# Patient Record
Sex: FEMALE | Race: BLACK OR AFRICAN AMERICAN | NOT HISPANIC OR LATINO | Employment: UNEMPLOYED | URBAN - METROPOLITAN AREA
[De-identification: names, ages, dates, MRNs, and addresses within clinical notes are randomized per-mention and may not be internally consistent; named-entity substitution may affect disease eponyms.]

---

## 2017-03-20 ENCOUNTER — ALLSCRIPTS OFFICE VISIT (OUTPATIENT)
Dept: OTHER | Facility: OTHER | Age: 42
End: 2017-03-20

## 2017-03-20 DIAGNOSIS — N64.4 MASTODYNIA: ICD-10-CM

## 2017-03-20 DIAGNOSIS — D56.3 THALASSEMIA MINOR: ICD-10-CM

## 2017-03-20 DIAGNOSIS — D64.9 ANEMIA: ICD-10-CM

## 2017-03-20 DIAGNOSIS — N64.52 NIPPLE DISCHARGE: ICD-10-CM

## 2017-03-20 DIAGNOSIS — M54.9 DORSALGIA: ICD-10-CM

## 2017-03-24 ENCOUNTER — TRANSCRIBE ORDERS (OUTPATIENT)
Dept: LAB | Facility: HOSPITAL | Age: 42
End: 2017-03-24

## 2017-03-24 ENCOUNTER — HOSPITAL ENCOUNTER (OUTPATIENT)
Dept: ULTRASOUND IMAGING | Facility: CLINIC | Age: 42
Discharge: HOME | End: 2017-03-24
Payer: COMMERCIAL

## 2017-03-24 ENCOUNTER — HOSPITAL ENCOUNTER (OUTPATIENT)
Dept: MAMMOGRAPHY | Facility: CLINIC | Age: 42
Discharge: HOME | End: 2017-03-24
Payer: COMMERCIAL

## 2017-03-24 ENCOUNTER — APPOINTMENT (OUTPATIENT)
Dept: LAB | Facility: HOSPITAL | Age: 42
End: 2017-03-24
Payer: COMMERCIAL

## 2017-03-24 DIAGNOSIS — N64.4 MASTODYNIA: ICD-10-CM

## 2017-03-24 DIAGNOSIS — N64.52 NIPPLE DISCHARGE: ICD-10-CM

## 2017-03-24 DIAGNOSIS — R56.9 SEIZURES (HCC): Primary | ICD-10-CM

## 2017-03-24 DIAGNOSIS — D56.3 THALASSEMIA MINOR: ICD-10-CM

## 2017-03-24 LAB
ALBUMIN SERPL BCP-MCNC: 3.5 G/DL (ref 3.5–5)
ALP SERPL-CCNC: 54 U/L (ref 46–116)
ALT SERPL W P-5'-P-CCNC: 23 U/L (ref 12–78)
ANION GAP SERPL CALCULATED.3IONS-SCNC: 7 MMOL/L (ref 4–13)
AST SERPL W P-5'-P-CCNC: 13 U/L (ref 5–45)
BASOPHILS # BLD AUTO: 0.02 THOUSANDS/ΜL (ref 0–0.1)
BASOPHILS NFR BLD AUTO: 0 % (ref 0–1)
BILIRUB SERPL-MCNC: 0.26 MG/DL (ref 0.2–1)
BUN SERPL-MCNC: 9 MG/DL (ref 5–25)
CALCIUM SERPL-MCNC: 8.8 MG/DL (ref 8.3–10.1)
CHLORIDE SERPL-SCNC: 104 MMOL/L (ref 100–108)
CO2 SERPL-SCNC: 28 MMOL/L (ref 21–32)
CREAT SERPL-MCNC: 0.8 MG/DL (ref 0.6–1.3)
EOSINOPHIL # BLD AUTO: 0.23 THOUSAND/ΜL (ref 0–0.61)
EOSINOPHIL NFR BLD AUTO: 3 % (ref 0–6)
ERYTHROCYTE [DISTWIDTH] IN BLOOD BY AUTOMATED COUNT: 15.6 % (ref 11.6–15.1)
GFR SERPL CREATININE-BSD FRML MDRD: >60 ML/MIN/1.73SQ M
GLUCOSE SERPL-MCNC: 83 MG/DL (ref 65–140)
HCT VFR BLD AUTO: 34.1 % (ref 34.8–46.1)
HGB BLD-MCNC: 11 G/DL (ref 11.5–15.4)
LYMPHOCYTES # BLD AUTO: 2.66 THOUSANDS/ΜL (ref 0.6–4.47)
LYMPHOCYTES NFR BLD AUTO: 39 % (ref 14–44)
MCH RBC QN AUTO: 24.5 PG (ref 26.8–34.3)
MCHC RBC AUTO-ENTMCNC: 32.3 G/DL (ref 31.4–37.4)
MCV RBC AUTO: 76 FL (ref 82–98)
MONOCYTES # BLD AUTO: 0.44 THOUSAND/ΜL (ref 0.17–1.22)
MONOCYTES NFR BLD AUTO: 6 % (ref 4–12)
NEUTROPHILS # BLD AUTO: 3.5 THOUSANDS/ΜL (ref 1.85–7.62)
NEUTS SEG NFR BLD AUTO: 52 % (ref 43–75)
NRBC BLD AUTO-RTO: 0 /100 WBCS
PLATELET # BLD AUTO: 238 THOUSANDS/UL (ref 149–390)
PMV BLD AUTO: 10.5 FL (ref 8.9–12.7)
POTASSIUM SERPL-SCNC: 3.4 MMOL/L (ref 3.5–5.3)
PROLACTIN SERPL-MCNC: 12 NG/ML
PROT SERPL-MCNC: 7.2 G/DL (ref 6.4–8.2)
RBC # BLD AUTO: 4.49 MILLION/UL (ref 3.81–5.12)
SODIUM SERPL-SCNC: 139 MMOL/L (ref 136–145)
T3 SERPL-MCNC: 1.1 NG/ML (ref 0.6–1.8)
T4 FREE SERPL-MCNC: 0.98 NG/DL (ref 0.76–1.46)
TSH SERPL DL<=0.05 MIU/L-ACNC: 0.91 UIU/ML (ref 0.36–3.74)
WBC # BLD AUTO: 6.87 THOUSAND/UL (ref 4.31–10.16)

## 2017-03-24 PROCEDURE — G0204 DX MAMMO INCL CAD BI: HCPCS

## 2017-03-24 PROCEDURE — 76642 ULTRASOUND BREAST LIMITED: CPT

## 2017-03-24 PROCEDURE — 80053 COMPREHEN METABOLIC PANEL: CPT

## 2017-03-24 PROCEDURE — 85025 COMPLETE CBC W/AUTO DIFF WBC: CPT

## 2017-03-24 PROCEDURE — 84146 ASSAY OF PROLACTIN: CPT

## 2017-03-24 PROCEDURE — 84439 ASSAY OF FREE THYROXINE: CPT

## 2017-03-24 PROCEDURE — 84443 ASSAY THYROID STIM HORMONE: CPT

## 2017-03-24 PROCEDURE — 84480 ASSAY TRIIODOTHYRONINE (T3): CPT

## 2017-03-24 PROCEDURE — 36415 COLL VENOUS BLD VENIPUNCTURE: CPT

## 2017-04-08 ENCOUNTER — APPOINTMENT (EMERGENCY)
Dept: RADIOLOGY | Facility: HOSPITAL | Age: 42
End: 2017-04-08
Payer: COMMERCIAL

## 2017-04-08 ENCOUNTER — HOSPITAL ENCOUNTER (EMERGENCY)
Facility: HOSPITAL | Age: 42
Discharge: HOME | End: 2017-04-08
Attending: EMERGENCY MEDICINE | Admitting: EMERGENCY MEDICINE
Payer: COMMERCIAL

## 2017-04-08 VITALS
OXYGEN SATURATION: 98 % | WEIGHT: 193 LBS | RESPIRATION RATE: 24 BRPM | TEMPERATURE: 98.7 F | HEIGHT: 67 IN | DIASTOLIC BLOOD PRESSURE: 101 MMHG | BODY MASS INDEX: 30.29 KG/M2 | SYSTOLIC BLOOD PRESSURE: 140 MMHG | HEART RATE: 97 BPM

## 2017-04-08 DIAGNOSIS — M54.9 BILATERAL BACK PAIN, UNSPECIFIED BACK LOCATION, UNSPECIFIED CHRONICITY: Primary | ICD-10-CM

## 2017-04-08 LAB
ALBUMIN SERPL BCP-MCNC: 3.7 G/DL (ref 3.5–5)
ALP SERPL-CCNC: 73 U/L (ref 46–116)
ALT SERPL W P-5'-P-CCNC: 36 U/L (ref 12–78)
ANION GAP SERPL CALCULATED.3IONS-SCNC: 8 MMOL/L (ref 4–13)
AST SERPL W P-5'-P-CCNC: 28 U/L (ref 5–45)
BASOPHILS # BLD AUTO: 0.03 THOUSANDS/ΜL (ref 0–0.1)
BASOPHILS NFR BLD AUTO: 0 % (ref 0–1)
BILIRUB SERPL-MCNC: 0.22 MG/DL (ref 0.2–1)
BUN SERPL-MCNC: 9 MG/DL (ref 5–25)
CALCIUM SERPL-MCNC: 8.7 MG/DL (ref 8.3–10.1)
CHLORIDE SERPL-SCNC: 105 MMOL/L (ref 100–108)
CO2 SERPL-SCNC: 27 MMOL/L (ref 21–32)
CREAT SERPL-MCNC: 0.81 MG/DL (ref 0.6–1.3)
EOSINOPHIL # BLD AUTO: 0.23 THOUSAND/ΜL (ref 0–0.61)
EOSINOPHIL NFR BLD AUTO: 3 % (ref 0–6)
ERYTHROCYTE [DISTWIDTH] IN BLOOD BY AUTOMATED COUNT: 15.4 % (ref 11.6–15.1)
ETHANOL SERPL-MCNC: 307 MG/DL (ref 0–3)
GFR SERPL CREATININE-BSD FRML MDRD: >60 ML/MIN/1.73SQ M
GLUCOSE SERPL-MCNC: 96 MG/DL (ref 65–140)
HCT VFR BLD AUTO: 39.5 % (ref 34.8–46.1)
HGB BLD-MCNC: 13 G/DL (ref 11.5–15.4)
LYMPHOCYTES # BLD AUTO: 3.46 THOUSANDS/ΜL (ref 0.6–4.47)
LYMPHOCYTES NFR BLD AUTO: 42 % (ref 14–44)
MCH RBC QN AUTO: 24.6 PG (ref 26.8–34.3)
MCHC RBC AUTO-ENTMCNC: 32.9 G/DL (ref 31.4–37.4)
MCV RBC AUTO: 75 FL (ref 82–98)
MONOCYTES # BLD AUTO: 0.59 THOUSAND/ΜL (ref 0.17–1.22)
MONOCYTES NFR BLD AUTO: 7 % (ref 4–12)
NEUTROPHILS # BLD AUTO: 4 THOUSANDS/ΜL (ref 1.85–7.62)
NEUTS SEG NFR BLD AUTO: 48 % (ref 43–75)
NRBC BLD AUTO-RTO: 0 /100 WBCS
PLATELET # BLD AUTO: 328 THOUSANDS/UL (ref 149–390)
PMV BLD AUTO: 9.8 FL (ref 8.9–12.7)
POTASSIUM SERPL-SCNC: 4.1 MMOL/L (ref 3.5–5.3)
PROT SERPL-MCNC: 8.1 G/DL (ref 6.4–8.2)
RBC # BLD AUTO: 5.28 MILLION/UL (ref 3.81–5.12)
SODIUM SERPL-SCNC: 140 MMOL/L (ref 136–145)
WBC # BLD AUTO: 8.34 THOUSAND/UL (ref 4.31–10.16)

## 2017-04-08 PROCEDURE — 99285 EMERGENCY DEPT VISIT HI MDM: CPT

## 2017-04-08 PROCEDURE — 70450 CT HEAD/BRAIN W/O DYE: CPT

## 2017-04-08 PROCEDURE — 72100 X-RAY EXAM L-S SPINE 2/3 VWS: CPT

## 2017-04-08 PROCEDURE — 80320 DRUG SCREEN QUANTALCOHOLS: CPT | Performed by: EMERGENCY MEDICINE

## 2017-04-08 PROCEDURE — 96374 THER/PROPH/DIAG INJ IV PUSH: CPT

## 2017-04-08 PROCEDURE — 85025 COMPLETE CBC W/AUTO DIFF WBC: CPT | Performed by: EMERGENCY MEDICINE

## 2017-04-08 PROCEDURE — 36415 COLL VENOUS BLD VENIPUNCTURE: CPT | Performed by: EMERGENCY MEDICINE

## 2017-04-08 PROCEDURE — 93005 ELECTROCARDIOGRAM TRACING: CPT | Performed by: EMERGENCY MEDICINE

## 2017-04-08 PROCEDURE — 80053 COMPREHEN METABOLIC PANEL: CPT | Performed by: EMERGENCY MEDICINE

## 2017-04-08 RX ORDER — ZIPRASIDONE MESYLATE 20 MG/ML
INJECTION, POWDER, LYOPHILIZED, FOR SOLUTION INTRAMUSCULAR
Status: DISCONTINUED
Start: 2017-04-08 | End: 2017-04-09 | Stop reason: HOSPADM

## 2017-04-08 RX ORDER — ASENAPINE 5 MG/1
5 TABLET SUBLINGUAL 2 TIMES DAILY
Status: DISCONTINUED | OUTPATIENT
Start: 2017-04-09 | End: 2017-04-09 | Stop reason: HOSPADM

## 2017-04-08 RX ORDER — LIDOCAINE 50 MG/G
2 PATCH TOPICAL ONCE
Status: COMPLETED | OUTPATIENT
Start: 2017-04-08 | End: 2017-04-08

## 2017-04-08 RX ORDER — LORAZEPAM 2 MG/ML
INJECTION INTRAMUSCULAR
Status: DISCONTINUED
Start: 2017-04-08 | End: 2017-04-09 | Stop reason: HOSPADM

## 2017-04-08 RX ORDER — KETOROLAC TROMETHAMINE 30 MG/ML
15 INJECTION, SOLUTION INTRAMUSCULAR; INTRAVENOUS ONCE
Status: COMPLETED | OUTPATIENT
Start: 2017-04-08 | End: 2017-04-08

## 2017-04-08 RX ADMIN — KETOROLAC TROMETHAMINE 15 MG: 30 INJECTION, SOLUTION INTRAMUSCULAR at 22:14

## 2017-04-08 RX ADMIN — LIDOCAINE 2 PATCH: 50 PATCH CUTANEOUS at 22:15

## 2017-04-10 LAB
ATRIAL RATE: 96 BPM
P AXIS: 79 DEGREES
PR INTERVAL: 160 MS
QRS AXIS: 33 DEGREES
QRSD INTERVAL: 70 MS
QT INTERVAL: 334 MS
QTC INTERVAL: 421 MS
T WAVE AXIS: -5 DEGREES
VENTRICULAR RATE: 96 BPM

## 2017-04-11 ENCOUNTER — ALLSCRIPTS OFFICE VISIT (OUTPATIENT)
Dept: OTHER | Facility: OTHER | Age: 42
End: 2017-04-11

## 2017-04-11 ENCOUNTER — APPOINTMENT (OUTPATIENT)
Dept: LAB | Facility: CLINIC | Age: 42
End: 2017-04-11
Payer: COMMERCIAL

## 2017-04-11 DIAGNOSIS — D64.9 ANEMIA: ICD-10-CM

## 2017-04-11 LAB
FERRITIN SERPL-MCNC: 44 NG/ML (ref 8–388)
IRON SERPL-MCNC: 52 UG/DL (ref 50–170)

## 2017-04-11 PROCEDURE — 82728 ASSAY OF FERRITIN: CPT

## 2017-04-11 PROCEDURE — 83540 ASSAY OF IRON: CPT

## 2017-04-11 PROCEDURE — 36415 COLL VENOUS BLD VENIPUNCTURE: CPT

## 2017-04-13 ENCOUNTER — ALLSCRIPTS OFFICE VISIT (OUTPATIENT)
Dept: OTHER | Facility: OTHER | Age: 42
End: 2017-04-13

## 2017-04-13 ENCOUNTER — GENERIC CONVERSION - ENCOUNTER (OUTPATIENT)
Dept: OTHER | Facility: OTHER | Age: 42
End: 2017-04-13

## 2017-04-25 ENCOUNTER — APPOINTMENT (OUTPATIENT)
Dept: LAB | Facility: HOSPITAL | Age: 42
End: 2017-04-25
Payer: COMMERCIAL

## 2017-04-25 ENCOUNTER — TRANSCRIBE ORDERS (OUTPATIENT)
Dept: LAB | Facility: HOSPITAL | Age: 42
End: 2017-04-25

## 2017-04-25 ENCOUNTER — TRANSCRIBE ORDERS (OUTPATIENT)
Dept: NEUROLOGY | Facility: AMBULATORY SURGERY CENTER | Age: 42
End: 2017-04-25

## 2017-04-25 ENCOUNTER — ALLSCRIPTS OFFICE VISIT (OUTPATIENT)
Dept: OTHER | Facility: OTHER | Age: 42
End: 2017-04-25

## 2017-04-25 ENCOUNTER — LAB REQUISITION (OUTPATIENT)
Dept: LAB | Facility: HOSPITAL | Age: 42
End: 2017-04-25
Payer: COMMERCIAL

## 2017-04-25 DIAGNOSIS — Z11.3 ENCOUNTER FOR SCREENING FOR INFECTIONS WITH PREDOMINANTLY SEXUAL MODE OF TRANSMISSION: ICD-10-CM

## 2017-04-25 DIAGNOSIS — Z01.419 ENCOUNTER FOR GYNECOLOGICAL EXAMINATION WITHOUT ABNORMAL FINDING: ICD-10-CM

## 2017-04-25 DIAGNOSIS — N92.0 EXCESSIVE AND FREQUENT MENSTRUATION WITH REGULAR CYCLE: ICD-10-CM

## 2017-04-25 LAB
HBV SURFACE AG SER QL: NORMAL
RPR SER QL: NORMAL

## 2017-04-25 PROCEDURE — 87389 HIV-1 AG W/HIV-1&-2 AB AG IA: CPT

## 2017-04-25 PROCEDURE — 87591 N.GONORRHOEAE DNA AMP PROB: CPT | Performed by: NURSE PRACTITIONER

## 2017-04-25 PROCEDURE — 87340 HEPATITIS B SURFACE AG IA: CPT

## 2017-04-25 PROCEDURE — 36415 COLL VENOUS BLD VENIPUNCTURE: CPT

## 2017-04-25 PROCEDURE — 87491 CHLMYD TRACH DNA AMP PROBE: CPT | Performed by: NURSE PRACTITIONER

## 2017-04-25 PROCEDURE — 86592 SYPHILIS TEST NON-TREP QUAL: CPT

## 2017-04-25 PROCEDURE — 87624 HPV HI-RISK TYP POOLED RSLT: CPT | Performed by: NURSE PRACTITIONER

## 2017-04-25 PROCEDURE — 88175 CYTOPATH C/V AUTO FLUID REDO: CPT | Performed by: NURSE PRACTITIONER

## 2017-04-26 LAB
CHLAMYDIA DNA CVX QL NAA+PROBE: NORMAL
HIV 1+2 AB+HIV1 P24 AG SERPL QL IA: NORMAL
N GONORRHOEA DNA GENITAL QL NAA+PROBE: NORMAL

## 2017-04-27 LAB — HPV RRNA GENITAL QL NAA+PROBE: NORMAL

## 2017-05-02 LAB
LAB AP GYN PRIMARY INTERPRETATION: NORMAL
Lab: NORMAL
PATH INTERP SPEC-IMP: NORMAL

## 2017-05-03 ENCOUNTER — HOSPITAL ENCOUNTER (OUTPATIENT)
Dept: RADIOLOGY | Facility: HOSPITAL | Age: 42
Discharge: HOME | End: 2017-05-03
Payer: COMMERCIAL

## 2017-05-03 DIAGNOSIS — N92.0 EXCESSIVE AND FREQUENT MENSTRUATION WITH REGULAR CYCLE: ICD-10-CM

## 2017-05-03 PROCEDURE — 76856 US EXAM PELVIC COMPLETE: CPT

## 2017-05-03 PROCEDURE — 76830 TRANSVAGINAL US NON-OB: CPT

## 2017-05-11 ENCOUNTER — ALLSCRIPTS OFFICE VISIT (OUTPATIENT)
Dept: OTHER | Facility: OTHER | Age: 42
End: 2017-05-11

## 2017-05-11 ENCOUNTER — LAB REQUISITION (OUTPATIENT)
Dept: LAB | Facility: HOSPITAL | Age: 42
End: 2017-05-11
Payer: COMMERCIAL

## 2017-05-11 DIAGNOSIS — R87.619 ABNORMAL CYTOLOGICAL FINDING IN SPECIMEN FROM CERVIX UTERI: ICD-10-CM

## 2017-05-11 PROCEDURE — 88305 TISSUE EXAM BY PATHOLOGIST: CPT | Performed by: OBSTETRICS & GYNECOLOGY

## 2017-05-25 ENCOUNTER — ALLSCRIPTS OFFICE VISIT (OUTPATIENT)
Dept: OTHER | Facility: OTHER | Age: 42
End: 2017-05-25

## 2017-06-12 ENCOUNTER — ALLSCRIPTS OFFICE VISIT (OUTPATIENT)
Dept: OTHER | Facility: OTHER | Age: 42
End: 2017-06-12

## 2017-06-12 ENCOUNTER — LAB REQUISITION (OUTPATIENT)
Dept: LAB | Facility: HOSPITAL | Age: 42
End: 2017-06-12
Payer: COMMERCIAL

## 2017-06-12 DIAGNOSIS — N93.9 ABNORMAL UTERINE AND VAGINAL BLEEDING, UNSPECIFIED: ICD-10-CM

## 2017-06-12 PROCEDURE — 88305 TISSUE EXAM BY PATHOLOGIST: CPT | Performed by: OBSTETRICS & GYNECOLOGY

## 2017-09-01 DIAGNOSIS — R79.0 ABNORMAL LEVEL OF BLOOD MINERAL: ICD-10-CM

## 2017-09-01 DIAGNOSIS — K62.5 HEMORRHAGE OF ANUS AND RECTUM: ICD-10-CM

## 2017-09-01 DIAGNOSIS — N60.01 SOLITARY CYST OF RIGHT BREAST: ICD-10-CM

## 2017-09-01 DIAGNOSIS — N60.02 SOLITARY CYST OF LEFT BREAST: ICD-10-CM

## 2017-09-04 ENCOUNTER — APPOINTMENT (EMERGENCY)
Dept: RADIOLOGY | Facility: HOSPITAL | Age: 42
End: 2017-09-04
Payer: COMMERCIAL

## 2017-09-04 ENCOUNTER — HOSPITAL ENCOUNTER (EMERGENCY)
Facility: HOSPITAL | Age: 42
Discharge: HOME | End: 2017-09-04
Attending: EMERGENCY MEDICINE | Admitting: EMERGENCY MEDICINE
Payer: COMMERCIAL

## 2017-09-04 VITALS
RESPIRATION RATE: 18 BRPM | BODY MASS INDEX: 27.62 KG/M2 | HEIGHT: 67 IN | WEIGHT: 176 LBS | OXYGEN SATURATION: 100 % | SYSTOLIC BLOOD PRESSURE: 148 MMHG | HEART RATE: 63 BPM | DIASTOLIC BLOOD PRESSURE: 75 MMHG | TEMPERATURE: 96.6 F

## 2017-09-04 DIAGNOSIS — R63.0 DECREASED APPETITE: Primary | ICD-10-CM

## 2017-09-04 DIAGNOSIS — F32.A DEPRESSION: ICD-10-CM

## 2017-09-04 DIAGNOSIS — N83.201 CYST OF RIGHT OVARY: ICD-10-CM

## 2017-09-04 DIAGNOSIS — F10.10 ALCOHOL ABUSE: ICD-10-CM

## 2017-09-04 LAB
ANION GAP SERPL CALCULATED.3IONS-SCNC: 7 MMOL/L (ref 4–13)
BASOPHILS # BLD AUTO: 0.03 THOUSANDS/ΜL (ref 0–0.1)
BASOPHILS NFR BLD AUTO: 0 % (ref 0–1)
BUN SERPL-MCNC: 8 MG/DL (ref 5–25)
CALCIUM SERPL-MCNC: 9.1 MG/DL (ref 8.3–10.1)
CHLORIDE SERPL-SCNC: 103 MMOL/L (ref 100–108)
CO2 SERPL-SCNC: 25 MMOL/L (ref 21–32)
CREAT SERPL-MCNC: 0.77 MG/DL (ref 0.6–1.3)
EOSINOPHIL # BLD AUTO: 0.1 THOUSAND/ΜL (ref 0–0.61)
EOSINOPHIL NFR BLD AUTO: 1 % (ref 0–6)
ERYTHROCYTE [DISTWIDTH] IN BLOOD BY AUTOMATED COUNT: 17.9 % (ref 11.6–15.1)
ETHANOL EXG-MCNC: 0 MG/DL
GFR SERPL CREATININE-BSD FRML MDRD: 112 ML/MIN/1.73SQ M
GLUCOSE SERPL-MCNC: 91 MG/DL (ref 65–140)
HCG UR QL: NEGATIVE
HCT VFR BLD AUTO: 35.1 % (ref 34.8–46.1)
HGB BLD-MCNC: 11.5 G/DL (ref 11.5–15.4)
LYMPHOCYTES # BLD AUTO: 2.6 THOUSANDS/ΜL (ref 0.6–4.47)
LYMPHOCYTES NFR BLD AUTO: 29 % (ref 14–44)
MCH RBC QN AUTO: 24.4 PG (ref 26.8–34.3)
MCHC RBC AUTO-ENTMCNC: 32.8 G/DL (ref 31.4–37.4)
MCV RBC AUTO: 74 FL (ref 82–98)
MONOCYTES # BLD AUTO: 0.66 THOUSAND/ΜL (ref 0.17–1.22)
MONOCYTES NFR BLD AUTO: 7 % (ref 4–12)
NEUTROPHILS # BLD AUTO: 5.67 THOUSANDS/ΜL (ref 1.85–7.62)
NEUTS SEG NFR BLD AUTO: 63 % (ref 43–75)
NRBC BLD AUTO-RTO: 0 /100 WBCS
PLATELET # BLD AUTO: 261 THOUSANDS/UL (ref 149–390)
PMV BLD AUTO: 10.5 FL (ref 8.9–12.7)
POTASSIUM SERPL-SCNC: 4.1 MMOL/L (ref 3.5–5.3)
RBC # BLD AUTO: 4.72 MILLION/UL (ref 3.81–5.12)
SODIUM SERPL-SCNC: 135 MMOL/L (ref 136–145)
WBC # BLD AUTO: 9.08 THOUSAND/UL (ref 4.31–10.16)

## 2017-09-04 PROCEDURE — 76830 TRANSVAGINAL US NON-OB: CPT

## 2017-09-04 PROCEDURE — 99285 EMERGENCY DEPT VISIT HI MDM: CPT

## 2017-09-04 PROCEDURE — 81025 URINE PREGNANCY TEST: CPT | Performed by: EMERGENCY MEDICINE

## 2017-09-04 PROCEDURE — 85025 COMPLETE CBC W/AUTO DIFF WBC: CPT | Performed by: EMERGENCY MEDICINE

## 2017-09-04 PROCEDURE — 82075 ASSAY OF BREATH ETHANOL: CPT | Performed by: EMERGENCY MEDICINE

## 2017-09-04 PROCEDURE — 93976 VASCULAR STUDY: CPT

## 2017-09-04 PROCEDURE — 36415 COLL VENOUS BLD VENIPUNCTURE: CPT | Performed by: EMERGENCY MEDICINE

## 2017-09-04 PROCEDURE — 74177 CT ABD & PELVIS W/CONTRAST: CPT

## 2017-09-04 PROCEDURE — 76856 US EXAM PELVIC COMPLETE: CPT

## 2017-09-04 PROCEDURE — 80048 BASIC METABOLIC PNL TOTAL CA: CPT | Performed by: EMERGENCY MEDICINE

## 2017-09-04 RX ORDER — ALBUTEROL SULFATE 90 UG/1
2 AEROSOL, METERED RESPIRATORY (INHALATION) EVERY 6 HOURS PRN
COMMUNITY
End: 2019-03-05

## 2017-09-04 RX ADMIN — IOHEXOL 100 ML: 350 INJECTION, SOLUTION INTRAVENOUS at 11:33

## 2017-09-08 ENCOUNTER — ALLSCRIPTS OFFICE VISIT (OUTPATIENT)
Dept: OTHER | Facility: OTHER | Age: 42
End: 2017-09-08

## 2017-09-08 ENCOUNTER — APPOINTMENT (OUTPATIENT)
Dept: LAB | Facility: CLINIC | Age: 42
End: 2017-09-08
Payer: COMMERCIAL

## 2017-09-08 DIAGNOSIS — K62.5 HEMORRHAGE OF ANUS AND RECTUM: ICD-10-CM

## 2017-09-08 LAB
ANION GAP SERPL CALCULATED.3IONS-SCNC: 8 MMOL/L (ref 4–13)
BUN SERPL-MCNC: 7 MG/DL (ref 5–25)
CALCIUM SERPL-MCNC: 8.9 MG/DL (ref 8.3–10.1)
CHLORIDE SERPL-SCNC: 105 MMOL/L (ref 100–108)
CO2 SERPL-SCNC: 23 MMOL/L (ref 21–32)
CREAT SERPL-MCNC: 0.72 MG/DL (ref 0.6–1.3)
ERYTHROCYTE [DISTWIDTH] IN BLOOD BY AUTOMATED COUNT: 18.2 % (ref 11.6–15.1)
GFR SERPL CREATININE-BSD FRML MDRD: 121 ML/MIN/1.73SQ M
GLUCOSE P FAST SERPL-MCNC: 87 MG/DL (ref 65–99)
HCT VFR BLD AUTO: 36.5 % (ref 34.8–46.1)
HGB BLD-MCNC: 11.8 G/DL (ref 11.5–15.4)
MCH RBC QN AUTO: 24.5 PG (ref 26.8–34.3)
MCHC RBC AUTO-ENTMCNC: 32.3 G/DL (ref 31.4–37.4)
MCV RBC AUTO: 76 FL (ref 82–98)
PLATELET # BLD AUTO: 295 THOUSANDS/UL (ref 149–390)
PMV BLD AUTO: 10.9 FL (ref 8.9–12.7)
POTASSIUM SERPL-SCNC: 3.8 MMOL/L (ref 3.5–5.3)
RBC # BLD AUTO: 4.82 MILLION/UL (ref 3.81–5.12)
SODIUM SERPL-SCNC: 136 MMOL/L (ref 136–145)
WBC # BLD AUTO: 8.09 THOUSAND/UL (ref 4.31–10.16)

## 2017-09-08 PROCEDURE — 82785 ASSAY OF IGE: CPT

## 2017-09-08 PROCEDURE — 80048 BASIC METABOLIC PNL TOTAL CA: CPT

## 2017-09-08 PROCEDURE — 83550 IRON BINDING TEST: CPT

## 2017-09-08 PROCEDURE — 36415 COLL VENOUS BLD VENIPUNCTURE: CPT

## 2017-09-08 PROCEDURE — 83540 ASSAY OF IRON: CPT

## 2017-09-08 PROCEDURE — 85027 COMPLETE CBC AUTOMATED: CPT

## 2017-09-08 PROCEDURE — 86003 ALLG SPEC IGE CRUDE XTRC EA: CPT

## 2017-09-11 LAB
A ALTERNATA IGE QN: <0.1 KUA/I
A FUMIGATUS IGE QN: <0.1 KUA/I
ALLERGEN COMMENT: ABNORMAL
ALLERGEN COMMENT: ABNORMAL
ALMOND IGE QN: <0.1 KUA/I
BERMUDA GRASS IGE QN: <0.1 KUA/I
BOXELDER IGE QN: <0.1 KUA/I
C HERBARUM IGE QN: <0.1 KUA/I
CASHEW NUT IGE QN: <0.1 KUA/I
CAT DANDER IGE QN: <0.1 KUA/I
CMN PIGWEED IGE QN: <0.1 KUA/I
CODFISH IGE QN: <0.1 KUA/I
COMMON RAGWEED IGE QN: <0.1 KUA/I
COTTONWOOD IGE QN: <0.1 KUA/I
D FARINAE IGE QN: 6.3 KUA/I
D PTERONYSS IGE QN: 5.48 KUA/I
DOG DANDER IGE QN: <0.1 KUA/I
EGG WHITE IGE QN: <0.1 KUA/I
GLUTEN IGE QN: <0.1 KUA/I
HAZELNUT IGE QN: <0.1 KUA/L
IRON SATN MFR SERPL: 6 %
IRON SERPL-MCNC: 26 UG/DL (ref 50–170)
LONDON PLANE IGE QN: <0.1 KUA/I
MILK IGE QN: <0.1 KUA/I
MOUSE URINE PROT IGE QN: <0.1 KUA/I
MT JUNIPER IGE QN: <0.1 KUA/I
MUGWORT IGE QN: <0.1 KUA/I
P NOTATUM IGE QN: <0.1 KUA/I
PEANUT IGE QN: <0.1 KUA/I
ROACH IGE QN: 4.66 KUA/I
SALMON IGE QN: <0.1 KUA/I
SCALLOP IGE QN: <0.1 KUA/L
SESAME SEED IGE QN: <0.1 KUA/I
SHEEP SORREL IGE QN: <0.1 KUA/I
SHRIMP IGE QN: 1.91 KUA/L
SILVER BIRCH IGE QN: <0.1 KUA/I
SOYBEAN IGE QN: <0.1 KUA/I
TIBC SERPL-MCNC: 443 UG/DL (ref 250–450)
TIMOTHY IGE QN: <0.1 KUA/I
TOTAL IGE SMQN RAST: 297 KU/L (ref 0–113)
TOTAL IGE SMQN RAST: 297 KU/L (ref 0–113)
TUNA IGE QN: <0.1 KUA/I
WALNUT IGE QN: <0.1 KUA/I
WALNUT IGE QN: <0.1 KUA/I
WHEAT IGE QN: <0.1 KUA/I
WHITE ASH IGE QN: <0.1 KUA/I
WHITE ELM IGE QN: <0.1 KUA/I
WHITE MULBERRY IGE QN: <0.1 KUA/I
WHITE OAK IGE QN: <0.1 KUA/I

## 2017-09-15 ENCOUNTER — ANESTHESIA (OUTPATIENT)
Dept: GASTROENTEROLOGY | Facility: HOSPITAL | Age: 42
End: 2017-09-15
Payer: COMMERCIAL

## 2017-09-15 ENCOUNTER — ANESTHESIA EVENT (OUTPATIENT)
Dept: GASTROENTEROLOGY | Facility: HOSPITAL | Age: 42
End: 2017-09-15
Payer: COMMERCIAL

## 2017-09-15 ENCOUNTER — GENERIC CONVERSION - ENCOUNTER (OUTPATIENT)
Dept: OTHER | Facility: OTHER | Age: 42
End: 2017-09-15

## 2017-09-15 ENCOUNTER — HOSPITAL ENCOUNTER (OUTPATIENT)
Facility: HOSPITAL | Age: 42
Setting detail: OUTPATIENT SURGERY
Discharge: HOME | End: 2017-09-15
Attending: INTERNAL MEDICINE | Admitting: INTERNAL MEDICINE
Payer: COMMERCIAL

## 2017-09-15 VITALS
DIASTOLIC BLOOD PRESSURE: 86 MMHG | TEMPERATURE: 97.3 F | WEIGHT: 183 LBS | HEART RATE: 62 BPM | SYSTOLIC BLOOD PRESSURE: 141 MMHG | HEIGHT: 67 IN | OXYGEN SATURATION: 100 % | BODY MASS INDEX: 28.72 KG/M2 | RESPIRATION RATE: 18 BRPM

## 2017-09-15 DIAGNOSIS — K62.5 RECTAL BLEEDING: ICD-10-CM

## 2017-09-15 PROCEDURE — 88305 TISSUE EXAM BY PATHOLOGIST: CPT | Performed by: INTERNAL MEDICINE

## 2017-09-15 RX ORDER — PROPOFOL 10 MG/ML
INJECTION, EMULSION INTRAVENOUS AS NEEDED
Status: DISCONTINUED | OUTPATIENT
Start: 2017-09-15 | End: 2017-09-15 | Stop reason: SURG

## 2017-09-15 RX ORDER — PROPOFOL 10 MG/ML
INJECTION, EMULSION INTRAVENOUS CONTINUOUS PRN
Status: DISCONTINUED | OUTPATIENT
Start: 2017-09-15 | End: 2017-09-15 | Stop reason: SURG

## 2017-09-15 RX ORDER — SODIUM CHLORIDE 9 MG/ML
INJECTION, SOLUTION INTRAVENOUS CONTINUOUS PRN
Status: DISCONTINUED | OUTPATIENT
Start: 2017-09-15 | End: 2017-09-15 | Stop reason: SURG

## 2017-09-15 RX ORDER — SODIUM CHLORIDE 9 MG/ML
50 INJECTION, SOLUTION INTRAVENOUS CONTINUOUS
Status: CANCELLED | OUTPATIENT
Start: 2017-09-15

## 2017-09-15 RX ADMIN — PROPOFOL 50 MG: 10 INJECTION, EMULSION INTRAVENOUS at 13:31

## 2017-09-15 RX ADMIN — SODIUM CHLORIDE: 0.9 INJECTION, SOLUTION INTRAVENOUS at 13:17

## 2017-09-15 RX ADMIN — PROPOFOL 100 MCG/KG/MIN: 10 INJECTION, EMULSION INTRAVENOUS at 13:31

## 2017-09-21 ENCOUNTER — GENERIC CONVERSION - ENCOUNTER (OUTPATIENT)
Dept: OTHER | Facility: OTHER | Age: 42
End: 2017-09-21

## 2017-09-25 ENCOUNTER — HOSPITAL ENCOUNTER (OUTPATIENT)
Dept: ULTRASOUND IMAGING | Facility: CLINIC | Age: 42
Discharge: HOME | End: 2017-09-25
Payer: COMMERCIAL

## 2017-10-09 ENCOUNTER — ALLSCRIPTS OFFICE VISIT (OUTPATIENT)
Dept: OTHER | Facility: OTHER | Age: 42
End: 2017-10-09

## 2017-11-02 ENCOUNTER — HOSPITAL ENCOUNTER (OUTPATIENT)
Dept: ULTRASOUND IMAGING | Facility: CLINIC | Age: 42
Discharge: HOME | End: 2017-11-02
Payer: COMMERCIAL

## 2017-11-02 ENCOUNTER — TRANSCRIBE ORDERS (OUTPATIENT)
Dept: MAMMOGRAPHY | Facility: CLINIC | Age: 42
End: 2017-11-02

## 2017-11-02 DIAGNOSIS — N60.02 SOLITARY CYST OF LEFT BREAST: ICD-10-CM

## 2017-11-02 DIAGNOSIS — N60.01 SOLITARY CYST OF RIGHT BREAST: ICD-10-CM

## 2017-11-02 DIAGNOSIS — Z12.39 SCREENING BREAST EXAMINATION: Primary | ICD-10-CM

## 2017-11-02 PROCEDURE — 76642 ULTRASOUND BREAST LIMITED: CPT

## 2018-01-10 NOTE — RESULT NOTES
Discussion/Summary   Removed polyp was a benign polyp  Repeat colonoscopy should be at the age of 50  Verified Results  (1) TISSUE EXAM [id number] 01:47PM Leslie Roblero     Test Name Result Flag Reference   LAB AP CASE REPORT (Report)     Surgical Pathology Report             Case: [id number]                   Authorizing Provider: Gallito Lundberg MD      Collected:      09/15/2017 1347        Ordering Location:   [address] [place] [address]   [facility]      [facility]                               Pathologist:      Eloy Dhillon MD                                Specimen:  Polyp, Colorectal, Transverse colon polyp   LAB AP FINAL DIAGNOSIS      A  Polyp, Colorectal, Transverse colon polyp:  -Polypoid fragment of colonic mucosa with focal hyperplastic surface   change suggestive of early hyperplastic polyp  Electronically signed by Eloy Dhillon MD on 9/20/2017 at 4:07 PM   LAB AP NOTE      Interpretation performed at [facility]   LAB AP SURGICAL ADDITIONAL INFORMATION (Report)     All controls performed with the immunohistochemical stains reported above   reacted appropriately  These tests were developed and their performance   characteristics determined by [facility] or   [facility]  They may not be cleared or approved by the [place]  Food and Drug Administration  The FDA has determined that such clearance   or approval is not necessary  These tests are used for clinical purposes  They should not be regarded as investigational or for research  This   laboratory has been approved by [place] 88, designated as a high-complexity   laboratory and is qualified to perform these tests  LAB AP GROSS DESCRIPTION (Report)     A  The specimen is received in formalin, labeled with the patient's name   and hospital number, and is designated transverse colon polyp   The   specimen consists of a tan soft tissue fragment measuring 0 5 cm  Entirely   submitted  One cassette  Note: The estimated total formalin fixation time based upon information   provided by the submitting clinician and the standard processing schedule   is over 72 hours      MAC

## 2018-01-10 NOTE — MISCELLANEOUS
Provider Comments  Provider Comments: This is the patients 1st no show to Neurology  No show letter mailed to patient and scanned into patients chart   Kelli Lucas MA      Signatures   Electronically signed by : JANNIE Kaplan ; Oct 30 2017  8:18AM EST                       (Author)

## 2018-01-12 VITALS
BODY MASS INDEX: 29.07 KG/M2 | WEIGHT: 185.2 LBS | SYSTOLIC BLOOD PRESSURE: 142 MMHG | HEIGHT: 67 IN | DIASTOLIC BLOOD PRESSURE: 94 MMHG

## 2018-01-13 VITALS
HEIGHT: 66 IN | BODY MASS INDEX: 29.09 KG/M2 | WEIGHT: 181 LBS | DIASTOLIC BLOOD PRESSURE: 88 MMHG | SYSTOLIC BLOOD PRESSURE: 120 MMHG

## 2018-01-13 NOTE — MISCELLANEOUS
Message  Return to work or school:   Honorio Elijah is under my professional care   She was seen in my office on 4/13/2017   She is able to return to work on  4/13/2017            [address]    Electronically signed by : Andrew Laguna, ; Apr 13 2017  9:40AM EST                       (Author)

## 2018-01-14 VITALS
DIASTOLIC BLOOD PRESSURE: 88 MMHG | HEART RATE: 70 BPM | BODY MASS INDEX: 29.27 KG/M2 | HEIGHT: 67 IN | SYSTOLIC BLOOD PRESSURE: 126 MMHG | TEMPERATURE: 98.9 F | WEIGHT: 186.51 LBS

## 2018-01-14 VITALS
HEIGHT: 67 IN | BODY MASS INDEX: 30.04 KG/M2 | WEIGHT: 191.38 LBS | SYSTOLIC BLOOD PRESSURE: 144 MMHG | DIASTOLIC BLOOD PRESSURE: 94 MMHG

## 2018-01-14 VITALS
HEART RATE: 88 BPM | TEMPERATURE: 97.9 F | WEIGHT: 184.3 LBS | DIASTOLIC BLOOD PRESSURE: 84 MMHG | HEIGHT: 67 IN | SYSTOLIC BLOOD PRESSURE: 118 MMHG | BODY MASS INDEX: 28.93 KG/M2

## 2018-01-15 VITALS
SYSTOLIC BLOOD PRESSURE: 134 MMHG | DIASTOLIC BLOOD PRESSURE: 74 MMHG | HEIGHT: 67 IN | HEART RATE: 94 BPM | BODY MASS INDEX: 29.82 KG/M2 | WEIGHT: 190 LBS

## 2018-01-15 VITALS
HEART RATE: 80 BPM | DIASTOLIC BLOOD PRESSURE: 80 MMHG | TEMPERATURE: 97.7 F | BODY MASS INDEX: 30.51 KG/M2 | HEIGHT: 66 IN | SYSTOLIC BLOOD PRESSURE: 136 MMHG | WEIGHT: 189.82 LBS

## 2018-01-16 NOTE — MISCELLANEOUS
Reason For Visit  Reason For Visit Free Text Note Form: Assistance with Mental Health Referral     Case Management Documentation [facility]:   Information obtained from the patient and medical record  Other needs and concerns include psych and Depression  Patient's financial status employed  She is also dealing with additional issues such as chronic/terminal disease  Action Plan: supportive counseling/advocacy, information provided and [facility]  plan reviewed  Progress Note  this 45 y/o female pt seen tis date for assistance with MH referral  Pt is a single the mother of 5 children who works full-time, has had recent death in the family, work and health stress  Pt reports she had a hx of depression in the past and has required OP MH services  Pt realizes she needs help again an is receptive to services  No SIIHI present  SW has reviewed with pt a few resources who accept her insurance and pt has chosen [facility]  Pt has an appointment on 4/17/17 at 8:00 am  Supportive counseling provided as well as info re the [employer] Act and info re: OP PT services  Pt to f/u with SW as needed  Active Problems    1  Anemia (285 9) (D64 9)   2  Back pain (724 5) (M54 9)   3  Bilateral breast cysts (610 0) (N60 01,N60 02)   4  Breast discharge (611 79) (N64 52)   5  Depression (311) (F32 9)   6  History of esophageal reflux (V12 79) (Z87 19)   7  Need for influenza vaccination (V04 81) (Z23)   8  Seizure-like activity (780 39) (R56 9)   9  Thalassemia trait, alpha (282 46) (D56 3)    Current Meds   1  Ibuprofen 600 MG Oral Tablet; Therapy: [id number] to Recorded    Allergies    1  Doxycycline Hyclate CAPS   2   Percocet TABS    Future Appointments    Date/Time Provider Specialty Site   04/25/2017 08:15 AM [address] [facility] Schedule  [facility]  Gabino Villalba [address]     Signatures   Electronically signed by : Ciarra Alejandra LCSW; Apr 13 2017  9:53AM EST                       (Author)

## 2018-05-01 DIAGNOSIS — N60.01 SOLITARY CYST OF RIGHT BREAST: ICD-10-CM

## 2018-05-02 ENCOUNTER — HOSPITAL ENCOUNTER (OUTPATIENT)
Dept: MAMMOGRAPHY | Facility: CLINIC | Age: 43
Discharge: HOME | End: 2018-05-02
Payer: COMMERCIAL

## 2018-05-02 ENCOUNTER — HOSPITAL ENCOUNTER (OUTPATIENT)
Dept: ULTRASOUND IMAGING | Facility: CLINIC | Age: 43
Discharge: HOME | End: 2018-05-02

## 2018-05-02 ENCOUNTER — HOSPITAL ENCOUNTER (OUTPATIENT)
Dept: ULTRASOUND IMAGING | Facility: CLINIC | Age: 43
Discharge: HOME | End: 2018-05-02
Payer: COMMERCIAL

## 2018-05-02 DIAGNOSIS — R92.8 ABNORMAL ULTRASOUND OF BREAST: Primary | ICD-10-CM

## 2018-05-02 DIAGNOSIS — R92.8 ABNORMAL MAMMOGRAM: ICD-10-CM

## 2018-05-02 DIAGNOSIS — Z12.39 SCREENING BREAST EXAMINATION: ICD-10-CM

## 2018-05-02 DIAGNOSIS — N60.01 SOLITARY CYST OF RIGHT BREAST: ICD-10-CM

## 2018-05-02 PROCEDURE — 77067 SCR MAMMO BI INCL CAD: CPT

## 2018-05-02 PROCEDURE — G0279 TOMOSYNTHESIS, MAMMO: HCPCS

## 2018-05-02 PROCEDURE — 77065 DX MAMMO INCL CAD UNI: CPT

## 2018-05-02 PROCEDURE — 76642 ULTRASOUND BREAST LIMITED: CPT

## 2018-08-18 ENCOUNTER — HOSPITAL ENCOUNTER (EMERGENCY)
Facility: HOSPITAL | Age: 43
Discharge: HOME | End: 2018-08-19
Attending: EMERGENCY MEDICINE | Admitting: EMERGENCY MEDICINE
Payer: COMMERCIAL

## 2018-08-18 VITALS
OXYGEN SATURATION: 100 % | HEART RATE: 94 BPM | DIASTOLIC BLOOD PRESSURE: 107 MMHG | RESPIRATION RATE: 18 BRPM | SYSTOLIC BLOOD PRESSURE: 168 MMHG | TEMPERATURE: 97.9 F

## 2018-08-18 DIAGNOSIS — N93.9 VAGINAL BLEEDING: Primary | ICD-10-CM

## 2018-08-18 DIAGNOSIS — N93.8 DYSFUNCTIONAL UTERINE BLEEDING: ICD-10-CM

## 2018-08-18 LAB
ALBUMIN SERPL BCP-MCNC: 3.8 G/DL (ref 3.5–5)
ALP SERPL-CCNC: 50 U/L (ref 46–116)
ALT SERPL W P-5'-P-CCNC: 18 U/L (ref 12–78)
ANION GAP SERPL CALCULATED.3IONS-SCNC: 5 MMOL/L (ref 4–13)
AST SERPL W P-5'-P-CCNC: 10 U/L (ref 5–45)
BACTERIA UR QL AUTO: ABNORMAL /HPF
BASOPHILS # BLD AUTO: 0.05 THOUSANDS/ΜL (ref 0–0.1)
BASOPHILS NFR BLD AUTO: 1 % (ref 0–1)
BILIRUB SERPL-MCNC: 0.23 MG/DL (ref 0.2–1)
BILIRUB UR QL STRIP: NEGATIVE
BUN SERPL-MCNC: 9 MG/DL (ref 5–25)
CALCIUM SERPL-MCNC: 8.8 MG/DL (ref 8.3–10.1)
CHLORIDE SERPL-SCNC: 106 MMOL/L (ref 100–108)
CLARITY UR: CLEAR
CO2 SERPL-SCNC: 26 MMOL/L (ref 21–32)
COLOR UR: YELLOW
COLOR, POC: YELLOW
CREAT SERPL-MCNC: 0.7 MG/DL (ref 0.6–1.3)
EOSINOPHIL # BLD AUTO: 0.25 THOUSAND/ΜL (ref 0–0.61)
EOSINOPHIL NFR BLD AUTO: 3 % (ref 0–6)
ERYTHROCYTE [DISTWIDTH] IN BLOOD BY AUTOMATED COUNT: 14.9 % (ref 11.6–15.1)
EXT PREG TEST URINE: NEGATIVE
GFR SERPL CREATININE-BSD FRML MDRD: 124 ML/MIN/1.73SQ M
GLUCOSE SERPL-MCNC: 97 MG/DL (ref 65–140)
GLUCOSE UR STRIP-MCNC: NEGATIVE MG/DL
HCT VFR BLD AUTO: 35.8 % (ref 34.8–46.1)
HGB BLD-MCNC: 11.2 G/DL (ref 11.5–15.4)
HGB UR QL STRIP.AUTO: ABNORMAL
IMM GRANULOCYTES # BLD AUTO: 0.02 THOUSAND/UL (ref 0–0.2)
IMM GRANULOCYTES NFR BLD AUTO: 0 % (ref 0–2)
KETONES UR STRIP-MCNC: NEGATIVE MG/DL
LEUKOCYTE ESTERASE UR QL STRIP: ABNORMAL
LYMPHOCYTES # BLD AUTO: 3.14 THOUSANDS/ΜL (ref 0.6–4.47)
LYMPHOCYTES NFR BLD AUTO: 37 % (ref 14–44)
MCH RBC QN AUTO: 25.7 PG (ref 26.8–34.3)
MCHC RBC AUTO-ENTMCNC: 31.3 G/DL (ref 31.4–37.4)
MCV RBC AUTO: 82 FL (ref 82–98)
MONOCYTES # BLD AUTO: 0.45 THOUSAND/ΜL (ref 0.17–1.22)
MONOCYTES NFR BLD AUTO: 5 % (ref 4–12)
NEUTROPHILS # BLD AUTO: 4.64 THOUSANDS/ΜL (ref 1.85–7.62)
NEUTS SEG NFR BLD AUTO: 54 % (ref 43–75)
NITRITE UR QL STRIP: NEGATIVE
NON-SQ EPI CELLS URNS QL MICRO: ABNORMAL /HPF
NRBC BLD AUTO-RTO: 0 /100 WBCS
PH UR STRIP.AUTO: 7 [PH] (ref 4.5–8)
PLATELET # BLD AUTO: 293 THOUSANDS/UL (ref 149–390)
PMV BLD AUTO: 10.5 FL (ref 8.9–12.7)
POTASSIUM SERPL-SCNC: 3.5 MMOL/L (ref 3.5–5.3)
PROT SERPL-MCNC: 7.7 G/DL (ref 6.4–8.2)
PROT UR STRIP-MCNC: NEGATIVE MG/DL
RBC # BLD AUTO: 4.36 MILLION/UL (ref 3.81–5.12)
RBC #/AREA URNS AUTO: ABNORMAL /HPF
SODIUM SERPL-SCNC: 137 MMOL/L (ref 136–145)
SP GR UR STRIP.AUTO: <=1.005 (ref 1–1.03)
UROBILINOGEN UR QL STRIP.AUTO: 0.2 E.U./DL
WBC # BLD AUTO: 8.55 THOUSAND/UL (ref 4.31–10.16)
WBC #/AREA URNS AUTO: ABNORMAL /HPF

## 2018-08-18 PROCEDURE — 85025 COMPLETE CBC W/AUTO DIFF WBC: CPT | Performed by: EMERGENCY MEDICINE

## 2018-08-18 PROCEDURE — 81001 URINALYSIS AUTO W/SCOPE: CPT

## 2018-08-18 PROCEDURE — 80053 COMPREHEN METABOLIC PANEL: CPT | Performed by: EMERGENCY MEDICINE

## 2018-08-18 PROCEDURE — 36415 COLL VENOUS BLD VENIPUNCTURE: CPT | Performed by: EMERGENCY MEDICINE

## 2018-08-18 PROCEDURE — 81025 URINE PREGNANCY TEST: CPT | Performed by: EMERGENCY MEDICINE

## 2018-08-18 RX ORDER — ALBUTEROL SULFATE 90 UG/1
2 AEROSOL, METERED RESPIRATORY (INHALATION) EVERY 6 HOURS PRN
COMMUNITY
End: 2019-03-05

## 2018-08-18 RX ORDER — FLUTICASONE PROPIONATE 50 MCG
1 SPRAY, SUSPENSION (ML) NASAL DAILY
COMMUNITY
End: 2020-10-30 | Stop reason: ALTCHOICE

## 2018-08-19 PROCEDURE — 87491 CHLMYD TRACH DNA AMP PROBE: CPT | Performed by: EMERGENCY MEDICINE

## 2018-08-19 PROCEDURE — 99284 EMERGENCY DEPT VISIT MOD MDM: CPT

## 2018-08-19 PROCEDURE — 87591 N.GONORRHOEAE DNA AMP PROB: CPT | Performed by: EMERGENCY MEDICINE

## 2018-08-19 NOTE — ED ATTENDING ATTESTATION
Edil Delgado DO, saw and evaluated the patient  I have discussed the patient with the resident/non-physician practitioner and agree with the resident's/non-physician practitioner's findings, Plan of Care, and MDM as documented in the resident's/non-physician practitioner's note, except where noted  All available labs and Radiology studies were reviewed  At this point I agree with the current assessment done in the Emergency Department  I have conducted an independent evaluation of this patient a history and physical is as follows:    42 yo female presents for evaluation of vaginal bleeding for over a week  Bleeding has slowed down, but pt worried that she is pregnant because her  has been "doing some freaky stuff"  Then pt thought maybe she left a tampon in, so she googled how to remove it  She reached up into her vagina and felt a "lump" but no string  Pt denies CP/SOB, lightheadedness, abd pain  Pt has hx of anemia, is supposed to be on iron but is not taking it  Imp: AUB   Plan: UA, upreg, CBC, pelvic exam       Critical Care Time  CritCare Time    Procedures

## 2018-08-19 NOTE — DISCHARGE INSTRUCTIONS
Dysfunctional Uterine Bleeding   WHAT YOU NEED TO KNOW:   Dysfunctional uterine bleeding (DUB) is abnormal uterine bleeding that is caused by a problem with your hormones  You may have bleeding from your uterus at times other than your normal monthly period  Your monthly periods may last longer or shorter, and bleeding may be heavier or lighter than usual    DISCHARGE INSTRUCTIONS:   Medicines:   · Hormones  help decrease bleeding by making your monthly periods more regular  Sometimes this medicine may be given as birth control pills  · NSAIDs  help decrease swelling, pain, and fever  This medicine is available with or without a doctor's order  NSAIDs can cause stomach bleeding or kidney problems in certain people  If you take blood thinner medicine, always ask your healthcare provider if NSAIDs are safe for you  Always read the medicine label and follow directions  · Iron supplements  may be given if your blood iron level decreases because of heavy bleeding  Iron may make you constipated  Ask your healthcare provider for ways to prevent or treat constipation  Iron may also make your bowel movements turn dark or black  · Take your medicine as directed  Contact your healthcare provider if you think your medicine is not helping or if you have side effects  Tell him or her if you are allergic to any medicine  Keep a list of the medicines, vitamins, and herbs you take  Include the amounts, and when and why you take them  Bring the list or the pill bottles to follow-up visits  Carry your medicine list with you in case of an emergency  Follow up with your healthcare provider as directed:  Write down your questions so you remember to ask them during your visits  Self-care:   · Apply heat  on your lower abdomen for 20 to 30 minutes every 2 hours for as many days as directed  Heat helps decrease pain and muscle spasms  · Include foods high in iron  if needed   Examples of foods high in iron are leafy green vegetables, beef, pork, liver, eggs, and whole-grain breads and cereals  · Keep a diary of your menstrual cycles  Keep track of the number of tampons or pads you use each day  · Talk to your healthcare provider before you start a weight loss program   You may need to wait until the abnormal bleeding has stopped before you try to lose weight  The amount of iron in your blood should be normal before you lose weight  Contact your healthcare provider if:   · You need to change your sanitary pad or tampon more than once an hour  · Your medicine causes nausea, vomiting, or diarrhea  · You have questions or concerns about your condition or care  Return to the emergency department if:   · You continue to bleed heavily, or you feel faint  © 2017 [employer]  Information is for End User's use only and may not be sold, redistributed or otherwise used for commercial purposes  All illustrations and images included in CareNotes® are the copyrighted property of A [employer] M , Inc  or [employer]  The above information is an  only  It is not intended as medical advice for individual conditions or treatments  Talk to your doctor, nurse or pharmacist before following any medical regimen to see if it is safe and effective for you

## 2018-08-20 LAB
CHLAMYDIA DNA CVX QL NAA+PROBE: NORMAL
N GONORRHOEA DNA GENITAL QL NAA+PROBE: NORMAL

## 2018-08-20 NOTE — ED PROVIDER NOTES
History  Chief Complaint   Patient presents with    Vaginal Bleeding     Pt states she was late with her menstral perdios in , pt then got her period on  and continues until   Pt complains of back pain and large clots  Patient is a 41year old female who presents with vaginal bleeding  Reports that initially had an irregularly long menstrual cycle that was heavier than baseline, then had a few days of no bleeding  Restarted bleeding with heavy amount, clots, changing tampons every 4-5 hours  Reports that she is concerned that she may be pregant because her  "has been doing freaky stuff", but also that they do not have sexual intercourse frequently and she has not had intercrouse since taking a pregancy test  Concerned that she left a tampon in the vagina, googled how to remove- when reaching into vagina believes she felt a lump, but could not feel tampon  Denies any vaginal discharge, abdominal pain, dysuria, fevers, chills, shortness of breath, palpitations, lightheadedness  Of note, patient has a history of anemia- non-compliant with iron supplementation  Assessment and Plan: vaginal bleeding  rule out pregnancy  Will perform pelvic exam to assess for tampon  Labs to evaluate hemoglobin for acute anemia  Pelvic exam unremarkable  Follow up OBGYN  Prior to Admission Medications   Prescriptions Last Dose Informant Patient Reported? Taking? Lansoprazole (PREVACID PO)   Yes Yes   Sig: Take by mouth   Montelukast Sodium (SINGULAIR PO)   Yes Yes   Sig: Take by mouth   albuterol (PROVENTIL HFA,VENTOLIN HFA) 90 mcg/act inhaler   Yes Yes   Sig: Inhale 2 puffs every 6 (six) hours as needed for wheezing   fluticasone (FLONASE) 50 mcg/act nasal spray   Yes Yes   Si spray into each nostril daily      Facility-Administered Medications: None       History reviewed  No pertinent past medical history  History reviewed  No pertinent surgical history  History reviewed  No pertinent family history  I have reviewed and agree with the history as documented  Social History   Substance Use Topics    Smoking status: Never Smoker    Smokeless tobacco: Never Used    Alcohol use No        Review of Systems   Constitutional: Negative for chills and fever  HENT: Negative for congestion and rhinorrhea  Eyes: Negative for photophobia and visual disturbance  Respiratory: Negative for chest tightness and shortness of breath  Cardiovascular: Negative for chest pain and palpitations  Gastrointestinal: Negative for abdominal pain, blood in stool, constipation, diarrhea, nausea and vomiting  Genitourinary: Positive for vaginal bleeding  Negative for dysuria, hematuria, pelvic pain, vaginal discharge and vaginal pain  Musculoskeletal: Negative for back pain and neck pain  Skin: Negative for pallor and rash  Neurological: Negative for dizziness, weakness, light-headedness, numbness and headaches  Physical Exam  ED Triage Vitals [08/18/18 2140]   Temperature Pulse Respirations Blood Pressure SpO2   97 9 °F (36 6 °C) 94 18 (!) 168/107 100 %      Temp Source Heart Rate Source Patient Position - Orthostatic VS BP Location FiO2 (%)   Tympanic -- -- -- --      Pain Score       No Pain           Orthostatic Vital Signs  Vitals:    08/18/18 2140   BP: (!) 168/107   Pulse: 94       Physical Exam   Constitutional: She is oriented to person, place, and time  She appears well-developed and well-nourished  No distress  HENT:   Head: Normocephalic and atraumatic  Right Ear: External ear normal    Left Ear: External ear normal    Nose: Nose normal    Mouth/Throat: Oropharynx is clear and moist    Eyes: Conjunctivae and EOM are normal  Pupils are equal, round, and reactive to light  Neck: Normal range of motion  Neck supple  Cardiovascular: Normal rate, regular rhythm, normal heart sounds and intact distal pulses  Exam reveals no gallop and no friction rub      No murmur heard   Pulmonary/Chest: Effort normal and breath sounds normal  No respiratory distress  She has no wheezes  She has no rales  She exhibits no tenderness  Abdominal: Soft  Bowel sounds are normal  She exhibits no distension  There is no tenderness  There is no rebound and no guarding  Genitourinary: Vagina normal  Cervix exhibits no motion tenderness, no discharge and no friability  Right adnexum displays no mass, no tenderness and no fullness  Left adnexum displays no mass, no tenderness and no fullness  No erythema, tenderness or bleeding in the vagina  No foreign body in the vagina  No signs of injury around the vagina  No vaginal discharge found  Musculoskeletal: Normal range of motion  She exhibits no edema  Neurological: She is alert and oriented to person, place, and time  She has normal reflexes  Skin: Skin is warm and dry  No rash noted  She is not diaphoretic  No erythema  No pallor  Psychiatric: She has a normal mood and affect  Her behavior is normal    Nursing note and vitals reviewed  ED Medications  Medications - No data to display    Diagnostic Studies  Results Reviewed     Procedure Component Value Units Date/Time    Chlamydia/GC amplified DNA by PCR [[id number]] Collected:  08/19/18 0100    Lab Status:   In process Specimen:  Genital from Cervix Updated:  08/19/18 0106    Urine Microscopic [[id number]]  (Abnormal) Collected:  08/18/18 2253    Lab Status:  Final result Specimen:  Urine from Urine, Other Updated:  08/18/18 2333     RBC, UA None Seen /hpf      WBC, UA None Seen /hpf      Epithelial Cells Moderate (A) /hpf      Bacteria, UA None Seen /hpf     Comprehensive metabolic panel [[id number]] Collected:  08/18/18 2243    Lab Status:  Final result Specimen:  Blood from Arm, Right Updated:  08/18/18 2310     Sodium 137 mmol/L      Potassium 3 5 mmol/L      Chloride 106 mmol/L      CO2 26 mmol/L      Anion Gap 5 mmol/L      BUN 9 mg/dL      Creatinine 0 70 mg/dL      Glucose 97 mg/dL Calcium 8 8 mg/dL      AST 10 U/L      ALT 18 U/L      Alkaline Phosphatase 50 U/L      Total Protein 7 7 g/dL      Albumin 3 8 g/dL      Total Bilirubin 0 23 mg/dL      eGFR 124 ml/min/1 73sq m     Narrative:         National Kidney Disease Education Program recommendations are as follows:  GFR calculation is accurate only with a steady state creatinine  Chronic Kidney disease less than 60 ml/min/1 73 sq  meters  Kidney failure less than 15 ml/min/1 73 sq  meters      CBC and differential [[id number]]  (Abnormal) Collected:  08/18/18 2243    Lab Status:  Final result Specimen:  Blood from Arm, Right Updated:  08/18/18 2251     WBC 8 55 Thousand/uL      RBC 4 36 Million/uL      Hemoglobin 11 2 (L) g/dL      Hematocrit 35 8 %      MCV 82 fL      MCH 25 7 (L) pg      MCHC 31 3 (L) g/dL      RDW 14 9 %      MPV 10 5 fL      Platelets 704 Thousands/uL      nRBC 0 /100 WBCs      Neutrophils Relative 54 %      Immat GRANS % 0 %      Lymphocytes Relative 37 %      Monocytes Relative 5 %      Eosinophils Relative 3 %      Basophils Relative 1 %      Neutrophils Absolute 4 64 Thousands/µL      Immature Grans Absolute 0 02 Thousand/uL      Lymphocytes Absolute 3 14 Thousands/µL      Monocytes Absolute 0 45 Thousand/µL      Eosinophils Absolute 0 25 Thousand/µL      Basophils Absolute 0 05 Thousands/µL     POCT pregnancy, urine [[id number]]  (Normal) Resulted:  08/18/18 2242    Lab Status:  Final result Updated:  08/18/18 2243     EXT PREG TEST UR (Ref: Negative) negative    POCT urinalysis dipstick [[id number]]  (Normal) Resulted:  08/18/18 2242    Lab Status:  Final result Specimen:  Urine Updated:  08/18/18 2242     Color, UA yellow    ED Urine Macroscopic [[id number]]  (Abnormal) Collected:  08/18/18 2253    Lab Status:  Final result Specimen:  Urine Updated:  08/18/18 2241     Color, UA Yellow     Clarity, UA Clear     pH, UA 7 0     Leukocytes, UA Trace (A)     Nitrite, UA Negative     Protein, UA Negative mg/dl      Glucose, UA Negative mg/dl      Ketones, UA Negative mg/dl      Urobilinogen, UA 0 2 E U /dl      Bilirubin, UA Negative     Blood, UA Small (A)     Specific [place], UA <=1 005    Narrative:       CLINITEK RESULT                 No orders to display         Procedures  Procedures      Phone Consults  ED Phone Contact    ED Course  ED Course as of Aug 20 0152   Sat Aug 18, 2018   3671 Hemoglobin: (!) 11 2                               [facility]  CritCare Time    Disposition  Final diagnoses:   Vaginal bleeding   Dysfunctional uterine bleeding     Time reflects when diagnosis was documented in both MDM as applicable and the Disposition within this note     Time User Action Codes Description Comment    8/19/2018 12:56 AM Glorious Peal Add [N93 9] Vaginal bleeding     8/19/2018 12:57 AM Glorious Peal Add [N93 8] Dysfunctional uterine bleeding       ED Disposition     ED Disposition Condition Comment    Discharge  Rayma Shannan discharge to home/self care      Condition at discharge: Good        Follow-up Information     Follow up With Specialties Details [facility] Schedule an appointment as soon as possible for a visit in 3 days for re-evaluation [address] [id number] [phone]     [address] [place] [address] Emergency Department Emergency Medicine Go to for re-evaluation, As needed, If symptoms worsen [facility] [address] [facility] ED, [address] [place] [address], [facility]          Discharge Medication List as of 8/19/2018 12:57 AM      CONTINUE these medications which have NOT CHANGED    Details   albuterol (PROVENTIL HFA,VENTOLIN HFA) 90 mcg/act inhaler Inhale 2 puffs every 6 (six) hours as needed for wheezing, Historical Med      fluticasone (FLONASE) 50 mcg/act nasal spray 1 spray into each nostril daily, Historical Med      Lansoprazole (PREVACID PO) Take by mouth, Historical Med      Montelukast Sodium (SINGULAIR PO) Take by mouth, Historical Med           No discharge procedures on file  ED Provider  Attending physically available and evaluated Kelly Resendiz I managed the patient along with the ED Attending      Electronically Signed by         Maricarmen Dalton DO  08/20/18 9486

## 2018-12-10 ENCOUNTER — TELEPHONE (OUTPATIENT)
Dept: OBGYN CLINIC | Facility: HOSPITAL | Age: 43
End: 2018-12-10

## 2019-01-07 ENCOUNTER — TELEPHONE (OUTPATIENT)
Dept: INTERNAL MEDICINE CLINIC | Facility: CLINIC | Age: 44
End: 2019-01-07

## 2019-01-07 PROBLEM — D64.9 ANEMIA: Status: ACTIVE | Noted: 2017-04-11

## 2019-01-07 PROBLEM — N93.9 ABNORMAL UTERINE BLEEDING (AUB): Status: ACTIVE | Noted: 2017-06-12

## 2019-01-07 PROBLEM — M54.9 BACK PAIN: Status: ACTIVE | Noted: 2017-04-11

## 2019-01-07 PROBLEM — R87.619 ATYPICAL GLANDULAR CELLS ON CERVICAL PAP SMEAR: Status: ACTIVE | Noted: 2017-05-11

## 2019-01-07 PROBLEM — N87.0 CIN I (CERVICAL INTRAEPITHELIAL NEOPLASIA I): Status: ACTIVE | Noted: 2017-05-25

## 2019-01-07 PROBLEM — N92.0 MENORRHAGIA: Status: ACTIVE | Noted: 2017-04-25

## 2019-01-07 PROBLEM — F41.9 ANXIETY: Status: ACTIVE | Noted: 2017-06-12

## 2019-01-07 PROBLEM — N60.01 CYST OF RIGHT BREAST: Status: ACTIVE | Noted: 2017-11-03

## 2019-01-07 PROBLEM — F32.A DEPRESSION: Status: ACTIVE | Noted: 2017-04-11

## 2019-02-13 ENCOUNTER — APPOINTMENT (EMERGENCY)
Dept: RADIOLOGY | Facility: HOSPITAL | Age: 44
End: 2019-02-13
Payer: COMMERCIAL

## 2019-02-13 ENCOUNTER — HOSPITAL ENCOUNTER (EMERGENCY)
Facility: HOSPITAL | Age: 44
Discharge: HOME | End: 2019-02-13
Attending: EMERGENCY MEDICINE | Admitting: EMERGENCY MEDICINE
Payer: COMMERCIAL

## 2019-02-13 VITALS
SYSTOLIC BLOOD PRESSURE: 161 MMHG | TEMPERATURE: 97.4 F | HEART RATE: 93 BPM | DIASTOLIC BLOOD PRESSURE: 89 MMHG | OXYGEN SATURATION: 100 % | RESPIRATION RATE: 18 BRPM

## 2019-02-13 DIAGNOSIS — J45.901 ASTHMA EXACERBATION: Primary | ICD-10-CM

## 2019-02-13 DIAGNOSIS — J06.9 VIRAL URI WITH COUGH: ICD-10-CM

## 2019-02-13 PROCEDURE — 99283 EMERGENCY DEPT VISIT LOW MDM: CPT

## 2019-02-13 PROCEDURE — 71046 X-RAY EXAM CHEST 2 VIEWS: CPT

## 2019-02-13 PROCEDURE — 94640 AIRWAY INHALATION TREATMENT: CPT

## 2019-02-13 RX ORDER — IPRATROPIUM BROMIDE AND ALBUTEROL SULFATE 2.5; .5 MG/3ML; MG/3ML
3 SOLUTION RESPIRATORY (INHALATION)
Status: DISCONTINUED | OUTPATIENT
Start: 2019-02-13 | End: 2019-02-13 | Stop reason: HOSPADM

## 2019-02-13 RX ORDER — PREDNISONE 50 MG/1
50 TABLET ORAL DAILY
Qty: 4 TABLET | Refills: 0 | Status: SHIPPED | OUTPATIENT
Start: 2019-02-14 | End: 2020-10-30 | Stop reason: ALTCHOICE

## 2019-02-13 RX ORDER — PSEUDOEPHEDRINE HCL 60 MG/1
60 TABLET ORAL EVERY 8 HOURS PRN
Qty: 30 TABLET | Refills: 0 | Status: SHIPPED | OUTPATIENT
Start: 2019-02-13 | End: 2020-10-30 | Stop reason: ALTCHOICE

## 2019-02-13 RX ORDER — BENZONATATE 100 MG/1
100 CAPSULE ORAL EVERY 8 HOURS
Qty: 21 CAPSULE | Refills: 0 | Status: SHIPPED | OUTPATIENT
Start: 2019-02-13 | End: 2020-10-30 | Stop reason: ALTCHOICE

## 2019-02-13 RX ADMIN — IPRATROPIUM BROMIDE AND ALBUTEROL SULFATE 3 ML: 2.5; .5 SOLUTION RESPIRATORY (INHALATION) at 14:19

## 2019-02-13 RX ADMIN — PREDNISONE 50 MG: 10 TABLET ORAL at 14:19

## 2019-02-15 ENCOUNTER — TELEPHONE (OUTPATIENT)
Dept: INTERNAL MEDICINE CLINIC | Facility: CLINIC | Age: 44
End: 2019-02-15

## 2019-02-15 DIAGNOSIS — J45.31 ACUTE EXACERBATION OF MILD PERSISTENT EXTRINSIC ASTHMA: Primary | ICD-10-CM

## 2019-02-15 RX ORDER — ALBUTEROL SULFATE 2.5 MG/3ML
2.5 SOLUTION RESPIRATORY (INHALATION) EVERY 6 HOURS PRN
Qty: 25 VIAL | Refills: 0 | Status: SHIPPED | OUTPATIENT
Start: 2019-02-15 | End: 2019-02-20

## 2019-02-15 NOTE — TELEPHONE ENCOUNTER
Patient called in due to her being at the ER on 02/13/2019 and patient was given nebulizer solution that has to be mixed with saline  Patient called the pharmacy because she is not familiar with how to use this so the pharmacist suggested patient contact her PCP and request the albuterol that is prepackaged be ordered  Patient has not been seen since 04/2017 but I am going to have her schedule an appt  Can you please maybe order this 1 time solution so she does not stay without her solution?

## 2019-03-05 ENCOUNTER — OFFICE VISIT (OUTPATIENT)
Dept: INTERNAL MEDICINE CLINIC | Facility: CLINIC | Age: 44
End: 2019-03-05

## 2019-03-05 VITALS
HEART RATE: 80 BPM | WEIGHT: 174.6 LBS | SYSTOLIC BLOOD PRESSURE: 120 MMHG | BODY MASS INDEX: 27.4 KG/M2 | HEIGHT: 67 IN | DIASTOLIC BLOOD PRESSURE: 90 MMHG | TEMPERATURE: 97.9 F

## 2019-03-05 DIAGNOSIS — J45.20 MILD INTERMITTENT ASTHMA WITHOUT COMPLICATION: Primary | ICD-10-CM

## 2019-03-05 DIAGNOSIS — E66.3 OVERWEIGHT (BMI 25.0-29.9): ICD-10-CM

## 2019-03-05 DIAGNOSIS — R09.82 POST-NASAL DRIP: ICD-10-CM

## 2019-03-05 DIAGNOSIS — F17.200 CURRENT EVERY DAY SMOKER: ICD-10-CM

## 2019-03-05 DIAGNOSIS — B37.3 VAGINAL CANDIDIASIS: ICD-10-CM

## 2019-03-05 PROBLEM — B37.31 VAGINAL CANDIDIASIS: Status: ACTIVE | Noted: 2019-03-05

## 2019-03-05 PROBLEM — M54.9 BACK PAIN: Status: RESOLVED | Noted: 2017-04-11 | Resolved: 2019-03-05

## 2019-03-05 PROBLEM — N93.9 ABNORMAL UTERINE BLEEDING (AUB): Status: RESOLVED | Noted: 2017-06-12 | Resolved: 2019-03-05

## 2019-03-05 PROBLEM — J45.31 ACUTE EXACERBATION OF MILD PERSISTENT EXTRINSIC ASTHMA: Status: RESOLVED | Noted: 2019-02-15 | Resolved: 2019-03-05

## 2019-03-05 PROBLEM — D64.9 ANEMIA: Status: RESOLVED | Noted: 2017-04-11 | Resolved: 2019-03-05

## 2019-03-05 PROCEDURE — 99213 OFFICE O/P EST LOW 20 MIN: CPT | Performed by: PHYSICIAN ASSISTANT

## 2019-03-05 PROCEDURE — 3008F BODY MASS INDEX DOCD: CPT | Performed by: PHYSICIAN ASSISTANT

## 2019-03-05 RX ORDER — NICOTINE 21 MG/24HR
1 PATCH, TRANSDERMAL 24 HOURS TRANSDERMAL EVERY 24 HOURS
Qty: 28 PATCH | Refills: 1 | Status: SHIPPED | OUTPATIENT
Start: 2019-03-05 | End: 2020-10-30 | Stop reason: ALTCHOICE

## 2019-03-05 RX ORDER — FLUCONAZOLE 150 MG/1
150 TABLET ORAL ONCE
Qty: 1 TABLET | Refills: 1 | Status: SHIPPED | OUTPATIENT
Start: 2019-03-05 | End: 2019-03-05

## 2019-03-05 RX ORDER — ALBUTEROL SULFATE 90 UG/1
2 AEROSOL, METERED RESPIRATORY (INHALATION) EVERY 6 HOURS PRN
Qty: 18 G | Refills: 0 | Status: SHIPPED | OUTPATIENT
Start: 2019-03-05 | End: 2020-03-04

## 2019-03-05 RX ORDER — GUAIFENESIN, PSEUDOEPHEDRINE HYDROCHLORIDE 600; 60 MG/1; MG/1
1 TABLET, EXTENDED RELEASE ORAL EVERY 12 HOURS
Qty: 10 TABLET | Refills: 0 | Status: SHIPPED | OUTPATIENT
Start: 2019-03-05 | End: 2019-03-10

## 2019-03-05 NOTE — PATIENT INSTRUCTIONS
As review today I have sent a Ventolin inhaler to your pharmacy  As discussed you have not needed to use this until this upper respiratory infection  We also reviewed that prednisone for a couple of days would not cause any symptoms in her mouth or vaginal discharge or itching  I did send a medication for this  Take 1 tablet immediately and if symptoms are not completely resolved you may take a 2nd tablet  If still no improvement please follow-up with your gyn  Please get the labs completed  I have sent the patch  This is 421 mg  We reviewed that you plan to start today as you have not purchased any cigarettes at all today  Additional information has been provided for assistance and we will see you in 1 month to see how you were doing as well as review your labs  Cigarette Smoking and Your Health   AMBULATORY CARE:   Risks to your health if you smoke:  Nicotine and other chemicals found in tobacco damage every cell in your body  Even if you are a light smoker, you have an increased risk for cancer, heart disease, and lung disease  If you are pregnant or have diabetes, smoking increases your risk for complications  Benefits to your health if you stop smoking:   · You decrease respiratory symptoms such as coughing, wheezing, and shortness of breath  · You reduce your risk for cancers of the lung, mouth, throat, kidney, bladder, pancreas, stomach, and cervix  If you already have cancer, you increase the benefits of chemotherapy  You also reduce your risk for cancer returning or a second cancer from developing  · You reduce your risk for heart disease, blood clots, heart attack, and stroke  · You reduce your risk for lung infections, and diseases such as pneumonia, asthma, chronic bronchitis, and emphysema  · Your circulation improves  More oxygen can be delivered to your body  If you have diabetes, you lower your risk for complications, such as kidney, artery, and eye diseases  You also lower your risk for nerve damage  Nerve damage can lead to amputations, poor vision, and blindness  · You improve your body's ability to heal and to fight infections  Benefits to the health of others if you stop smoking:  Tobacco is harmful to nonsmokers who breathe in your secondhand smoke  The following are ways the health of others around you may improve when you stop smoking:  · You lower the risks for lung cancer and heart disease in nonsmoking adults  · If you are pregnant, you lower the risk for miscarriage, early delivery, low birth weight, and stillbirth  You also lower your baby's risk for SIDS, obesity, developmental delay, and neurobehavioral problems, such as ADHD  · If you have children, you lower their risk for ear infections, colds, pneumonia, bronchitis, and asthma  For more information and support to stop smoking:   · [employer]  Phone: 1- 721 - 561-8855  Web Address: [employer]  Follow up with your healthcare provider as directed:  Write down your questions so you remember to ask them during your visits  © 2017 [employer]  Information is for End User's use only and may not be sold, redistributed or otherwise used for commercial purposes  All illustrations and images included in CareNotes® are the copyrighted property of [employer] , [employer]  or [employer]  The above information is an  only  It is not intended as medical advice for individual conditions or treatments  Talk to your doctor, nurse or pharmacist before following any medical regimen to see if it is safe and effective for you

## 2019-03-05 NOTE — PROGRESS NOTES
Assessment/Plan:    No problem-specific Assessment & Plan notes found for this encounter  Diagnoses and all orders for this visit:    Mild intermittent asthma without complication  -     albuterol (VENTOLIN HFA) 90 mcg/act inhaler; Inhale 2 puffs every 6 (six) hours as needed for wheezing  -     pseudoephedrine-guaifenesin (MUCINEX D)  MG per tablet; Take 1 tablet by mouth every 12 (twelve) hours for 5 days    Post-nasal drip    Vaginal candidiasis  -     fluconazole (DIFLUCAN) 150 mg tablet; Take 1 tablet (150 mg total) by mouth once for 1 dose    Overweight (BMI 25 0-29 9)  -     Comprehensive metabolic panel  -     Lipid Panel with Direct LDL reflex    Current every day smoker  -     nicotine (NICODERM CQ) 21 mg/24 hr TD 24 hr patch; Place 1 patch on the skin every 24 hours for 60 days          Subjective:      Patient ID: Chivo Pink is a 41 y o  female  Patient is here for ER follow up  Patient went to ER  with URI/flu-like illness  She was given a nebulizer treatment there which improved her breath  She was prescribed prednisone and took for 5 days  She is no longer wheezing or feeling short of breath or chest tightness  She still has a slight cough with brown and blood streaked mucus  It is choking her when she sleeps  She feels like something is sitting in her throat  She noticed that her tongue is very white a couple of days ago  Patient reports she has a vaginal yeast infection as well as she was getting some itchy white discharge  Reviewed with patient that prednisone for a couple a days and no steroid inhaler was unlikely to cause her to have any thrush or yeast infection in her mouth  Patient however does confirm that while she was having some of the vaginal itching she and her  did have oral sex  She also still has post nasal drip and rhinorrhea  She uses a friend's albuterol inhaler because her prescription      She also has a nebulizer at home and was given refills for this  She used it everyday after the hospital for a week  Since then she has been fine  Still smoking 10 to 20 a day is considering quitting  Has tried and did but only when pregnant  Patient reports she is the only smoker in the home  She states her 6year-old child has now been making comments about getting her to quit  Patient states she smokes anywhere from 10-30 cigarettes a day depending upon her mood and if she is home a lot  She states she often smokes just because it is habit and she is bored  Patient states she has a plan to start exercising to help with her mood and help her quit smoking  She reports she has equipment in the home and plans to start this  Patient states she is interested in the patch and aware that she cannot smoke at all while on the patch  Patient has been advised to set a quit date and advised family an additional information has been provided  Patient also admits that she never went for the follow-up bilateral breast ultrasounds to monitor her cysts  This was due in November of 2018  These forms were reprinted and provided to patient today  The following portions of the patient's history were reviewed and updated as appropriate: allergies, current medications, past family history, past medical history, past social history, past surgical history and problem list     Review of Systems   Constitutional: Negative  Negative for chills and fever  HENT: Positive for congestion, postnasal drip and rhinorrhea  Negative for ear pain and sore throat  White tongue   Eyes: Negative  Respiratory: Positive for cough  Negative for chest tightness, shortness of breath and wheezing  Cardiovascular: Negative for chest pain and palpitations  Gastrointestinal: Negative  Endocrine: Negative for polydipsia, polyphagia and polyuria  Genitourinary:        Vaginal itching / discharge   Musculoskeletal: Negative  Skin: Negative    Negative for rash  Neurological: Negative  Psychiatric/Behavioral: Negative  Objective:      /90 (BP Location: Right arm, Patient Position: Sitting, Cuff Size: Standard)   Pulse 80   Temp 97 9 °F (36 6 °C) (Oral)   Ht 5' 6 75" (1 695 m)   Wt 79 2 kg (174 lb 9 7 oz)   BMI 27 55 kg/m²          Physical Exam   Constitutional: She is oriented to person, place, and time  She appears well-developed and well-nourished  No distress  HENT:   Head: Normocephalic and atraumatic  Right Ear: External ear normal    Left Ear: External ear normal    Mouth/Throat: Uvula is midline and mucous membranes are normal  No oropharyngeal exudate, posterior oropharyngeal edema or posterior oropharyngeal erythema  No tonsillar exudate  Right nare with edematous turbinate  Left normal    Minimal white substance on surface of tongue  Mild post nasal drip   Eyes: Conjunctivae are normal    Neck: Carotid bruit is not present  Cardiovascular: Normal rate, regular rhythm and normal heart sounds  Pulmonary/Chest: Effort normal and breath sounds normal  No respiratory distress  She has no wheezes  Abdominal: Soft  Bowel sounds are normal    Lymphadenopathy:     She has no cervical adenopathy  Neurological: She is alert and oriented to person, place, and time  Skin: Skin is warm and dry  No rash noted  She is not diaphoretic  No erythema  No pallor  Psychiatric: She has a normal mood and affect  Her behavior is normal    Nursing note and vitals reviewed

## 2019-04-05 ENCOUNTER — TELEPHONE (OUTPATIENT)
Dept: INTERNAL MEDICINE CLINIC | Facility: CLINIC | Age: 44
End: 2019-04-05

## 2019-12-18 ENCOUNTER — OFFICE VISIT (OUTPATIENT)
Dept: INTERNAL MEDICINE CLINIC | Facility: CLINIC | Age: 44
End: 2019-12-18

## 2019-12-18 VITALS
BODY MASS INDEX: 27.99 KG/M2 | WEIGHT: 174.16 LBS | TEMPERATURE: 97.6 F | OXYGEN SATURATION: 99 % | HEART RATE: 90 BPM | DIASTOLIC BLOOD PRESSURE: 80 MMHG | SYSTOLIC BLOOD PRESSURE: 110 MMHG | HEIGHT: 66 IN

## 2019-12-18 DIAGNOSIS — E66.3 OVERWEIGHT (BMI 25.0-29.9): ICD-10-CM

## 2019-12-18 DIAGNOSIS — B35.9 TINEA: Primary | ICD-10-CM

## 2019-12-18 DIAGNOSIS — M79.645 PAIN OF FINGER OF LEFT HAND: ICD-10-CM

## 2019-12-18 DIAGNOSIS — D56.3 THALASSEMIA TRAIT, ALPHA: ICD-10-CM

## 2019-12-18 PROCEDURE — 99213 OFFICE O/P EST LOW 20 MIN: CPT | Performed by: PHYSICIAN ASSISTANT

## 2019-12-18 PROCEDURE — 3008F BODY MASS INDEX DOCD: CPT | Performed by: PHYSICIAN ASSISTANT

## 2019-12-18 RX ORDER — CLOTRIMAZOLE 1 %
CREAM (GRAM) TOPICAL 2 TIMES DAILY
Qty: 30 G | Refills: 1 | Status: SHIPPED | OUTPATIENT
Start: 2019-12-18 | End: 2020-10-30 | Stop reason: ALTCHOICE
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
## 2019-12-18 NOTE — PATIENT INSTRUCTIONS
As per our discussion you will likely require oral antifungals because you have locations in so many places it will be difficult to use the cream alone  That being said as we discussed you must get your labs completed 1st to evaluate your liver functions before oral antifungal pills can be prescribed  In the meantime I have sent a prescription for the clotrimazole cream that you may use on the areas for now  As reviewed these labs are also going to screen you for diabetes and thyroid which has been a concern of yours for some time now  Order has been placed so you may schedule with the orthopedist who already evaluated your finger to determine if you did sustain a fracture  Schedule follow-up appointment with me after the holidays as per your request for routine checkup in healthcare

## 2020-01-02 ENCOUNTER — OFFICE VISIT (OUTPATIENT)
Dept: OBGYN CLINIC | Facility: MEDICAL CENTER | Age: 45
End: 2020-01-02
Payer: COMMERCIAL

## 2020-01-02 ENCOUNTER — APPOINTMENT (OUTPATIENT)
Dept: RADIOLOGY | Facility: MEDICAL CENTER | Age: 45
End: 2020-01-02
Payer: COMMERCIAL

## 2020-01-02 VITALS
SYSTOLIC BLOOD PRESSURE: 141 MMHG | DIASTOLIC BLOOD PRESSURE: 92 MMHG | HEIGHT: 68 IN | WEIGHT: 173 LBS | BODY MASS INDEX: 26.22 KG/M2 | HEART RATE: 78 BPM

## 2020-01-02 DIAGNOSIS — M79.645 PAIN OF FINGER OF LEFT HAND: ICD-10-CM

## 2020-01-02 DIAGNOSIS — M79.645 PAIN OF FINGER OF LEFT HAND: Primary | ICD-10-CM

## 2020-01-02 DIAGNOSIS — M19.042 ARTHRITIS OF FINGER OF LEFT HAND: ICD-10-CM

## 2020-01-02 PROCEDURE — 3008F BODY MASS INDEX DOCD: CPT | Performed by: ORTHOPAEDIC SURGERY

## 2020-01-02 PROCEDURE — 99204 OFFICE O/P NEW MOD 45 MIN: CPT | Performed by: ORTHOPAEDIC SURGERY

## 2020-01-02 PROCEDURE — 73140 X-RAY EXAM OF FINGER(S): CPT

## 2020-01-02 RX ORDER — MELOXICAM 15 MG/1
15 TABLET ORAL DAILY
Qty: 30 TABLET | Refills: 0 | Status: SHIPPED | OUTPATIENT
Start: 2020-01-02 | End: 2020-10-30 | Stop reason: ALTCHOICE
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
## 2020-01-15 ENCOUNTER — TELEPHONE (OUTPATIENT)
Dept: INTERNAL MEDICINE CLINIC | Facility: CLINIC | Age: 45
End: 2020-01-15

## 2020-01-15 NOTE — TELEPHONE ENCOUNTER
Called patient because she have an appt with Ruby Contreras on 1/17/2020 at 9:40 am and there is blood test ordered back in 3/2019 and 12/2019 and she never completed  Per patient she would go tomorrow 1/16/2020

## 2020-02-17 ENCOUNTER — APPOINTMENT (EMERGENCY)
Dept: RADIOLOGY | Facility: HOSPITAL | Age: 45
End: 2020-02-17
Payer: COMMERCIAL

## 2020-02-17 ENCOUNTER — HOSPITAL ENCOUNTER (EMERGENCY)
Facility: HOSPITAL | Age: 45
Discharge: HOME | End: 2020-02-17
Attending: EMERGENCY MEDICINE | Admitting: EMERGENCY MEDICINE
Payer: COMMERCIAL

## 2020-02-17 VITALS
WEIGHT: 159 LBS | RESPIRATION RATE: 18 BRPM | SYSTOLIC BLOOD PRESSURE: 150 MMHG | OXYGEN SATURATION: 99 % | HEART RATE: 77 BPM | DIASTOLIC BLOOD PRESSURE: 93 MMHG | TEMPERATURE: 98.5 F | BODY MASS INDEX: 24.54 KG/M2

## 2020-02-17 DIAGNOSIS — R05.9 COUGH: ICD-10-CM

## 2020-02-17 DIAGNOSIS — R07.89 MUSCULOSKELETAL CHEST PAIN: Primary | ICD-10-CM

## 2020-02-17 LAB
ALBUMIN SERPL BCP-MCNC: 4 G/DL (ref 3.5–5)
ALP SERPL-CCNC: 59 U/L (ref 46–116)
ALT SERPL W P-5'-P-CCNC: 19 U/L (ref 12–78)
ANION GAP SERPL CALCULATED.3IONS-SCNC: 4 MMOL/L (ref 4–13)
AST SERPL W P-5'-P-CCNC: 13 U/L (ref 5–45)
BASOPHILS # BLD AUTO: 0.06 THOUSANDS/ΜL (ref 0–0.1)
BASOPHILS NFR BLD AUTO: 1 % (ref 0–1)
BILIRUB SERPL-MCNC: 0.41 MG/DL (ref 0.2–1)
BUN SERPL-MCNC: 12 MG/DL (ref 5–25)
CALCIUM SERPL-MCNC: 9.2 MG/DL (ref 8.3–10.1)
CHLORIDE SERPL-SCNC: 105 MMOL/L (ref 100–108)
CO2 SERPL-SCNC: 26 MMOL/L (ref 21–32)
CREAT SERPL-MCNC: 0.83 MG/DL (ref 0.6–1.3)
EOSINOPHIL # BLD AUTO: 0.24 THOUSAND/ΜL (ref 0–0.61)
EOSINOPHIL NFR BLD AUTO: 3 % (ref 0–6)
ERYTHROCYTE [DISTWIDTH] IN BLOOD BY AUTOMATED COUNT: 15.7 % (ref 11.6–15.1)
GFR SERPL CREATININE-BSD FRML MDRD: 101 ML/MIN/1.73SQ M
GLUCOSE SERPL-MCNC: 90 MG/DL (ref 65–140)
HCT VFR BLD AUTO: 37.8 % (ref 34.8–46.1)
HGB BLD-MCNC: 11.9 G/DL (ref 11.5–15.4)
IMM GRANULOCYTES # BLD AUTO: 0.01 THOUSAND/UL (ref 0–0.2)
IMM GRANULOCYTES NFR BLD AUTO: 0 % (ref 0–2)
LYMPHOCYTES # BLD AUTO: 2.8 THOUSANDS/ΜL (ref 0.6–4.47)
LYMPHOCYTES NFR BLD AUTO: 40 % (ref 14–44)
MCH RBC QN AUTO: 25.1 PG (ref 26.8–34.3)
MCHC RBC AUTO-ENTMCNC: 31.5 G/DL (ref 31.4–37.4)
MCV RBC AUTO: 80 FL (ref 82–98)
MONOCYTES # BLD AUTO: 0.45 THOUSAND/ΜL (ref 0.17–1.22)
MONOCYTES NFR BLD AUTO: 6 % (ref 4–12)
NEUTROPHILS # BLD AUTO: 3.45 THOUSANDS/ΜL (ref 1.85–7.62)
NEUTS SEG NFR BLD AUTO: 50 % (ref 43–75)
NRBC BLD AUTO-RTO: 0 /100 WBCS
PLATELET # BLD AUTO: 231 THOUSANDS/UL (ref 149–390)
PMV BLD AUTO: 9.7 FL (ref 8.9–12.7)
POTASSIUM SERPL-SCNC: 3.7 MMOL/L (ref 3.5–5.3)
PROT SERPL-MCNC: 7.8 G/DL (ref 6.4–8.2)
RBC # BLD AUTO: 4.74 MILLION/UL (ref 3.81–5.12)
SODIUM SERPL-SCNC: 135 MMOL/L (ref 136–145)
TROPONIN I SERPL-MCNC: <0.02 NG/ML
WBC # BLD AUTO: 7.01 THOUSAND/UL (ref 4.31–10.16)

## 2020-02-17 PROCEDURE — 94640 AIRWAY INHALATION TREATMENT: CPT

## 2020-02-17 PROCEDURE — 96372 THER/PROPH/DIAG INJ SC/IM: CPT

## 2020-02-17 PROCEDURE — 93005 ELECTROCARDIOGRAM TRACING: CPT

## 2020-02-17 PROCEDURE — 99284 EMERGENCY DEPT VISIT MOD MDM: CPT | Performed by: EMERGENCY MEDICINE

## 2020-02-17 PROCEDURE — 71046 X-RAY EXAM CHEST 2 VIEWS: CPT

## 2020-02-17 PROCEDURE — 80053 COMPREHEN METABOLIC PANEL: CPT | Performed by: EMERGENCY MEDICINE

## 2020-02-17 PROCEDURE — 99285 EMERGENCY DEPT VISIT HI MDM: CPT

## 2020-02-17 PROCEDURE — 36415 COLL VENOUS BLD VENIPUNCTURE: CPT | Performed by: EMERGENCY MEDICINE

## 2020-02-17 PROCEDURE — 84484 ASSAY OF TROPONIN QUANT: CPT | Performed by: EMERGENCY MEDICINE

## 2020-02-17 PROCEDURE — 85025 COMPLETE CBC W/AUTO DIFF WBC: CPT | Performed by: EMERGENCY MEDICINE

## 2020-02-17 RX ORDER — KETOROLAC TROMETHAMINE 30 MG/ML
15 INJECTION, SOLUTION INTRAMUSCULAR; INTRAVENOUS ONCE
Status: DISCONTINUED | OUTPATIENT
Start: 2020-02-17 | End: 2020-02-17

## 2020-02-17 RX ORDER — KETOROLAC TROMETHAMINE 30 MG/ML
15 INJECTION, SOLUTION INTRAMUSCULAR; INTRAVENOUS ONCE
Status: COMPLETED | OUTPATIENT
Start: 2020-02-17 | End: 2020-02-17

## 2020-02-17 RX ADMIN — IPRATROPIUM BROMIDE 0.5 MG: 0.5 SOLUTION RESPIRATORY (INHALATION) at 21:59

## 2020-02-17 RX ADMIN — KETOROLAC TROMETHAMINE 15 MG: 30 INJECTION, SOLUTION INTRAMUSCULAR at 22:15

## 2020-02-17 RX ADMIN — ALBUTEROL SULFATE 5 MG: 2.5 SOLUTION RESPIRATORY (INHALATION) at 21:59

## 2020-02-18 LAB
ATRIAL RATE: 72 BPM
P AXIS: 69 DEGREES
PR INTERVAL: 166 MS
QRS AXIS: 46 DEGREES
QRSD INTERVAL: 74 MS
QT INTERVAL: 404 MS
QTC INTERVAL: 442 MS
T WAVE AXIS: 40 DEGREES
VENTRICULAR RATE: 72 BPM

## 2020-02-18 PROCEDURE — 93010 ELECTROCARDIOGRAM REPORT: CPT | Performed by: INTERNAL MEDICINE

## 2020-02-18 NOTE — ED PROVIDER NOTES
History  Chief Complaint   Patient presents with    Chest Pain     Pt reports left sided chest pain, and feels like the right side is restricted  Pt has hx of asthma but reports it feels different      Patient is a 24-year-old female with a history of asthma who presents for right-sided chest tightness and left-sided chest discomfort  Patient says the symptoms have been present since Friday  She says that they have been constant in nature and have not worsened  She says that the pain in her left-sided chest is worse when she coughs  She says that she has been using her albuterol inhaler without relief  She says this feels different than asthma that she has had in the past   She admits to a cough productive of clear mucus, runny nose  She denies fevers, chills, shortness of breath, abdominal pain, lightheadedness, dizziness  Patient says she has not taken anything for the discomfort  Prior to Admission Medications   Prescriptions Last Dose Informant Patient Reported? Taking? ARTIFICIAL TEARS 1 4 % ophthalmic solution   Yes Yes   Sig: instill 1 drop into both eyes four times a day if needed   HYDROcodone-acetaminophen (NORCO) 5-325 mg per tablet   Yes Yes   Sig: Take 1-2 tablets by mouth every 6 (six) hours   Lansoprazole (PREVACID PO)   Yes Yes   Sig: Take by mouth   Montelukast Sodium (SINGULAIR PO)   Yes Yes   Sig: Take by mouth   Respiratory Therapy Supplies (NEBULIZER COMPRESSOR) KIT   No No   Sig: by Does not apply route once for 1 dose Please dispense 1 nebulizer machine with mask and tubing for patient     albuterol (VENTOLIN HFA) 90 mcg/act inhaler   No No   Sig: Inhale 2 puffs every 6 (six) hours as needed for wheezing   Patient not taking: Reported on 1/2/2020   benzonatate (TESSALON PERLES) 100 mg capsule   No No   Sig: Take 1 capsule (100 mg total) by mouth every 8 (eight) hours   Patient not taking: Reported on 3/5/2019   bisacodyl (DULCOLAX) 5 mg EC tablet   Yes Yes   Sig: Take by mouth   clotrimazole (LOTRIMIN) 1 % cream   No Yes   Sig: Apply topically 2 (two) times a day   docusate sodium (DULCOLAX) 100 mg capsule   Yes Yes   Sig: Take by mouth daily   doxylamine (UNISOM) 25 MG tablet   Yes Yes   Sig: Take by mouth   ferrous sulfate 325 (65 Fe) mg tablet   Yes Yes   Sig: Take 1 tablet by mouth 2 (two) times a day   fluticasone (FLONASE) 50 mcg/act nasal spray   Yes Yes   Si spray into each nostril daily   ibuprofen (MOTRIN) 600 mg tablet   Yes Yes   Sig: Take by mouth   meloxicam (MOBIC) 15 mg tablet   No Yes   Sig: Take 1 tablet (15 mg total) by mouth daily   menthol-cetylpyridinium (CEPACOL) 3 MG lozenge Not Taking at Unknown time  No No   Sig: Take 1 lozenge (3 mg total) by mouth as needed for sore throat   Patient not taking: Reported on 3/5/2019   naproxen (NAPROSYN) 500 mg tablet   Yes Yes   Sig: Take 500 mg by mouth   nicotine (NICODERM CQ) 21 mg/24 hr TD 24 hr patch   No No   Sig: Place 1 patch on the skin every 24 hours for 60 days   polyethylene glycol (MIRALAX) 17 g packet   No Yes   Sig: Take 17 g by mouth daily as needed (constipation)   predniSONE 50 mg tablet   No Yes   Sig: Take 1 tablet (50 mg total) by mouth daily   pseudoephedrine (SUDAFED) 60 mg tablet   No Yes   Sig: Take 1 tablet (60 mg total) by mouth every 8 (eight) hours as needed for congestion      Facility-Administered Medications: None       Past Medical History:   Diagnosis Date    Anemia     Asthma     Bilateral breast cysts     last assessed    [facility]    resolved   11/3/17     Cervical cancer, FIGO stage I (HCC)     Constipation     Seizures (HCC)     URI (upper respiratory infection)     under additonal type - Chronic       Past Surgical History:   Procedure Laterality Date     SECTION      INGUINAL HERNIA REPAIR      last assessed   12/17/15      OH COLONOSCOPY FLX DX W/COLLJ SPEC WHEN PFRMD N/A 9/15/2017    Procedure: COLONOSCOPY;  Surgeon: Morales Panchal MD;  Location: [facility];  Service: Gastroenterology    SKIN BIOPSY      last assessed   40/67/36      UMBILICAL HERNIA REPAIR      last assessed   12/17/15     URETHRAL DIVERTICULECTOMY      excision of urethral diverticulum    last assessed   12/17/15       Family History   Problem Relation Age of Onset    Hyperlipidemia Mother     Hypertension Mother    [facility] Breast cancer Mother         neoplasm/dx first age 39, mastectomy age 38, did have lymph biopsy positive with recurrance age around age 61    Hyperlipidemia Maternal Grandmother     Hypertension Maternal Grandmother     Leukemia Maternal Grandmother      I have reviewed and agree with the history as documented  Social History     Tobacco Use    Smoking status: Current Every Day Smoker     Packs/day: 0 25     Types: Cigarettes    Smokeless tobacco: Never Used   Substance Use Topics    Alcohol use: Yes     Frequency: Monthly or less     Drinks per session: 1 or 2     Binge frequency: Never    Drug use: Never        Review of Systems   Constitutional: Negative for chills, diaphoresis and fever  HENT: Positive for congestion  Negative for sinus pressure, sore throat and trouble swallowing  Eyes: Negative for pain, discharge and itching  Respiratory: Positive for cough and chest tightness  Negative for shortness of breath and wheezing  Cardiovascular: Negative for chest pain, palpitations and leg swelling  Gastrointestinal: Negative for abdominal distention, abdominal pain, blood in stool, diarrhea, nausea and vomiting  Endocrine: Negative for polyphagia and polyuria  Genitourinary: Negative for difficulty urinating, dysuria, flank pain, hematuria, pelvic pain and vaginal bleeding  Musculoskeletal: Negative for arthralgias and back pain  Skin: Negative for rash  Neurological: Negative for dizziness, syncope, weakness, light-headedness and headaches         Physical Exam  ED Triage Vitals [02/17/20 2039]   Temperature Pulse Respirations Blood Pressure SpO2   98 5 °F (36 9 °C) 85 18 (!) 182/108 100 %      Temp Source Heart Rate Source Patient Position - Orthostatic VS BP Location FiO2 (%)   Oral Monitor Sitting Left arm --      Pain Score       8             Orthostatic Vital Signs  Vitals:    02/17/20 2039 02/17/20 2155   BP: (!) 182/108 150/93   Pulse: 85 77   Patient Position - Orthostatic VS: Sitting Lying       Physical Exam   Constitutional: She is oriented to person, place, and time  She appears well-developed and well-nourished  No distress  HENT:   Head: Normocephalic and atraumatic  Right Ear: External ear normal    Left Ear: External ear normal    Mouth/Throat: No oropharyngeal exudate  Eyes: Pupils are equal, round, and reactive to light  Conjunctivae are normal    Neck: Normal range of motion  Neck supple  Cardiovascular: Normal rate, regular rhythm, normal heart sounds and intact distal pulses  Exam reveals no gallop and no friction rub  No murmur heard  Pulmonary/Chest: Effort normal and breath sounds normal  No respiratory distress  She has no decreased breath sounds  She has no wheezes  She has no rales  Abdominal: Soft  She exhibits no distension  There is no tenderness  There is no guarding  Musculoskeletal: Normal range of motion  She exhibits no edema, tenderness or deformity  Lymphadenopathy:     She has no cervical adenopathy  Neurological: She is alert and oriented to person, place, and time  No cranial nerve deficit or sensory deficit  She exhibits normal muscle tone  Skin: Skin is warm and dry  Psychiatric: She has a normal mood and affect  Nursing note and vitals reviewed        ED Medications  Medications   albuterol inhalation solution 5 mg (5 mg Nebulization Given 2/17/20 2159)   ipratropium (ATROVENT) 0 02 % inhalation solution 0 5 mg (0 5 mg Nebulization Given 2/17/20 2159)   ketorolac (TORADOL) injection 15 mg (15 mg Intramuscular Given 2/17/20 2215)       Diagnostic Studies  Results Reviewed Procedure Component Value Units Date/Time    Troponin I [[id number]]  (Normal) Collected:  02/17/20 2216    Lab Status:  Final result Specimen:  Blood from Arm, Left Updated:  02/17/20 2240     Troponin I <0 02 ng/mL     Comprehensive metabolic panel [[id number]]  (Abnormal) Collected:  02/17/20 2216    Lab Status:  Final result Specimen:  Blood from Arm, Left Updated:  02/17/20 2239     Sodium 135 mmol/L      Potassium 3 7 mmol/L      Chloride 105 mmol/L      CO2 26 mmol/L      ANION GAP 4 mmol/L      BUN 12 mg/dL      Creatinine 0 83 mg/dL      Glucose 90 mg/dL      Calcium 9 2 mg/dL      AST 13 U/L      ALT 19 U/L      Alkaline Phosphatase 59 U/L      Total Protein 7 8 g/dL      Albumin 4 0 g/dL      Total Bilirubin 0 41 mg/dL      eGFR 101 ml/min/1 73sq m     Narrative:       National Kidney Disease Foundation guidelines for Chronic Kidney Disease (CKD):     Stage 1 with normal or high GFR (GFR > 90 mL/min/1 73 square meters)    Stage 2 Mild CKD (GFR = 60-89 mL/min/1 73 square meters)    Stage 3A Moderate CKD (GFR = 45-59 mL/min/1 73 square meters)    Stage 3B Moderate CKD (GFR = 30-44 mL/min/1 73 square meters)    Stage 4 Severe CKD (GFR = 15-29 mL/min/1 73 square meters)    Stage 5 End Stage CKD (GFR <15 mL/min/1 73 square meters)  Note: GFR calculation is accurate only with a steady state creatinine    CBC and differential [[id number]]  (Abnormal) Collected:  02/17/20 2216    Lab Status:  Final result Specimen:  Blood from Arm, Left Updated:  02/17/20 2223     WBC 7 01 Thousand/uL      RBC 4 74 Million/uL      Hemoglobin 11 9 g/dL      Hematocrit 37 8 %      MCV 80 fL      MCH 25 1 pg      MCHC 31 5 g/dL      RDW 15 7 %      MPV 9 7 fL      Platelets 471 Thousands/uL      nRBC 0 /100 WBCs      Neutrophils Relative 50 %      Immat GRANS % 0 %      Lymphocytes Relative 40 %      Monocytes Relative 6 %      Eosinophils Relative 3 %      Basophils Relative 1 %      Neutrophils Absolute 3 45 Thousands/µL Immature Grans Absolute 0 01 Thousand/uL      Lymphocytes Absolute 2 80 Thousands/µL      Monocytes Absolute 0 45 Thousand/µL      Eosinophils Absolute 0 24 Thousand/µL      Basophils Absolute 0 06 Thousands/µL                  XR chest 2 views    (Results Pending)         Procedures  Procedures      ED Course                               MDM  Number of Diagnoses or Management Options  Diagnosis management comments: 53-year-old female history of asthma presenting for 4 days of cough, congestion, right-sided chest tightness and left-sided chest discomfort  Chest discomfort is worse when she coughs and when she is lays flat  No associated lightheadedness, dizziness, nausea, vomiting or diaphoresis  No history DVT/PE  Vitals are within normal limits  Lungs are clear to auscultation  Will obtain cardiac workup  Will obtain chest x-ray to rule out pneumonia  Patient is low risk per Wells criteria and PERC negative  Believe patient's symptoms are secondary to musculoskeletal pain from cough  Will give Toradol and breathing treatment        Disposition  Final diagnoses:   Musculoskeletal chest pain   Cough     Time reflects when diagnosis was documented in both MDM as applicable and the Disposition within this note     Time User Action Codes Description Comment    2/17/2020 10:49 PM Anna Jackson Add [R07 89] Musculoskeletal chest pain     2/17/2020 10:49 PM Anna Jackson Add [R05] Cough       ED Disposition     ED Disposition Condition Date/Time Comment    Discharge Stable Mon Feb 17, 2020 10:49 PM Genevieve Teran discharge to home/self care  Follow-up Information     Follow up With Specialties Details Why Contact Info    Aura Canela PA-C Internal Medicine, Physician Assistant Schedule an appointment as soon as possible for a visit  For follow up of symptoms [address] [facility]  [phone]            Discharge Medication List as of 2/17/2020 10:50 PM CONTINUE these medications which have NOT CHANGED    Details   albuterol (VENTOLIN HFA) 90 mcg/act inhaler Inhale 2 puffs every 6 (six) hours as needed for wheezing, Starting Tue 3/5/2019, Until Wed 3/4/2020, Normal      ARTIFICIAL TEARS 1 4 % ophthalmic solution instill 1 drop into both eyes four times a day if needed, Historical Med      benzonatate (TESSALON PERLES) 100 mg capsule Take 1 capsule (100 mg total) by mouth every 8 (eight) hours, Starting Wed 2/13/2019, Print      bisacodyl (DULCOLAX) 5 mg EC tablet Take by mouth, Starting Fri 9/8/2017, Historical Med      clotrimazole (LOTRIMIN) 1 % cream Apply topically 2 (two) times a day, Starting Wed 12/18/2019, Normal      docusate sodium (DULCOLAX) 100 mg capsule Take by mouth daily, Starting Thu 9/14/2017, Historical Med      doxylamine (UNISOM) 25 MG tablet Take by mouth, Historical Med      ferrous sulfate 325 (65 Fe) mg tablet Take 1 tablet by mouth 2 (two) times a day, Starting Tue 9/12/2017, Historical Med      fluticasone (FLONASE) 50 mcg/act nasal spray 1 spray into each nostril daily, Historical Med      HYDROcodone-acetaminophen (NORCO) 5-325 mg per tablet Take 1-2 tablets by mouth every 6 (six) hours, Starting Tue 10/27/2015, Historical Med      ibuprofen (MOTRIN) 600 mg tablet Take by mouth, Starting Wed 3/8/2017, Historical Med      Lansoprazole (PREVACID PO) Take by mouth, Historical Med      meloxicam (MOBIC) 15 mg tablet Take 1 tablet (15 mg total) by mouth daily, Starting Thu 1/2/2020, Normal      menthol-cetylpyridinium (CEPACOL) 3 MG lozenge Take 1 lozenge (3 mg total) by mouth as needed for sore throat, Starting Wed 2/13/2019, Print      Montelukast Sodium (SINGULAIR PO) Take by mouth, Historical Med      naproxen (NAPROSYN) 500 mg tablet Take 500 mg by mouth, Starting Tue 10/27/2015, Historical Med      nicotine (NICODERM CQ) 21 mg/24 hr TD 24 hr patch Place 1 patch on the skin every 24 hours for 60 days, Starting Tue 3/5/2019, Until Sat 5/4/2019, Normal      polyethylene glycol (MIRALAX) 17 g packet Take 17 g by mouth daily as needed (constipation), Starting 12/21/2016, Until Discontinued, Print      predniSONE 50 mg tablet Take 1 tablet (50 mg total) by mouth daily, Starting Thu 2/14/2019, Print      pseudoephedrine (SUDAFED) 60 mg tablet Take 1 tablet (60 mg total) by mouth every 8 (eight) hours as needed for congestion, Starting Wed 2/13/2019, Print      Respiratory Therapy Supplies (NEBULIZER COMPRESSOR) KIT by Does not apply route once for 1 dose Please dispense 1 nebulizer machine with mask and tubing for patient , Starting Wed 2/13/2019, Print           No discharge procedures on file  ED Provider  Attending physically available and evaluated Holly Cabezas I managed the patient along with the ED Attending      Electronically Signed by         Nathan Wen DO  02/17/20 1942

## 2020-02-18 NOTE — ED ATTENDING ATTESTATION
2/17/2020  IAsaf, , saw and evaluated the patient  I have discussed the patient with the resident/non-physician practitioner and agree with the resident's/non-physician practitioner's findings, Plan of Care, and MDM as documented in the resident's/non-physician practitioner's note, except where noted  All available labs and Radiology studies were reviewed  I was present for key portions of any procedure(s) performed by the resident/non-physician practitioner and I was immediately available to provide assistance  At this point I agree with the current assessment done in the Emergency Department  I have conducted an independent evaluation of this patient a history and physical is as follows:    ED Course       Patient is a 60-year-old female history of asthma presents more as a chest tightness and pain that is worse when she coughs  Denies any fevers or chills  Denies any overt shortness of breath  Denies any chest pain otherwise  No trauma but has had recent URI symptoms concerns of cough, clear mucus production and runny nose  Denies any abdominal pain, lightheadedness, dizziness  Otherwise benign physical exam   Patient not tachycardic tachypneic nor hypoxic  The calf pain tenderness or asymmetry, lungs have very faint expiratory wheeze  No respiratory distress or accessory muscle usage  No history of DVT or PE  Repeat exam, wheezing is resolved  No accessory muscle usage  Lungs clear  Toradol for myalgias  Labs reviewed within normal range  Chest x-ray unremarkable  DC home and follow up with her primary care physician  States she only smokes a few cigarettes daily but advised to quit smoking  Smoking cessation discussion with patient:  3-5 minute conversation with the patient regarding smoking cessation  I explained at length risks of long term smoking, associated health conditions, benefits of quitting, as well as quitting methods   Patient receptive and understands      Critical Care Time  Procedures

## 2020-10-30 ENCOUNTER — OFFICE VISIT (OUTPATIENT)
Dept: INTERNAL MEDICINE CLINIC | Facility: CLINIC | Age: 45
End: 2020-10-30

## 2020-10-30 ENCOUNTER — PATIENT OUTREACH (OUTPATIENT)
Dept: INTERNAL MEDICINE CLINIC | Facility: CLINIC | Age: 45
End: 2020-10-30

## 2020-10-30 VITALS
HEIGHT: 66 IN | WEIGHT: 162 LBS | DIASTOLIC BLOOD PRESSURE: 80 MMHG | SYSTOLIC BLOOD PRESSURE: 130 MMHG | HEART RATE: 88 BPM | OXYGEN SATURATION: 98 % | TEMPERATURE: 96.8 F | BODY MASS INDEX: 26.03 KG/M2

## 2020-10-30 DIAGNOSIS — R92.8 ABNORMAL MAMMOGRAM OF BOTH BREASTS: Primary | ICD-10-CM

## 2020-10-30 DIAGNOSIS — R92.8 ABNORMAL ULTRASOUND OF BREAST: ICD-10-CM

## 2020-10-30 DIAGNOSIS — Z11.3 ROUTINE SCREENING FOR STI (SEXUALLY TRANSMITTED INFECTION): ICD-10-CM

## 2020-10-30 DIAGNOSIS — F17.200 CURRENT EVERY DAY SMOKER: ICD-10-CM

## 2020-10-30 DIAGNOSIS — D22.9 CHANGE IN SKIN MOLE: ICD-10-CM

## 2020-10-30 DIAGNOSIS — J45.20 MILD INTERMITTENT ASTHMA WITHOUT COMPLICATION: ICD-10-CM

## 2020-10-30 DIAGNOSIS — E66.3 OVERWEIGHT (BMI 25.0-29.9): ICD-10-CM

## 2020-10-30 DIAGNOSIS — F41.9 ANXIETY: ICD-10-CM

## 2020-10-30 DIAGNOSIS — Z28.20 IMMUNIZATION NOT CARRIED OUT BECAUSE OF PATIENT DECISION: ICD-10-CM

## 2020-10-30 DIAGNOSIS — Z13.220 SCREENING FOR LIPID DISORDERS: ICD-10-CM

## 2020-10-30 DIAGNOSIS — N92.6 MENSTRUAL CHANGES: ICD-10-CM

## 2020-10-30 PROBLEM — F32.A DEPRESSION: Status: RESOLVED | Noted: 2017-04-11 | Resolved: 2020-10-30

## 2020-10-30 PROCEDURE — 3725F SCREEN DEPRESSION PERFORMED: CPT | Performed by: PHYSICIAN ASSISTANT

## 2020-10-30 PROCEDURE — 99214 OFFICE O/P EST MOD 30 MIN: CPT | Performed by: PHYSICIAN ASSISTANT

## 2020-10-30 RX ORDER — HYDROXYZINE HYDROCHLORIDE 25 MG/1
25 TABLET, FILM COATED ORAL 2 TIMES DAILY PRN
Qty: 30 TABLET | Refills: 1 | Status: SHIPPED | OUTPATIENT
Start: 2020-10-30 | End: 2021-02-15 | Stop reason: SDUPTHER

## 2020-11-02 ENCOUNTER — LAB (OUTPATIENT)
Dept: LAB | Facility: HOSPITAL | Age: 45
End: 2020-11-02
Payer: COMMERCIAL

## 2020-11-02 DIAGNOSIS — Z11.3 ROUTINE SCREENING FOR STI (SEXUALLY TRANSMITTED INFECTION): ICD-10-CM

## 2020-11-02 LAB
ALBUMIN SERPL BCP-MCNC: 3.6 G/DL (ref 3.5–5)
ALP SERPL-CCNC: 59 U/L (ref 46–116)
ALT SERPL W P-5'-P-CCNC: 22 U/L (ref 12–78)
ANION GAP SERPL CALCULATED.3IONS-SCNC: 7 MMOL/L (ref 4–13)
AST SERPL W P-5'-P-CCNC: 14 U/L (ref 5–45)
BASOPHILS # BLD AUTO: 0.05 THOUSANDS/ΜL (ref 0–0.1)
BASOPHILS NFR BLD AUTO: 1 % (ref 0–1)
BILIRUB SERPL-MCNC: 0.41 MG/DL (ref 0.2–1)
BUN SERPL-MCNC: 9 MG/DL (ref 5–25)
CALCIUM SERPL-MCNC: 8.7 MG/DL (ref 8.3–10.1)
CHLORIDE SERPL-SCNC: 110 MMOL/L (ref 100–108)
CHOLEST SERPL-MCNC: 194 MG/DL (ref 50–200)
CO2 SERPL-SCNC: 25 MMOL/L (ref 21–32)
CREAT SERPL-MCNC: 0.7 MG/DL (ref 0.6–1.3)
EOSINOPHIL # BLD AUTO: 0.19 THOUSAND/ΜL (ref 0–0.61)
EOSINOPHIL NFR BLD AUTO: 3 % (ref 0–6)
ERYTHROCYTE [DISTWIDTH] IN BLOOD BY AUTOMATED COUNT: 15.3 % (ref 11.6–15.1)
GFR SERPL CREATININE-BSD FRML MDRD: 123 ML/MIN/1.73SQ M
GLUCOSE P FAST SERPL-MCNC: 78 MG/DL (ref 65–99)
HCT VFR BLD AUTO: 36.8 % (ref 34.8–46.1)
HDLC SERPL-MCNC: 59 MG/DL
HGB BLD-MCNC: 11.6 G/DL (ref 11.5–15.4)
IMM GRANULOCYTES # BLD AUTO: 0.02 THOUSAND/UL (ref 0–0.2)
IMM GRANULOCYTES NFR BLD AUTO: 0 % (ref 0–2)
LDLC SERPL CALC-MCNC: 95 MG/DL (ref 0–100)
LYMPHOCYTES # BLD AUTO: 2.35 THOUSANDS/ΜL (ref 0.6–4.47)
LYMPHOCYTES NFR BLD AUTO: 31 % (ref 14–44)
MCH RBC QN AUTO: 27.2 PG (ref 26.8–34.3)
MCHC RBC AUTO-ENTMCNC: 31.5 G/DL (ref 31.4–37.4)
MCV RBC AUTO: 86 FL (ref 82–98)
MONOCYTES # BLD AUTO: 0.49 THOUSAND/ΜL (ref 0.17–1.22)
MONOCYTES NFR BLD AUTO: 6 % (ref 4–12)
NEUTROPHILS # BLD AUTO: 4.6 THOUSANDS/ΜL (ref 1.85–7.62)
NEUTS SEG NFR BLD AUTO: 59 % (ref 43–75)
NRBC BLD AUTO-RTO: 0 /100 WBCS
PLATELET # BLD AUTO: 259 THOUSANDS/UL (ref 149–390)
PMV BLD AUTO: 10 FL (ref 8.9–12.7)
POTASSIUM SERPL-SCNC: 4.2 MMOL/L (ref 3.5–5.3)
PROT SERPL-MCNC: 7.2 G/DL (ref 6.4–8.2)
RBC # BLD AUTO: 4.27 MILLION/UL (ref 3.81–5.12)
SODIUM SERPL-SCNC: 142 MMOL/L (ref 136–145)
TRIGL SERPL-MCNC: 199 MG/DL
TSH SERPL DL<=0.05 MIU/L-ACNC: 0.55 UIU/ML (ref 0.36–3.74)
WBC # BLD AUTO: 7.7 THOUSAND/UL (ref 4.31–10.16)

## 2020-11-02 PROCEDURE — 85025 COMPLETE CBC W/AUTO DIFF WBC: CPT | Performed by: PHYSICIAN ASSISTANT

## 2020-11-02 PROCEDURE — 84443 ASSAY THYROID STIM HORMONE: CPT | Performed by: PHYSICIAN ASSISTANT

## 2020-11-02 PROCEDURE — 80053 COMPREHEN METABOLIC PANEL: CPT | Performed by: PHYSICIAN ASSISTANT

## 2020-11-02 PROCEDURE — 36415 COLL VENOUS BLD VENIPUNCTURE: CPT | Performed by: PHYSICIAN ASSISTANT

## 2020-11-02 PROCEDURE — 87389 HIV-1 AG W/HIV-1&-2 AB AG IA: CPT

## 2020-11-02 PROCEDURE — 80061 LIPID PANEL: CPT | Performed by: PHYSICIAN ASSISTANT

## 2020-11-03 LAB — HIV 1+2 AB+HIV1 P24 AG SERPL QL IA: NORMAL

## 2020-11-04 ENCOUNTER — CONSULT (OUTPATIENT)
Dept: MULTI SPECIALTY CLINIC | Facility: CLINIC | Age: 45
End: 2020-11-04

## 2020-11-04 VITALS — BODY MASS INDEX: 26.12 KG/M2 | HEIGHT: 66 IN | WEIGHT: 162.5 LBS | TEMPERATURE: 97 F

## 2020-11-04 DIAGNOSIS — L20.89 OTHER ATOPIC DERMATITIS: ICD-10-CM

## 2020-11-04 DIAGNOSIS — D48.5 NEOPLASM OF UNCERTAIN BEHAVIOR OF SKIN: ICD-10-CM

## 2020-11-04 DIAGNOSIS — D22.5 MULTIPLE BENIGN MELANOCYTIC NEVI OF UPPER AND LOWER EXTREMITIES AND TRUNK: Primary | ICD-10-CM

## 2020-11-04 DIAGNOSIS — D18.01 CHERRY ANGIOMA: ICD-10-CM

## 2020-11-04 DIAGNOSIS — D22.70 MULTIPLE BENIGN MELANOCYTIC NEVI OF UPPER AND LOWER EXTREMITIES AND TRUNK: Primary | ICD-10-CM

## 2020-11-04 DIAGNOSIS — D23.9 DERMATOFIBROMA: ICD-10-CM

## 2020-11-04 DIAGNOSIS — D22.60 MULTIPLE BENIGN MELANOCYTIC NEVI OF UPPER AND LOWER EXTREMITIES AND TRUNK: Primary | ICD-10-CM

## 2020-11-04 PROCEDURE — 99244 OFF/OP CNSLTJ NEW/EST MOD 40: CPT | Performed by: DERMATOLOGY

## 2020-11-06 ENCOUNTER — HOSPITAL ENCOUNTER (OUTPATIENT)
Dept: ULTRASOUND IMAGING | Facility: CLINIC | Age: 45
Discharge: HOME | End: 2020-11-06
Payer: COMMERCIAL

## 2020-11-06 ENCOUNTER — TELEPHONE (OUTPATIENT)
Dept: OBGYN CLINIC | Facility: CLINIC | Age: 45
End: 2020-11-06

## 2020-11-06 ENCOUNTER — HOSPITAL ENCOUNTER (OUTPATIENT)
Dept: MAMMOGRAPHY | Facility: CLINIC | Age: 45
Discharge: HOME | End: 2020-11-06
Payer: COMMERCIAL

## 2020-11-06 VITALS — HEIGHT: 66 IN | WEIGHT: 162 LBS | BODY MASS INDEX: 26.03 KG/M2

## 2020-11-06 DIAGNOSIS — R92.8 ABNORMAL MAMMOGRAM OF BOTH BREASTS: ICD-10-CM

## 2020-11-06 PROCEDURE — G0279 TOMOSYNTHESIS, MAMMO: HCPCS

## 2020-11-06 PROCEDURE — 76642 ULTRASOUND BREAST LIMITED: CPT

## 2020-11-06 PROCEDURE — 77066 DX MAMMO INCL CAD BI: CPT

## 2020-11-25 ENCOUNTER — OFFICE VISIT (OUTPATIENT)
Dept: INTERNAL MEDICINE CLINIC | Facility: CLINIC | Age: 45
End: 2020-11-25

## 2020-11-25 VITALS
HEIGHT: 66 IN | TEMPERATURE: 97.8 F | DIASTOLIC BLOOD PRESSURE: 80 MMHG | BODY MASS INDEX: 26.36 KG/M2 | HEART RATE: 79 BPM | OXYGEN SATURATION: 97 % | SYSTOLIC BLOOD PRESSURE: 120 MMHG | WEIGHT: 164 LBS

## 2020-11-25 DIAGNOSIS — H10.13 ALLERGIC CONJUNCTIVITIS OF BOTH EYES: ICD-10-CM

## 2020-11-25 DIAGNOSIS — F41.9 ANXIETY: ICD-10-CM

## 2020-11-25 DIAGNOSIS — J45.20 MILD INTERMITTENT ASTHMA WITHOUT COMPLICATION: Primary | ICD-10-CM

## 2020-11-25 DIAGNOSIS — E66.3 OVERWEIGHT (BMI 25.0-29.9): ICD-10-CM

## 2020-11-25 PROBLEM — B37.3 VAGINAL CANDIDIASIS: Status: RESOLVED | Noted: 2019-03-05 | Resolved: 2020-11-25

## 2020-11-25 PROBLEM — B37.31 VAGINAL CANDIDIASIS: Status: RESOLVED | Noted: 2019-03-05 | Resolved: 2020-11-25

## 2020-11-25 PROBLEM — B35.9 TINEA: Status: RESOLVED | Noted: 2019-12-18 | Resolved: 2020-11-25

## 2020-11-25 PROBLEM — M79.645 PAIN OF FINGER OF LEFT HAND: Status: RESOLVED | Noted: 2019-12-18 | Resolved: 2020-11-25

## 2020-11-25 PROCEDURE — 99213 OFFICE O/P EST LOW 20 MIN: CPT | Performed by: PHYSICIAN ASSISTANT

## 2020-11-25 PROCEDURE — 3008F BODY MASS INDEX DOCD: CPT | Performed by: PHYSICIAN ASSISTANT

## 2020-11-25 RX ORDER — KETOTIFEN FUMARATE 0.35 MG/ML
1 SOLUTION/ DROPS OPHTHALMIC 2 TIMES DAILY PRN
Qty: 5 ML | Refills: 0 | Status: SHIPPED | OUTPATIENT
Start: 2020-11-25

## 2020-11-25 RX ORDER — LORATADINE 10 MG/1
10 TABLET ORAL DAILY
Qty: 90 TABLET | Refills: 1 | Status: SHIPPED | OUTPATIENT
Start: 2020-11-25 | End: 2022-08-05

## 2021-02-15 DIAGNOSIS — F41.9 ANXIETY: ICD-10-CM

## 2021-02-15 NOTE — TELEPHONE ENCOUNTER
Patient left voicemail requesting refills on hydoxyzine, patient is also requesting refills on albuterol inhaler  I do not see this as an active medication  Please refill albuterol if appropriate

## 2021-02-16 DIAGNOSIS — J45.20 MILD INTERMITTENT ASTHMA WITHOUT COMPLICATION: Primary | ICD-10-CM

## 2021-02-16 RX ORDER — HYDROXYZINE HYDROCHLORIDE 25 MG/1
25 TABLET, FILM COATED ORAL 2 TIMES DAILY PRN
Qty: 30 TABLET | Refills: 1 | Status: SHIPPED | OUTPATIENT
Start: 2021-02-16

## 2021-02-16 RX ORDER — ALBUTEROL SULFATE 90 UG/1
2 AEROSOL, METERED RESPIRATORY (INHALATION) EVERY 6 HOURS PRN
Qty: 8 G | Refills: 0 | Status: SHIPPED | OUTPATIENT
Start: 2021-02-16 | End: 2021-09-24 | Stop reason: SDUPTHER

## 2021-03-06 ENCOUNTER — HOSPITAL ENCOUNTER (EMERGENCY)
Facility: HOSPITAL | Age: 46
Discharge: HOME | End: 2021-03-06
Attending: EMERGENCY MEDICINE
Payer: COMMERCIAL

## 2021-03-06 ENCOUNTER — NURSE TRIAGE (OUTPATIENT)
Dept: OTHER | Facility: OTHER | Age: 46
End: 2021-03-06

## 2021-03-06 VITALS
TEMPERATURE: 97.8 F | HEART RATE: 87 BPM | WEIGHT: 173 LBS | OXYGEN SATURATION: 96 % | DIASTOLIC BLOOD PRESSURE: 104 MMHG | BODY MASS INDEX: 27.92 KG/M2 | SYSTOLIC BLOOD PRESSURE: 147 MMHG | RESPIRATION RATE: 16 BRPM

## 2021-03-06 DIAGNOSIS — L01.00 IMPETIGO: ICD-10-CM

## 2021-03-06 DIAGNOSIS — L85.3 DRY SKIN: ICD-10-CM

## 2021-03-06 DIAGNOSIS — R21 RASH: Primary | ICD-10-CM

## 2021-03-06 PROCEDURE — 99284 EMERGENCY DEPT VISIT MOD MDM: CPT | Performed by: EMERGENCY MEDICINE

## 2021-03-06 PROCEDURE — 99282 EMERGENCY DEPT VISIT SF MDM: CPT

## 2021-03-06 NOTE — TELEPHONE ENCOUNTER
Regarding:  lesions on my face and ears  ----- Message from Yuridia Jurado sent at 3/6/2021 11:45 AM EST -----  " I have lesions on my face and ears"

## 2021-03-06 NOTE — ED ATTENDING ATTESTATION
3/6/2021  IJean-Paul MD, saw and evaluated the patient  I have discussed the patient with the resident/non-physician practitioner and agree with the resident's/non-physician practitioner's findings, Plan of Care, and MDM as documented in the resident's/non-physician practitioner's note, except where noted  All available labs and Radiology studies were reviewed  I was present for key portions of any procedure(s) performed by the resident/non-physician practitioner and I was immediately available to provide assistance  At this point I agree with the current assessment done in the Emergency Department  I have conducted an independent evaluation of this patient a history and physical is as follows:   Pt has been having a rash off and on Pt was being treated for eczema Pt states rash has flared over the past week Pt has noted pustules of face and crusting of ear lobe   No fever no chills Pt states bactroban has helped this in the past PE: cresting noted to L ear small ulceration at base of R ear lobe few papules noted r side of face no palpable nodes  MDM: will start bactroban aquaphor for dry skin on hands  ED Course         Critical Care Time  Procedures

## 2021-03-06 NOTE — TELEPHONE ENCOUNTER
Reason for Disposition   [1] Looks infected (spreading redness, pus) AND [2] large red area (> 2 in  or 5 cm)    Answer Assessment - Initial Assessment Questions  1  APPEARANCE of SORES: "What do the sores look like?"      Sores to her left and right ear that are draining puss and appear raw  Pt states "You can see the meat inside my ear  It looks like something is eating away at my skin  "  Also notes some bumps and dry skin to her face  2  NUMBER: "How many sores are there?"      Over 4 or more  3  SIZE: "How big is the largest sore?"      Did not specify  4  LOCATION: "Where are the sores located?"      Ears  5  ONSET: "When did the sores begin?"      Says she has had chronic skin issues for over a year now but this now looks different for the last week  6  CAUSE: "What do you think is causing the sores?"      Unknown  Says she was told by dermatologist at one time that rash on her face is eczema related but was told by her provider rash appears fungal- this was over a year ago  7  OTHER SYMPTOMS: "Do you have any other symptoms?" (e g , fever, new weakness)      Denies fever  There if spreading redness  Pt states skin looks infected  Protocols used: SORES-ADULT-AH    Pts nearest care now is [facility]  Will be heading over there now  Pt wanted to get in for appt with her provider on Monday  Unable to schedule pt due to appt block  Pt will be reaching out to her office Monday morning in hopes to be squeezed in for appt

## 2021-03-06 NOTE — ED PROVIDER NOTES
History  Chief Complaint   Patient presents with    Rash     Patient reports rash inside of ears and on hands  Reports this has been going on for almost a year  47-year-old female presents to the emergency department for evaluation of a rash  The patient reports that she has had waxing and waning of this rash over the past year  She states that she has been seen by her PCP and Dermatology prior  She reports that she was initially told that the rash was due to a fungus and was treated with an antifungal cream   She says that this did not resolve the rash  When she followed up with Dermatology she was told that the rash was related to eczema and was treated with triamcinolone  Again she states that this did not resolve the rash  She reports that in the past she had a similar rash that was treated with Bactroban  She reports that she try to get an appointment with her PCP but was unable to so came to the emergency department for further evaluation  She describes the rash as tiny pustules on the right side of her face inside her left ear and on her right earlobe  She states that the lesion on her right ear is scabbed over with a crust like appearance  She denies rashes to the rest of her body  No new creams, lotions or detergents  She denies any insect bites  No fevers or chills  Prior to Admission Medications   Prescriptions Last Dose Informant Patient Reported? Taking?    albuterol (Ventolin HFA) 90 mcg/act inhaler   No No   Sig: Inhale 2 puffs every 6 (six) hours as needed for wheezing   hydrOXYzine HCL (ATARAX) 25 mg tablet   No No   Sig: Take 1 tablet (25 mg total) by mouth 2 (two) times a day as needed for anxiety   ketotifen (ZADITOR) 0 025 % ophthalmic solution   No No   Sig: Administer 1 drop to both eyes 2 (two) times a day as needed (itchy eyes)   loratadine (CLARITIN) 10 mg tablet   No No   Sig: Take 1 tablet (10 mg total) by mouth daily   triamcinolone (KENALOG) 0 1 % ointment No No   Sig: Apply topically 2 (two) times a day   Patient not taking: Reported on 2020      Facility-Administered Medications: None       Past Medical History:   Diagnosis Date    Anemia     Asthma     Bilateral breast cysts     last assessed    Americo Jones 19    resolved   11/3/17     Cervical cancer, FIGO stage I (HCC)     Constipation     Seizures (HCC)     URI (upper respiratory infection)     under additonal type - Chronic       Past Surgical History:   Procedure Laterality Date     SECTION      INGUINAL HERNIA REPAIR      last assessed   12/17/15      NV COLONOSCOPY FLX DX W/COLLJ SPEC WHEN PFRMD N/A 9/15/2017    Procedure: COLONOSCOPY;  Surgeon: Kurt Delgado MD;  Location: [facility]; Service: Gastroenterology    SKIN BIOPSY      last assessed   64      UMBILICAL HERNIA REPAIR      last assessed   12/17/15     URETHRAL DIVERTICULECTOMY      excision of urethral diverticulum    last assessed   12/17/15       Family History   Problem Relation Age of Onset    Hyperlipidemia Mother     Hypertension Mother    [de-identified] Breast cancer Mother         neoplasm/dx first age 39, mastectomy age 38, did have lymph biopsy positive with recurrance age around age 61    Hyperlipidemia Maternal Grandmother     Hypertension Maternal Grandmother     Leukemia Maternal Grandmother      I have reviewed and agree with the history as documented  E-Cigarette/Vaping    E-Cigarette Use Never User      E-Cigarette/Vaping Substances    Nicotine No     THC No     CBD No     Flavoring No     Other No     Unknown No      Social History     Tobacco Use    Smoking status: Current Every Day Smoker     Packs/day: 0 25     Types: Cigarettes    Smokeless tobacco: Never Used   Substance Use Topics    Alcohol use: Yes     Frequency: Monthly or less     Drinks per session: 1 or 2     Binge frequency: Never    Drug use: Never        Review of Systems   Constitutional: Negative for chills and fever  HENT: Negative for ear pain and sore throat  Eyes: Negative for pain and visual disturbance  Respiratory: Negative for cough and shortness of breath  Cardiovascular: Negative for chest pain and palpitations  Gastrointestinal: Negative for abdominal pain and vomiting  Genitourinary: Negative for dysuria and hematuria  Musculoskeletal: Negative for arthralgias and back pain  Skin: Positive for rash  Negative for color change  Neurological: Negative for seizures and syncope  All other systems reviewed and are negative  Physical Exam  ED Triage Vitals [03/06/21 1302]   Temperature Pulse Respirations Blood Pressure SpO2   97 8 °F (36 6 °C) 87 16 (!) 147/104 96 %      Temp Source Heart Rate Source Patient Position - Orthostatic VS BP Location FiO2 (%)   Oral -- -- -- --      Pain Score       --             Orthostatic Vital Signs  Vitals:    03/06/21 1302   BP: (!) 147/104   Pulse: 87       Physical Exam  Vitals signs and nursing note reviewed  Constitutional:       General: She is not in acute distress  Appearance: She is well-developed  HENT:      Head: Normocephalic and atraumatic  Ears:     Eyes:      Conjunctiva/sclera: Conjunctivae normal    Neck:      Musculoskeletal: Neck supple  Cardiovascular:      Rate and Rhythm: Normal rate and regular rhythm  Heart sounds: No murmur  Pulmonary:      Effort: Pulmonary effort is normal  No respiratory distress  Breath sounds: Normal breath sounds  Abdominal:      Palpations: Abdomen is soft  Tenderness: There is no abdominal tenderness  Skin:     General: Skin is warm and dry  Findings: Rash present  Rash is crusting and pustular  Neurological:      Mental Status: She is alert           ED Medications  Medications - No data to display    Diagnostic Studies  Results Reviewed     None                 No orders to display         Procedures  Procedures      ED Course MDM  Number of Diagnoses or Management Options  Impetigo:   Rash:   Diagnosis management comments: 46-year-old female presented to the emergency department for evaluation of rash  On arrival the patient was awake, alert, oriented and in no acute distress  Vital signs were stable  The patient has already been treated with an antifungal cream and triamcinolone  The trial of Bactroban is appropriate at this time for suspected impetigo with recommendation to follow up with Dermatology for continued symptoms  Return precautions were discussed  Disposition  Final diagnoses:   Rash   Impetigo     Time reflects when diagnosis was documented in both MDM as applicable and the Disposition within this note     Time User Action Codes Description Comment    3/6/2021  1:29 PM Allyson Quick Add [R21] Rash     3/6/2021  1:37 PM Allyson Quick Add [L01 00] Impetigo       ED Disposition     ED Disposition Condition Date/Time Comment    Discharge Stable Sat Mar 6, 2021  1:29 PM Genevieve Connor discharge to home/self care              Follow-up Information     Follow up With Specialties Details Why Contact Info Additional C/ [address] [facility] Dermatology Schedule an appointment as soon as possible for a visit   [address] [facility] [place], [address], [place], [address] [facility]    [address] [place] [address] [address] [facility] Emergency Department Emergency Medicine Go to  If symptoms worsen [address]  [address] [facility] [address] [facility] Emergency Department, [address] [facility], [address] [place] [address] 108          Patient's Medications   Discharge Prescriptions    MUPIROCIN (BACTROBAN) 2 % OINTMENT    Apply topically 3 (three) times a day for 5 days       Start Date: 3/6/2021  End Date: 3/11/2021       Order Dose: --       Quantity: 15 g    Refills: 0     No discharge procedures on file  PDMP Review     None           ED Provider  Attending physically available and evaluated Tao Cadet  CELSO managed the patient along with the ED Attending      Electronically Signed by         Javi Rowe MD  03/06/21 6757

## 2021-05-26 ENCOUNTER — OFFICE VISIT (OUTPATIENT)
Dept: INTERNAL MEDICINE CLINIC | Facility: CLINIC | Age: 46
End: 2021-05-26

## 2021-05-26 ENCOUNTER — APPOINTMENT (OUTPATIENT)
Dept: LAB | Facility: CLINIC | Age: 46
End: 2021-05-26
Payer: COMMERCIAL

## 2021-05-26 VITALS
BODY MASS INDEX: 28.61 KG/M2 | OXYGEN SATURATION: 98 % | DIASTOLIC BLOOD PRESSURE: 88 MMHG | WEIGHT: 178 LBS | TEMPERATURE: 98.2 F | HEIGHT: 66 IN | SYSTOLIC BLOOD PRESSURE: 132 MMHG | HEART RATE: 92 BPM

## 2021-05-26 DIAGNOSIS — J45.20 MILD INTERMITTENT ASTHMA WITHOUT COMPLICATION: Primary | ICD-10-CM

## 2021-05-26 DIAGNOSIS — N92.6 IRREGULAR MENSTRUATION: ICD-10-CM

## 2021-05-26 DIAGNOSIS — R42 INTERMITTENT LIGHTHEADEDNESS: ICD-10-CM

## 2021-05-26 DIAGNOSIS — Z71.85 VACCINE COUNSELING: ICD-10-CM

## 2021-05-26 DIAGNOSIS — Z00.00 HEALTHCARE MAINTENANCE: ICD-10-CM

## 2021-05-26 DIAGNOSIS — N95.1 PERIMENOPAUSE: ICD-10-CM

## 2021-05-26 LAB
ALBUMIN SERPL BCP-MCNC: 3.6 G/DL (ref 3.5–5)
ALP SERPL-CCNC: 59 U/L (ref 46–116)
ALT SERPL W P-5'-P-CCNC: 17 U/L (ref 12–78)
ANION GAP SERPL CALCULATED.3IONS-SCNC: 6 MMOL/L (ref 4–13)
AST SERPL W P-5'-P-CCNC: 10 U/L (ref 5–45)
BASOPHILS # BLD MANUAL: 0.08 THOUSAND/UL (ref 0–0.1)
BASOPHILS NFR MAR MANUAL: 1 % (ref 0–1)
BILIRUB SERPL-MCNC: 0.35 MG/DL (ref 0.2–1)
BUN SERPL-MCNC: 13 MG/DL (ref 5–25)
CALCIUM SERPL-MCNC: 9 MG/DL (ref 8.3–10.1)
CHLORIDE SERPL-SCNC: 109 MMOL/L (ref 100–108)
CO2 SERPL-SCNC: 23 MMOL/L (ref 21–32)
CREAT SERPL-MCNC: 0.84 MG/DL (ref 0.6–1.3)
EOSINOPHIL # BLD MANUAL: 0.08 THOUSAND/UL (ref 0–0.4)
EOSINOPHIL NFR BLD MANUAL: 1 % (ref 0–6)
ERYTHROCYTE [DISTWIDTH] IN BLOOD BY AUTOMATED COUNT: 16.4 % (ref 11.6–15.1)
GFR SERPL CREATININE-BSD FRML MDRD: 98 ML/MIN/1.73SQ M
GLUCOSE P FAST SERPL-MCNC: 82 MG/DL (ref 65–99)
HCT VFR BLD AUTO: 37.2 % (ref 34.8–46.1)
HGB BLD-MCNC: 11.5 G/DL (ref 11.5–15.4)
HYPERCHROMIA BLD QL SMEAR: PRESENT
LYMPHOCYTES # BLD AUTO: 2.66 THOUSAND/UL (ref 0.6–4.47)
LYMPHOCYTES # BLD AUTO: 35 % (ref 14–44)
MCH RBC QN AUTO: 24.9 PG (ref 26.8–34.3)
MCHC RBC AUTO-ENTMCNC: 30.9 G/DL (ref 31.4–37.4)
MCV RBC AUTO: 81 FL (ref 82–98)
MONOCYTES # BLD AUTO: 0.46 THOUSAND/UL (ref 0–1.22)
MONOCYTES NFR BLD: 6 % (ref 4–12)
NEUTROPHILS # BLD MANUAL: 4.02 THOUSAND/UL (ref 1.85–7.62)
NEUTS BAND NFR BLD MANUAL: 2 % (ref 0–8)
NEUTS SEG NFR BLD AUTO: 51 % (ref 43–75)
NRBC BLD AUTO-RTO: 0 /100 WBCS
OVALOCYTES BLD QL SMEAR: PRESENT
PLATELET # BLD AUTO: 250 THOUSANDS/UL (ref 149–390)
PLATELET BLD QL SMEAR: ADEQUATE
PMV BLD AUTO: 10.6 FL (ref 8.9–12.7)
POIKILOCYTOSIS BLD QL SMEAR: PRESENT
POLYCHROMASIA BLD QL SMEAR: PRESENT
POTASSIUM SERPL-SCNC: 4.1 MMOL/L (ref 3.5–5.3)
PROT SERPL-MCNC: 7.4 G/DL (ref 6.4–8.2)
RBC # BLD AUTO: 4.61 MILLION/UL (ref 3.81–5.12)
RBC MORPH BLD: PRESENT
SODIUM SERPL-SCNC: 138 MMOL/L (ref 136–145)
T4 FREE SERPL-MCNC: 0.89 NG/DL (ref 0.76–1.46)
TSH SERPL DL<=0.05 MIU/L-ACNC: 1.13 UIU/ML (ref 0.36–3.74)
VARIANT LYMPHS # BLD AUTO: 4 %
WBC # BLD AUTO: 7.59 THOUSAND/UL (ref 4.31–10.16)

## 2021-05-26 PROCEDURE — 36415 COLL VENOUS BLD VENIPUNCTURE: CPT | Performed by: PHYSICIAN ASSISTANT

## 2021-05-26 PROCEDURE — 80053 COMPREHEN METABOLIC PANEL: CPT | Performed by: PHYSICIAN ASSISTANT

## 2021-05-26 PROCEDURE — 83540 ASSAY OF IRON: CPT | Performed by: PHYSICIAN ASSISTANT

## 2021-05-26 PROCEDURE — 84443 ASSAY THYROID STIM HORMONE: CPT | Performed by: PHYSICIAN ASSISTANT

## 2021-05-26 PROCEDURE — 83550 IRON BINDING TEST: CPT | Performed by: PHYSICIAN ASSISTANT

## 2021-05-26 PROCEDURE — 85027 COMPLETE CBC AUTOMATED: CPT | Performed by: PHYSICIAN ASSISTANT

## 2021-05-26 PROCEDURE — 85007 BL SMEAR W/DIFF WBC COUNT: CPT | Performed by: PHYSICIAN ASSISTANT

## 2021-05-26 PROCEDURE — 84439 ASSAY OF FREE THYROXINE: CPT | Performed by: PHYSICIAN ASSISTANT

## 2021-05-26 PROCEDURE — 99213 OFFICE O/P EST LOW 20 MIN: CPT | Performed by: PHYSICIAN ASSISTANT

## 2021-05-26 PROCEDURE — 82728 ASSAY OF FERRITIN: CPT | Performed by: PHYSICIAN ASSISTANT

## 2021-05-26 NOTE — PATIENT INSTRUCTIONS
On your visit today we discussed your symptoms of feeling lightheaded like you might pass out  This has been accompanied by increased frequency of menstruation as well as significant hot flashes  We reviewed her blood pressure is normal and had a normal response with change in position  No neurologic abnormalities on your exam     Suspect this is changes in hormonal balance  As per our discussion I have ordered labs to make sure you are not becoming anemic secondary to the heavy and more frequent menstruation as well as your thyroid due to change in menstruation  I encourage you to schedule an appointment with your gyn for further discussion and evaluation as needed as well as routine women's health  Continue to remain well hydrated but if you develop any new or worsening symptoms to call us back  Will reach out to you once I have the results of the labs  We also discussed at length COVID vaccine  You have been doing her research and it is appropriate but you have not yet made a decision about getting the vaccine for yourself

## 2021-05-26 NOTE — PROGRESS NOTES
Assessment/Plan: On your visit today we discussed your symptoms of feeling lightheaded like you might pass out  This has been accompanied by increased frequency of menstruation as well as significant hot flashes  We reviewed her blood pressure is normal and had a normal response with change in position  No neurologic abnormalities on your exam     Suspect this is changes in hormonal balance  As per our discussion I have ordered labs to make sure you are not becoming anemic secondary to the heavy and more frequent menstruation as well as your thyroid due to change in menstruation  I encourage you to schedule an appointment with your gyn for further discussion and evaluation as needed as well as routine women's health  Continue to remain well hydrated but if you develop any new or worsening symptoms to call us back  Will reach out to you once I have the results of the labs  We also discussed at length COVID vaccine  You have been doing her research and it is appropriate but you have not yet made a decision about getting the vaccine for yourself  No problem-specific Assessment & Plan notes found for this encounter  Diagnoses and all orders for this visit:    Mild intermittent asthma without complication    Irregular menstruation  -     CBC and differential  -     T4, free  -     TSH, 3rd generation  -     Comprehensive metabolic panel  -     Ambulatory referral to Obstetrics / Gynecology; Future  -     Manual Differential(PHLEBS Do Not Order)    Perimenopause    Intermittent lightheadedness  -     CBC and differential  -     Comprehensive metabolic panel  -     Manual Differential(PHLEBS Do Not Order)    Healthcare maintenance    Vaccine counseling          Subjective:      Patient ID: China Schulz is a 39 y o  female  Pt here for episodic with C/O feeling lightheaded for past 1 5 weeks  States when stands up feels like off balance and may fall  Nothing is spinning  Not syncopal    States will feel when change position but will also happen when just standing will feel needs to sit down  Will not have when sitting or lying down    States getting a period every 2-3 weeks since Feb   Is getting hot flashes and waking up soaked  Has been getting hot flashes for awhile but worse  No N/V no change vision  Heart not racing, no CP  Eating, drinking and sleeping well    Orthostatics checked and normal increase with change in position    States asthma is good not need inhaler  Suspect symptoms related to perimenopause but will check thyroid and a CBC as well  No focal deficits, orthostatics normal      Advised patient to remain well hydrated and to change position more slowly  Also advised patient to schedule an appointment with her gyn to further discuss change in menstruation but as noted likely normal adams menopausal symptoms  Patient still remains hesitant about receiving her COVID vaccine  Patient is well read on correct information and aware of the vaccines but still has not made a decision for herself  The following portions of the patient's history were reviewed and updated as appropriate: allergies, current medications, past family history, past medical history, past social history, past surgical history and problem list     Review of Systems   Constitutional: Negative for appetite change, chills and fever  HENT: Negative  Respiratory: Negative  Cardiovascular: Negative  Gastrointestinal: Negative  Genitourinary: Positive for menstrual problem (irregular)  Musculoskeletal: Negative  Skin: Positive for rash (saw derm told eczema on her legs)  Neurological: Positive for dizziness and light-headedness  Negative for syncope (presyncope)  Psychiatric/Behavioral: Negative            Objective:      /88 (BP Location: Left arm, Patient Position: Sitting, Cuff Size: Standard)   Pulse 92   Temp 98 2 °F (36 8 °C) (Temporal)   Ht 5' 6" (1 676 m)   Wt 80 7 kg (178 lb)   SpO2 98%   BMI 28 73 kg/m²          Physical Exam  Vitals signs and nursing note reviewed  Constitutional:       General: She is not in acute distress  Appearance: She is not ill-appearing  HENT:      Head: Normocephalic  Right Ear: Tympanic membrane, ear canal and external ear normal       Left Ear: Tympanic membrane, ear canal and external ear normal       Mouth/Throat:      Mouth: Mucous membranes are moist       Pharynx: Oropharynx is clear  No oropharyngeal exudate  Eyes:      Extraocular Movements: Extraocular movements intact  Conjunctiva/sclera: Conjunctivae normal    Neck:      Musculoskeletal: Neck supple  Vascular: No carotid bruit  Cardiovascular:      Rate and Rhythm: Normal rate and regular rhythm  Heart sounds: Normal heart sounds  Pulmonary:      Effort: Pulmonary effort is normal       Breath sounds: Normal breath sounds  Abdominal:      General: Bowel sounds are normal    Skin:     General: Skin is warm and dry  Capillary Refill: Capillary refill takes 2 to 3 seconds  Findings: Rash (R>L shin hyperpigmentation) present  Neurological:      General: No focal deficit present  Mental Status: She is alert  Mental status is at baseline  Cranial Nerves: No cranial nerve deficit  Sensory: No sensory deficit  Motor: No weakness     Psychiatric:         Mood and Affect: Mood normal

## 2021-05-27 DIAGNOSIS — R71.8 ABNORMAL RBC INDICES: ICD-10-CM

## 2021-05-27 DIAGNOSIS — R71.8 ABNORMAL RBC MORPHOLOGY: ICD-10-CM

## 2021-05-27 DIAGNOSIS — D64.9 ANEMIA, UNSPECIFIED TYPE: Primary | ICD-10-CM

## 2021-05-27 LAB
FERRITIN SERPL-MCNC: 11 NG/ML (ref 8–388)
IRON SERPL-MCNC: 45 UG/DL (ref 50–170)
TIBC SERPL-MCNC: 459 UG/DL (ref 250–450)

## 2021-06-09 DIAGNOSIS — R79.0 LOW IRON STORES: Primary | ICD-10-CM

## 2021-07-15 ENCOUNTER — OFFICE VISIT (OUTPATIENT)
Dept: OBGYN CLINIC | Facility: CLINIC | Age: 46
End: 2021-07-15

## 2021-07-15 VITALS
DIASTOLIC BLOOD PRESSURE: 96 MMHG | HEART RATE: 97 BPM | BODY MASS INDEX: 27.64 KG/M2 | SYSTOLIC BLOOD PRESSURE: 141 MMHG | WEIGHT: 172 LBS | HEIGHT: 66 IN

## 2021-07-15 DIAGNOSIS — Z12.4 SCREENING FOR CERVICAL CANCER: Primary | ICD-10-CM

## 2021-07-15 DIAGNOSIS — N92.6 IRREGULAR MENSTRUATION: ICD-10-CM

## 2021-07-15 LAB — SL AMB POCT URINE HCG: NEGATIVE

## 2021-07-15 PROCEDURE — T1015 CLINIC SERVICE: HCPCS | Performed by: OBSTETRICS & GYNECOLOGY

## 2021-07-15 PROCEDURE — 81025 URINE PREGNANCY TEST: CPT | Performed by: OBSTETRICS & GYNECOLOGY

## 2021-07-15 PROCEDURE — G0145 SCR C/V CYTO,THINLAYER,RESCR: HCPCS | Performed by: OBSTETRICS & GYNECOLOGY

## 2021-07-15 PROCEDURE — G0476 HPV COMBO ASSAY CA SCREEN: HCPCS | Performed by: OBSTETRICS & GYNECOLOGY

## 2021-07-15 PROCEDURE — 99203 OFFICE O/P NEW LOW 30 MIN: CPT | Performed by: OBSTETRICS & GYNECOLOGY

## 2021-07-15 NOTE — PROGRESS NOTES
Subjective:     León Pedraza is a 42 y o  [id number] female who presents with onset of irregular menses for last year  Her menstrual cycle has been occurring twice a month with the first cycle being heavy and her second cycle being lighter  She denies dysmenorrhea, dyspareunia, dysuria, or hematuria  She reports associated hot flashes  Her gyn history is notable for age of onset at 17 yo with menses occurring monthly every 3-5 days  She has a total of 9 pregnancies with 3 surgical abortions and one medical  She had 2 uncomplicated vaginal deliveries followed by an emergency  for fetal intolerance and 2 successful 's  She has a family hx of ovarian cancer in her maternal great aunt and family hx of breast cancer in her mom diagnosed at 39  The patient's most recent mammogram was 2020 and was benign  Objective:    Vitals: Blood pressure 141/96, pulse 97, height 5' 6" (1 676 m), weight 78 kg (172 lb), last menstrual period 2021  Body mass index is 27 76 kg/m²  Physical Exam  Vitals reviewed  Exam conducted with a chaperone present  Constitutional:       Appearance: Normal appearance  Abdominal:      General: There is no distension  Palpations: Abdomen is soft  Tenderness: There is no abdominal tenderness  Genitourinary:     Vagina: No signs of injury  No vaginal discharge, tenderness or bleeding  Cervix: No cervical motion tenderness or friability  Uterus: Not deviated  Adnexa: Right adnexa normal and left adnexa normal    Neurological:      Mental Status: She is alert  Assessment/Plan:    Irregular Menses  The plan of the work-up for abnormal uterine bleeding was reviewed today  We will plan to move forward with an EMB at next visit  Treatment options were discussed, including expectant management, hormonal (both combination and progesterone-only), Mirena, Lysteda, endometrial ablation, and hysterectomy    The risks, benefits and side effects of each method were discussed  All questions have been answered to her satisfaction and we will plan to move forward with Mirena placement after EMB      Hx of JOSEFINA 1  4/25/17-NICOLE, HPV neg  5/11/17: Colpo- 7:00 LSIL/CIN1 10:00 benign, ECC benign  6/17/17: EMB: negative    7/15/21: Co-testing performed    Follow up in office for EMB and Mirena placement         Josh Allen MD  7/15/2021  2:03 PM

## 2021-07-17 LAB
HPV HR 12 DNA CVX QL NAA+PROBE: NEGATIVE
HPV16 DNA CVX QL NAA+PROBE: NEGATIVE
HPV18 DNA CVX QL NAA+PROBE: NEGATIVE

## 2021-07-18 ENCOUNTER — HOSPITAL ENCOUNTER (EMERGENCY)
Facility: HOSPITAL | Age: 46
Discharge: HOME | End: 2021-07-18
Attending: EMERGENCY MEDICINE | Admitting: EMERGENCY MEDICINE
Payer: COMMERCIAL

## 2021-07-18 VITALS
OXYGEN SATURATION: 100 % | HEIGHT: 68 IN | RESPIRATION RATE: 18 BRPM | WEIGHT: 172 LBS | BODY MASS INDEX: 26.07 KG/M2 | DIASTOLIC BLOOD PRESSURE: 99 MMHG | SYSTOLIC BLOOD PRESSURE: 163 MMHG | TEMPERATURE: 97.4 F | HEART RATE: 85 BPM

## 2021-07-18 DIAGNOSIS — H92.09 EAR PAIN: Primary | ICD-10-CM

## 2021-07-18 DIAGNOSIS — R07.0 THROAT PAIN: ICD-10-CM

## 2021-07-18 PROCEDURE — 99283 EMERGENCY DEPT VISIT LOW MDM: CPT

## 2021-07-18 PROCEDURE — 96372 THER/PROPH/DIAG INJ SC/IM: CPT

## 2021-07-18 PROCEDURE — 99284 EMERGENCY DEPT VISIT MOD MDM: CPT | Performed by: EMERGENCY MEDICINE

## 2021-07-18 RX ORDER — FLUTICASONE PROPIONATE 50 MCG
1 SPRAY, SUSPENSION (ML) NASAL DAILY
Qty: 16 G | Refills: 0 | Status: SHIPPED | OUTPATIENT
Start: 2021-07-18

## 2021-07-18 RX ORDER — AMOXICILLIN 500 MG/1
500 CAPSULE ORAL EVERY 12 HOURS SCHEDULED
Qty: 10 CAPSULE | Refills: 0 | Status: SHIPPED | OUTPATIENT
Start: 2021-07-18 | End: 2021-07-21

## 2021-07-18 RX ORDER — KETOROLAC TROMETHAMINE 30 MG/ML
15 INJECTION, SOLUTION INTRAMUSCULAR; INTRAVENOUS ONCE
Status: COMPLETED | OUTPATIENT
Start: 2021-07-18 | End: 2021-07-18

## 2021-07-18 RX ORDER — ACETAMINOPHEN 325 MG/1
975 TABLET ORAL ONCE
Status: COMPLETED | OUTPATIENT
Start: 2021-07-18 | End: 2021-07-18

## 2021-07-18 RX ORDER — FLUTICASONE PROPIONATE 50 MCG
1 SPRAY, SUSPENSION (ML) NASAL DAILY
Status: DISCONTINUED | OUTPATIENT
Start: 2021-07-18 | End: 2021-07-18

## 2021-07-18 RX ADMIN — DEXAMETHASONE SODIUM PHOSPHATE 10 MG: 10 INJECTION, SOLUTION INTRAMUSCULAR; INTRAVENOUS at 06:05

## 2021-07-18 RX ADMIN — KETOROLAC TROMETHAMINE 15 MG: 30 INJECTION, SOLUTION INTRAMUSCULAR at 05:26

## 2021-07-18 RX ADMIN — ACETAMINOPHEN 975 MG: 325 TABLET, FILM COATED ORAL at 05:25

## 2021-07-18 NOTE — ED PROVIDER NOTES
History  Chief Complaint   Patient presents with    Neck Pain     pt states she is having right sided ear/neck pain x 2 weeks, pt states it eased up for a little but the pain has become extreme     Earache     HPI  41yo f with past medical history of asthma who presents with a complaint of right ear and right neck pain for the past 2 weeks  Patient states that for the past 2 weeks she has been sleeping outside at a summer camp and she has developed right ear fullness and right neck pain  The pain is progressively gotten worse for the past 2 weeks the ear pain is described as a pressure like pain that is an 8/10 pain that keeps her from going to sleep as worse when she tries to swallow is associated with a right neck pain  She is able swallow her own secretions  She denies drooling, she denies fevers, denies nausea , vomiting, chest pain, trauma, shortness of breath  She has not tried any medications for the pain  She states that the pain is worse with swallowing and can sometimes get better depending on which position she puts her head in or if she puts her fingers on the side of her throat and applies pressure  She denies a sore throat, she denies cough  Prior to Admission Medications   Prescriptions Last Dose Informant Patient Reported? Taking?    albuterol (Ventolin HFA) 90 mcg/act inhaler   No No   Sig: Inhale 2 puffs every 6 (six) hours as needed for wheezing   hydrOXYzine HCL (ATARAX) 25 mg tablet   No No   Sig: Take 1 tablet (25 mg total) by mouth 2 (two) times a day as needed for anxiety   ketotifen (ZADITOR) 0 025 % ophthalmic solution   No No   Sig: Administer 1 drop to both eyes 2 (two) times a day as needed (itchy eyes)   loratadine (CLARITIN) 10 mg tablet   No No   Sig: Take 1 tablet (10 mg total) by mouth daily   Patient not taking: Reported on 5/26/2021   triamcinolone (KENALOG) 0 1 % ointment   No No   Sig: Apply topically 2 (two) times a day      Facility-Administered Medications: None Past Medical History:   Diagnosis Date    Anemia     Asthma     Bilateral breast cysts     last assessed    Aleah Peter 19    resolved   11/3/17     Cervical cancer, FIGO stage I (HCC)     Constipation     Seizures (HCC)     URI (upper respiratory infection)     under additonal type - Chronic       Past Surgical History:   Procedure Laterality Date     SECTION      INGUINAL HERNIA REPAIR      last assessed   12/17/15      IL COLONOSCOPY FLX DX W/COLLJ SPEC WHEN PFRMD N/A 9/15/2017    Procedure: COLONOSCOPY;  Surgeon: Yves Desai MD;  Location: [facility]; Service: Gastroenterology    SKIN BIOPSY      last assessed         UMBILICAL HERNIA REPAIR      last assessed   12/17/15     URETHRAL DIVERTICULECTOMY      excision of urethral diverticulum    last assessed   12/17/15       Family History   Problem Relation Age of Onset    Hyperlipidemia Mother     Hypertension Mother    Janine Castillo Breast cancer Mother         neoplasm/dx first age 39, mastectomy age 38, did have lymph biopsy positive with recurrance age around age 61    Hyperlipidemia Maternal Grandmother     Hypertension Maternal Grandmother     Leukemia Maternal Grandmother      I have reviewed and agree with the history as documented  E-Cigarette/Vaping    E-Cigarette Use Never User      E-Cigarette/Vaping Substances    Nicotine No     THC No     CBD No     Flavoring No     Other No     Unknown No      Social History     Tobacco Use    Smoking status: Current Every Day Smoker     Packs/day: 0 25     Types: Cigarettes    Smokeless tobacco: Never Used   Vaping Use    Vaping Use: Never used   Substance Use Topics    Alcohol use: Yes     Comment: every other day    Drug use: Never        Review of Systems   Constitutional: Negative for chills and fever  HENT: Positive for trouble swallowing and voice change  Negative for congestion, drooling, ear pain, rhinorrhea, sinus pressure and sore throat      Eyes: Negative for pain  Respiratory: Negative for apnea, cough, choking, chest tightness, shortness of breath, wheezing and stridor  Cardiovascular: Negative for chest pain, palpitations and leg swelling  Gastrointestinal: Negative for abdominal pain, constipation, diarrhea, nausea and vomiting  Genitourinary: Negative for hematuria  Musculoskeletal: Negative for arthralgias and back pain  Skin: Negative for rash and wound  Neurological: Negative for dizziness  Psychiatric/Behavioral: Negative for agitation and hallucinations  All other systems reviewed and are negative  Physical Exam  ED Triage Vitals   Temperature Pulse Respirations Blood Pressure SpO2   07/18/21 0438 07/18/21 0434 07/18/21 0434 07/18/21 0434 07/18/21 0434   (!) 97 4 °F (36 3 °C) 85 18 163/99 100 %      Temp Source Heart Rate Source Patient Position - Orthostatic VS BP Location FiO2 (%)   07/18/21 0438 07/18/21 0434 07/18/21 0434 07/18/21 0434 --   Oral Monitor Sitting Right arm       Pain Score       07/18/21 0434       8             Orthostatic Vital Signs  Vitals:    07/18/21 0434   BP: 163/99   Pulse: 85   Patient Position - Orthostatic VS: Sitting       Physical Exam  Vitals reviewed  Constitutional:       General: She is not in acute distress  Appearance: She is well-developed  HENT:      Head: Normocephalic and atraumatic  Right Ear: External ear normal       Left Ear: Tympanic membrane and external ear normal       Ears:      Comments: Full right TM, no erythema or drainage     Nose: Nose normal       Mouth/Throat:      Mouth: Mucous membranes are moist       Pharynx: Oropharynx is clear  No oropharyngeal exudate or posterior oropharyngeal erythema  Eyes:      Extraocular Movements: Extraocular movements intact  Conjunctiva/sclera: Conjunctivae normal       Pupils: Pupils are equal, round, and reactive to light  Cardiovascular:      Rate and Rhythm: Normal rate and regular rhythm        Heart sounds: Normal heart sounds  Pulmonary:      Effort: Pulmonary effort is normal  No respiratory distress  Breath sounds: Normal breath sounds  No wheezing or rales  Abdominal:      Palpations: Abdomen is soft  Musculoskeletal:         General: Normal range of motion  Cervical back: Normal range of motion and neck supple  No rigidity  Lymphadenopathy:      Cervical: Cervical adenopathy (on right) present  Skin:     General: Skin is warm  Neurological:      General: No focal deficit present  Mental Status: She is alert and oriented to person, place, and time  Psychiatric:         Mood and Affect: Mood normal          ED Medications  Medications   ketorolac (TORADOL) injection 15 mg (15 mg Intramuscular Given 7/18/21 2508)   acetaminophen (TYLENOL) tablet 975 mg (975 mg Oral Given 7/18/21 9101)   dexamethasone oral liquid 10 mg 1 mL (10 mg Oral Given 7/18/21 6349)       Diagnostic Studies  Results Reviewed     None                 No orders to display         Procedures  Procedures      ED Course                                       MDM  Number of Diagnoses or Management Options  41yo f with past medical history of asthma who presents with a complaint of right ear and right neck pain for the past 2 weeks  Ddx:  Otitis media, URI, tonsillitis, peritonsillar abscess, otitis externa    Patient has full right tympanic membrane    Normal oropharynx  Patient prescribed antibiotics for otitis media  Patient is prescribed Flonase for eustachian tube inflammation help with pressure in her ears  Patient is given acetaminophen dexamethasone and Toradol in the ED and has improvement of her symptoms  Patient agrees with discharge and is given follow-up with her primary care doctor and ENT      Disposition  Final diagnoses:   Ear pain   Throat pain     Time reflects when diagnosis was documented in both MDM as applicable and the Disposition within this note     Time User Action Codes Description Comment    7/18/2021  5:41 AM Yancy Sheets Add [H92 09] Ear pain     7/18/2021  5:41 AM [place] Sheets Add [R07 0] Throat pain       ED Disposition     ED Disposition Condition Date/Time Comment    Discharge Stable Sun Jul 18, 2021  5:41 AM Genevieve Davalosmiriamin Post discharge to home/self care  Follow-up Information     Follow up With Specialties Details Why Contact Info Additional Information    Magda Dc PA-C Internal Medicine, Physician Assistant Schedule an appointment as soon as possible for a visit    E   Andrea CLARK  [address] ENT Otolaryngology Call   [address] [id number]  [address] [facility], [address] [place] [address], [id number] [phone]          Discharge Medication List as of 7/18/2021  5:47 AM      START taking these medications    Details   amoxicillin (AMOXIL) 500 mg capsule Take 1 capsule (500 mg total) by mouth every 12 (twelve) hours for 5 days, Starting Sun 7/18/2021, Until Fri 7/23/2021, Normal      fluticasone (FLONASE) 50 mcg/act nasal spray 1 spray into each nostril daily, Starting Sun 7/18/2021, Normal         CONTINUE these medications which have NOT CHANGED    Details   albuterol (Ventolin HFA) 90 mcg/act inhaler Inhale 2 puffs every 6 (six) hours as needed for wheezing, Starting Tue 2/16/2021, Until Wed 2/16/2022, Normal      hydrOXYzine HCL (ATARAX) 25 mg tablet Take 1 tablet (25 mg total) by mouth 2 (two) times a day as needed for anxiety, Starting Tue 2/16/2021, Normal      ketotifen (ZADITOR) 0 025 % ophthalmic solution Administer 1 drop to both eyes 2 (two) times a day as needed (itchy eyes), Starting Wed 11/25/2020, Normal      loratadine (CLARITIN) 10 mg tablet Take 1 tablet (10 mg total) by mouth daily, Starting Wed 11/25/2020, Until Mon 5/24/2021, Normal      triamcinolone (KENALOG) 0 1 % ointment Apply topically 2 (two) times a day, Starting Wed 11/4/2020, Normal           No discharge procedures on file  PDMP Review     None           ED Provider  Attending physically available and evaluated Alysha Orozco I managed the patient along with the ED Attending      Electronically Signed by         Emely Bran DO  07/20/21 7973

## 2021-07-18 NOTE — DISCHARGE INSTRUCTIONS
Please take the medication prescribed to you as directed  Please schedule appointment with ENT if your pain persists  You may also schedule an appointment with your PCP if your pain continues

## 2021-07-19 NOTE — ED ATTENDING ATTESTATION
7/18/2021  IAkhil DO, saw and evaluated the patient  I have discussed the patient with the resident/non-physician practitioner and agree with the resident's/non-physician practitioner's findings, Plan of Care, and MDM as documented in the resident's/non-physician practitioner's note, except where noted  All available labs and Radiology studies were reviewed  I was present for key portions of any procedure(s) performed by the resident/non-physician practitioner and I was immediately available to provide assistance  At this point I agree with the current assessment done in the Emergency Department  I have conducted an independent evaluation of this patient a history and physical is as follows:      29-year-old female presents for right-sided ear pain and neck pain  She has fullness in the right ear and pain in the anterior portion of her neck  Has nasal congestion as well  On exam she has tender cervical lymphadenopathy and a bulging right tympanic membrane  Her pharynx is normal she has no trismus she has normal dentition    Plan is to treat with amoxicillin Decadron and discharge if it worsens she should follow up with ENT    ED Course         Critical Care Time  Procedures

## 2021-07-21 ENCOUNTER — OFFICE VISIT (OUTPATIENT)
Dept: INTERNAL MEDICINE CLINIC | Facility: CLINIC | Age: 46
End: 2021-07-21

## 2021-07-21 VITALS
DIASTOLIC BLOOD PRESSURE: 85 MMHG | BODY MASS INDEX: 27.93 KG/M2 | SYSTOLIC BLOOD PRESSURE: 129 MMHG | WEIGHT: 181 LBS | HEART RATE: 83 BPM | TEMPERATURE: 97.6 F

## 2021-07-21 DIAGNOSIS — R42 DIZZINESS: ICD-10-CM

## 2021-07-21 DIAGNOSIS — H65.01 RIGHT ACUTE SEROUS OTITIS MEDIA, RECURRENCE NOT SPECIFIED: Primary | ICD-10-CM

## 2021-07-21 PROCEDURE — 99213 OFFICE O/P EST LOW 20 MIN: CPT | Performed by: INTERNAL MEDICINE

## 2021-07-21 PROCEDURE — T1015 CLINIC SERVICE: HCPCS | Performed by: INTERNAL MEDICINE

## 2021-07-21 RX ORDER — AMOXICILLIN AND CLAVULANATE POTASSIUM 875; 125 MG/1; MG/1
1 TABLET, FILM COATED ORAL EVERY 12 HOURS SCHEDULED
Qty: 20 TABLET | Refills: 0 | Status: SHIPPED | OUTPATIENT
Start: 2021-07-21 | End: 2021-07-31

## 2021-07-21 RX ORDER — MECLIZINE HYDROCHLORIDE 25 MG/1
25 TABLET ORAL 3 TIMES DAILY PRN
Qty: 30 TABLET | Refills: 0 | Status: SHIPPED | OUTPATIENT
Start: 2021-07-21 | End: 2021-09-24 | Stop reason: ALTCHOICE

## 2021-07-21 RX ORDER — IBUPROFEN 600 MG/1
600 TABLET ORAL EVERY 6 HOURS PRN
Qty: 30 TABLET | Refills: 0 | Status: SHIPPED | OUTPATIENT
Start: 2021-07-21 | End: 2021-09-24 | Stop reason: ALTCHOICE

## 2021-07-21 NOTE — PROGRESS NOTES
INTERNAL MEDICINE OFFICE VISIT  [facility]  [address] [facility]    NAME: Alysha Orozco  AGE: 42 y o  SEX: female    DATE OF ENCOUNTER: 7/21/2021    Assessment and Plan     1  Right acute serous/supperative otitis media  Physical exam findings support otitis media, unclear if infectious or serous  Given the patient's refractory symptoms and severe pain, will give the 1st line antibiotic Augmentin for 10 days with pain relief in the form of ibuprofen  She was instructed to continue using Flonase and to return if systems persist in 2 weeks  ENT referral given due to strange associated symptom of pulsation and dizziness  - amoxicillin-clavulanate (Augmentin) 875-125 mg per tablet; Take 1 tablet by mouth every 12 (twelve) hours for 10 days  Dispense: 20 tablet; Refill: 0  - ibuprofen (MOTRIN) 600 mg tablet; Take 1 tablet (600 mg total) by mouth every 6 (six) hours as needed for mild pain or moderate pain  Dispense: 30 tablet; Refill: 0  - Ambulatory Referral to Otolaryngology; Future    2  Dizziness  Denies any syncope or vertigo  Worse with changes in head position  Likely related to otitis media causing inner ear dysfunction  Will make sure that she is seen by ENT and given meclizine for symptomatic relief  - meclizine (ANTIVERT) 25 mg tablet; Take 1 tablet (25 mg total) by mouth 3 (three) times a day as needed for dizziness  Dispense: 30 tablet; Refill: 0  - Ambulatory Referral to Otolaryngology; Future    Orders Placed This Encounter   Procedures    Ambulatory Referral to Otolaryngology       Chief Complaint     Chief Complaint   Patient presents with    Earache     right ear pain x 3 weeks       History of Present Illness     Patient is a 62-year-old female with PMH of asthma, anxiety, alpha thalassemia, and tobacco dependence comes to the clinic as a same-day for persistent right-sided ear pain    For the past 2 months, the patient has been experiencing intermittent lightheadedness and dizziness (where with changes in position, particularly with her head, she feels off balance, denying syncope or vertigo)  She was recently seen in the ED on 07/18 for right-sided urine neck pain that had been going on for the last 2 weeks after sleeping outside at a summer camp  At that time they found cervical adenopathy on the right with full right tympanic membrane concerning for otitis media  She states that liquid dexamethasone and acetaminophen greatly helped her symptoms, she was sent home with Flonase and amoxicillin  Since being discharged from the ED, her symptoms have persisted despite these therapies  She denies any FND or vision loss  She states that sometimes pain shoots upward in a pulsating fashion  The following portions of the patient's history were reviewed and updated as appropriate: allergies, current medications, past family history, past medical history, past social history, past surgical history and problem list     Review of Systems     Review of Systems   Constitutional: Negative for chills and fever  HENT: Positive for ear pain  Negative for sore throat  Eyes: Negative for pain and visual disturbance  Respiratory: Negative for cough and shortness of breath  Cardiovascular: Negative for chest pain and palpitations  Gastrointestinal: Negative for abdominal pain and vomiting  Genitourinary: Negative for dysuria and hematuria  Musculoskeletal: Negative for arthralgias and back pain  Skin: Negative for color change and rash  Neurological: Positive for dizziness and light-headedness  Negative for seizures, syncope and speech difficulty  All other systems reviewed and are negative        Active Problem List     Patient Active Problem List   Diagnosis    Abnormal ultrasound of breast    Anxiety    Atypical glandular cells on cervical Pap smear    JOSEFINA I (cervical intraepithelial neoplasia I)    Cyst of right breast    Menorrhagia    Thalassemia trait, alpha    Overweight (BMI 25 0-29  9)    Post-nasal drip    Mild intermittent asthma without complication    Current every day smoker    Abnormal mammogram of both breasts    Irregular menstruation       Objective     /85 (BP Location: Right arm, Patient Position: Sitting, Cuff Size: Large)   Pulse 83   Temp 97 6 °F (36 4 °C) (Temporal)   Wt 82 1 kg (181 lb)   LMP 07/11/2021   BMI 27 93 kg/m²     Physical Exam  Vitals reviewed  Constitutional:       General: She is not in acute distress  Appearance: Normal appearance  She is not ill-appearing  HENT:      Head: Normocephalic and atraumatic  Right Ear: A middle ear effusion is present  Tympanic membrane is injected and bulging  Nose: No congestion  Mouth/Throat:      Mouth: Mucous membranes are moist       Pharynx: Oropharynx is clear  Eyes:      General: No scleral icterus  Conjunctiva/sclera: Conjunctivae normal    Cardiovascular:      Rate and Rhythm: Normal rate and regular rhythm  Pulses: Normal pulses  Heart sounds: No murmur heard  No gallop  Pulmonary:      Effort: Pulmonary effort is normal       Breath sounds: Normal breath sounds  Abdominal:      General: Bowel sounds are normal  There is no distension  Palpations: Abdomen is soft  Tenderness: There is no abdominal tenderness  Musculoskeletal:         General: No swelling or tenderness  Cervical back: Neck supple  No muscular tenderness  Right lower leg: No edema  Left lower leg: No edema  Lymphadenopathy:      Cervical: No cervical adenopathy  Skin:     General: Skin is warm  Coloration: Skin is not pale  Findings: No erythema or rash  Neurological:      General: No focal deficit present  Mental Status: She is alert and oriented to person, place, and time     Psychiatric:         Mood and Affect: Mood normal          Behavior: Behavior normal  Thought Content:  Thought content normal          Judgment: Judgment normal          Pertinent Laboratory/Diagnostic Studies:  CBC:   Lab Results   Component Value Date/Time    WBC 7 59 05/26/2021 11:10 AM    RBC 4 61 05/26/2021 11:10 AM    HGB 11 5 05/26/2021 11:10 AM    HCT 37 2 05/26/2021 11:10 AM    MCV 81 (L) 05/26/2021 11:10 AM    MCH 24 9 (L) 05/26/2021 11:10 AM    MCHC 30 9 (L) 05/26/2021 11:10 AM    RDW 16 4 (H) 05/26/2021 11:10 AM    MPV 10 6 05/26/2021 11:10 AM     05/26/2021 11:10 AM    NRBC 0 05/26/2021 11:10 AM    NEUTOPHILPCT 59 11/02/2020 10:05 AM    LYMPHOPCT 35 05/26/2021 11:10 AM    LYMPHOPCT 31 11/02/2020 10:05 AM    MONOPCT 6 05/26/2021 11:10 AM    MONOPCT 6 11/02/2020 10:05 AM    EOSPCT 1 05/26/2021 11:10 AM    EOSPCT 3 11/02/2020 10:05 AM    BASOPCT 1 05/26/2021 11:10 AM    BASOPCT 1 11/02/2020 10:05 AM    NEUTROABS 4 60 11/02/2020 10:05 AM    LYMPHSABS 2 35 11/02/2020 10:05 AM    MONOSABS 0 49 11/02/2020 10:05 AM    EOSABS 0 08 05/26/2021 11:10 AM    EOSABS 0 19 11/02/2020 10:05 AM     Chemistry Profile:   Lab Results   Component Value Date/Time    K 4 1 05/26/2021 11:10 AM     (H) 05/26/2021 11:10 AM    CO2 23 05/26/2021 11:10 AM    BUN 13 05/26/2021 11:10 AM    CREATININE 0 84 05/26/2021 11:10 AM    GLUC 90 02/17/2020 10:16 PM    GLUF 82 05/26/2021 11:10 AM    CALCIUM 9 0 05/26/2021 11:10 AM    AST 10 05/26/2021 11:10 AM    ALT 17 05/26/2021 11:10 AM    ALKPHOS 59 05/26/2021 11:10 AM    EGFR 98 05/26/2021 11:10 AM     Coagulation Studies: No results found for: PROTIME, INR, PTT  Cardiac Studies:   Lab Results   Component Value Date/Time    TROPONINI <0 02 02/17/2020 10:16 PM    POCTROP 0 00 12/21/2016 03:52 PM     Hepatology: No results found for: LIPASE, AMMONIA, AFP      Current Medications     Current Outpatient Medications:     albuterol (Ventolin HFA) 90 mcg/act inhaler, Inhale 2 puffs every 6 (six) hours as needed for wheezing, Disp: 8 g, Rfl: 0    fluticasone (FLONASE) 50 mcg/act nasal spray, 1 spray into each nostril daily, Disp: 16 g, Rfl: 0    hydrOXYzine HCL (ATARAX) 25 mg tablet, Take 1 tablet (25 mg total) by mouth 2 (two) times a day as needed for anxiety, Disp: 30 tablet, Rfl: 1    ketotifen (ZADITOR) 0 025 % ophthalmic solution, Administer 1 drop to both eyes 2 (two) times a day as needed (itchy eyes), Disp: 5 mL, Rfl: 0    triamcinolone (KENALOG) 0 1 % ointment, Apply topically 2 (two) times a day, Disp: 80 g, Rfl: 5    amoxicillin-clavulanate (Augmentin) 875-125 mg per tablet, Take 1 tablet by mouth every 12 (twelve) hours for 10 days, Disp: 20 tablet, Rfl: 0    ibuprofen (MOTRIN) 600 mg tablet, Take 1 tablet (600 mg total) by mouth every 6 (six) hours as needed for mild pain or moderate pain, Disp: 30 tablet, Rfl: 0    loratadine (CLARITIN) 10 mg tablet, Take 1 tablet (10 mg total) by mouth daily (Patient not taking: Reported on 5/26/2021), Disp: 90 tablet, Rfl: 1    meclizine (ANTIVERT) 25 mg tablet, Take 1 tablet (25 mg total) by mouth 3 (three) times a day as needed for dizziness, Disp: 30 tablet, Rfl: 0    Health Maintenance     Health Maintenance   Topic Date Due    Hepatitis C Screening  Never done    Pneumococcal Vaccine: Pediatrics (0 to 5 Years) and At-Risk Patients (6 to 59 Years) (1 of 4 - PCV13) Never done    COVID-19 Vaccine (1) Never done    Annual Physical  Never done    Influenza Vaccine (1) 09/01/2021    Depression Screening PHQ  10/30/2021    BMI: Followup Plan  11/25/2021    Breast Cancer Screening: Mammogram  11/06/2021    Cervical Cancer Screening  04/25/2022    BMI: Adult  07/21/2022    DTaP,Tdap,and Td Vaccines (2 - Td or Tdap) 12/09/2025    HIV Screening  Completed    HIB Vaccine  Aged Out    Hepatitis B Vaccine  Aged Out    IPV Vaccine  Aged Out    Hepatitis A Vaccine  Aged Out    Meningococcal ACWY Vaccine  Aged Out    HPV Vaccine  Aged [employer] History   Administered Date(s) Administered    Tdap 12/09/2015       Chuy Melgar DO  Internal Medicine  PGY-2  7/21/2021 5:45 PM

## 2021-07-22 LAB
LAB AP GYN PRIMARY INTERPRETATION: NORMAL
Lab: NORMAL

## 2021-07-29 ENCOUNTER — TELEPHONE (OUTPATIENT)
Dept: OBGYN CLINIC | Facility: CLINIC | Age: 46
End: 2021-07-29

## 2021-07-29 NOTE — TELEPHONE ENCOUNTER
[facility] [facility] contacted to verify status of Mirena delivery  Spoke to [employer] and she stated that patient no longer wants IUD  Patient was contacted and she confirmed that she no longer wants the IUD device stating "I no longer want it"  Follow up visit scheduled for [facility] on 8/5/21 at 1415 with Dr Mason Henderson  No further questions at this time

## 2021-08-19 ENCOUNTER — PROCEDURE VISIT (OUTPATIENT)
Dept: OBGYN CLINIC | Facility: CLINIC | Age: 46
End: 2021-08-19

## 2021-08-19 VITALS
HEART RATE: 101 BPM | BODY MASS INDEX: 26.67 KG/M2 | DIASTOLIC BLOOD PRESSURE: 71 MMHG | SYSTOLIC BLOOD PRESSURE: 117 MMHG | HEIGHT: 68 IN | WEIGHT: 176 LBS

## 2021-08-19 DIAGNOSIS — N93.9 ABNORMAL UTERINE BLEEDING (AUB): Primary | ICD-10-CM

## 2021-08-19 LAB — SL AMB POCT URINE HCG: NEGATIVE

## 2021-08-19 PROCEDURE — 99213 OFFICE O/P EST LOW 20 MIN: CPT | Performed by: OBSTETRICS & GYNECOLOGY

## 2021-08-19 PROCEDURE — 88305 TISSUE EXAM BY PATHOLOGIST: CPT | Performed by: PATHOLOGY

## 2021-08-19 PROCEDURE — 58100 BIOPSY OF UTERUS LINING: CPT | Performed by: OBSTETRICS & GYNECOLOGY

## 2021-08-19 PROCEDURE — 81025 URINE PREGNANCY TEST: CPT | Performed by: OBSTETRICS & GYNECOLOGY

## 2021-08-19 NOTE — PROGRESS NOTES
Subjective:     Misha Baltazar is a 42 y o  [id number] female who presents for endometrial biopsy in the setting of abnormal uterine bleeding  She has declines IUD that was previously discussed  Objective:    Vitals: Blood pressure 117/71, pulse 101, height 5' 7 5" (1 715 m), weight 79 8 kg (176 lb), last menstrual period 08/05/2021  Body mass index is 27 16 kg/m²  Physical Exam    Endometrial biopsy    Date/Time: 8/19/2021 3:02 PM  Performed by: Marya Benítez MD  Authorized by: Marya Benítez MD   Universal Protocol:  Procedure performed by:  Consent: Verbal consent obtained  Written consent obtained  Consent given by: patient  Patient understanding: patient states understanding of the procedure being performed  Patient consent: the patient's understanding of the procedure matches consent given  Procedure consent: procedure consent matches procedure scheduled  Relevant documents: relevant documents present and verified  Test results: test results available and properly labeled  Patient identity confirmed: verbally with patient      Indication:     Indications: Other disorder of menstruation and other abnormal bleeding from female genital tract    Procedure:     Procedure: endometrial biopsy with Pipelle      A bivalve speculum was placed in the vagina: yes      Cervix cleaned and prepped: yes      A paracervical block was performed: no      An intracervical block was performed: no      The cervix was dilated: no      Uterus sounded: yes      Uterus sound depth (cm):  7    Curettes used:  1    Specimen collected: specimen collected and sent to pathology      Patient tolerated procedure well with no complications: yes    Findings:     Cervix: normal    Comments:     Procedure comments:  Procedure completed in two passes  Tissue was seen to be collected  Assessment/Plan:    Endometrial Biopsy   -Completed w/o difficulty  Patient tolerated procedure well     -Will call with results   -Explained to pt that based on results the next steps could involved continued monitoring, Dilation and curettage, polypectomy (if polyp suspected), or referral to [address] (if cancer suspected)        Grisel Sexton MD  8/19/2021  2:59 PM

## 2021-08-24 ENCOUNTER — TELEPHONE (OUTPATIENT)
Dept: LABOR AND DELIVERY | Facility: HOSPITAL | Age: 46
End: 2021-08-24

## 2021-08-24 NOTE — TELEPHONE ENCOUNTER
Called pt and left voicemail of normal biopsy results  Instructed to call office if any further questions      Garrett Nyhan, MD  08/24/21

## 2021-08-25 ENCOUNTER — HOSPITAL ENCOUNTER (EMERGENCY)
Facility: HOSPITAL | Age: 46
Discharge: HOME | End: 2021-08-25
Attending: EMERGENCY MEDICINE | Admitting: EMERGENCY MEDICINE
Payer: COMMERCIAL

## 2021-08-25 VITALS
DIASTOLIC BLOOD PRESSURE: 94 MMHG | HEART RATE: 89 BPM | RESPIRATION RATE: 16 BRPM | OXYGEN SATURATION: 100 % | TEMPERATURE: 98.2 F | SYSTOLIC BLOOD PRESSURE: 163 MMHG

## 2021-08-25 DIAGNOSIS — N93.9 VAGINAL BLEEDING: Primary | ICD-10-CM

## 2021-08-25 LAB
BASOPHILS # BLD AUTO: 0.07 THOUSANDS/ΜL (ref 0–0.1)
BASOPHILS NFR BLD AUTO: 1 % (ref 0–1)
EOSINOPHIL # BLD AUTO: 0.09 THOUSAND/ΜL (ref 0–0.61)
EOSINOPHIL NFR BLD AUTO: 1 % (ref 0–6)
ERYTHROCYTE [DISTWIDTH] IN BLOOD BY AUTOMATED COUNT: 15.4 % (ref 11.6–15.1)
EXT PREG TEST URINE: NEGATIVE
EXT. CONTROL ED NAV: NORMAL
HCT VFR BLD AUTO: 39.6 % (ref 34.8–46.1)
HGB BLD-MCNC: 12.6 G/DL (ref 11.5–15.4)
IMM GRANULOCYTES # BLD AUTO: 0.01 THOUSAND/UL (ref 0–0.2)
IMM GRANULOCYTES NFR BLD AUTO: 0 % (ref 0–2)
LYMPHOCYTES # BLD AUTO: 3.39 THOUSANDS/ΜL (ref 0.6–4.47)
LYMPHOCYTES NFR BLD AUTO: 41 % (ref 14–44)
MCH RBC QN AUTO: 25.7 PG (ref 26.8–34.3)
MCHC RBC AUTO-ENTMCNC: 31.8 G/DL (ref 31.4–37.4)
MCV RBC AUTO: 81 FL (ref 82–98)
MONOCYTES # BLD AUTO: 0.4 THOUSAND/ΜL (ref 0.17–1.22)
MONOCYTES NFR BLD AUTO: 5 % (ref 4–12)
NEUTROPHILS # BLD AUTO: 4.3 THOUSANDS/ΜL (ref 1.85–7.62)
NEUTS SEG NFR BLD AUTO: 52 % (ref 43–75)
NRBC BLD AUTO-RTO: 0 /100 WBCS
PLATELET # BLD AUTO: 276 THOUSANDS/UL (ref 149–390)
PMV BLD AUTO: 10 FL (ref 8.9–12.7)
RBC # BLD AUTO: 4.9 MILLION/UL (ref 3.81–5.12)
WBC # BLD AUTO: 8.26 THOUSAND/UL (ref 4.31–10.16)

## 2021-08-25 PROCEDURE — 87491 CHLMYD TRACH DNA AMP PROBE: CPT | Performed by: EMERGENCY MEDICINE

## 2021-08-25 PROCEDURE — NC001 PR NO CHARGE: Performed by: OBSTETRICS & GYNECOLOGY

## 2021-08-25 PROCEDURE — 99284 EMERGENCY DEPT VISIT MOD MDM: CPT | Performed by: EMERGENCY MEDICINE

## 2021-08-25 PROCEDURE — 87591 N.GONORRHOEAE DNA AMP PROB: CPT | Performed by: EMERGENCY MEDICINE

## 2021-08-25 PROCEDURE — 81025 URINE PREGNANCY TEST: CPT | Performed by: EMERGENCY MEDICINE

## 2021-08-25 PROCEDURE — 99284 EMERGENCY DEPT VISIT MOD MDM: CPT

## 2021-08-25 PROCEDURE — 85025 COMPLETE CBC W/AUTO DIFF WBC: CPT | Performed by: EMERGENCY MEDICINE

## 2021-08-25 PROCEDURE — 36415 COLL VENOUS BLD VENIPUNCTURE: CPT | Performed by: EMERGENCY MEDICINE

## 2021-08-25 NOTE — CONSULTS
Consultation - Gynecology  Misha Baltazar 42 y o  female MRN: [id number]  Unit/Bed#: ED 03 Encounter: [id number]      Consults      Chief Complaint   Patient presents with    Vaginal Bleeding     had endometrial biopsy done 6 days ago "since then im bleeding like a fountain and it smells nasty between my legs" going through 1 pad and hour  History of Present Illness   Physician Requesting Consult: Dyana Bales MD  Reason for Consult / Principal Problem: Vaginal bleeding after EMB  Subspeciality: General GYN  HPI: Misha Baltazar is a 42 y o  female who presents with a several day history of vaginal bleeding after endometrial biopsy in the office on the 19th  She states after she had her Pap smear she also had 7 days of bleeding even over the end of her periods  She is currently being evaluated for abnormal uterine bleeding  Pap and EMB were normal   Patient reports no new sexual partners  She reports some cramping the day after her EMB, but reports no persistent pelvic or vaginal pain  On evaluation today, patient is hypertensive as hemoglobin of 12  She reports some lightheadedness that has been persistent ever since just before having an ear infection for which she was treated with meclizine  She also reports having vasovagal symptoms in her PCP office  She reports previously having anemia but change her diet rather than taking iron supplementation which appears to have been effective  She also reports a vaginal odor for last 2 days  She is unable to describe her discharge she has been bleeding  Today she desires management of her vaginal bleeding in the short term and wishes to discuss her long-term medical management options for her AUB with her gynecologist in the office  Review of Systems   Constitutional: Negative  HENT: Negative  Eyes: Negative  Respiratory: Negative  Cardiovascular: Negative  Gastrointestinal: Negative  Endocrine: Negative  Genitourinary: Positive for vaginal bleeding  Musculoskeletal: Negative  Skin: Negative  Neurological: Negative  Psychiatric/Behavioral: Negative  Historical Information   Past Medical History:   Diagnosis Date    Anemia     Asthma     Bilateral breast cysts     last assessed    Franko Banks 19    resolved   11/3/17     Cervical cancer, FIGO stage I (HCC)     Constipation     Seizures (HCC)     URI (upper respiratory infection)     under additonal type - Chronic     Past Surgical History:   Procedure Laterality Date     SECTION      INGUINAL HERNIA REPAIR      last assessed   12/17/15      [place] COLONOSCOPY FLX DX W/COLLJ SPEC WHEN PFRMD N/A 9/15/2017    Procedure: COLONOSCOPY;  Surgeon: Svetlana Conklin MD;  Location: [facility]; Service: Gastroenterology    SKIN BIOPSY      last assessed         UMBILICAL HERNIA REPAIR      last assessed   12/17/15     URETHRAL DIVERTICULECTOMY      excision of urethral diverticulum    last assessed   12/17/15     OB History    Para Term  AB Living   5 5 5         SAB TAB Ectopic Multiple Live Births                  # Outcome Date GA Lbr Vimal/2nd Weight Sex Delivery Anes PTL Lv   5 Term            4 Term            3 Term            2 Term            1 Term              Family History   Problem Relation Age of Onset    Hyperlipidemia Mother     Hypertension Mother    Solorzano Breast cancer Mother         neoplasm/dx first age 39, mastectomy age 38, did have lymph biopsy positive with recurrance age around age 63   Solorzano Hyperlipidemia Maternal Grandmother     Hypertension Maternal Grandmother     Leukemia Maternal Grandmother      Social History   Social History     Substance and Sexual Activity   Alcohol Use Yes    Comment: every other day     Social History     Substance and Sexual Activity   Drug Use Never     Social History     Tobacco Use   Smoking Status Current Every Day Smoker    Packs/day: 0 25    Types: Cigarettes Smokeless Tobacco Never Used       Meds/Allergies   No current facility-administered medications for this encounter  Allergies   Allergen Reactions    Percocet [Oxycodone-Acetaminophen]     Doxycycline Hives, Myalgia and Rash     Category: Allergy;          Objective   Vitals: Blood pressure 163/94, pulse 89, temperature 98 2 °F (36 8 °C), temperature source Oral, resp  rate 16, last menstrual period 08/05/2021, SpO2 100 %  There is no height or weight on file to calculate BMI  No intake or output data in the 24 hours ending 08/25/21 1528    Invasive Devices     None                 Physical Exam  Constitutional:       General: She is not in acute distress  Appearance: She is well-developed  She is not diaphoretic  HENT:      Head: Normocephalic and atraumatic  Eyes:      Pupils: Pupils are equal, round, and reactive to light  Neck:      Trachea: No tracheal deviation  Cardiovascular:      Rate and Rhythm: Normal rate  Pulmonary:      Effort: Pulmonary effort is normal  No respiratory distress  Breath sounds: No stridor  Abdominal:      General: Abdomen is flat  There is no distension  Palpations: Abdomen is soft  Genitourinary:     General: Normal vulva  Comments: Minimal blood in the vaginal vault, light staining of her pad  Musculoskeletal:         General: No tenderness or deformity  Normal range of motion  Cervical back: Normal range of motion  Skin:     General: Skin is warm and dry  Coloration: Skin is not pale  Findings: No erythema or rash  Neurological:      Mental Status: She is alert and oriented to person, place, and time        Coordination: Coordination normal    Psychiatric:         Mood and Affect: Mood normal          Behavior: Behavior normal          Lab Results:   Recent Results (from the past 24 hour(s))   CBC and differential    Collection Time: 08/25/21  2:28 PM   Result Value Ref Range    WBC 8 26 4 31 - 10 16 Thousand/uL RBC 4 90 3 81 - 5 12 Million/uL    Hemoglobin 12 6 11 5 - 15 4 g/dL    Hematocrit 39 6 34 8 - 46 1 %    MCV 81 (L) 82 - 98 fL    MCH 25 7 (L) 26 8 - 34 3 pg    MCHC 31 8 31 4 - 37 4 g/dL    RDW 15 4 (H) 11 6 - 15 1 %    MPV 10 0 8 9 - 12 7 fL    Platelets 733 419 - 065 Thousands/uL    nRBC 0 /100 WBCs    Neutrophils Relative 52 43 - 75 %    Immat GRANS % 0 0 - 2 %    Lymphocytes Relative 41 14 - 44 %    Monocytes Relative 5 4 - 12 %    Eosinophils Relative 1 0 - 6 %    Basophils Relative 1 0 - 1 %    Neutrophils Absolute 4 30 1 85 - 7 62 Thousands/µL    Immature Grans Absolute 0 01 0 00 - 0 20 Thousand/uL    Lymphocytes Absolute 3 39 0 60 - 4 47 Thousands/µL    Monocytes Absolute 0 40 0 17 - 1 22 Thousand/µL    Eosinophils Absolute 0 09 0 00 - 0 61 Thousand/µL    Basophils Absolute 0 07 0 00 - 0 10 Thousands/µL   POCT pregnancy, urine    Collection Time: 08/25/21  2:29 PM   Result Value Ref Range    EXT PREG TEST UR (Ref: Negative) negative     Control valid            Assessment/Plan     Assessment:  61-year-old AUB after normal EMB, likely perimenopausal  Plan:  Ten day course of Aygestin 5 mg daily  Follow-up in [facility] office  Outpatient pelvic ultrasound  Follow-up GC CT results    Code Status: No Order  Advance Directive and Living Will:      Power of :    POLST:      Counseling / Coordination of Care  Total floor / unit time spent today15 minutes  minutes  Greater than 50% of total time was spent with the patient and / or family counseling and / or coordination of care   A description of the counseling / coordination of care:  Discussion of long versus short-term management options    Stefan Andrade MD  8/25/2021  3:28 PM

## 2021-08-25 NOTE — ED PROVIDER NOTES
History  Chief Complaint   Patient presents with    Vaginal Bleeding     had endometrial biopsy done 6 days ago "since then im bleeding like a fountain and it smells nasty between my legs" going through 1 pad and hour  62-year-old female who presents for vaginal bleeding after endometrial biopsy 6 days ago  She reports that she had a Pap smear done about 2 weeks ago prior that showed JOSEFINA, and subsequently had bleeding for several days after that  States that on Thursday she had an endometrial biopsy done, and soon after had significant vaginal bleeding  She says that this is unlike her   And is heavier, she is going through approximately 1 pad every hour or 2  No clots at this time  She is not reporting any abdominal or back discomfort  She also reports to the past 2 days she her vagina has been foul-smelling, although she has not noticed any discharge or than blood  She is not reporting lightheadedness, fever or chills  States that she spoke with her gynecologist who recommended she come into the office or come to the ED  She decided to come to the ED  No new sexual partners, no high-risk sexual behavior  Patient does have a prior history of BV  ROS otherwise negative  Prior to Admission Medications   Prescriptions Last Dose Informant Patient Reported? Taking?    albuterol (Ventolin HFA) 90 mcg/act inhaler   No No   Sig: Inhale 2 puffs every 6 (six) hours as needed for wheezing   fluticasone (FLONASE) 50 mcg/act nasal spray   No No   Si spray into each nostril daily   hydrOXYzine HCL (ATARAX) 25 mg tablet   No No   Sig: Take 1 tablet (25 mg total) by mouth 2 (two) times a day as needed for anxiety   ibuprofen (MOTRIN) 600 mg tablet   No No   Sig: Take 1 tablet (600 mg total) by mouth every 6 (six) hours as needed for mild pain or moderate pain   ketotifen (ZADITOR) 0 025 % ophthalmic solution   No No   Sig: Administer 1 drop to both eyes 2 (two) times a day as needed (itchy eyes) Patient not taking: Reported on 2021   loratadine (CLARITIN) 10 mg tablet   No No   Sig: Take 1 tablet (10 mg total) by mouth daily   Patient not taking: Reported on 2021   meclizine (ANTIVERT) 25 mg tablet   No No   Sig: Take 1 tablet (25 mg total) by mouth 3 (three) times a day as needed for dizziness   triamcinolone (KENALOG) 0 1 % ointment   No No   Sig: Apply topically 2 (two) times a day      Facility-Administered Medications: None       Past Medical History:   Diagnosis Date    Anemia     Asthma     Bilateral breast cysts     last assessed    Allyson Cheryle Allyson Cheryle 19    resolved   11/3/17     Cervical cancer, FIGO stage I (HCC)     Constipation     Seizures (HCC)     URI (upper respiratory infection)     under additonal type - Chronic       Past Surgical History:   Procedure Laterality Date     SECTION      INGUINAL HERNIA REPAIR      last assessed   12/17/15      [place] COLONOSCOPY FLX DX W/COLLJ SPEC WHEN PFRMD N/A 9/15/2017    Procedure: COLONOSCOPY;  Surgeon: Anna Anderson MD;  Location: [facility]; Service: Gastroenterology    SKIN BIOPSY      last assessed         UMBILICAL HERNIA REPAIR      last assessed   12/17/15     URETHRAL DIVERTICULECTOMY      excision of urethral diverticulum    last assessed   12/17/15       Family History   Problem Relation Age of Onset    Hyperlipidemia Mother     Hypertension Mother    Kavon Hero Breast cancer Mother         neoplasm/dx first age 39, mastectomy age 38, did have lymph biopsy positive with recurrance age around age 63    Hyperlipidemia Maternal Grandmother     Hypertension Maternal Grandmother     Leukemia Maternal Grandmother      I have reviewed and agree with the history as documented      E-Cigarette/Vaping    E-Cigarette Use Never User      E-Cigarette/Vaping Substances    Nicotine No     THC No     CBD No     Flavoring No     Other No     Unknown No      Social History     Tobacco Use    Smoking status: Current Every Day Smoker     Packs/day: 0 25     Types: Cigarettes    Smokeless tobacco: Never Used   Vaping Use    Vaping Use: Never used   Substance Use Topics    Alcohol use: Yes     Comment: every other day    Drug use: Never        Review of Systems   Constitutional: Negative for chills and fever  HENT: Negative for congestion, rhinorrhea and sore throat  Respiratory: Negative for cough and shortness of breath  Cardiovascular: Negative for chest pain and palpitations  Gastrointestinal: Negative for abdominal pain, constipation, diarrhea, nausea and vomiting  Genitourinary: Positive for vaginal bleeding  Negative for difficulty urinating and flank pain  Musculoskeletal: Negative for arthralgias  Neurological: Negative for dizziness, weakness, light-headedness and headaches  Psychiatric/Behavioral: Negative for agitation, behavioral problems and confusion  All other systems reviewed and are negative  Physical Exam  ED Triage Vitals [08/25/21 1339]   Temperature Pulse Respirations Blood Pressure SpO2   98 2 °F (36 8 °C) 89 16 163/94 100 %      Temp Source Heart Rate Source Patient Position - Orthostatic VS BP Location FiO2 (%)   Oral Monitor Sitting Left arm --      Pain Score       --             Orthostatic Vital Signs  Vitals:    08/25/21 1339   BP: 163/94   Pulse: 89   Patient Position - Orthostatic VS: Sitting       Physical Exam  Constitutional:       Appearance: She is well-developed  HENT:      Head: Normocephalic and atraumatic  Cardiovascular:      Rate and Rhythm: Normal rate and regular rhythm  Heart sounds: Normal heart sounds  No murmur heard  No friction rub  Pulmonary:      Effort: Pulmonary effort is normal  No respiratory distress  Breath sounds: Normal breath sounds  No wheezing or rales  Abdominal:      General: Bowel sounds are normal  There is no distension  Palpations: Abdomen is soft  Tenderness: There is no abdominal tenderness  Genitourinary:     Comments: Patient has significant amount of blood in the vaginal vault  Does not appear to be oozing from the cervix at this time  No CMT when pressing on the cervix  No obvious site of oozingidentified  No white or yellow discharge visualized on exam   Musculoskeletal:         General: Normal range of motion  Cervical back: Normal range of motion and neck supple  Skin:     General: Skin is warm  Neurological:      Mental Status: She is alert and oriented to person, place, and time  Coordination: Coordination normal    Psychiatric:         Behavior: Behavior normal          Thought Content: Thought content normal          Judgment: Judgment normal          ED Medications  Medications - No data to display    Diagnostic Studies  Results Reviewed     Procedure Component Value Units Date/Time    Chlamydia/GC amplified DNA by PCR [[id number]]  (Normal) Collected: 08/25/21 1546    Lab Status: Final result Specimen: Genital from Cervix Updated: 08/26/21 0548     N gonorrhoeae, DNA Probe Negative     Chlamydia trachomatis, DNA Probe Negative    Narrative:      Test performed using PCR amplification of target DNA  This test is intended as an aid in the diagnosis of Chlamydial and gonococcal disease  This test has not been evaluated in patients younger than 15years of age and is not recommended for evaluation of suspected sexual abuse  Additional testing is recommended when the results do not correlate with clinical signs and symptoms        CBC and differential [[id number]]  (Abnormal) Collected: 08/25/21 1428    Lab Status: Final result Specimen: Blood from Arm, Right Updated: 08/25/21 1434     WBC 8 26 Thousand/uL      RBC 4 90 Million/uL      Hemoglobin 12 6 g/dL      Hematocrit 39 6 %      MCV 81 fL      MCH 25 7 pg      MCHC 31 8 g/dL      RDW 15 4 %      MPV 10 0 fL      Platelets 614 Thousands/uL      nRBC 0 /100 WBCs      Neutrophils Relative 52 %      Immat GRANS % 0 % Lymphocytes Relative 41 %      Monocytes Relative 5 %      Eosinophils Relative 1 %      Basophils Relative 1 %      Neutrophils Absolute 4 30 Thousands/µL      Immature Grans Absolute 0 01 Thousand/uL      Lymphocytes Absolute 3 39 Thousands/µL      Monocytes Absolute 0 40 Thousand/µL      Eosinophils Absolute 0 09 Thousand/µL      Basophils Absolute 0 07 Thousands/µL     POCT pregnancy, urine [[id number]]  (Normal) Resulted: 08/25/21 1429    Lab Status: Final result Updated: 08/25/21 1429     EXT PREG TEST UR (Ref: Negative) negative     Control valid                 No orders to display         Procedures  Procedures      ED Course  ED Course as of Aug 26 1209   Wed Aug 25, 2021   1429 PREGNANCY TEST URINE: negative                             SBIRT 20yo+      Most Recent Value   SBIRT (23 yo +)   In order to provide better care to our patients, we are screening all of our patients for alcohol and drug use  Would it be okay to ask you these screening questions? Unable to answer at this time Filed at: 08/25/2021 1342                MDM  Number of Diagnoses or Management Options  Vaginal bleeding  Diagnosis management comments: Pt's presentation is concerning for complication of endometrial biopsy  CBC, poct preg performed and unremarkable  Pelvic exam revealed blood in vaginal vault without CMT, GC sent  GYN consulted and saw patient, prescribed estrogen treatment and recommend gyn followup  They recommend not treating empirically with abx at this time for foul smell  Patient agreeable to this plan, strict return precautions were given, and she was discharged        Disposition  Final diagnoses:   Vaginal bleeding     Time reflects when diagnosis was documented in both MDM as applicable and the Disposition within this note     Time User Action Codes Description Comment    8/25/2021  2:38 PM Sha Bowie Add [N93 9] Vaginal bleeding     8/25/2021  3:17 PM Suraj Khanna Modify [N93 9] Vaginal bleeding ED Disposition     ED Disposition Condition Date/Time Comment    Discharge Stable Wed Aug 25, 2021  3:27 PM Genevieve Martinez Certain discharge to home/self care  Follow-up Information     Follow up With Specialties Details Why Contact Info Additional Information    Loni Meléndez PA-C Internal Medicine, Physician Assistant Call   [phone] [address] [facility]  [address] Obstetrics and Gynecology Call  For re-evaluation [address], [address] [facility] [id number] [address], [address], [address], [address] [phone]          Discharge Medication List as of 8/25/2021  3:28 PM      START taking these medications    Details   norethindrone (AYGESTIN) 5 mg tablet Take 1 tablet (5 mg total) by mouth daily for 10 days, Starting Wed 8/25/2021, Until Sat 9/4/2021, Normal         CONTINUE these medications which have NOT CHANGED    Details   albuterol (Ventolin HFA) 90 mcg/act inhaler Inhale 2 puffs every 6 (six) hours as needed for wheezing, Starting Tue 2/16/2021, Until Wed 2/16/2022, Normal      fluticasone (FLONASE) 50 mcg/act nasal spray 1 spray into each nostril daily, Starting Sun 7/18/2021, Normal      hydrOXYzine HCL (ATARAX) 25 mg tablet Take 1 tablet (25 mg total) by mouth 2 (two) times a day as needed for anxiety, Starting Tue 2/16/2021, Normal      ibuprofen (MOTRIN) 600 mg tablet Take 1 tablet (600 mg total) by mouth every 6 (six) hours as needed for mild pain or moderate pain, Starting Wed 7/21/2021, Normal      ketotifen (ZADITOR) 0 025 % ophthalmic solution Administer 1 drop to both eyes 2 (two) times a day as needed (itchy eyes), Starting Wed 11/25/2020, Normal      loratadine (CLARITIN) 10 mg tablet Take 1 tablet (10 mg total) by mouth daily, Starting Wed 11/25/2020, Until Mon 5/24/2021, Normal      meclizine (ANTIVERT) 25 mg tablet Take 1 tablet (25 mg total) by mouth 3 (three) times a day as needed for dizziness, Starting Wed 7/21/2021, Normal      triamcinolone (KENALOG) 0 1 % ointment Apply topically 2 (two) times a day, Starting Wed 11/4/2020, Normal           No discharge procedures on file  PDMP Review     None           ED Provider  Attending physically available and evaluated Jona Kahn  CELSO managed the patient along with the ED Attending      Electronically Signed by         Kimberley Lucas MD  08/26/21 2046

## 2021-08-26 LAB
C TRACH DNA SPEC QL NAA+PROBE: NEGATIVE
N GONORRHOEA DNA SPEC QL NAA+PROBE: NEGATIVE

## 2021-08-28 NOTE — ED ATTENDING ATTESTATION
8/25/2021  I, Dixon Leventhal, MD, saw and evaluated the patient  I have discussed the patient with the resident/non-physician practitioner and agree with the resident's/non-physician practitioner's findings, Plan of Care, and MDM as documented in the resident's/non-physician practitioner's note, except where noted  All available labs and Radiology studies were reviewed  I was present for key portions of any procedure(s) performed by the resident/non-physician practitioner and I was immediately available to provide assistance  At this point I agree with the current assessment done in the Emergency Department  I have conducted an independent evaluation of this patient a history and physical is as follows:    ED Course     51-year-old female presents with vaginal bleeding status post recent Pap smear  Vitals reviewed  Pelvic per resident note  Impression vaginal bleeding will check pregnancy test CBC discussed case with gyn  Gyn saw patient emergency department started patient on estrogen  Patient be discharged with follow-up with gyn as outpatient      Critical Care Time  Procedures

## 2021-09-02 ENCOUNTER — OFFICE VISIT (OUTPATIENT)
Dept: OBGYN CLINIC | Facility: CLINIC | Age: 46
End: 2021-09-02

## 2021-09-02 VITALS
HEART RATE: 76 BPM | HEIGHT: 68 IN | DIASTOLIC BLOOD PRESSURE: 87 MMHG | BODY MASS INDEX: 26.52 KG/M2 | WEIGHT: 175 LBS | SYSTOLIC BLOOD PRESSURE: 137 MMHG

## 2021-09-02 DIAGNOSIS — N93.9 ABNORMAL UTERINE BLEEDING (AUB): Primary | ICD-10-CM

## 2021-09-02 PROCEDURE — 99212 OFFICE O/P EST SF 10 MIN: CPT | Performed by: OBSTETRICS & GYNECOLOGY

## 2021-09-02 NOTE — PROGRESS NOTES
Subjective:     Satnam Santana is a 42 y o  [id number] female who presents for follow up discussion regarding abnormal uterine bleeding  She has been having abnormal uterine bleeding for the last year  (See previous note on 7/15/21 for further details)  Her pap smear on 7/15/21 was NILM and HPV neg  Her EMB on 8/19/21 was benign  She then had an episode of heavy bleeding after her EMB and was seen in the ED for evaluation  At that time her Hgb was 12 6 and she was sent home on Agyestin  She states her bleeding has stopped and the accompanying odor that she had at the time has also stopped  She reports some mild perimenopausal symptoms of hot flashes  Objective:    Vitals: Blood pressure 137/87, pulse 76, height 5' 7 5" (1 715 m), weight 79 4 kg (175 lb), last menstrual period 08/05/2021  Body mass index is 27 kg/m²  Assessment/Plan:    AUB:   The plan of treatment for abnormal uterine bleeding was reviewed today  Treatment options were discussed, including expectant management, hormonal (both combination and progesterone-only), Mirena, Lysteda, endometrial ablation, and hysterectomy  The risks, benefits and side effects of each method were discussed  She would like to proceed with progesterone only pills at this time  She is not a candidate for combination pills as she is a smoker and has borderline high blood pressure  Will send prescription for Slynd to the pharmacy  If not covered by her insurance will plan to send Latonia Hanna MD  9/2/2021  1:23 PM

## 2021-09-12 ENCOUNTER — APPOINTMENT (EMERGENCY)
Dept: CT IMAGING | Facility: HOSPITAL | Age: 46
End: 2021-09-12
Payer: COMMERCIAL

## 2021-09-12 ENCOUNTER — HOSPITAL ENCOUNTER (EMERGENCY)
Facility: HOSPITAL | Age: 46
Discharge: HOME | End: 2021-09-12
Attending: SURGERY | Admitting: SURGERY
Payer: COMMERCIAL

## 2021-09-12 VITALS
TEMPERATURE: 96.9 F | HEART RATE: 78 BPM | DIASTOLIC BLOOD PRESSURE: 75 MMHG | RESPIRATION RATE: 16 BRPM | HEIGHT: 67 IN | BODY MASS INDEX: 25.9 KG/M2 | SYSTOLIC BLOOD PRESSURE: 121 MMHG | WEIGHT: 165 LBS | OXYGEN SATURATION: 94 %
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
## 2021-09-12 PROCEDURE — 99284 EMERGENCY DEPT VISIT MOD MDM: CPT

## 2021-09-12 PROCEDURE — 85014 HEMATOCRIT: CPT

## 2021-09-12 PROCEDURE — 70450 CT HEAD/BRAIN W/O DYE: CPT

## 2021-09-12 PROCEDURE — 84295 ASSAY OF SERUM SODIUM: CPT

## 2021-09-12 PROCEDURE — 82803 BLOOD GASES ANY COMBINATION: CPT

## 2021-09-12 PROCEDURE — 99282 EMERGENCY DEPT VISIT SF MDM: CPT | Performed by: SURGERY

## 2021-09-12 PROCEDURE — 76705 ECHO EXAM OF ABDOMEN: CPT | Performed by: SURGERY

## 2021-09-12 PROCEDURE — 90715 TDAP VACCINE 7 YRS/> IM: CPT | Performed by: SURGERY

## 2021-09-12 PROCEDURE — 90471 IMMUNIZATION ADMIN: CPT

## 2021-09-12 PROCEDURE — 84132 ASSAY OF SERUM POTASSIUM: CPT

## 2021-09-12 PROCEDURE — 93308 TTE F-UP OR LMTD: CPT | Performed by: SURGERY

## 2021-09-12 PROCEDURE — 72125 CT NECK SPINE W/O DYE: CPT

## 2021-09-12 PROCEDURE — NC001 PR NO CHARGE: Performed by: EMERGENCY MEDICINE

## 2021-09-12 PROCEDURE — 82947 ASSAY GLUCOSE BLOOD QUANT: CPT

## 2021-09-12 RX ORDER — ACETAMINOPHEN 325 MG/1
975 TABLET ORAL ONCE
Status: COMPLETED | OUTPATIENT
Start: 2021-09-12 | End: 2021-09-12

## 2021-09-12 RX ADMIN — TETANUS TOXOID, REDUCED DIPHTHERIA TOXOID AND ACELLULAR PERTUSSIS VACCINE, ADSORBED 0.5 ML: 5; 2.5; 8; 8; 2.5 SUSPENSION INTRAMUSCULAR at 04:03

## 2021-09-12 RX ADMIN — ACETAMINOPHEN 975 MG: 325 TABLET, FILM COATED ORAL at 04:03

## 2021-09-12 NOTE — TRAUMA DOCUMENTATION
Patient resting with family at bedside  Family requested to observe patient and not discharge her at this time Provider and charge RN made aware of discharge plan  Will continue to monitor patient until family is able to take patient home

## 2021-09-12 NOTE — TRAUMA DOCUMENTATION
Family given discharge instructions with follow up with PCP as needed  Wound care addressed with both daughters  Patient left via wheelchair  Primary RN (Cara Rodrigez) helped patient into the car

## 2021-09-12 NOTE — H&P
H&P Exam - Trauma   Genevieve Hall Fat 42 y o  female MRN: [id number]  Unit/Bed#: ED 25 Encounter: [id number]    Assessment/Plan   Trauma Alert: Level B  Model of Arrival: Ambulance  Trauma Team: Attending Jo-Ann Morales and AP [address] [place] [address]  Consultants: None    Trauma Active Problems:   - fall off a chair while intoxicated, head strike, no loss of consciousness  - facial abrasions/skin tears  - alcohol intoxication and agitation      Trauma Plan:   - chest x-ray  - fast negative  - CT head negative for acute intracranial abnormalities  - CT C-spine negative  - Local wound care to facial abrasion with soap and water  - Imaging negative, patient may be discharged from the ER when she is sober, or when a sober family member or friend is able to pick her up  Cervical Collar Clearance: The patient had a CT scan of the cervical spine demonstrating no acute injury  On exam, the patient had no midline point tenderness or paresthesias/numbness/weakness in the extremities  The patient had full range of motion (was then able to flex, extend, and rotate head laterally) without pain  There were no distracting injuries  The patient's cervical spine was cleared radiologically and clinically  Cervical collar removed at this time  Julissa Hale PA-C  9/12/2021 4:09 AM       Chief Complaint:  Fall      History of Present Illness   HPI:  Dulce Maria Husbands is a 42 y o  female who presents with fall while intoxicated  Patient had a witnessed fall off of a bench while sitting and hit her head on the pavement, no loss of consciousness  Patient denies use antiplatelet or anticoagulation medications  Patient reports that she drink copious amounts of alcohol, more than she normally does, and is heavily intoxicated, agitated and has no complaints of pain  Mechanism:Fall    Review of Systems   Eyes: Negative for photophobia  Respiratory: Negative for shortness of breath  Cardiovascular: Negative for chest pain  Gastrointestinal: Negative for abdominal pain, nausea and vomiting  Musculoskeletal: Negative for back pain and neck pain  Skin: Positive for wound  Neurological: Negative for dizziness, tremors, syncope, weakness, numbness and headaches  Psychiatric/Behavioral: Positive for agitation  All other systems reviewed and are negative  12-point, complete review of systems was reviewed and negative except as stated above  Historical Information   History is unobtainable from the patient due to alcohol intoxication  Efforts to obtain history included the following sources: other medical personnel    No past medical history on file  No past surgical history on file  Social History   Social History     Substance and Sexual Activity   Alcohol Use Not on file     Social History     Substance and Sexual Activity   Drug Use Not on file     Social History     Tobacco Use   Smoking Status Not on file     No existing history information found  No existing history information found  Immunization History   Administered Date(s) Administered    Tdap 09/12/2021     Last Tetanus:  Unknown  Family History: Non-contributory  Unable to obtain/limited by alcohol intoxication      Meds/Allergies   all current active meds have been reviewed    Not on File      PHYSICAL EXAM    PE limited by:  Non    Objective   Vitals:   First set: Temperature: (!) 96 1 °F (35 6 °C) (09/12/21 0252)  Pulse: 82 (09/12/21 0252)  Respirations: 18 (09/12/21 0252)  Blood Pressure: 144/95 (09/12/21 0252)    Primary Survey:   (A) Airway:  Intact  (B) Breathing:  Breath sounds clear and equal bilaterally  (C) Circulation: Pulses:   normal  (D) Disabliity:  GCS Total:  15  (E) Expose:  Completed    Secondary Survey: (Click on Physical Exam tab above)  Physical Exam  Vitals reviewed  HENT:      Head: Normocephalic        Comments: Large Left forehead and temporal abrasion     Right Ear: External ear normal       Left Ear: External ear normal  Nose: Nose normal       Mouth/Throat:      Mouth: Mucous membranes are moist       Pharynx: Oropharynx is clear  Eyes:      Extraocular Movements: Extraocular movements intact  Conjunctiva/sclera: Conjunctivae normal       Pupils: Pupils are equal, round, and reactive to light  Cardiovascular:      Rate and Rhythm: Normal rate and regular rhythm  Pulses: Normal pulses  Heart sounds: Normal heart sounds  No murmur heard  No friction rub  No gallop  Pulmonary:      Effort: Pulmonary effort is normal  No respiratory distress  Breath sounds: Normal breath sounds  No stridor  No wheezing, rhonchi or rales  Chest:      Chest wall: No tenderness  Abdominal:      General: Abdomen is flat  Bowel sounds are normal  There is no distension  Palpations: Abdomen is soft  There is no mass  Tenderness: There is no abdominal tenderness  There is no guarding or rebound  Hernia: No hernia is present  Musculoskeletal:         General: No swelling, tenderness, deformity or signs of injury  Normal range of motion  Cervical back: No tenderness  Skin:     General: Skin is warm and dry  Capillary Refill: Capillary refill takes less than 2 seconds  Findings: Lesion present  Neurological:      General: No focal deficit present  Mental Status: She is alert and oriented to person, place, and time  Psychiatric:      Comments: Repetitive, agitated         Invasive Devices     None                 Lab Results: Results: I have personally reviewed pertinent reports  Imaging/EKG Studies: Results: I have personally reviewed pertinent reports  , FAST: Negative  Other Studies:   TRAUMA - CT head wo contrast   Final Result by Adi Carbajal MD (09/12 3401)      No acute intracranial abnormality        I personally discussed this study with Mariana Romo on 9/12/2021 at 3:38 AM                      Workstation performed: [id number]         TRAUMA - CT spine cervical wo contrast   Final Result by Stephen Edmondson MD (09/12 2200)      No cervical spine fracture or traumatic malalignment        I personally discussed this study with Donald Aung on 9/12/2021 at 3:38 AM              Workstation performed: [id number]         XR Trauma multiple ([facility] trauma bay ONLY)    (Results Pending)   XR chest portable    (Results Pending)         Code Status: No Order  Advance Directive and Living Will:      Power of :    POLST:

## 2021-09-12 NOTE — PROCEDURES
POC FAST US    Date/Time: 9/12/2021 3:33 AM  Performed by: Annabelle Quintero PA-C  Authorized by: Annabelle Quintero PA-C     Patient location:  ED and Trauma  Procedure details:     Exam Type:  Diagnostic    Indications: blunt abdominal trauma      Assess for:  Intra-abdominal fluid and pericardial effusion    Technique: FAST      Views obtained:  Heart - Pericardial sac, RUQ - Pope's Pouch, LUQ - Splenorenal space and Suprapubic - Pouch of Saad    Image quality: diagnostic      Image availability:  Images available in PACS  FAST Findings:     RUQ (Hepatorenal) free fluid: absent      LUQ (Splenorenal) free fluid: absent      Suprapubic free fluid: absent      Cardiac wall motion: identified      Pericardial effusion: absent    Interpretation:     Impressions: negative

## 2021-09-12 NOTE — ED PROVIDER NOTES
Emergency Department Airway Evaluation and Management Form    History  Obtained from: ems  Patient has no allergy information on record  No chief complaint on file  HPI     Patient intoxicated, fell from a chair  Hit her head  MDM fall, head trauma, intoxication,  airway intact will hand off to trauma team        No past medical history on file  No past surgical history on file  No family history on file  Social History     Tobacco Use    Smoking status: Not on file   Substance Use Topics    Alcohol use: Not on file    Drug use: Not on file     I have reviewed and agree with the history as documented      Review of Systems    Physical Exam  /95   Pulse 82   Temp (!) 96 9 °F (36 1 °C) (Oral)   Resp 18   Wt 74 8 kg (165 lb)   SpO2 98%     Physical Exam    ED Medications  Medications - No data to display    Intubation  Procedures    Notes       Final Diagnosis  Final diagnoses:   None       ED Provider  Electronically Signed by     Venkatesh Mccarthy MD  09/12/21 6278

## 2021-09-12 NOTE — ED NOTES
Patient crawled out of bed, without grown  Stating that she needed to urinate  Explained to patient she was to unsteady to walk  Went to go get a bedside commode because patient refused bedpan  Asked a fellow nurse to go check on patient until a clean bedside commode was found  Fellow RN found patient in bathroom  Charge RN aware and helped escort patient back into room  Provider made aware       Brian Izaguirre RN  09/12/21 1456

## 2021-09-13 ENCOUNTER — TELEPHONE (OUTPATIENT)
Dept: OTHER | Facility: HOSPITAL | Age: 46
End: 2021-09-13

## 2021-09-13 NOTE — TELEPHONE ENCOUNTER
Called patient after discharge to discuss incidental finding noted on chest x-ray  Discussed with patient over the phone regarding the chest x-ray findings as well as the importance of having close interval follow-up  Informed patient of the  Lung finding  She stated she was understanding of the lung finding and was able to repeat with the finding was  At this time the primary care provider was contacted and the patient will have an appointment on 09/24 at approximately 8:20 a m     The patient will be contacted by the primary care office to discuss scheduled appointment  Will send message through epic to contact primary care provider of patient

## 2021-09-15 LAB
BASE EXCESS BLDA CALC-SCNC: -1 MMOL/L (ref -2–3)
GLUCOSE SERPL-MCNC: 109 MG/DL (ref 65–140)
HCO3 BLDA-SCNC: 23.4 MMOL/L (ref 24–30)
HCT VFR BLD CALC: 37 % (ref 34.8–46.1)
HGB BLDA-MCNC: 12.6 G/DL (ref 11.5–15.4)
PCO2 BLD: 25 MMOL/L (ref 21–32)
PCO2 BLD: 38.6 MM HG (ref 42–50)
PH BLD: 7.39 [PH] (ref 7.3–7.4)
PO2 BLD: 69 MM HG (ref 35–45)
POTASSIUM BLD-SCNC: 3.5 MMOL/L (ref 3.5–5.3)
SAO2 % BLD FROM PO2: 94 % (ref 60–85)
SODIUM BLD-SCNC: 139 MMOL/L (ref 136–145)
SPECIMEN SOURCE: ABNORMAL

## 2021-09-22 ENCOUNTER — CONSULT (OUTPATIENT)
Dept: MULTI SPECIALTY CLINIC | Facility: CLINIC | Age: 46
End: 2021-09-22

## 2021-09-22 ENCOUNTER — APPOINTMENT (OUTPATIENT)
Dept: LAB | Facility: CLINIC | Age: 46
End: 2021-09-22
Payer: COMMERCIAL

## 2021-09-22 VITALS — BODY MASS INDEX: 27.47 KG/M2 | WEIGHT: 175 LBS | HEIGHT: 67 IN

## 2021-09-22 DIAGNOSIS — R79.0 LOW IRON STORES: ICD-10-CM

## 2021-09-22 DIAGNOSIS — R21 RASH: Primary | ICD-10-CM

## 2021-09-22 DIAGNOSIS — D22.9 CHANGE IN SKIN MOLE: ICD-10-CM

## 2021-09-22 DIAGNOSIS — R21 RASH: ICD-10-CM

## 2021-09-22 LAB
BASOPHILS # BLD AUTO: 0.04 THOUSANDS/ΜL (ref 0–0.1)
BASOPHILS NFR BLD AUTO: 1 % (ref 0–1)
C3 SERPL-MCNC: 139 MG/DL (ref 90–180)
C4 SERPL-MCNC: 34 MG/DL (ref 10–40)
EOSINOPHIL # BLD AUTO: 0.19 THOUSAND/ΜL (ref 0–0.61)
EOSINOPHIL NFR BLD AUTO: 3 % (ref 0–6)
ERYTHROCYTE [DISTWIDTH] IN BLOOD BY AUTOMATED COUNT: 15.2 % (ref 11.6–15.1)
HCT VFR BLD AUTO: 35.3 % (ref 34.8–46.1)
HGB BLD-MCNC: 11.2 G/DL (ref 11.5–15.4)
IMM GRANULOCYTES # BLD AUTO: 0.04 THOUSAND/UL (ref 0–0.2)
IMM GRANULOCYTES NFR BLD AUTO: 1 % (ref 0–2)
IRON SERPL-MCNC: 59 UG/DL (ref 50–170)
LYMPHOCYTES # BLD AUTO: 2 THOUSANDS/ΜL (ref 0.6–4.47)
LYMPHOCYTES NFR BLD AUTO: 26 % (ref 14–44)
MCH RBC QN AUTO: 25.4 PG (ref 26.8–34.3)
MCHC RBC AUTO-ENTMCNC: 31.7 G/DL (ref 31.4–37.4)
MCV RBC AUTO: 80 FL (ref 82–98)
MONOCYTES # BLD AUTO: 0.52 THOUSAND/ΜL (ref 0.17–1.22)
MONOCYTES NFR BLD AUTO: 7 % (ref 4–12)
NEUTROPHILS # BLD AUTO: 4.89 THOUSANDS/ΜL (ref 1.85–7.62)
NEUTS SEG NFR BLD AUTO: 62 % (ref 43–75)
NRBC BLD AUTO-RTO: 0 /100 WBCS
PLATELET # BLD AUTO: 312 THOUSANDS/UL (ref 149–390)
PMV BLD AUTO: 10.9 FL (ref 8.9–12.7)
RBC # BLD AUTO: 4.41 MILLION/UL (ref 3.81–5.12)
TIBC SERPL-MCNC: 435 UG/DL (ref 250–450)
WBC # BLD AUTO: 7.68 THOUSAND/UL (ref 4.31–10.16)

## 2021-09-22 PROCEDURE — 88305 TISSUE EXAM BY PATHOLOGIST: CPT | Performed by: STUDENT IN AN ORGANIZED HEALTH CARE EDUCATION/TRAINING PROGRAM

## 2021-09-22 PROCEDURE — 86225 DNA ANTIBODY NATIVE: CPT

## 2021-09-22 PROCEDURE — 86038 ANTINUCLEAR ANTIBODIES: CPT

## 2021-09-22 PROCEDURE — 99244 OFF/OP CNSLTJ NEW/EST MOD 40: CPT | Performed by: DERMATOLOGY

## 2021-09-22 PROCEDURE — 11104 PUNCH BX SKIN SINGLE LESION: CPT | Performed by: DERMATOLOGY

## 2021-09-22 PROCEDURE — 83540 ASSAY OF IRON: CPT

## 2021-09-22 PROCEDURE — 87070 CULTURE OTHR SPECIMN AEROBIC: CPT | Performed by: STUDENT IN AN ORGANIZED HEALTH CARE EDUCATION/TRAINING PROGRAM

## 2021-09-22 PROCEDURE — 86160 COMPLEMENT ANTIGEN: CPT

## 2021-09-22 PROCEDURE — 88313 SPECIAL STAINS GROUP 2: CPT | Performed by: STUDENT IN AN ORGANIZED HEALTH CARE EDUCATION/TRAINING PROGRAM

## 2021-09-22 PROCEDURE — 36415 COLL VENOUS BLD VENIPUNCTURE: CPT

## 2021-09-22 PROCEDURE — 86235 NUCLEAR ANTIGEN ANTIBODY: CPT

## 2021-09-22 PROCEDURE — 83550 IRON BINDING TEST: CPT

## 2021-09-22 PROCEDURE — 87205 SMEAR GRAM STAIN: CPT | Performed by: STUDENT IN AN ORGANIZED HEALTH CARE EDUCATION/TRAINING PROGRAM

## 2021-09-22 PROCEDURE — 87186 SC STD MICRODIL/AGAR DIL: CPT | Performed by: STUDENT IN AN ORGANIZED HEALTH CARE EDUCATION/TRAINING PROGRAM

## 2021-09-22 PROCEDURE — 85025 COMPLETE CBC W/AUTO DIFF WBC: CPT

## 2021-09-22 PROCEDURE — 86039 ANTINUCLEAR ANTIBODIES (ANA): CPT

## 2021-09-22 NOTE — PROGRESS NOTES
[facility] Note     Patient Name: Rosalia Bonilla  Encounter Date: 9/22/2021     Have you been cared for by a [facility] Dermatologist in the last 3 years and, if so, which one? No    · Have you traveled outside of the [address] [place] [address] in the past 3 months or outside of the [facility] area in the last 2 weeks? No     May we call your Preferred Phone number to discuss your specific medical information? Yes     May we leave a detailed message that includes your specific medical information? Yes      Today's Chief Concerns:   Concern #1:  Changing mole   Concern #2:      Past Medical History:  Have you personally ever had or currently have any of the following? · Skin cancer (such as Melanoma, Basal Cell Carcinoma, Squamous Cell Carcinoma? (If Yes, please provide more detail)- No  · Eczema: YES  · Psoriasis: No  · HIV/AIDS: No  · Hepatitis B or C: No  · Tuberculosis: No  · Systemic Immunosuppression such as Diabetes, Biologic or Immunotherapy, Chemotherapy, Organ Transplantation, Bone Marrow Transplantation (If YES, please provide more detail): No  · Radiation Treatment (If YES, please provide more detail): No  · Any other major medical conditions/concerns? 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 kalpana    Family History:  Have any of your "first degree relatives" (parent, brother, sister, or child) had any of the following       · Skin cancer such as Melanoma or Merkel Cell Carcinoma or Pancreatic Cancer? No  · Eczema, Asthma, Hay Fever or Seasonal Allergies: YES, seasonal allergies  · Psoriasis or Psoriatic Arthritis: No  · Do any other medical conditions seem to run in your family? If Yes, what condition and which relatives?   YES, breast cancer, asthma    Current Medications:   (please update all dermatological medications before printing patient's AVS!)      Current Outpatient Medications:     albuterol (Ventolin HFA) 90 mcg/act inhaler, Inhale 2 puffs every 6 (six) hours as needed for wheezing, Disp: 8 g, Rfl: 0    Drospirenone 4 MG TABS, Take 4 mg by mouth daily, Disp: 30 tablet, Rfl: 3    fluticasone (FLONASE) 50 mcg/act nasal spray, 1 spray into each nostril daily, Disp: 16 g, Rfl: 0    hydrOXYzine HCL (ATARAX) 25 mg tablet, Take 1 tablet (25 mg total) by mouth 2 (two) times a day as needed for anxiety (Patient not taking: Reported on 9/2/2021), Disp: 30 tablet, Rfl: 1    ibuprofen (MOTRIN) 600 mg tablet, Take 1 tablet (600 mg total) by mouth every 6 (six) hours as needed for mild pain or moderate pain, Disp: 30 tablet, Rfl: 0    ketotifen (ZADITOR) 0 025 % ophthalmic solution, Administer 1 drop to both eyes 2 (two) times a day as needed (itchy eyes) (Patient not taking: Reported on 8/19/2021), Disp: 5 mL, Rfl: 0    loratadine (CLARITIN) 10 mg tablet, Take 1 tablet (10 mg total) by mouth daily (Patient not taking: Reported on 5/26/2021), Disp: 90 tablet, Rfl: 1    meclizine (ANTIVERT) 25 mg tablet, Take 1 tablet (25 mg total) by mouth 3 (three) times a day as needed for dizziness (Patient not taking: Reported on 9/2/2021), Disp: 30 tablet, Rfl: 0    norethindrone (AYGESTIN) 5 mg tablet, Take 1 tablet (5 mg total) by mouth daily for 10 days (Patient not taking: Reported on 9/2/2021), Disp: 10 tablet, Rfl: 0    triamcinolone (KENALOG) 0 1 % ointment, Apply topically 2 (two) times a day (Patient not taking: Reported on 9/2/2021), Disp: 80 g, Rfl: 5      Review of Systems:  Have you recently had or currently have any of the following? If YES, what are you doing for the problem?     · Fever, chills or unintended weight loss: No  · Sudden loss or change in your vision: No  · Nausea, vomiting or blood in your stool: No  · Painful or swollen joints: No  · Wheezing or cough: No  · Changing mole or non-healing wound: No  · Nosebleeds: No  · Excessive sweating: No  · Easy or prolonged bleeding? No  · Over the last 2 weeks, how often have you been bothered by the following problems? · Taking little interest or pleasure in doing things: 1 - Not at All  · Feeling down, depressed, or hopeless: 1 - Not at All  · Rapid heartbeat with epinephrine:  No    · FEMALES ONLY:    · Are you pregnant or planning to become pregnant? n/a  · Are you currently or planning to be nursing or breast feeding? No    · Any known allergies? Allergies   Allergen Reactions    Percocet [Oxycodone-Acetaminophen]     Doxycycline Hives, Myalgia and Rash     Category: Allergy;      ·       Physical Exam:     Was a chaperone (Derm Clinical Assistant) present throughout the entire Physical Exam? NO     Did the Dermatology Team specifically  the patient on the importance of a Full Skin Exam to be sure that nothing is missed clinically? NO}  o Did the patient ultimately request or accept a Full Skin Exam?  NO  o Did the patient specifically refuse to have the areas "under-the-bra" examined by the Dermatologist? No  o Did the patient specifically refuse to have the areas "under-the-underwear" examined by the Dermatologist? No    CONSTITUTIONAL:   There were no vitals filed for this visit      Today's Height:   5 7'  Today's Weight (in kilograms):   175 lbs      PSYCH: Normal mood and affect  EYES: Normal conjunctiva  ENT: Normal lips and oral mucosa  CARDIOVASCULAR: No edema  RESPIRATORY: Normal respirations  HEME/LYMPH/IMMUNO:  No regional lymphadenopathy except as noted below in "ASSESSMENT AND PLAN BY DIAGNOSIS"    SKIN:  FULL ORGAN SYSTEM EXAM   Hair, Scalp, Ears, Face Normal except as noted below in Assessment   Neck, Cervical Chain Nodes Normal except as noted below in Assessment   Right Arm/Hand/Fingers Normal except as noted below in Assessment   Left Arm/Hand/Fingers Normal except as noted below in Assessment   Chest/Breasts/Axillae Viewed areas Normal except as noted below in Assessment   Right Leg, Foot, Toes Normal except as noted below in Assessment   Left Leg, Foot, Toes Normal except as noted below in Assessment        Assessment and Plan by Diagnosis:    History of Present Condition:     Duration:  How long has this been an issue for you?    o  2 years   Location Affected:  Where on the body is this affecting you?    o  both hands, lower legs   Quality:  Is there any bleeding, pain, itch, burning/irritation, or redness associated with the skin lesion? o  itch, vesicle formation between digits, burning   Severity:  Describe any bleeding, pain, itch, burning/irritation, or redness on a scale of 1 to 10 (with 10 being the worst)  o  very bothersome   Timing:  Does this condition seem to be there pretty constantly or do you notice it more at specific times throughout the day? o  constantly   Context:  Have you ever noticed that this condition seems to be associated with specific activities you do?    o  hands become more irritated when washing hands   Modifying Factors:    o Anything that seems to make the condition worse?    -  water immersion  o What have you tried to do to make the condition better?    -  triamcinolone ointment for 6 months- no relief   Associated Signs and Symptoms:  Does this skin lesion seem to be associated with any of the following:  o  SL AMB DERM SIGNS AND SYMPTOMS: Itching and Scratching             HPI  Pt presents with complaints of dry, scaly hands associated with dark patches and new moles on her arms ongoing for the past 2 years  She had seen a dermatologist earlier this year, when she was prescribed with triamcinolone ointment to apply to the hands and legs which did not help her rash after 6 months of use  She reports that the rash on her hands is extremely itchy, and she has noticed that the skin around her fingers have become darker  She sometimes gets pustules between the fingers, which arise after contact with water   Her rash on her lower legs have persisted as well as dark patches  In addition, she has noticed new scattered moles all over her body that initially start off as pimples and become sores before turning into moles  She has a dark pustule that occasionally drains over the past 2 years in her left ear  Lesion of Ear  Physical Exam:   Anatomic Location Affected: antihelix cruris of left ear   Morphological Description:  Dark brown papule   Pertinent Positives:   Pertinent Negatives: Additional History of Present Condition:  Noticed 2 years ago; lesion is painful and occasionally drains    Assessment and Plan:  Based on a thorough discussion of this condition and the management approach to it (including a comprehensive discussion of the known risks, side effects and potential benefits of treatment), the patient (family) agrees to implement the following specific plan:   Lesion cultured today; will follow up wound culture   Can use vinegar and water with q tip in meantime      ATOPIC DERMATITIS ("childhood Eczema")    Physical Exam:   Anatomic Location Affected:  Hands, lower legs   Morphological Description:  Scaly hyperpigmented plaques on hands, hyperpigmented plaques with perifollicular skin colored papueles   Severity: moderate   Pertinent Positives: worse when washing hands   Pertinent Negatives: Denied worsening with sun exposure    Additional History of Present Condition:  Pt reports dry hands with occasional pustules between digits ongoing for the past 2 years  Assessment and Plan:  Based on a thorough discussion of this condition and the management approach to it (including a comprehensive discussion of the known risks, side effects and potential benefits of treatment), the patient (family) agrees to implement the following specific plan:   Recommended 0 1% triamcinolone ointment BID for up to 14 days (ordered), although patient deferred use   Recommended moisturizing with vaseline     Biopsy of right lower leg performed given recalcitrant nature of rash- Discussed risks and benefits of punch biopsy; 4 mm punch biopsy done today   Will follow up with biopsy results   Differentials include atopic dermatitis vs  Cutaneous lupus vs  majocchi granuloma    Rash on hands and legs more likely to be atopic dermatitis, however ROME, dsDNA, Sjogren's antibodies ordered given patient recalcitrant nature, pt's reported history of intense fatigue in the last few months, family history of lupus in niece, and reported association of multiple dermatofibromas associated with lupus profundus in the literature   Right lower leg plaque looks similar to majocchi granuloma which is also on the differential possible due to persistent nature after topical steroid use      Assessment and Plan:   Atopic Dermatitis is a chronic, itchy skin condition that is very common in children but may occur at any age  It is also known as eczema or atopic eczema   It is the most common form of dermatitis  Atopic dermatitis usually occurs in people who have an atopic tendency    This means they may develop any or all of these closely linked conditions: Atopic dermatitis, asthma, hay fever (allergic rhinitis), eosinophilic esophagitis, and gastroenteritis  Often these conditions run within families with a parent, child or sibling also affected  A family history of asthma, eczema or hay fever is particularly useful in diagnosing atopic dermatitis in infants  Atopic dermatitis arises because of a complex interaction of genetic and environmental factors  These include defects in skin barrier function making the skin more susceptible to irritation by soap and other contact irritants, the weather, temperature and non-specific triggers  There is also an element of immune system dysregulation that is often present    By definition, it is chronic and has a "waxing-waning" nature; flares should be expected but with good education and treatment strategies can be minimized  Some specific tips we discussed:   Dry skin care   Usingonly mild cleansers (hypoallergenic and without fragrances) and fragrance free detergent (not unscented products which contain a masking agent); we discussed avoiding irritants/fragranced products   The importance of regular application of moisturizers daily (at least 3 times a day)   The known and theoretical side effects of steroids at length, including but not limited to atrophy of skin and increased pressure in eye (glaucoma) and clouding of the eye's lens (cataracts) if used in or around the eye for extended durations   The specific over-the-counter interventions and medications   Side effects, risks and benefits of topical and oral medications discussed   After lengthy discussion of etiology and treatment options, we decided to implement the following personalized treatment plan:      YOUR PERSONALIZED ECZEMA ACTION PLAN    FOR ACUTE FLARING    1) Antimicrobials  a) None    b) Clindamycin 75mg/5mL solution:  Take by mouth THREE TIMES A DAY for 10 days straight to help reduce the amount of presumed Staph aureus colonizing the skin  c) Bleach baths:  Soak in a bleach bath (recipe provided via email) about 3 times a week for about 5-10 minutes a day; crucially, make sure to rinse thoroughly with fresh water and apply moisturizer within 3 minutes of toweling off gently  2) Anti-Inflammatories  a) During periods of acute flaring, apply the Hydrocortisone 2 5% ointment to the face and neck TWICE A DAY for 2 weeks straight  To give you an idea of how much medication to use: You should be using at least a single full 30-gram tube of this medication EACH WEEK  b) During periods of acute flaring, apply the Triamcinolone 0 1% ointment to the body TWICE A DAY for 2 weeks straight  To give you an idea of how much medication to use:   You should be using at least two full 30-gram tubes of this medication EACH WEEK  Do NOT apply this stronger medication to the face or groin area as the skin is too thin and at greater risk for side effects  3) Moisturizers  a) Apply Eucerin cream or Aquaphor ointment at least 3 times a day  It is best to use moisturizers and prescription medications at DIFFERENT times during the day (ideally, about 30 minutes apart)  If you must apply your prescription medication and your moisturizer at the same time, then ALWAYS apply the prescription medication FIRST (i e , directly to the skin); as we discussed, this allows the prescription medication to reach the skin without being blocked by the thick moisturizer! 4) Oral Antihistamines  a) Cetirizine (Zyrtec): Take 5 mL (1 teaspoon) of 5mg/5mL oral suspension EACH MORNING through allergy season  b) Hydroxyzine (Atarax): Take 5 mL (1 teaspoon) of 12 5mg/5mL oral solution about 1 hour before bedtime for the next 3-5 evenings to help reduce scratching  FOR CHRONIC MAINTENANCE    1) Antimicrobials  a) None    b) Bleach baths:  Soak in a bleach bath (recipe provided via email) about 3 times a week for about 5-10 minutes a day; crucially, make sure to rinse thoroughly with fresh water and apply moisturizer within 3 minutes of toweling off gently  2) Anti-Inflammatories  a) Once in the chronic maintenance phase apply Hydrocortisone 2 5% ointment to the face ONCE A DAY and only on Mondays, Wednesdays and Fridays to help decrease the inflammation; try to decrease to [facility] and Fridays if possible  b) Once in the chronic maintenance phase apply Triamcinolone 0 1% ointment to the body ONCE A DAY and only on Mondays, Wednesdays and Fridays to help decrease the inflammation; try to decrease to [facility] and Fridays if possible  Do NOT apply this stronger medication to your face or groin area as the skin is too thin and at greater risk for side effects        c) Apply crisaborole 2% (Eucrisa) ointment TWICE A DAY on Tuesdays, Thursdays, Saturdays and Sundays (i e , the days you are not applying a topical steroid) to both the face/neck and body  As we discussed, this product is approved for the topical treatment of mild-to-moderate eczema in patients 3years of age and older; use of the medication in kids younger than 2 is considered off label and has not been formally studied  Burning and stinging are the most commonly reported side effects of this medication  Rarely, this product has been known to cause hives and hypersensitivity reactions; discontinue its use if you develop severe itching, swelling, or redness in the area of application  3) Moisturizers  a) Continue to apply Eucerin cream or Aquaphor ointment at least 3 times a day  It is best to use moisturizers and prescription medications at DIFFERENT times during the day (ideally, about 30 minutes apart)  If you must apply your prescription medication and your moisturizer at the same time, then ALWAYS apply the prescription medication FIRST (i e , directly to the skin); as we discussed, this allows the prescription medication to reach the skin without being blocked by the thick moisturizer! 4) Oral Antihistamines  Take Cetirizine (Zyrtec): Take 5 mL (1 teaspoon) of 5mg/5mL oral suspension through allergy season  EDUCATION AS INTERVENTION! WHAT IS ATOPIC DERMATITIS? Atopic dermatitis (also called eczema) is a condition of the skin where the skin is dry, red, and itchy  The main function of the skin is to provide a barrier from the environment and is also the first defense of the immune system  In atopic dermatitis the skin barrier is decreased or disrupted, and the skin is easily irritated  As a result, moisture escapes the skin more easily, and environmental allergens and microbes can enter the skin more easily  Consequently, the skin's immune system is altered    If there are increased allergic type cells in the skin, the skin may become red and hyper-excitable   This leads to itching and a subsequent rash  WHY DO PEOPLE GET ATOPIC DERMATITIS? There is no single answer because many factors are involved  It is likely a combination of genetic makeup and environmental triggers and/or exposures  Excessive drying or sweating of the skin, Irritating soaps, dust mites, and pet dander are some of the more common triggers  There is no blood test that can be done to confirm this diagnosis  The history and appearance of the skin is usually sufficient for a diagnosis  However, in some cases if the rash does not fit with the history or respond appropriately to treatment, a skin biopsy may be helpful  Many children do outgrow atopic dermatitis or get better; however, many continue to have sensitive skin into adulthood  Asthma and hay fever are often seen in many patients with atopic dermatitis; however, asthma flares do not necessarily occur at the same time as skin flares  PREVENTING FLARES OF ATOPIC DERMATITIS  The first step is to maintain the skin's barrier function  Keep the skin well moisturized  Avoid irritants and triggers  Use prescribed medicine when there are red or rough areas to help the skin to return to normal as quickly as possible  Try to limit scratching  If you keep the skin well moisturized, and avoid coming in contact with things you know irritate your child's skin, there will be less flares  However, some flares of atopic dermatitis are beyond your control  You should work with your health care provider to come up with a plan that minimizes flares while minimizing long term use of medications that suppress the immune system  WHAT ARE SOME OF THE TRIGGERS? Triggers are different for different people  The most common triggers are:   Heat and sweat for some individuals, cold weather for others   House dust mites, pet fur   Wool; synthetic fabrics like nylon; dyed fabrics     Tobacco smoke    Fragrances in: shampoos, soaps, lotions, laundry detergents, fabric softeners   Saliva or prolonged exposure to water  WHAT ABOUT FOOD ALLERGIES? This is a very controversial topic, as many believe that food allergies are responsible for skin flares  In some cases, specific foods may cause worsening of atopic dermatitis; however this occurs in a minority of cases and usually happens within a few hours of ingestion  While food allergy is more common in children with eczema, foods are specific triggers for flares in only a small percentage of children  If you notice that the skin flares after certain foods you can see if eliminating one food at time makes a difference, as long as your child can still enjoy a well-balanced diet  There are blood (RAST) and skin (PRICK) tests that can check for allergies, but they are often positive in children who are not truly allergic  Therefore it is important that you work with your allergist and dermatologist to determine which foods are relevant and causing true symptoms  Extreme food elimination diets without the guidance of your doctor, which have become more popular in recent years, may even result in worsening of the skin rash due to malnutrition and avoidance of essential nutrients  TREATMENT  Treatments are aimed at minimizing exposure to irritating factors and decreasing  the skin inflammation which results in an itchy rash  There are many different treatment options, which depend on your child's rash, its location, and severity  Topical treatments include corticosteroids and steroid-like creams such as Protopic, Elidel, and Eucrisa, which are believed to not thin the skin  Please read the discussions below regarding risks and benefits of all of these creams  Occasionally bacterial or viral infections can occur which flare the skin and require oral and/or topical antibiotics or antivirals   In some cases bleach baths 2-3 times weekly can be helpful to prevent recurrent infection  For severe disease, strong oral medications such as corticosteroids, methotrexate or azathioprine (Imuran) may be needed  These medications require close monitoring and follow-up  You should discuss the risks/ benefits/alternatives of these medications with your health care provider to come up with the best treatment plan for your child  1) Use moisturizer all over the entire body at least THREE TIMES a day  This keeps the skin moisturized to restore the barrier function  Find a cream or ointment that your child likes - this is the most important  The medicines do not work in the bottle  The thicker the moisturizer, generally the better barrier it provides  Ointments often moisturize better than creams; and creams work better than lotions  Lotions are more useful during the summer when thick greasy ointments are uncomfortable  If you put moisturizer on the skin after bathing, while the skin is damp, it is twice as effective  The moisturizer provides a seal holding the water in the skin  You may bathe your child in warm - not hot - water, for short periods of time (no more than 5-10 minutes at a time) once a day if they like  Lightly pat your child dry with a towel and, while the skin is still damp, (within 3 minutes) apply a moisturizer from head to toe  If your child is using a medicated cream, apply it and allow it to absorb completely BEFORE you apply the moisturizer  2) Apply the prescription medication TWICE A DAY to only the red, rough areas on the skin OR [place]  Put the medication on your fingers and gently rub it into the areas  Usually the medicine will help an area within a few days time  Try to put the medicine on for two days after you have noticed that the redness is no longer present; this will help the redness from returning    The severity of the rash and the strength and usage of the medication will determine how quickly you see improvement  It is important that you do not overuse steroid creams, and if you notice a thin, shiny appearance to the skin or broken blood vessels, you should stop using the cream and consult your health care provider regarding possible overuse/overthinning of the skin  The face, armpits and groin have particularly thin and sensitive skin and are therefore most at risk for bad results if steroids are over-used in these sites  3) Avoid triggers  Some children have specific things that trigger itching and rashes, while others may have none that can be identified  It may require a little bit of trial and error to see what applies to your child  Also, triggers can change over time for your child  The most common triggers are listed above; start with these  Avoid the use of fabric softeners in the washing machine or dryer sheets (unless they are fragrance-free)  Try to use laundry detergents, soaps and shampoos that are fragrance-free  You may find it helpful to double-rinse your clothes  Some children are sensitive to house dust mites and they may benefit from a plastic mattress wrap  While food allergy is more common in children with eczema, foods are specific triggers for flares in only a small percentage of children  If you notice that the skin flares after certain foods you can see if eliminating one food at time makes a difference, as long as your child can still enjoy a well-balanced diet  4) Consider using a medication like an anti-histamine by mouth to help control the itching  Scratching only makes the skin more reactive and the barrier function even more disrupted  It can cause both children and their parents to lose sleep! There are different types of anti-itch medications  Some cause more drowsiness than others  Both types are acceptable depending on your child and your preference  Start with Benadryl and if that does not work, ask for a prescription antihistamine      5) About the prescription creams:  Corticosteroid creams and ointments (generally things with "-one" or "erinn" on the end of their names): The strength of the cream or ointment depends on the name of the active ingredient  The numbers at the end do not indicate the relative strength  Thus triamcinolone 0 1% ointment, considered a mid-strength corticosteroid, is much stronger than hydrocortisone 1% even though the number following the name is much lower  Topical corticosteroids are very effective in treating atopic dermatitis  When used in the manner prescribed (to rashy areas of skin and for no more than a few weeks at a time to any one area) they are very safe  These are corticosteroids and are anti-inflammatory, not the anabolic steroids like those used illegally by some athletes  Topical non-steroid creams and ointments (immunomodulators): These creams and ointments are also called topical calcineurin inhibitors (TCIs)  These include Protopic ointment and Elidel cream  Crisaborole 2% Mosmelchor Michaeljasminaius) is a prescription ointment that targets an enzyme called PDE4 (phosphodiesterase 4)  It is used on the skin topically to treat mild-to-moderate eczema in adults and children 3years of age and older  In total, these nonsteroidal prescriptions are used to help decrease itching and redness in the skin  They are not as strong as most steroid creams; however, it is believed that they do not thin the skin when overused  They are generally used as second-line medications, though they may be used alone or in conjunction with topical steroids  In sensitive areas such as the face, underarms or groin, they are often recommended  They can sting inflamed skin, but are generally well tolerated once the skin is healing  The FDA placed a black-box warning on both Elidel and Protopic in 2006 based on animal studies using the medications  Some animals developed skin cancer and lymphoma    Subsequently, the FDA released a statement that there is no causal relationship between the two medications and cancer  Because of this concern, there are ongoing studies to evaluate this relationship in humans  So far, there are studies that support the safety of these medications  One showed that the rates of cancer in patient using these medications topically were less than the rates of the general population and another showed that in patient's using the medication over a large area of the body, the levels of the medication in the blood was undetectable  As for Eucrisa, this product is only approved for the topical treatment of mild-to-moderate eczema in patients 3years of age and older; use of the medication in kids younger than 2 is considered off label and has not been formally studied  Burning and stinging are the most commonly reported side effects of this medication  Rarely, this product has been known to cause hives and hypersensitivity reactions; discontinue its use if you develop severe itching, swelling, or redness in the area of application  PROCEDURE NOTE:  PUNCH BIOPSY      Performing Physician: Dr Lamb    Anatomic Location; Clinical Description with size (cm); Pre-Op Diagnosis:     Right lower leg       Anesthesia: lidocain with epinephrine     Topical anesthesia: None       Indications: To indicate diagnosis and management plan  Procedure Details     Patient informed of the risks (including bleeding,scaring and infection) and benefits of the procedure explained  Verbal and written informed consent obtained  The area was prepped and draped in the usual fashion  Anesthesia was obtained with 1% lidocaine with epinephrine  The skin was then stretched perpendicular to the skin tension lines and a punch biopsy to an appropriate sampling depth was obtained with a 4 mm punch with a forceps and iris scissors  Hemostasis was obtained with 4-0 Ethilon x 3 sutures       Complications:  None      Specimen has been sent for review by Dermatopathology  Plan:  1  Instructed to keep the wound dry and covered for 24-48h and clean thereafter  2  Warning signs of infection were reviewed  3  Recommended that the patient use acetaminophen as needed for pain  4  Sutures if any should be removed in 10-14 days      Standard post-procedure care has been explained and has been included in written form within the patient's copy of Informed Consent  DERMATOFIBROMA    Physical Exam:   Anatomic Location Affected:  Upper arms, legs   Morphological Description:  Hyperpigmented scarred papules   Pertinent Positives:   Pertinent Negatives: Additional History of Present Condition:  Patient has noticed many cropping up in the last few years  Assessment and Plan:  Based on a thorough discussion of this condition and the management approach to it (including a comprehensive discussion of the known risks, side effects and potential benefits of treatment), the patient (family) agrees to implement the following specific plan:   Ordered ROME, dsDNA, anti-Scl, Sjogren's antibodies, as multiple dermatofibromas have been reported in association with lupus in the literature   Follow up biopsy   RTC 4 weeks    Assessment and Plan:  A dermatofibroma is a common benign fibrous nodule that most often arises on the skin of the lower legs  A dermatofibroma is also called a "cutaneous fibrous histiocytoma "  Dermatofibromas occur at all ages and in people of every ethnicity  They are more common in women than in men  It is not clear if dermatofibroma is a reactive process or if it is a neoplasm  The lesions are made up of proliferating fibroblasts  Histiocytes may also be involved  They are sometimes attributed to an insect bite or ingrownhair or local trauma, but not consistently  They may be more numerous in patients with altered immunity      Dermatofibromas most often occur on the legs and arms, but may also arise on the trunk or any site of the body  Typical clinical features include the following:   People may have 1 or up to 15 lesions   Size varies from 0 5-1 5 cm diameter; most lesions are 7-10 mm diameter   They are firm nodules tethered to the skin surface and mobile over subcutaneous tissue   The skin "dimples" on pinching the lesion   Color may be pink to light brown in white skin, and dark brown to black in dark skin; some appear paler in the center   They do not usually cause symptoms, but they are sometimes painful or itchy   Because they are often raised lesions, they may be traumatized, for example by a razor   Occasionally dozens may erupt within a few months, usually in the setting of immunosuppression (for example autoimmune disease, cancer or certain medications)   Dermatofibroma does not give rise to cancer  However, occasionally, it may be mistaken for dermatofibrosarcoma or desmoplastic melanoma  A dermatofibroma is harmless and seldom causes any symptoms  Usually, only reassurance is needed  If it is nuisance or causing concern, the lesion can be removed surgically, resulting in a scar that is, by definition, usually longer in diameter than the widest portion of the dermatofibroma  Cryotherapy, shave biopsy and laser surgery are rarely completely successful  Skin punch biopsy or incisional biopsy may be undertaken if there is an atypical feature such as recent enlargement, ulceration, or asymmetrical structures and colours on dermatoscopy

## 2021-09-22 NOTE — PATIENT INSTRUCTIONS
Lesion of Ear      Assessment and Plan:  Based on a thorough discussion of this condition and the management approach to it (including a comprehensive discussion of the known risks, side effects and potential benefits of treatment), the patient (family) agrees to implement the following specific plan:   Lesion cultured today   Can use vinegar and water with q tip      ATOPIC DERMATITIS ("childhood Eczema")    Physical Exam:   Anatomic Location Affected:  hands   Morphological Description:  Scaly hyperpigmented plaques   Severity: moderate   Pertinent Positives:   Pertinent Negatives: Additional History of Present Condition:  Pt reports dry hands with occasional pustules between digits ongoing for the past 2 years  Assessment and Plan:  Based on a thorough discussion of this condition and the management approach to it (including a comprehensive discussion of the known risks, side effects and potential benefits of treatment), the patient (family) agrees to implement the following specific plan:   Recommended triamcinolone, but patient deferred   Recommended moisturizing with vaseline   Biopsy of right lower leg performed given recalcitrant nature of rash   Discussed risks and benefits of punch biopsy; 4 mm punch biopsy done today   Will follow up with biopsy results   Differentials include atopic dermatitis vs  lupus vs  majocchi granuloma      Assessment and Plan:   Atopic Dermatitis is a chronic, itchy skin condition that is very common in children but may occur at any age  It is also known as eczema or atopic eczema   It is the most common form of dermatitis  Atopic dermatitis usually occurs in people who have an atopic tendency    This means they may develop any or all of these closely linked conditions: Atopic dermatitis, asthma, hay fever (allergic rhinitis), eosinophilic esophagitis, and gastroenteritis    Often these conditions run within families with a parent, child or sibling also affected  A family history of asthma, eczema or hay fever is particularly useful in diagnosing atopic dermatitis in infants  Atopic dermatitis arises because of a complex interaction of genetic and environmental factors  These include defects in skin barrier function making the skin more susceptible to irritation by soap and other contact irritants, the weather, temperature and non-specific triggers  There is also an element of immune system dysregulation that is often present  By definition, it is chronic and has a "waxing-waning" nature; flares should be expected but with good education and treatment strategies can be minimized  Some specific tips we discussed:   Dry skin care   Usingonly mild cleansers (hypoallergenic and without fragrances) and fragrance free detergent (not unscented products which contain a masking agent); we discussed avoiding irritants/fragranced products   The importance of regular application of moisturizers daily (at least 3 times a day)   The known and theoretical side effects of steroids at length, including but not limited to atrophy of skin and increased pressure in eye (glaucoma) and clouding of the eye's lens (cataracts) if used in or around the eye for extended durations   The specific over-the-counter interventions and medications   Side effects, risks and benefits of topical and oral medications discussed   After lengthy discussion of etiology and treatment options, we decided to implement the following personalized treatment plan:      YOUR PERSONALIZED ECZEMA ACTION PLAN    FOR ACUTE FLARING    1) Antimicrobials  a) None    b) Clindamycin 75mg/5mL solution:  Take by mouth THREE TIMES A DAY for 10 days straight to help reduce the amount of presumed Staph aureus colonizing the skin      c) Bleach baths:  Soak in a bleach bath (recipe provided via email) about 3 times a week for about 5-10 minutes a day; crucially, make sure to rinse thoroughly with fresh water and apply moisturizer within 3 minutes of toweling off gently  2) Anti-Inflammatories  a) During periods of acute flaring, apply the Hydrocortisone 2 5% ointment to the face and neck TWICE A DAY for 2 weeks straight  To give you an idea of how much medication to use: You should be using at least a single full 30-gram tube of this medication EACH WEEK  b) During periods of acute flaring, apply the Triamcinolone 0 1% ointment to the body TWICE A DAY for 2 weeks straight  To give you an idea of how much medication to use: You should be using at least two full 30-gram tubes of this medication EACH WEEK  Do NOT apply this stronger medication to the face or groin area as the skin is too thin and at greater risk for side effects  3) Moisturizers  a) Apply Eucerin cream or Aquaphor ointment at least 3 times a day  It is best to use moisturizers and prescription medications at DIFFERENT times during the day (ideally, about 30 minutes apart)  If you must apply your prescription medication and your moisturizer at the same time, then ALWAYS apply the prescription medication FIRST (i e , directly to the skin); as we discussed, this allows the prescription medication to reach the skin without being blocked by the thick moisturizer! 4) Oral Antihistamines  a) Cetirizine (Zyrtec): Take 5 mL (1 teaspoon) of 5mg/5mL oral suspension EACH MORNING through allergy season  b) Hydroxyzine (Atarax): Take 5 mL (1 teaspoon) of 12 5mg/5mL oral solution about 1 hour before bedtime for the next 3-5 evenings to help reduce scratching  FOR CHRONIC MAINTENANCE    1) Antimicrobials  a) None    b) Bleach baths:  Soak in a bleach bath (recipe provided via email) about 3 times a week for about 5-10 minutes a day; crucially, make sure to rinse thoroughly with fresh water and apply moisturizer within 3 minutes of toweling off gently      2) Anti-Inflammatories  a) Once in the chronic maintenance phase apply Hydrocortisone 2 5% ointment to the face ONCE A DAY and only on Mondays, Wednesdays and Fridays to help decrease the inflammation; try to decrease to [facility] and Fridays if possible  b) Once in the chronic maintenance phase apply Triamcinolone 0 1% ointment to the body ONCE A DAY and only on Mondays, Wednesdays and Fridays to help decrease the inflammation; try to decrease to [facility] and Fridays if possible  Do NOT apply this stronger medication to your face or groin area as the skin is too thin and at greater risk for side effects  c) Apply crisaborole 2% Michael Mccall) ointment TWICE A DAY on Tuesdays, Thursdays, Saturdays and Sundays (i e , the days you are not applying a topical steroid) to both the face/neck and body  As we discussed, this product is approved for the topical treatment of mild-to-moderate eczema in patients 3years of age and older; use of the medication in kids younger than 2 is considered off label and has not been formally studied  Burning and stinging are the most commonly reported side effects of this medication  Rarely, this product has been known to cause hives and hypersensitivity reactions; discontinue its use if you develop severe itching, swelling, or redness in the area of application  3) Moisturizers  a) Continue to apply Eucerin cream or Aquaphor ointment at least 3 times a day  It is best to use moisturizers and prescription medications at DIFFERENT times during the day (ideally, about 30 minutes apart)  If you must apply your prescription medication and your moisturizer at the same time, then ALWAYS apply the prescription medication FIRST (i e , directly to the skin); as we discussed, this allows the prescription medication to reach the skin without being blocked by the thick moisturizer! 4) Oral Antihistamines  Take Cetirizine (Zyrtec): Take 5 mL (1 teaspoon) of 5mg/5mL oral suspension through allergy season  EDUCATION AS INTERVENTION!     WHAT IS ATOPIC DERMATITIS? Atopic dermatitis (also called eczema) is a condition of the skin where the skin is dry, red, and itchy  The main function of the skin is to provide a barrier from the environment and is also the first defense of the immune system  In atopic dermatitis the skin barrier is decreased or disrupted, and the skin is easily irritated  As a result, moisture escapes the skin more easily, and environmental allergens and microbes can enter the skin more easily  Consequently, the skin's immune system is altered  If there are increased allergic type cells in the skin, the skin may become red and hyper-excitable   This leads to itching and a subsequent rash  WHY DO PEOPLE GET ATOPIC DERMATITIS? There is no single answer because many factors are involved  It is likely a combination of genetic makeup and environmental triggers and/or exposures  Excessive drying or sweating of the skin, Irritating soaps, dust mites, and pet dander are some of the more common triggers  There is no blood test that can be done to confirm this diagnosis  The history and appearance of the skin is usually sufficient for a diagnosis  However, in some cases if the rash does not fit with the history or respond appropriately to treatment, a skin biopsy may be helpful  Many children do outgrow atopic dermatitis or get better; however, many continue to have sensitive skin into adulthood  Asthma and hay fever are often seen in many patients with atopic dermatitis; however, asthma flares do not necessarily occur at the same time as skin flares  PREVENTING FLARES OF ATOPIC DERMATITIS  The first step is to maintain the skin's barrier function  Keep the skin well moisturized  Avoid irritants and triggers  Use prescribed medicine when there are red or rough areas to help the skin to return to normal as quickly as possible  Try to limit scratching       If you keep the skin well moisturized, and avoid coming in contact with things you know irritate your child's skin, there will be less flares  However, some flares of atopic dermatitis are beyond your control  You should work with your health care provider to come up with a plan that minimizes flares while minimizing long term use of medications that suppress the immune system  WHAT ARE SOME OF THE TRIGGERS? Triggers are different for different people  The most common triggers are:   Heat and sweat for some individuals, cold weather for others   House dust mites, pet fur   Wool; synthetic fabrics like nylon; dyed fabrics   Tobacco smoke    Fragrances in: shampoos, soaps, lotions, laundry detergents, fabric softeners   Saliva or prolonged exposure to water  WHAT ABOUT FOOD ALLERGIES? This is a very controversial topic, as many believe that food allergies are responsible for skin flares  In some cases, specific foods may cause worsening of atopic dermatitis; however this occurs in a minority of cases and usually happens within a few hours of ingestion  While food allergy is more common in children with eczema, foods are specific triggers for flares in only a small percentage of children  If you notice that the skin flares after certain foods you can see if eliminating one food at time makes a difference, as long as your child can still enjoy a well-balanced diet  There are blood (RAST) and skin (PRICK) tests that can check for allergies, but they are often positive in children who are not truly allergic  Therefore it is important that you work with your allergist and dermatologist to determine which foods are relevant and causing true symptoms  Extreme food elimination diets without the guidance of your doctor, which have become more popular in recent years, may even result in worsening of the skin rash due to malnutrition and avoidance of essential nutrients      TREATMENT  Treatments are aimed at minimizing exposure to irritating factors and decreasing the skin inflammation which results in an itchy rash  There are many different treatment options, which depend on your child's rash, its location, and severity  Topical treatments include corticosteroids and steroid-like creams such as Protopic, Elidel, and Eucrisa, which are believed to not thin the skin  Please read the discussions below regarding risks and benefits of all of these creams  Occasionally bacterial or viral infections can occur which flare the skin and require oral and/or topical antibiotics or antivirals  In some cases bleach baths 2-3 times weekly can be helpful to prevent recurrent infection  For severe disease, strong oral medications such as corticosteroids, methotrexate or azathioprine (Imuran) may be needed  These medications require close monitoring and follow-up  You should discuss the risks/ benefits/alternatives of these medications with your health care provider to come up with the best treatment plan for your child  1) Use moisturizer all over the entire body at least THREE TIMES a day  This keeps the skin moisturized to restore the barrier function  Find a cream or ointment that your child likes - this is the most important  The medicines do not work in the bottle  The thicker the moisturizer, generally the better barrier it provides  Ointments often moisturize better than creams; and creams work better than lotions  Lotions are more useful during the summer when thick greasy ointments are uncomfortable  If you put moisturizer on the skin after bathing, while the skin is damp, it is twice as effective  The moisturizer provides a seal holding the water in the skin  You may bathe your child in warm - not hot - water, for short periods of time (no more than 5-10 minutes at a time) once a day if they like  Lightly pat your child dry with a towel and, while the skin is still damp, (within 3 minutes) apply a moisturizer from head to toe    If your child is using a medicated cream, apply it and allow it to absorb completely BEFORE you apply the moisturizer  2) Apply the prescription medication TWICE A DAY to only the red, rough areas on the skin OR AS [place]  Put the medication on your fingers and gently rub it into the areas  Usually the medicine will help an area within a few days time  Try to put the medicine on for two days after you have noticed that the redness is no longer present; this will help the redness from returning  The severity of the rash and the strength and usage of the medication will determine how quickly you see improvement  It is important that you do not overuse steroid creams, and if you notice a thin, shiny appearance to the skin or broken blood vessels, you should stop using the cream and consult your health care provider regarding possible overuse/overthinning of the skin  The face, armpits and groin have particularly thin and sensitive skin and are therefore most at risk for bad results if steroids are over-used in these sites  3) Avoid triggers  Some children have specific things that trigger itching and rashes, while others may have none that can be identified  It may require a little bit of trial and error to see what applies to your child  Also, triggers can change over time for your child  The most common triggers are listed above; start with these  Avoid the use of fabric softeners in the washing machine or dryer sheets (unless they are fragrance-free)  Try to use laundry detergents, soaps and shampoos that are fragrance-free  You may find it helpful to double-rinse your clothes  Some children are sensitive to house dust mites and they may benefit from a plastic mattress wrap  While food allergy is more common in children with eczema, foods are specific triggers for flares in only a small percentage of children    If you notice that the skin flares after certain foods you can see if eliminating one food at time makes a difference, as long as your child can still enjoy a well-balanced diet  4) Consider using a medication like an anti-histamine by mouth to help control the itching  Scratching only makes the skin more reactive and the barrier function even more disrupted  It can cause both children and their parents to lose sleep! There are different types of anti-itch medications  Some cause more drowsiness than others  Both types are acceptable depending on your child and your preference  Start with Benadryl and if that does not work, ask for a prescription antihistamine      5) About the prescription creams:  Corticosteroid creams and ointments (generally things with "-one" or "erinn" on the end of their names): The strength of the cream or ointment depends on the name of the active ingredient  The numbers at the end do not indicate the relative strength  Thus triamcinolone 0 1% ointment, considered a mid-strength corticosteroid, is much stronger than hydrocortisone 1% even though the number following the name is much lower  Topical corticosteroids are very effective in treating atopic dermatitis  When used in the manner prescribed (to rashy areas of skin and for no more than a few weeks at a time to any one area) they are very safe  These are corticosteroids and are anti-inflammatory, not the anabolic steroids like those used illegally by some athletes  Topical non-steroid creams and ointments (immunomodulators): These creams and ointments are also called topical calcineurin inhibitors (TCIs)  These include Protopic ointment and Elidel cream  Crisaborole 2% Gerard Sees) is a prescription ointment that targets an enzyme called PDE4 (phosphodiesterase 4)  It is used on the skin topically to treat mild-to-moderate eczema in adults and children 3years of age and older  In total, these nonsteroidal prescriptions are used to help decrease itching and redness in the skin    They are not as strong as most steroid creams; however, it is believed that they do not thin the skin when overused  They are generally used as second-line medications, though they may be used alone or in conjunction with topical steroids  In sensitive areas such as the face, underarms or groin, they are often recommended  They can sting inflamed skin, but are generally well tolerated once the skin is healing  The FDA placed a black-box warning on both Elidel and Protopic in 2006 based on animal studies using the medications  Some animals developed skin cancer and lymphoma  Subsequently, the FDA released a statement that there is no causal relationship between the two medications and cancer  Because of this concern, there are ongoing studies to evaluate this relationship in humans  So far, there are studies that support the safety of these medications  One showed that the rates of cancer in patient using these medications topically were less than the rates of the general population and another showed that in patient's using the medication over a large area of the body, the levels of the medication in the blood was undetectable  As for Eucrisa, this product is only approved for the topical treatment of mild-to-moderate eczema in patients 3years of age and older; use of the medication in kids younger than 2 is considered off label and has not been formally studied  Burning and stinging are the most commonly reported side effects of this medication  Rarely, this product has been known to cause hives and hypersensitivity reactions; discontinue its use if you develop severe itching, swelling, or redness in the area of application  PROCEDURE NOTE:  PUNCH BIOPSY      Performing Physician: Dr Lamb    Anatomic Location; Clinical Description with size (cm); Pre-Op Diagnosis:     Right lower leg       Anesthesia: lidocain with epinephrine     Topical anesthesia: None       Indications:  To indicate diagnosis and management plan  Procedure Details     Patient informed of the risks (including bleeding,scaring and infection) and benefits of the procedure explained  Verbal and written informed consent obtained  The area was prepped and draped in the usual fashion  Anesthesia was obtained with 1% lidocaine with epinephrine  The skin was then stretched perpendicular to the skin tension lines and a punch biopsy to an appropriate sampling depth was obtained with a 4 mm punch with a forceps and iris scissors  Hemostasis was obtained with 4-0 Ethilon x 3 sutures  Complications:  None      Specimen has been sent for review by Dermatopathology  Plan:  1  Instructed to keep the wound dry and covered for 24-48h and clean thereafter  2  Warning signs of infection were reviewed  3  Recommended that the patient use acetaminophen as needed for pain  4  Sutures if any should be removed in 10-14 days      Standard post-procedure care has been explained and has been included in written form within the patient's copy of Informed Consent  DERMATOFIBROMA    Assessment and Plan:  Based on a thorough discussion of this condition and the management approach to it (including a comprehensive discussion of the known risks, side effects and potential benefits of treatment), the patient (family) agrees to implement the following specific plan:   Ordered ROME, dsDNA, anti-Scl, Sjogren's antibodies, as multiple dermatofibromas have been reported in association with lupus in the literature   Follow up biopsy   RTC 4 weeks    Assessment and Plan:  A dermatofibroma is a common benign fibrous nodule that most often arises on the skin of the lower legs  A dermatofibroma is also called a "cutaneous fibrous histiocytoma "  Dermatofibromas occur at all ages and in people of every ethnicity  They are more common in women than in men  It is not clear if dermatofibroma is a reactive process or if it is a neoplasm   The lesions are made up of proliferating fibroblasts  Histiocytes may also be involved  They are sometimes attributed to an insect bite or ingrownhair or local trauma, but not consistently  They may be more numerous in patients with altered immunity  Dermatofibromas most often occur on the legs and arms, but may also arise on the trunk or any site of the body  Typical clinical features include the following:   People may have 1 or up to 15 lesions   Size varies from 0 5-1 5 cm diameter; most lesions are 7-10 mm diameter   They are firm nodules tethered to the skin surface and mobile over subcutaneous tissue   The skin "dimples" on pinching the lesion   Color may be pink to light brown in white skin, and dark brown to black in dark skin; some appear paler in the center   They do not usually cause symptoms, but they are sometimes painful or itchy   Because they are often raised lesions, they may be traumatized, for example by a razor   Occasionally dozens may erupt within a few months, usually in the setting of immunosuppression (for example autoimmune disease, cancer or certain medications)   Dermatofibroma does not give rise to cancer  However, occasionally, it may be mistaken for dermatofibrosarcoma or desmoplastic melanoma  A dermatofibroma is harmless and seldom causes any symptoms  Usually, only reassurance is needed  If it is nuisance or causing concern, the lesion can be removed surgically, resulting in a scar that is, by definition, usually longer in diameter than the widest portion of the dermatofibroma  Cryotherapy, shave biopsy and laser surgery are rarely completely successful  Skin punch biopsy or incisional biopsy may be undertaken if there is an atypical feature such as recent enlargement, ulceration, or asymmetrical structures and colours on dermatoscopy

## 2021-09-23 LAB
DSDNA AB SER-ACNC: <1 IU/ML (ref 0–9)
ENA RNP AB SER-ACNC: <0.2 AI (ref 0–0.9)
ENA SCL70 AB SER-ACNC: 0.4 AI (ref 0–0.9)
ENA SM AB SER-ACNC: <0.2 AI (ref 0–0.9)
ENA SS-A AB SER-ACNC: <0.2 AI (ref 0–0.9)
ENA SS-B AB SER-ACNC: <0.2 AI (ref 0–0.9)

## 2021-09-24 ENCOUNTER — OFFICE VISIT (OUTPATIENT)
Dept: INTERNAL MEDICINE CLINIC | Facility: CLINIC | Age: 46
End: 2021-09-24

## 2021-09-24 VITALS
HEIGHT: 67 IN | SYSTOLIC BLOOD PRESSURE: 115 MMHG | DIASTOLIC BLOOD PRESSURE: 78 MMHG | TEMPERATURE: 98.4 F | BODY MASS INDEX: 27.47 KG/M2 | WEIGHT: 175 LBS | HEART RATE: 72 BPM

## 2021-09-24 DIAGNOSIS — Z28.21 COVID-19 VACCINE SERIES DECLINED: ICD-10-CM

## 2021-09-24 DIAGNOSIS — R06.83 SNORING: ICD-10-CM

## 2021-09-24 DIAGNOSIS — R93.89 ABNORMAL CHEST X-RAY: Primary | ICD-10-CM

## 2021-09-24 DIAGNOSIS — Z28.21 INFLUENZA VACCINATION DECLINED: ICD-10-CM

## 2021-09-24 DIAGNOSIS — R06.89 GASPING FOR BREATH: ICD-10-CM

## 2021-09-24 DIAGNOSIS — Z28.310 COVID-19 VACCINE SERIES DECLINED: ICD-10-CM

## 2021-09-24 DIAGNOSIS — Z28.21 PNEUMOCOCCAL VACCINATION DECLINED: ICD-10-CM

## 2021-09-24 DIAGNOSIS — J45.20 MILD INTERMITTENT ASTHMA WITHOUT COMPLICATION: ICD-10-CM

## 2021-09-24 LAB
ANA HOMOGEN SER QL IF: NORMAL
ANA HOMOGEN TITR SER: NORMAL {TITER}
BACTERIA WND AEROBE CULT: ABNORMAL
BACTERIA WND AEROBE CULT: ABNORMAL
GRAM STN SPEC: ABNORMAL
RYE IGE QN: POSITIVE

## 2021-09-24 PROCEDURE — 99213 OFFICE O/P EST LOW 20 MIN: CPT | Performed by: PHYSICIAN ASSISTANT

## 2021-09-24 RX ORDER — BUDESONIDE AND FORMOTEROL FUMARATE DIHYDRATE 80; 4.5 UG/1; UG/1
2 AEROSOL RESPIRATORY (INHALATION) 2 TIMES DAILY
Qty: 10.2 G | Refills: 1 | Status: SHIPPED | OUTPATIENT
Start: 2021-09-24

## 2021-09-24 RX ORDER — ALBUTEROL SULFATE 90 UG/1
2 AEROSOL, METERED RESPIRATORY (INHALATION) EVERY 6 HOURS PRN
Qty: 8 G | Refills: 0 | Status: SHIPPED | OUTPATIENT
Start: 2021-09-24 | End: 2022-09-24

## 2021-09-24 NOTE — PATIENT INSTRUCTIONS
On your visit today we reviewed your recent emergency room visit  CT scans fortunately did show no internal bleed in her brain and no fractures  We did review however there was a possible abnormality on your imaging and recommendation per the radiologist was for a dedicated chest x-ray  No appointment needed you may walk in to get this completed  For your complaint of some shortness of breath and gasping at night this could be your asthma but may also be related to sleep apnea  I have sent a refill of her rescue inhaler  I would like you to start on Symbicort to see if we can improve your breathing please remember to rinse and spit after using this inhaler  Please schedule the appointment with Sleep Medicine for further evaluation of her snoring and gasping at night  We will get you scheduled for a follow-up here to review your chest x-ray and if any improvement with the inhalers  We also reviewed you continue to decline all of her vaccinations including COVID, pneumonia and flu  You are aware of recommendations for health prevention

## 2021-09-24 NOTE — PROGRESS NOTES
Assessment/Plan: On your visit today we reviewed your recent emergency room visit  CT scans fortunately did show no internal bleed in her brain and no fractures  We did review however there was a possible abnormality on your imaging and recommendation per the radiologist was for a dedicated chest x-ray  No appointment needed you may walk in to get this completed  For your complaint of some shortness of breath and gasping at night this could be your asthma but may also be related to sleep apnea  I have sent a refill of her rescue inhaler  I would like you to start on Symbicort to see if we can improve your breathing please remember to rinse and spit after using this inhaler  Please schedule the appointment with Sleep Medicine for further evaluation of her snoring and gasping at night  We will get you scheduled for a follow-up here to review your chest x-ray and if any improvement with the inhalers  We also reviewed you continue to decline all of her vaccinations including COVID, pneumonia and flu  You are aware of recommendations for health prevention  No problem-specific Assessment & Plan notes found for this encounter  Diagnoses and all orders for this visit:    Abnormal chest x-ray  -     XR chest pa & lateral; Future    Mild intermittent asthma without complication  -     albuterol (Ventolin HFA) 90 mcg/act inhaler; Inhale 2 puffs every 6 (six) hours as needed for wheezing  -     budesonide-formoterol (SYMBICORT) 80-4 5 MCG/ACT inhaler; Inhale 2 puffs 2 (two) times a day Rinse mouth after use  Snoring  -     Ambulatory referral to Sleep Medicine; Future    Gasping for breath  -     Ambulatory referral to Sleep Medicine; Future    COVID-19 vaccine series declined    Influenza vaccination declined    Pneumococcal vaccination declined          Subjective:      Patient ID: Shabnam Villarreal is a 42 y o  female        Patient presents today for follow-up regarding incidental finding during ED visit earlier this month  Patient was seen in the emergency room September 12th after a fall  Patient thinks her drink may have been laced with a drug  Patient did hit her head and therefore CT scan of head and neck were completed  CT scan of the head no acute abnormalities or other findings  CT scan of her neck did show some mild degenerative changes but no acute fractures or herniations  Patient had a bedside chest x-ray completed that noted a right paratracheal opacity  Recommendation was for dedicated upright PA and lateral chest x-ray for further evaluation  Patient states overall feeling well  Has been using aloe and helping with scars  Needs refill of rescueinhaler but states has been using more especially at night  States will be asleep and sometime wakes up feels like choking  Patient states that she has not been wheezing but sometimes in the middle the night she will wake up tightness gasping  She does report family confirms that she does snore  Discussed with patient she does need a refill of her rescue inhaler but her symptoms are more consistent with Sleep Disorder sleep apnea  She is using the rescue inhaler more during the day and reports this is typical for the winter  Will get her restarted on her daily maintenance inhaler but would also like her to have evaluation with Sleep Center  Discussed once again and patient continues to decline COVID, flu and pneumonia vaccines  The following portions of the patient's history were reviewed and updated as appropriate: allergies, current medications, past family history, past medical history, past social history, past surgical history and problem list     Review of Systems   Constitutional: Negative for chills and fever  Eyes: Negative for visual disturbance  Respiratory: Positive for chest tightness and shortness of breath  Negative for wheezing           At night Cardiovascular: Negative  Negative for chest pain  Skin:             Neurological: Negative for dizziness, light-headedness and headaches  Psychiatric/Behavioral: Negative  Objective:      /78 (BP Location: Left arm, Patient Position: Sitting, Cuff Size: Large)   Pulse 72   Temp 98 4 °F (36 9 °C) (Temporal)   Ht 5' 7" (1 702 m)   Wt 79 4 kg (175 lb)   BMI 27 41 kg/m²          Physical Exam  Vitals and nursing note reviewed  Constitutional:       Appearance: She is normal weight  Eyes:      Extraocular Movements: Extraocular movements intact  Comments: L eye redness improving s/p trauma from fall   Cardiovascular:      Rate and Rhythm: Normal rate and regular rhythm  Heart sounds: Normal heart sounds  Pulmonary:      Effort: Pulmonary effort is normal       Breath sounds: Normal breath sounds  No wheezing or rales  Skin:     Comments: scalp laceration healing L temporal without erythema  No drainage   Neurological:      Mental Status: She is alert  Cranial Nerves: No cranial nerve deficit  Psychiatric:         Mood and Affect: Mood normal          Thought Content:  Thought content normal

## 2021-09-25 ENCOUNTER — TELEPHONE (OUTPATIENT)
Dept: DERMATOLOGY | Facility: CLINIC | Age: 46
End: 2021-09-25

## 2021-09-25 DIAGNOSIS — B99.9 INFECTION: Primary | ICD-10-CM

## 2021-09-25 DIAGNOSIS — R76.8 POSITIVE ANA (ANTINUCLEAR ANTIBODY): ICD-10-CM

## 2021-09-25 RX ORDER — CLINDAMYCIN HYDROCHLORIDE 300 MG/1
300 CAPSULE ORAL 3 TIMES DAILY
Qty: 30 CAPSULE | Refills: 0 | Status: SHIPPED | OUTPATIENT
Start: 2021-09-25 | End: 2021-09-26 | Stop reason: CLARIF

## 2021-09-25 RX ORDER — CLINDAMYCIN HYDROCHLORIDE 300 MG/1
300 CAPSULE ORAL 3 TIMES DAILY
Qty: 30 CAPSULE | Refills: 0 | Status: SHIPPED | OUTPATIENT
Start: 2021-09-25 | End: 2021-09-25

## 2021-09-26 DIAGNOSIS — B99.9 INFECTION: Primary | ICD-10-CM

## 2021-09-26 RX ORDER — CEPHALEXIN 500 MG/1
500 CAPSULE ORAL EVERY 12 HOURS SCHEDULED
Qty: 20 CAPSULE | Refills: 0 | Status: SHIPPED | OUTPATIENT
Start: 2021-09-26 | End: 2021-10-06

## 2021-09-26 NOTE — RESULT ENCOUNTER NOTE
DERMATOPATHOLOGY RESULT NOTE    Results reviewed by ordering physician  Called patient to personally discuss results  Discussed results with patient  Counseled patient that clinical exam currently points to eczematous process at this time as she does not have symptoms meeting criteria for SLE in setting of low-positive ROME  She should monitor for symptoms such as a malar rash, oral ulcers, photosensitivity, and arthritis  I also discussed her bloodwork and wound culture growing Staph aureus and prescribed a 10 day course of clindamycin (300 mg TID)  Also will send communication to PCP regarding biopsy results and low-positive ROME  Instructions for Clinical Derm Team:   (remember to route Result Note to appropriate staff):    None    Result & Plan by Specimen:    Specimen A: Focal vacuolar interface dermatitis and perivascular lymphocytic infiltrate with few eosinophils consistent with hypersensitivity reaction while including connective tissue disease on the differential   Plan: Will monitor for further symptoms that may lead us to diagnosis of lupus and send message to PCP regarding tissue biopsy, low-positive ROME,and patient's current symptom of fatigue        Tissue Exam: [id number]  Order: [id number]  Status:  Final result   Visible to patient:  Yes ([facility] MyChart) Next appt:  10/06/2021 at 11:00 AM in Internal Medicine ([address] [place] [address]) Dx:  Rash  0 Result Notes  Component   Case Report  Surgical Pathology Report                         Case: [id number]                                   Authorizing Provider: Eric Gray MD            Collected:           09/22/2021 1007              Ordering Location:     [facility]    Received:            09/22/2021 [address] [place] [address]                                                                    Pathologist:           Pily Richmond MD                                                           Specimen:    Skin, Other, Specimen A: right lower leg                                                 Final Diagnosis  A  Skin, right lower leg, punch biopsy:     Focal vacuolar interface dermatitis and perivascular lymphocytic infiltrate with few eosinophils (see note)      Note: In the appropriate clinical context, the histopathologic findings are consistent with hypersensitivity reaction (e g , to a drug, contactant)  The histopathologic differential diagnosis includes connective tissue disease  While prominent spongiosis is not seen, an eczematous process cannot be excluded  Clinical pathological correlation is advised  Pathogenic microorganisms are not seen on PAS stain  Multiple levels examined  Electronically signed by Shabbir Syed MD on 9/23/2021 at  2:14 PM  Additional Information   All reported additional testing was performed with appropriately reactive controls   These tests were developed and their performance characteristics determined by [address] [place] [address] or appropriate performing facility, though some tests may be performed on tissues which have not been validated for performance characteristics (such as staining performed on alcohol exposed cell blocks and decalcified tissues)   Results should be interpreted with caution and in the context of the patients' clinical condition  These tests may not be cleared or approved by the [place]  Food and Drug Administration, though the FDA has determined that such clearance or approval is not necessary  These tests are used for clinical purposes and they should not be regarded as investigational or for research  This laboratory has been approved by CLIA 88, designated as a high-complexity laboratory and is qualified to perform these tests  Nathan Shaker Description     A  The specimen is received in formalin, labeled with the patient's name and hospital number, and is designated "right lower leg"    The specimen consists of a 0 4 x 0 4 cm punch biopsy brown-gray skin excised to a depth of 0 5 cm  The epithelial surface appears hair-bearing, keratotic and is inked red  The margin of resection is inked green  The specimen is bisected revealing grossly unremarkable cut surfaces  The specimen is entirely submitted between sponges, 1 cassette  Note: The estimated total formalin fixation time based upon information provided by the submitting clinician and the standard processing schedule is under 72 hours      Jan       Clinical Information   Specimen A: Location: right lower leg Skin;Punch biopsy;  38 y o  year old  Female with a Description:hyperpigmented scaly plaque with papules ;  Differential diagnosis:atopic dermatitis vs  lupus vs  Majocchi granuloma   ATTENTION [address]  Resulting Agency [facility]      Specimen Collected: 09/22/21 10:07 AM Last Resulted: 09/23/21  2:14 PM    Order Details    View Encounter    Lab and Collection Details    Routing    Result History        Scans on Order [id number]    Lab Result Document - Document on 9/23/2021  2:14 PM

## 2021-09-26 NOTE — TELEPHONE ENCOUNTER
Discussed wound culture that grew staph with patient  As patient is allergic to doxycycline, prescribed 300 mg clindamycin TID for 10 days  Also ordered CBC, CMP, and urinalysis for further investigation of signs of connective tissue disease

## 2021-09-27 PROBLEM — R76.8 POSITIVE ANA (ANTINUCLEAR ANTIBODY): Status: ACTIVE | Noted: 2021-09-27

## 2021-10-06 ENCOUNTER — TELEPHONE (OUTPATIENT)
Dept: INTERNAL MEDICINE CLINIC | Facility: CLINIC | Age: 46
End: 2021-10-06

## 2021-10-27 ENCOUNTER — APPOINTMENT (OUTPATIENT)
Dept: LAB | Facility: CLINIC | Age: 46
End: 2021-10-27
Payer: COMMERCIAL

## 2021-10-27 ENCOUNTER — TELEPHONE (OUTPATIENT)
Dept: INTERNAL MEDICINE CLINIC | Facility: CLINIC | Age: 46
End: 2021-10-27

## 2021-10-27 ENCOUNTER — OFFICE VISIT (OUTPATIENT)
Dept: MULTI SPECIALTY CLINIC | Facility: CLINIC | Age: 46
End: 2021-10-27

## 2021-10-27 DIAGNOSIS — T78.40XD HYPERSENSITIVITY REACTION, SUBSEQUENT ENCOUNTER: Primary | ICD-10-CM

## 2021-10-27 DIAGNOSIS — R76.8 POSITIVE ANA (ANTINUCLEAR ANTIBODY): ICD-10-CM

## 2021-10-27 PROBLEM — R92.8 ABNORMAL MAMMOGRAM OF BOTH BREASTS: Status: RESOLVED | Noted: 2020-10-30 | Resolved: 2021-10-27

## 2021-10-27 LAB
ALBUMIN SERPL BCP-MCNC: 3.5 G/DL (ref 3.5–5)
ALP SERPL-CCNC: 50 U/L (ref 46–116)
ALT SERPL W P-5'-P-CCNC: 19 U/L (ref 12–78)
ANION GAP SERPL CALCULATED.3IONS-SCNC: 4 MMOL/L (ref 4–13)
AST SERPL W P-5'-P-CCNC: 11 U/L (ref 5–45)
BACTERIA UR QL AUTO: ABNORMAL /HPF
BASOPHILS # BLD AUTO: 0.03 THOUSANDS/ΜL (ref 0–0.1)
BASOPHILS NFR BLD AUTO: 1 % (ref 0–1)
BILIRUB SERPL-MCNC: 0.7 MG/DL (ref 0.2–1)
BILIRUB UR QL STRIP: NEGATIVE
BUN SERPL-MCNC: 9 MG/DL (ref 5–25)
CALCIUM SERPL-MCNC: 8.8 MG/DL (ref 8.3–10.1)
CHLORIDE SERPL-SCNC: 104 MMOL/L (ref 100–108)
CLARITY UR: ABNORMAL
CO2 SERPL-SCNC: 25 MMOL/L (ref 21–32)
COLOR UR: YELLOW
CREAT SERPL-MCNC: 0.77 MG/DL (ref 0.6–1.3)
EOSINOPHIL # BLD AUTO: 0.14 THOUSAND/ΜL (ref 0–0.61)
EOSINOPHIL NFR BLD AUTO: 2 % (ref 0–6)
ERYTHROCYTE [DISTWIDTH] IN BLOOD BY AUTOMATED COUNT: 15.7 % (ref 11.6–15.1)
GFR SERPL CREATININE-BSD FRML MDRD: 109 ML/MIN/1.73SQ M
GLUCOSE P FAST SERPL-MCNC: 90 MG/DL (ref 65–99)
GLUCOSE UR STRIP-MCNC: NEGATIVE MG/DL
HCT VFR BLD AUTO: 35.5 % (ref 34.8–46.1)
HGB BLD-MCNC: 11.1 G/DL (ref 11.5–15.4)
HGB UR QL STRIP.AUTO: NEGATIVE
HYALINE CASTS #/AREA URNS LPF: ABNORMAL /LPF
IMM GRANULOCYTES # BLD AUTO: 0.03 THOUSAND/UL (ref 0–0.2)
IMM GRANULOCYTES NFR BLD AUTO: 1 % (ref 0–2)
KETONES UR STRIP-MCNC: NEGATIVE MG/DL
LEUKOCYTE ESTERASE UR QL STRIP: ABNORMAL
LYMPHOCYTES # BLD AUTO: 1.65 THOUSANDS/ΜL (ref 0.6–4.47)
LYMPHOCYTES NFR BLD AUTO: 25 % (ref 14–44)
MCH RBC QN AUTO: 25.2 PG (ref 26.8–34.3)
MCHC RBC AUTO-ENTMCNC: 31.3 G/DL (ref 31.4–37.4)
MCV RBC AUTO: 81 FL (ref 82–98)
MONOCYTES # BLD AUTO: 0.39 THOUSAND/ΜL (ref 0.17–1.22)
MONOCYTES NFR BLD AUTO: 6 % (ref 4–12)
NEUTROPHILS # BLD AUTO: 4.26 THOUSANDS/ΜL (ref 1.85–7.62)
NEUTS SEG NFR BLD AUTO: 65 % (ref 43–75)
NITRITE UR QL STRIP: NEGATIVE
NON-SQ EPI CELLS URNS QL MICRO: ABNORMAL /HPF
NRBC BLD AUTO-RTO: 0 /100 WBCS
PH UR STRIP.AUTO: 6.5 [PH]
PLATELET # BLD AUTO: 240 THOUSANDS/UL (ref 149–390)
PMV BLD AUTO: 10.9 FL (ref 8.9–12.7)
POTASSIUM SERPL-SCNC: 3.4 MMOL/L (ref 3.5–5.3)
PROT SERPL-MCNC: 7.2 G/DL (ref 6.4–8.2)
PROT UR STRIP-MCNC: NEGATIVE MG/DL
RBC # BLD AUTO: 4.41 MILLION/UL (ref 3.81–5.12)
RBC #/AREA URNS AUTO: ABNORMAL /HPF
SODIUM SERPL-SCNC: 133 MMOL/L (ref 136–145)
SP GR UR STRIP.AUTO: 1.01 (ref 1–1.03)
UROBILINOGEN UR QL STRIP.AUTO: 1 E.U./DL
WBC # BLD AUTO: 6.5 THOUSAND/UL (ref 4.31–10.16)
WBC #/AREA URNS AUTO: ABNORMAL /HPF

## 2021-10-27 PROCEDURE — 99214 OFFICE O/P EST MOD 30 MIN: CPT | Performed by: DERMATOLOGY

## 2021-10-27 PROCEDURE — 85025 COMPLETE CBC W/AUTO DIFF WBC: CPT

## 2021-10-27 PROCEDURE — 36415 COLL VENOUS BLD VENIPUNCTURE: CPT

## 2021-10-27 PROCEDURE — 81001 URINALYSIS AUTO W/SCOPE: CPT | Performed by: STUDENT IN AN ORGANIZED HEALTH CARE EDUCATION/TRAINING PROGRAM

## 2021-10-27 PROCEDURE — 80053 COMPREHEN METABOLIC PANEL: CPT

## 2021-10-27 RX ORDER — CLOBETASOL PROPIONATE 0.5 MG/G
OINTMENT TOPICAL 2 TIMES DAILY
Qty: 30 G | Refills: 0 | Status: SHIPPED | OUTPATIENT
Start: 2021-10-27

## 2021-11-04 ENCOUNTER — OFFICE VISIT (OUTPATIENT)
Dept: INTERNAL MEDICINE CLINIC | Facility: CLINIC | Age: 46
End: 2021-11-04

## 2021-11-04 VITALS
WEIGHT: 176.8 LBS | BODY MASS INDEX: 27.69 KG/M2 | DIASTOLIC BLOOD PRESSURE: 95 MMHG | SYSTOLIC BLOOD PRESSURE: 139 MMHG | HEART RATE: 104 BPM | TEMPERATURE: 97.7 F | OXYGEN SATURATION: 97 %

## 2021-11-04 DIAGNOSIS — N39.490 OVERFLOW INCONTINENCE OF URINE: Primary | ICD-10-CM

## 2021-11-04 DIAGNOSIS — Z13.1 DIABETES MELLITUS SCREENING: ICD-10-CM

## 2021-11-04 LAB
SL AMB  POCT GLUCOSE, UA: NEGATIVE
SL AMB LEUKOCYTE ESTERASE,UA: NEGATIVE
SL AMB POCT BILIRUBIN,UA: NORMAL
SL AMB POCT BLOOD,UA: NEGATIVE
SL AMB POCT CLARITY,UA: CLEAR
SL AMB POCT COLOR,UA: YELLOW
SL AMB POCT KETONES,UA: NEGATIVE
SL AMB POCT NITRITE,UA: NEGATIVE
SL AMB POCT PH,UA: 6
SL AMB POCT SPECIFIC GRAVITY,UA: 1
SL AMB POCT URINE PROTEIN: NEGATIVE
SL AMB POCT UROBILINOGEN: NORMAL

## 2021-11-04 PROCEDURE — 99214 OFFICE O/P EST MOD 30 MIN: CPT | Performed by: INTERNAL MEDICINE

## 2021-11-04 PROCEDURE — 81002 URINALYSIS NONAUTO W/O SCOPE: CPT | Performed by: INTERNAL MEDICINE

## 2022-04-04 ENCOUNTER — TELEPHONE (OUTPATIENT)
Dept: MULTI SPECIALTY CLINIC | Facility: CLINIC | Age: 47
End: 2022-04-04

## 2022-04-06 ENCOUNTER — TELEPHONE (OUTPATIENT)
Dept: INTERNAL MEDICINE CLINIC | Facility: CLINIC | Age: 47
End: 2022-04-06

## 2022-04-06 DIAGNOSIS — L30.9 DERMATITIS: Primary | ICD-10-CM

## 2022-05-16 ENCOUNTER — OFFICE VISIT (OUTPATIENT)
Dept: URGENT CARE | Age: 47
End: 2022-05-16
Payer: COMMERCIAL

## 2022-05-16 VITALS
RESPIRATION RATE: 16 BRPM | HEART RATE: 105 BPM | DIASTOLIC BLOOD PRESSURE: 91 MMHG | OXYGEN SATURATION: 97 % | SYSTOLIC BLOOD PRESSURE: 145 MMHG | TEMPERATURE: 97.6 F

## 2022-05-16 DIAGNOSIS — N39.0 URINARY TRACT INFECTION WITHOUT HEMATURIA, SITE UNSPECIFIED: ICD-10-CM

## 2022-05-16 DIAGNOSIS — R39.9 UTI SYMPTOMS: Primary | ICD-10-CM

## 2022-05-16 LAB
SL AMB  POCT GLUCOSE, UA: ABNORMAL
SL AMB LEUKOCYTE ESTERASE,UA: ABNORMAL
SL AMB POCT BILIRUBIN,UA: ABNORMAL
SL AMB POCT BLOOD,UA: ABNORMAL
SL AMB POCT CLARITY,UA: CLEAR
SL AMB POCT COLOR,UA: YELLOW
SL AMB POCT KETONES,UA: ABNORMAL
SL AMB POCT NITRITE,UA: ABNORMAL
SL AMB POCT PH,UA: 6
SL AMB POCT SPECIFIC GRAVITY,UA: 1
SL AMB POCT URINE PROTEIN: ABNORMAL
SL AMB POCT UROBILINOGEN: ABNORMAL

## 2022-05-16 PROCEDURE — 99213 OFFICE O/P EST LOW 20 MIN: CPT | Performed by: STUDENT IN AN ORGANIZED HEALTH CARE EDUCATION/TRAINING PROGRAM

## 2022-05-16 PROCEDURE — 81002 URINALYSIS NONAUTO W/O SCOPE: CPT | Performed by: STUDENT IN AN ORGANIZED HEALTH CARE EDUCATION/TRAINING PROGRAM

## 2022-05-16 RX ORDER — NITROFURANTOIN 25; 75 MG/1; MG/1
100 CAPSULE ORAL 2 TIMES DAILY
Qty: 10 CAPSULE | Refills: 0 | Status: SHIPPED | OUTPATIENT
Start: 2022-05-16 | End: 2022-05-21

## 2022-05-16 RX ORDER — FLUCONAZOLE 150 MG/1
150 TABLET ORAL ONCE
Qty: 1 TABLET | Refills: 0 | Status: SHIPPED | OUTPATIENT
Start: 2022-05-16 | End: 2022-05-16

## 2022-05-16 NOTE — PROGRESS NOTES
[facility]        NAME: Bela Asher is a 42 y o  female  : 1975    MRN: [id number]  DATE: May 16, 2022  TIME: 12:10 PM    Assessment and Plan   UTI symptoms [R39 9]  1  UTI symptoms  nitrofurantoin (MACROBID) 100 mg capsule    fluconazole (DIFLUCAN) 150 mg tablet   2  Urinary tract infection without hematuria, site unspecified  POCT urine dip    Urine culture         Patient Instructions       Follow up with PCP in 3-5 days  Proceed to  ER if symptoms worsen  Chief Complaint     Chief Complaint   Patient presents with    Possible UTI     Increased urgency, unable to fully empty bladder         History of Present Illness       HPI   Patient states that she has been having increased urgency and pain while urinating  Patient states that her  left for 6 months and came back and now after intercourse she has been having symptoms that she suspects that he gave she gave him something  Patient does not have chills or backache but has a temperature of a 100 1°  Review of Systems   Review of Systems  Per hpi     Current Medications       Current Outpatient Medications:     albuterol (Ventolin HFA) 90 mcg/act inhaler, Inhale 2 puffs every 6 (six) hours as needed for wheezing, Disp: 8 g, Rfl: 0    budesonide-formoterol (SYMBICORT) 80-4 5 MCG/ACT inhaler, Inhale 2 puffs 2 (two) times a day Rinse mouth after use , Disp: 10 2 g, Rfl: 1    clobetasol (TEMOVATE) 0 05 % ointment, Apply topically 2 (two) times a day Apply to affected area on hands and shins twice a day for up to 14 days  Then stop  Then apply for flares up to 14 days at a time  Apply pea size amount to affected area on ear twice daily for up to 3 days, then stop   Avoid face, groin, axilla , Disp: 30 g, Rfl: 0    Drospirenone 4 MG TABS, Take 4 mg by mouth daily, Disp: 30 tablet, Rfl: 3    fluconazole (DIFLUCAN) 150 mg tablet, Take 1 tablet (150 mg total) by mouth once for 1 dose, Disp: 1 tablet, Rfl: 0    fluticasone (FLONASE) 50 mcg/act nasal spray, 1 spray into each nostril daily, Disp: 16 g, Rfl: 0    hydrOXYzine HCL (ATARAX) 25 mg tablet, Take 1 tablet (25 mg total) by mouth 2 (two) times a day as needed for anxiety, Disp: 30 tablet, Rfl: 1    ketotifen (ZADITOR) 0 025 % ophthalmic solution, Administer 1 drop to both eyes 2 (two) times a day as needed (itchy eyes), Disp: 5 mL, Rfl: 0    nitrofurantoin (MACROBID) 100 mg capsule, Take 1 capsule (100 mg total) by mouth in the morning and 1 capsule (100 mg total) in the evening  Do all this for 5 days  , Disp: 10 capsule, Rfl: 0    triamcinolone (KENALOG) 0 1 % ointment, Apply topically 2 (two) times a day, Disp: 80 g, Rfl: 5    triamcinolone (KENALOG) 0 1 % ointment, Apply topically 2 (two) times a day Apply to affected areas  Avoid face, groin, axilla , Disp: 30 g, Rfl: 0    loratadine (CLARITIN) 10 mg tablet, Take 1 tablet (10 mg total) by mouth daily, Disp: 90 tablet, Rfl: 1    norethindrone (AYGESTIN) 5 mg tablet, Take 1 tablet (5 mg total) by mouth daily for 10 days (Patient not taking: Reported on 9/2/2021), Disp: 10 tablet, Rfl: 0    Current Allergies     Allergies as of 05/16/2022 - Reviewed 05/16/2022   Allergen Reaction Noted    Percocet [oxycodone-acetaminophen]  09/14/2017    Doxycycline Hives, Myalgia, and Rash 10/27/2015            The following portions of the patient's history were reviewed and updated as appropriate: allergies, current medications, past family history, past medical history, past social history, past surgical history and problem list      Past Medical History:   Diagnosis Date    Abnormal mammogram of both breasts 10/30/2020    Anemia     Asthma     Bilateral breast cysts     last assessed    Rosa Nash 4/11/17    resolved   11/3/17     Cervical cancer, FIGO stage I (HCC)     Constipation     Seizures (HCC)     URI (upper respiratory infection)     under additonal type - Chronic       Past Surgical History:   Procedure Laterality Date   SECTION      INGUINAL HERNIA REPAIR      last assessed   12/17/15      NV COLONOSCOPY FLX DX W/COLLJ SPEC WHEN PFRMD N/A 9/15/2017    Procedure: COLONOSCOPY;  Surgeon: Zuhair Rodriguez MD;  Location: [facility]; Service: Gastroenterology    SKIN BIOPSY      last assessed         UMBILICAL HERNIA REPAIR      last assessed   12/17/15     URETHRAL DIVERTICULECTOMY      excision of urethral diverticulum    last assessed   12/17/15       Family History   Problem Relation Age of Onset    Hyperlipidemia Mother     Hypertension Mother    Hermelindo Andrea Breast cancer Mother         neoplasm/dx first age 39, mastectomy age 38, did have lymph biopsy positive with recurrance age around age 63    Hyperlipidemia Maternal Grandmother     Hypertension Maternal Grandmother     Leukemia Maternal Grandmother          Medications have been verified  Objective   /91   Pulse 105   Temp 97 6 °F (36 4 °C)   Resp 16   SpO2 97%   No LMP recorded  Physical Exam     Physical Exam  Constitutional:       General: She is not in acute distress  Appearance: Normal appearance  HENT:      Head: Normocephalic  Nose: No congestion or rhinorrhea  Mouth/Throat:      Mouth: Mucous membranes are moist       Pharynx: No oropharyngeal exudate or posterior oropharyngeal erythema  Eyes:      General:         Right eye: No discharge  Left eye: No discharge  Conjunctiva/sclera: Conjunctivae normal    Cardiovascular:      Rate and Rhythm: Normal rate and regular rhythm  Pulses: Normal pulses  Pulmonary:      Effort: Pulmonary effort is normal  No respiratory distress  Abdominal:      General: Abdomen is flat  There is no distension  Palpations: Abdomen is soft  Tenderness: There is no abdominal tenderness  There is no right CVA tenderness or left CVA tenderness  Musculoskeletal:      Cervical back: Neck supple  Skin:     General: Skin is warm        Capillary Refill: Capillary refill takes less than 2 seconds  Neurological:      Mental Status: She is alert and oriented to person, place, and time

## 2022-07-19 ENCOUNTER — TELEPHONE (OUTPATIENT)
Dept: INTERNAL MEDICINE CLINIC | Facility: CLINIC | Age: 47
End: 2022-07-19

## 2022-07-19 ENCOUNTER — OFFICE VISIT (OUTPATIENT)
Dept: INTERNAL MEDICINE CLINIC | Facility: CLINIC | Age: 47
End: 2022-07-19

## 2022-07-19 VITALS
BODY MASS INDEX: 27.44 KG/M2 | TEMPERATURE: 97.9 F | SYSTOLIC BLOOD PRESSURE: 123 MMHG | WEIGHT: 175.2 LBS | DIASTOLIC BLOOD PRESSURE: 89 MMHG | HEART RATE: 98 BPM | OXYGEN SATURATION: 98 %

## 2022-07-19 DIAGNOSIS — K59.00 CONSTIPATION, UNSPECIFIED CONSTIPATION TYPE: Primary | ICD-10-CM

## 2022-07-19 PROCEDURE — 99213 OFFICE O/P EST LOW 20 MIN: CPT | Performed by: INTERNAL MEDICINE

## 2022-07-19 RX ORDER — FLUCONAZOLE 150 MG/1
150 TABLET ORAL ONCE
COMMUNITY
Start: 2022-05-16

## 2022-07-19 RX ORDER — POLYETHYLENE GLYCOL 3350 17 G/17G
17 POWDER, FOR SOLUTION ORAL DAILY
Qty: 14 EACH | Refills: 0 | Status: SHIPPED | OUTPATIENT
Start: 2022-07-19

## 2022-07-19 RX ORDER — SIMETHICONE 125 MG
125 CAPSULE ORAL
Qty: 28 CAPSULE | Refills: 1 | Status: SHIPPED | OUTPATIENT
Start: 2022-07-19

## 2022-07-19 NOTE — PROGRESS NOTES
[facility]  INTERNAL MEDICINE OFFICE VISIT     PATIENT INFORMATION     Genevieve Faye   41 y o  female   MRN: [id number]    ASSESSMENT/PLAN     1  Constipation, unspecified constipation type  -     polyethylene glycol (MIRALAX) 17 g packet; Take 17 g by mouth daily  -     simethicone (MYLICON,GAS-X) 549 MG CAPS; Take 1 capsule (125 mg total) by mouth 3 (three) times daily after meals  - Patient states that she has been constipated for 3-4 days before taking laxatives on 07/16    - Patient tried taking 2 dulcolax tablets without producing a bowel movement  She then used a dulcolax suppository, which produced a small bowel movement  - She also had 4 bouts of emesis over the weekend  She had poor appetite and was not able to eat without vomiting    - Patient still has bloated sensation, however, she states that she was able to tolerate food yesterday without vomiting  - Suspect that patient's constipation is causing bloated sensation and vomiting  - Will use Miralax daily until regular BM is produced  - Will also use simethicone after meals  - Once patient's BMs become more regular, would consider use of fiber supplementation  - Encourage patient to increase fluid intake  - Follow-up PRN      HEALTH MAINTENANCE     Immunization History   Administered Date(s) Administered    Tdap 12/09/2015, 09/12/2021     Immunizations:  · To be addressed at annual visit with PCP  Screening:  · To be addressed at annual visit with PCP  Care Gaps:      [facility]     Chief Complaint   Patient presents with    Constipation     4 days    Vomiting     4 days      Andra Mathews     Ms Suman Allen is a 26-year-old female with past medical history of mild asthma, overweight, alpha thalassemia trait, anxiety, irregular menses/menorrhagia, urethral/vaginal fistula status post surgical repair in 2003    Patient presents to clinic today for same-day visit for constipation vomiting  Patient states that she has history of acid reflux  Last week, patient states that she had cough with associated burning sensation in the back of her throat  She took Pepcid at that time with some improvement symptoms  Patient also states that she has been having constipation that was lasting 3-4 days  Patient does experience constipation past that typically comes and goes  Over the weekend, patient was constipated and ultimately developed vomiting  Patient had decreased appetite would vomit when she tried to eat  On 07/16 patient tried taking 2 Dulcolax tablets without producing a bowel movement  She then used a Dulcolax suppository and subsequently had a small bowel movement  Patient states that she still feels bloated like she still has more stool to pass  Yesterday, patient was able to eat some food and keep it down without vomiting  She denies any fever and chills and denies any further bouts of vomiting since the weekend  She denies abdominal pain, and only complains of a bloated sensation  REVIEW OF SYSTEMS     Review of Systems   Constitutional: Positive for appetite change  Negative for chills, fatigue and fever  HENT: Negative for congestion, rhinorrhea and sore throat  Respiratory: Negative for cough, shortness of breath and wheezing  Cardiovascular: Negative for chest pain, palpitations and leg swelling  Gastrointestinal: Positive for abdominal distention and constipation  Negative for abdominal pain, diarrhea, nausea and vomiting  Genitourinary: Negative for difficulty urinating, dysuria and flank pain  Neurological: Negative for dizziness, weakness, light-headedness and headaches  Psychiatric/Behavioral: Negative for agitation, behavioral problems and confusion       OBJECTIVE     Vitals:    07/19/22 1138   BP: 123/89   BP Location: Left arm   Patient Position: Sitting   Cuff Size: Standard   Pulse: 98   Temp: 97 9 °F (36 6 °C)   TempSrc: Temporal   SpO2: 98% Weight: 79 5 kg (175 lb 3 2 oz)     Physical Exam  Constitutional:       General: She is not in acute distress  Appearance: Normal appearance  HENT:      Mouth/Throat:      Mouth: Mucous membranes are moist       Pharynx: Oropharynx is clear  Eyes:      Extraocular Movements: Extraocular movements intact  Conjunctiva/sclera: Conjunctivae normal       Pupils: Pupils are equal, round, and reactive to light  Cardiovascular:      Rate and Rhythm: Normal rate and regular rhythm  Pulses: Normal pulses  Heart sounds: Normal heart sounds  No murmur heard  Pulmonary:      Effort: Pulmonary effort is normal  No respiratory distress  Breath sounds: Normal breath sounds  No wheezing, rhonchi or rales  Abdominal:      General: Abdomen is flat  Bowel sounds are normal  There is no distension  Palpations: Abdomen is soft  Tenderness: There is no abdominal tenderness  Musculoskeletal:      Right lower leg: No edema  Left lower leg: No edema  Skin:     General: Skin is warm and dry  Neurological:      Mental Status: She is alert and oriented to person, place, and time  Psychiatric:         Mood and Affect: Mood normal          Behavior: Behavior normal          Thought Content: Thought content normal        CURRENT MEDICATIONS     Current Outpatient Medications:     albuterol (Ventolin HFA) 90 mcg/act inhaler, Inhale 2 puffs every 6 (six) hours as needed for wheezing, Disp: 8 g, Rfl: 0    budesonide-formoterol (SYMBICORT) 80-4 5 MCG/ACT inhaler, Inhale 2 puffs 2 (two) times a day Rinse mouth after use , Disp: 10 2 g, Rfl: 1    clobetasol (TEMOVATE) 0 05 % ointment, Apply topically 2 (two) times a day Apply to affected area on hands and shins twice a day for up to 14 days  Then stop  Then apply for flares up to 14 days at a time  Apply pea size amount to affected area on ear twice daily for up to 3 days, then stop   Avoid face, groin, axilla , Disp: 30 g, Rfl: 0   Drospirenone 4 MG TABS, Take 4 mg by mouth daily, Disp: 30 tablet, Rfl: 3    fluconazole (DIFLUCAN) 150 mg tablet, Take 150 mg by mouth once, Disp: , Rfl:     fluticasone (FLONASE) 50 mcg/act nasal spray, 1 spray into each nostril daily, Disp: 16 g, Rfl: 0    hydrOXYzine HCL (ATARAX) 25 mg tablet, Take 1 tablet (25 mg total) by mouth 2 (two) times a day as needed for anxiety, Disp: 30 tablet, Rfl: 1    ketotifen (ZADITOR) 0 025 % ophthalmic solution, Administer 1 drop to both eyes 2 (two) times a day as needed (itchy eyes), Disp: 5 mL, Rfl: 0    polyethylene glycol (MIRALAX) 17 g packet, Take 17 g by mouth daily, Disp: 14 each, Rfl: 0    simethicone (MYLICON,GAS-X) 387 MG CAPS, Take 1 capsule (125 mg total) by mouth 3 (three) times daily after meals, Disp: 28 capsule, Rfl: 1    triamcinolone (KENALOG) 0 1 % ointment, Apply topically 2 (two) times a day, Disp: 80 g, Rfl: 5    triamcinolone (KENALOG) 0 1 % ointment, Apply topically 2 (two) times a day Apply to affected areas  Avoid face, groin, axilla , Disp: 30 g, Rfl: 0    loratadine (CLARITIN) 10 mg tablet, Take 1 tablet (10 mg total) by mouth daily, Disp: 90 tablet, Rfl: 1    norethindrone (AYGESTIN) 5 mg tablet, Take 1 tablet (5 mg total) by mouth daily for 10 days (Patient not taking: Reported on 2021), Disp: 10 tablet, Rfl: 0    PAST MEDICAL & SURGICAL HISTORY     Past Medical History:   Diagnosis Date    Abnormal mammogram of both breasts 10/30/2020    Anemia     Asthma     Bilateral breast cysts     last assessed    Brandon Bernal 19    resolved   11/3/17     Cervical cancer, FIGO stage I (HCC)     Constipation     Seizures (HCC)     URI (upper respiratory infection)     under additonal type - Chronic     Past Surgical History:   Procedure Laterality Date     SECTION      INGUINAL HERNIA REPAIR      last assessed   12/17/15      AK COLONOSCOPY FLX DX W/COLLJ SPEC WHEN PFRMD N/A 9/15/2017    Procedure: COLONOSCOPY;  Surgeon: Malu Gonzalez Kisha Gates MD;  Location: [facility]; Service: Gastroenterology    SKIN BIOPSY      last assessed   56/42/29      UMBILICAL HERNIA REPAIR      last assessed   12/17/15     URETHRAL DIVERTICULECTOMY      excision of urethral diverticulum    last assessed   12/17/15     SOCIAL & FAMILY HISTORY     Social History     Socioeconomic History    Marital status: Single     Spouse name: Not on file    Number of children: 11    Years of education: Not on file    Highest education level: Not on file   Occupational History     Comment: full time employment   Tobacco Use    Smoking status: Current Every Day Smoker     Packs/day: 1 00     Types: Cigarettes    Smokeless tobacco: Never Used   Vaping Use    Vaping Use: Never used   Substance and Sexual Activity    Alcohol use: Yes     Comment: every other day    Drug use: Never    Sexual activity: Not Currently     Partners: Male     Birth control/protection: None   Other Topics Concern    Not on file   Social History Narrative    ** Merged History Encounter **         Daily caffeine consumption 4-5 servings a day  Single per allscript     Social Determinants of Health     Financial Resource Strain: Not on file   Food Insecurity: Not on file   Transportation Needs: Not on file   Physical Activity: Not on file   Stress: Not on file   Social Connections: Not on file   Intimate Partner Violence: Not on file   Housing Stability: Not on file     Social History     Substance and Sexual Activity   Alcohol Use Yes    Comment: every other day     Social History     Substance and Sexual Activity   Drug Use Never     Social History     Tobacco Use   Smoking Status Current Every Day Smoker    Packs/day: 1 00    Types: Cigarettes   Smokeless Tobacco Never Used     Family History   Problem Relation Age of Onset    Hyperlipidemia Mother     Hypertension Mother    Tyrone Abt Breast cancer Mother         neoplasm/dx first age 39, mastectomy age 38, did have lymph biopsy positive with recurrance age around age 61    Hyperlipidemia Maternal Grandmother     Hypertension Maternal Grandmother     Leukemia Maternal Grandmother      ==  Steve Rich DO  Internal Medicine Residency, PGY-2  [address]  [address]  [facility] , [address] [address] [facility] [facility]  Office: [phone]  Fax: [phone] 08-Jun-2017

## 2022-07-19 NOTE — TELEPHONE ENCOUNTER
Return in about 3 months (around 10/19/2022) for Annual physical with PCP       Was seen 7/19/22 with Dr Joaquin Sears 1st call - left message in VM

## 2022-08-05 ENCOUNTER — CONSULT (OUTPATIENT)
Dept: MULTI SPECIALTY CLINIC | Facility: CLINIC | Age: 47
End: 2022-08-05

## 2022-08-05 VITALS
TEMPERATURE: 97.2 F | WEIGHT: 177 LBS | BODY MASS INDEX: 27.72 KG/M2 | DIASTOLIC BLOOD PRESSURE: 83 MMHG | HEART RATE: 92 BPM | SYSTOLIC BLOOD PRESSURE: 120 MMHG

## 2022-08-05 DIAGNOSIS — K21.9 GASTROESOPHAGEAL REFLUX DISEASE, UNSPECIFIED WHETHER ESOPHAGITIS PRESENT: Primary | ICD-10-CM

## 2022-08-05 DIAGNOSIS — Z72.0 TOBACCO ABUSE: ICD-10-CM

## 2022-08-05 DIAGNOSIS — K21.9 LARYNGOPHARYNGEAL REFLUX (LPR): ICD-10-CM

## 2022-08-05 PROCEDURE — 99204 OFFICE O/P NEW MOD 45 MIN: CPT | Performed by: OTOLARYNGOLOGY

## 2022-08-05 PROCEDURE — 31575 DIAGNOSTIC LARYNGOSCOPY: CPT | Performed by: OTOLARYNGOLOGY

## 2022-08-05 RX ORDER — OMEPRAZOLE 40 MG/1
40 CAPSULE, DELAYED RELEASE ORAL DAILY
Qty: 30 CAPSULE | Refills: 2 | Status: SHIPPED | OUTPATIENT
Start: 2022-08-05 | End: 2022-11-03

## 2022-08-05 NOTE — PROGRESS NOTES
Consultation - Otolaryngology - Head and Neck Surgery  Facial Plastic and Reconstructive Surgery  Genevieve Calix 41 y o  female MRN: [id number]  Encounter: [id number]        Assessment/Plan:  1  Gastroesophageal reflux disease, unspecified whether esophagitis present  omeprazole (PriLOSEC) 40 MG capsule   2  Laryngopharyngeal reflux (LPR)  omeprazole (PriLOSEC) 40 MG capsule   3  Tobacco abuse         Laryngopharyngeal reflux: We discussed the ongoing findings of laryngopharyngeal reflux  We discussed the management of laryngopharyngeal reflux with PPI taken 30 minutes before the evening meal, elevation the head of bed, diet modifications, weight loss, and other lifestyle changes to assist with decreasing laryngopharyngeal reflux  Tobacco abuse: Discussed risks of ongoing cigarette smoking  Discussed the benefits of quitting immediately and prior to any surgery  Discussed options including support, quit date, medications, and family support  Patient voiced understanding and knowledge  Greater than 3 minutes were needed for discussion of tobacco dependence  History of Present Illness   Physician Requesting Consult: Carmen Oneill MD   Reason for Consult / Principal Problem: dysphagia  HPI: Pawan Ross is a 41y o  year old female who presents with long hx of reflux with dyspepsia  She has problems with constipation as well  She felt better last week when she was using medication for reflux due to her severe constipation  No improvement with some dietary changes  She smokes cigarettes  Review of systems:  10 Point ROS was performed and negative except as above or otherwise noted in the medical record  Historical Information   Past Medical History:   Diagnosis Date    Abnormal mammogram of both breasts 10/30/2020    Anemia     Asthma     Bilateral breast cysts     last assessed    [place] 4/11/17    resolved   11/3/17     Cervical cancer, FIGO stage I (HCC)     Constipation     Seizures ([address] )     URI (upper respiratory infection)     under additonal type - Chronic     Past Surgical History:   Procedure Laterality Date     SECTION      INGUINAL HERNIA REPAIR      last assessed   12/17/15      [place] COLONOSCOPY FLX DX W/COLLJ SPEC WHEN PFRMD N/A 9/15/2017    Procedure: COLONOSCOPY;  Surgeon: Seth Ku MD;  [facility]; Service: Gastroenterology    SKIN BIOPSY      last assessed         UMBILICAL HERNIA REPAIR      last assessed   12/17/15     URETHRAL DIVERTICULECTOMY      excision of urethral diverticulum    last assessed   12/17/15     Social History   Social History     Substance and Sexual Activity   Alcohol Use Yes    Comment: every other day     Social History     Substance and Sexual Activity   Drug Use Never     Social History     Tobacco Use   Smoking Status Current Every Day Smoker    Packs/day: 1 00    Types: Cigarettes   Smokeless Tobacco Never Used     Family History:   Family History   Problem Relation Age of Onset    Hyperlipidemia Mother     Hypertension Mother    Jean Claude Lowry Breast cancer Mother         neoplasm/dx first age 41, mastectomy age 38, did have lymph biopsy positive with recurrance age around age 63    Hyperlipidemia Maternal Grandmother     Hypertension Maternal Grandmother     Leukemia Maternal Grandmother        Current Outpatient Medications on File Prior to Visit   Medication Sig    albuterol (Ventolin HFA) 90 mcg/act inhaler Inhale 2 puffs every 6 (six) hours as needed for wheezing    clobetasol (TEMOVATE) 0 05 % ointment Apply topically 2 (two) times a day Apply to affected area on hands and shins twice a day for up to 14 days  Then stop  Then apply for flares up to 14 days at a time  Apply pea size amount to affected area on ear twice daily for up to 3 days, then stop  Avoid face, groin, axilla      fluticasone (FLONASE) 50 mcg/act nasal spray 1 spray into each nostril daily    loratadine (CLARITIN) 10 mg tablet Take 1 tablet (10 mg total) by mouth daily    polyethylene glycol (MIRALAX) 17 g packet Take 17 g by mouth daily    simethicone (MYLICON,GAS-X) 774 MG CAPS Take 1 capsule (125 mg total) by mouth 3 (three) times daily after meals    triamcinolone (KENALOG) 0 1 % ointment Apply topically 2 (two) times a day    triamcinolone (KENALOG) 0 1 % ointment Apply topically 2 (two) times a day Apply to affected areas  Avoid face, groin, axilla   budesonide-formoterol (SYMBICORT) 80-4 5 MCG/ACT inhaler Inhale 2 puffs 2 (two) times a day Rinse mouth after use  (Patient not taking: Reported on 8/5/2022)    Drospirenone 4 MG TABS Take 4 mg by mouth daily (Patient not taking: Reported on 8/5/2022)    fluconazole (DIFLUCAN) 150 mg tablet Take 150 mg by mouth once (Patient not taking: Reported on 8/5/2022)    hydrOXYzine HCL (ATARAX) 25 mg tablet Take 1 tablet (25 mg total) by mouth 2 (two) times a day as needed for anxiety (Patient not taking: Reported on 8/5/2022)    ketotifen (ZADITOR) 0 025 % ophthalmic solution Administer 1 drop to both eyes 2 (two) times a day as needed (itchy eyes) (Patient not taking: Reported on 8/5/2022)    norethindrone (AYGESTIN) 5 mg tablet Take 1 tablet (5 mg total) by mouth daily for 10 days (Patient not taking: No sig reported)     No current facility-administered medications on file prior to visit  Allergies   Allergen Reactions    Percocet [Oxycodone-Acetaminophen]     Doxycycline Hives, Myalgia and Rash     Category: Allergy;          Vitals:    08/05/22 1341   BP: 120/83   Pulse: 92   Temp: (!) 97 2 °F (36 2 °C)       Physical Exam   Constitutional: Oriented to person, place, and time  Well-developed and well-nourished, no apparent distress, non-toxic appearance  Cooperative, able to hear and answer questions without difficulty  Voice: Normal voice quality  Head: Normocephalic, atraumatic  No scars, masses or lesions  Face: Symmetric, no edema, no sinus tenderness    Eyes: Vision grossly intact, extra-ocular movement intact  Ears: External ears normal  Tympanic membranes intact with intact normal landmarks  No post-auricular erythema or tenderness  Nose: Septum intact, nares clear  Mucosa moist, turbinates well appearing  No crusting, polyps or discharge evident  Oral cavity: Dentition intact  Mucosa moist, lips without lesions or masses  Tongue mobile, floor of mouth soft and flat  Hard palate intact  No masses or lesions  Oropharynx: Uvula is midline, soft palate intact without lesion or mass  Oropharyngeal inlet without obstruction  Tonsils unremarkable  Posterior pharyngeal wall clear  No masses or lesions  Salivary glands:  Parotid glands and submandibular glands symmetric, no enlargement or tenderness  Neck: Normal laryngeal elevation with swallow  Trachea midline  No masses or lesions  No palpable adenopathy  Thyroid: Without tenderness or palpable nodules  Pulmonary/Chest: Normal effort and rate  No respiratory distress  No stertor or stridor  Musculoskeletal: Normal range of motion  Neurological: Cranial nerves 2-12 intact  Skin: Skin is warm and dry  Psychiatric: Normal mood and affect  Procedure: Flexible fiberoptic laryngoscopy    Indications: Evaluate areas of the upper aerodigestive tract not seen on physical exam    Procedure in detail: After informed verbal consent was obtained the nose was anesthetized using 4% lidocaine and neosynephrine spray  After adequate time for anesthesia the scope was guided easily along the nasal cavity floor and into the nasopharynx  The nasopharynx, oropharynx, hypopharynx and larynx were evaluated with the below listed findings  The scope was removed without difficulty and the patient tolerated the procedure well  Findings: No significant mucopurulence or polyposis in bilateral nasal cavities  No lesions or masses of the nasopharynx, oropharynx, hypopharynx, or larynx   Bilateral vocal cords are full mobile  Moderate erythema of true and false vocal cords  Moderate cobblestoning present at posterior hypopharynx  Imaging Studies: I have personally reviewed pertinent reports  Lab Results: I have personally reviewed pertinent lab results  Records reviewed: ED and GI records

## 2022-10-11 ENCOUNTER — TELEPHONE (OUTPATIENT)
Dept: INTERNAL MEDICINE CLINIC | Facility: CLINIC | Age: 47
End: 2022-10-11

## 2022-10-11 DIAGNOSIS — Z12.31 VISIT FOR SCREENING MAMMOGRAM: Primary | ICD-10-CM

## 2022-10-11 NOTE — TELEPHONE ENCOUNTER
Patient called requesting an updated mammo script  The script expires 10/2022 and she has the mammo scheduled for 12/6/22 at [facility]    Please prepare an updated script for the mammo so that central scheduling can assign it to the appointment      thanks

## 2022-10-27 ENCOUNTER — ANNUAL EXAM (OUTPATIENT)
Dept: OBGYN CLINIC | Facility: CLINIC | Age: 47
End: 2022-10-27

## 2022-10-27 VITALS
BODY MASS INDEX: 28.09 KG/M2 | WEIGHT: 179 LBS | SYSTOLIC BLOOD PRESSURE: 125 MMHG | HEART RATE: 91 BPM | HEIGHT: 67 IN | RESPIRATION RATE: 18 BRPM | DIASTOLIC BLOOD PRESSURE: 90 MMHG

## 2022-10-27 DIAGNOSIS — Z72.0 TOBACCO ABUSE: ICD-10-CM

## 2022-10-27 DIAGNOSIS — Z01.419 PAP SMEAR, AS PART OF ROUTINE GYNECOLOGICAL EXAMINATION: ICD-10-CM

## 2022-10-27 DIAGNOSIS — Z01.419 WELL WOMAN EXAM WITH ROUTINE GYNECOLOGICAL EXAM: Primary | ICD-10-CM

## 2022-10-27 DIAGNOSIS — Z87.42 HX OF ABNORMAL CERVICAL PAP SMEAR: ICD-10-CM

## 2022-10-27 DIAGNOSIS — I10 HYPERTENSION, UNSPECIFIED TYPE: ICD-10-CM

## 2022-10-27 DIAGNOSIS — N93.9 ABNORMAL UTERINE BLEEDING (AUB): ICD-10-CM

## 2022-10-27 DIAGNOSIS — N95.1 PERIMENOPAUSE: ICD-10-CM

## 2022-10-27 PROCEDURE — 99396 PREV VISIT EST AGE 40-64: CPT | Performed by: OBSTETRICS & GYNECOLOGY

## 2022-10-27 PROCEDURE — G0476 HPV COMBO ASSAY CA SCREEN: HCPCS | Performed by: OBSTETRICS & GYNECOLOGY

## 2022-10-27 RX ORDER — TRANEXAMIC ACID 650 MG/1
1300 TABLET ORAL 3 TIMES DAILY
Qty: 60 TABLET | Refills: 0 | Status: SHIPPED | OUTPATIENT
Start: 2022-10-27 | End: 2022-11-06

## 2022-10-27 RX ORDER — ACETAMINOPHEN AND CODEINE PHOSPHATE 120; 12 MG/5ML; MG/5ML
1 SOLUTION ORAL DAILY
Qty: 28 TABLET | Refills: 6 | Status: SHIPPED | OUTPATIENT
Start: 2022-10-27

## 2022-10-27 NOTE — ASSESSMENT & PLAN NOTE
-Likely secondary to perimenopausal status  Declines hormonal IUD, depo provera  Not candidate for estrogen containing regimens due to tobacco use and HTN  -Previously prescribed Slynd but it was not covered by insurance   Will try Micronor  -Pelvic ultrasound ordered to evaluate for structural causes but low suspicion of abnormalities from pelvic exam today  -TXA also prescribed to help minimize blood loss

## 2022-10-27 NOTE — ASSESSMENT & PLAN NOTE
-Cervical cancer screening: pap smear with HPV done today  -Hepatitis C screening ordered  -Mammogram scheduled for 12/6/22  Advised to keep appointment  -Had colonoscopy 9/2017 with polypectomy  Due for 5 year follow up   Referral placed  -The following were addressed today: menopausal transition, AUB, smoking cessation, healthy diet and exercise  -Return in 1 year for annual or sooner if needed

## 2022-10-27 NOTE — PROGRESS NOTES
[facility] Annual Visit   Genevieve Yates    Assessment & Plan: Catheryn Dubin is a 41 y o  [id number] here for annual well woman exam  By issue:  Problem List Items Addressed This Visit        Cardiovascular and Mediastinum    Hypertension     -Not currently on medications  -Has upcoming appointment with PCP on 11/8/22            Genitourinary    Abnormal uterine bleeding (AUB)     -Likely secondary to perimenopausal status  Declines hormonal IUD, depo provera  Not candidate for estrogen containing regimens due to tobacco use and HTN  -Previously prescribed Slynd but it was not covered by insurance  Will try Micronor  -Pelvic ultrasound ordered to evaluate for structural causes but low suspicion of abnormalities from pelvic exam today  -TXA also prescribed to help minimize blood loss         Relevant Medications    norethindrone (Ortho Micronor) 0 35 MG tablet    Other Relevant Orders    US pelvis complete non OB       Other    Tobacco abuse     Cessation encouraged         Hx of abnormal cervical Pap smear     NICOLE on pap, HPV neg 4/25/17  JOSEFINA I on colposcopic biopsy 5/11/17  Pap smear with HPV collected today         Well woman exam with routine gynecological exam - Primary     -Cervical cancer screening: pap smear with HPV done today  -Hepatitis C screening ordered  -Mammogram scheduled for 12/6/22  Advised to keep appointment  -Had colonoscopy 9/2017 with polypectomy  Due for 5 year follow up   Referral placed  -The following were addressed today: menopausal transition, AUB, smoking cessation, healthy diet and exercise  -Return in 1 year for annual or sooner if needed         Relevant Orders    Ambulatory referral for colonoscopy    Ambulatory Referral to Obstetrics / Gynecology    Hepatitis C antibody    Perimenopause     Referred to menopause specialist for symptom management           Other Visit Diagnoses     Pap smear, as part of routine gynecological examination        Relevant Orders    Liquid-based pap, screening          Discussed with Dr Suraj Pierce MD  PGY-III, OB/GYN      Subjective:   Alicia Grove is a 41 y o  [id number] who presents for annual well woman exam    Complaints/concerns today: abnormal uterine bleeding, hot flashes    Review of Systems  No vaginal discharge, labial erythema or lesions, dyspareunia  Periods are irregular  They now occur every other month  No intermenstrual bleeding or spotting  Sexually active with 1 male partner  Current contraception: none  Partner has ED  Denies urinary or fecal incontinence  No bulge complaints   Feels the cyst in her right breast may have increased in size  She sometimes notices white discharge from her nipples  Her mother had breast cancer in her late 35s; she did not have genetic testing  She had a aunt who had a hysterectomy duet o cancer but she cannot recall the type  No known familial cancer syndromes per her report    [address] Maintenance:    Last cervical screening 2017: atypical glandular cells, HPV negative  Last mammogram 2020: stable, benign masses  Last colonoscopy 2017  She does not exercise  She feels safe at home  She does not follow a balanced diet  She uses 4-30 cigarettes daily depending on her stress level (she has smoked for 34yrs)  No illicit drugs  Social alcohol use    Depression Screening: Patient's depression screening was significant for a PHQ-2 score of 5  Her PHQ-9 score was 19  She sees a therapist     Past Medical History:   Diagnosis Date   • Abnormal mammogram of both breasts 10/30/2020   • Anemia    • Asthma    • Bilateral breast cysts     last assessed    Esperanza Lamb 19    resolved   11/3/17    • Cervical cancer, FIGO stage I (HCC)    • Constipation    • Seizures (HCC)    • URI (upper respiratory infection)     under additonal type - Chronic     Past Surgical History:   Procedure Laterality Date   •  SECTION     • INGUINAL HERNIA REPAIR      last assessed   12/17/15     • MA COLONOSCOPY FLX DX W/COLLJ SPEC WHEN PFRMD N/A 9/15/2017    Procedure: COLONOSCOPY;  Surgeon: Yessenia Summers MD;  Location: [facility]; Service: Gastroenterology   • SKIN BIOPSY      last assessed   60/33/05     • UMBILICAL HERNIA REPAIR      last assessed   12/17/15    • URETHRAL DIVERTICULECTOMY      excision of urethral diverticulum    last assessed   12/17/15     Family History   Problem Relation Age of Onset   • Hyperlipidemia Mother    • Hypertension Mother    • Breast cancer Mother         neoplasm/dx first age 39, mastectomy age 38, did have lymph biopsy positive with recurrance age around age 63   • Hyperlipidemia Maternal Grandmother    • Hypertension Maternal Grandmother    • Leukemia Maternal Grandmother        Objective:  /90 (BP Location: Right arm, Patient Position: Sitting, Cuff Size: Adult)   Pulse 91   Resp 18   Ht 5' 7" (1 702 m)   Wt 81 2 kg (179 lb)   LMP 10/09/2022 (Exact Date)   BMI 28 04 kg/m²  Body mass index is 28 04 kg/m²  Physical Exam:  GEN: The patient was alert and oriented x3, pleasant well-appearing female in no acute distress  CV:  Regular rate   RESP:  Unlabored breathing  BREAST:  Symmetric breasts bilaterally with no skin abnormalities and no nipple discharge elicited  Both breasts with mobile masses scattered throughout   GI:  Soft, nontender, non-distended  MSK: bilateral lower extremities are nontender, no edema  : Normal appearing external female genitalia, normal appearing urethral meatus  On sterile speculum exam, mild vaginal atrophy, no prolapse, nodischarge, no bleeding  Cervix with multiple nabothian cysts  On bimanual exam, no cervical motion tenderness; uterus is smooth, mobile, normal  No tenderness or fullness in the bilateral adnexa

## 2022-10-27 NOTE — ASSESSMENT & PLAN NOTE
NICOLE on pap, HPV neg 4/25/17  JOSEFINA I on colposcopic biopsy 5/11/17  Pap smear with HPV collected today

## 2022-10-31 ENCOUNTER — TELEPHONE (OUTPATIENT)
Dept: GASTROENTEROLOGY | Facility: CLINIC | Age: 47
End: 2022-10-31

## 2022-10-31 NOTE — TELEPHONE ENCOUNTER
Patients GI provider:  Dr Juan Schroeder    Number to return call: ([phone])    Reason for call: Pt calling because her GYN told her she was due for her 5 year colonoscopy with Dr Juan Schroeder for hx of polyps  I don't have the previous colon report and no recall in her chart  Please call her to schedule  Thank you!     Scheduled procedure/appointment date if applicable: Apt/procedure

## 2022-11-01 DIAGNOSIS — Z86.010 HX OF COLONIC POLYP: Primary | ICD-10-CM

## 2022-11-01 NOTE — TELEPHONE ENCOUNTER
Scheduled date of colonoscopy (as of today):12 13 22  Physician performing colonoscopy:DR [facility]RO[facility]  [facility] of [facility]  Bowel prep reviewed with patient:NIEVES  Instructions reviewed with patient by:SB/MAILED  Clearances: N/A

## 2022-11-04 LAB
LAB AP GYN PRIMARY INTERPRETATION: NORMAL
Lab: NORMAL

## 2022-11-08 ENCOUNTER — TELEPHONE (OUTPATIENT)
Dept: OBGYN CLINIC | Facility: CLINIC | Age: 47
End: 2022-11-08

## 2022-11-08 NOTE — TELEPHONE ENCOUNTER
----- Message from Rolando Bell MD sent at 11/6/2022 11:15 AM EST -----  Normal screening pap smear  Please let patient know      Elisha Park MD

## 2022-11-15 ENCOUNTER — HOSPITAL ENCOUNTER (EMERGENCY)
Facility: HOSPITAL | Age: 47
Discharge: HOME | End: 2022-11-15
Attending: EMERGENCY MEDICINE

## 2022-11-15 VITALS
SYSTOLIC BLOOD PRESSURE: 176 MMHG | HEART RATE: 89 BPM | TEMPERATURE: 98.8 F | OXYGEN SATURATION: 100 % | DIASTOLIC BLOOD PRESSURE: 103 MMHG | RESPIRATION RATE: 20 BRPM

## 2022-11-15 DIAGNOSIS — R05.9 COUGH: ICD-10-CM

## 2022-11-15 DIAGNOSIS — I10 HYPERTENSION: Primary | ICD-10-CM

## 2022-11-15 DIAGNOSIS — R51.9 HEADACHE: ICD-10-CM

## 2022-11-15 LAB
ANION GAP SERPL CALCULATED.3IONS-SCNC: 5 MMOL/L (ref 4–13)
BASOPHILS # BLD AUTO: 0.05 THOUSANDS/ÂΜL (ref 0–0.1)
BASOPHILS NFR BLD AUTO: 1 % (ref 0–1)
BUN SERPL-MCNC: 10 MG/DL (ref 5–25)
CALCIUM SERPL-MCNC: 9.2 MG/DL (ref 8.3–10.1)
CHLORIDE SERPL-SCNC: 107 MMOL/L (ref 96–108)
CO2 SERPL-SCNC: 25 MMOL/L (ref 21–32)
CREAT SERPL-MCNC: 1.02 MG/DL (ref 0.6–1.3)
EOSINOPHIL # BLD AUTO: 0.15 THOUSAND/ÂΜL (ref 0–0.61)
EOSINOPHIL NFR BLD AUTO: 3 % (ref 0–6)
ERYTHROCYTE [DISTWIDTH] IN BLOOD BY AUTOMATED COUNT: 15.6 % (ref 11.6–15.1)
GFR SERPL CREATININE-BSD FRML MDRD: 66 ML/MIN/1.73SQ M
GLUCOSE SERPL-MCNC: 99 MG/DL (ref 65–140)
HCT VFR BLD AUTO: 39.2 % (ref 34.8–46.1)
HGB BLD-MCNC: 12.2 G/DL (ref 11.5–15.4)
IMM GRANULOCYTES # BLD AUTO: 0.01 THOUSAND/UL (ref 0–0.2)
IMM GRANULOCYTES NFR BLD AUTO: 0 % (ref 0–2)
LYMPHOCYTES # BLD AUTO: 1.85 THOUSANDS/ÂΜL (ref 0.6–4.47)
LYMPHOCYTES NFR BLD AUTO: 31 % (ref 14–44)
MCH RBC QN AUTO: 24.5 PG (ref 26.8–34.3)
MCHC RBC AUTO-ENTMCNC: 31.1 G/DL (ref 31.4–37.4)
MCV RBC AUTO: 79 FL (ref 82–98)
MONOCYTES # BLD AUTO: 0.32 THOUSAND/ÂΜL (ref 0.17–1.22)
MONOCYTES NFR BLD AUTO: 5 % (ref 4–12)
NEUTROPHILS # BLD AUTO: 3.59 THOUSANDS/ÂΜL (ref 1.85–7.62)
NEUTS SEG NFR BLD AUTO: 60 % (ref 43–75)
NRBC BLD AUTO-RTO: 0 /100 WBCS
PLATELET # BLD AUTO: 271 THOUSANDS/UL (ref 149–390)
PMV BLD AUTO: 10.4 FL (ref 8.9–12.7)
POTASSIUM SERPL-SCNC: 3.6 MMOL/L (ref 3.5–5.3)
RBC # BLD AUTO: 4.97 MILLION/UL (ref 3.81–5.12)
SODIUM SERPL-SCNC: 137 MMOL/L (ref 135–147)
WBC # BLD AUTO: 5.97 THOUSAND/UL (ref 4.31–10.16)

## 2022-11-15 RX ORDER — AMLODIPINE BESYLATE 5 MG/1
5 TABLET ORAL DAILY
Qty: 30 TABLET | Refills: 0 | Status: SHIPPED | OUTPATIENT
Start: 2022-11-15 | End: 2022-12-06 | Stop reason: SDUPTHER

## 2022-11-15 NOTE — DISCHARGE INSTRUCTIONS
It is important that you see your PCP to help manage your high blood pressure  If you develop new or worsening symptoms, please return to the Emergency Department for further evaluation 
99

## 2022-11-15 NOTE — ED PROVIDER NOTES
Emergency Department Note- Iza Meza 41 y o  female MRN: [id number]    Unit/Bed#: ED 22 Encounter: [id number]    Chief Complaint   Patient presents with   • Headache     Pt has been having HA, sweaty, dizziness, cough, and nasal congestion for past week  History of Present Illness   HPI:  Jerilyn Ignacio is a 41 y o  female with PMH asthma who presents with elevated blood pressure, headache, cough, nasal congestion  Patient states symptoms have been ongoing for a week  Started initially with what patient felt was an asthma exacerbation, cough, wheezing that she treated at home with albuterol  Pt states "I should have come to hospital for it but it got better " For past few days she has had elevated blood pressure readings in the 522I systolic and feels this is causing her to have intermittent throbbing headaches and dizziness  Reports recent stressful life event that caused her to miss PCP appointment  Pt also reports hx of ROME+ testing for autoimmune disease but has not followed up on this, has been suggested to her she may have Lupus  Given that pt also here with 2 daughters with HA, discussed with patient if any changes to heating system, space heaters, home fireplace use for which pt states the heat has been on in the house for 2 months with no issues so far, no space heater/fireplace use  Pt denies fevers, CP, n/v/d  Review of Systems   Constitutional: Negative for chills and fever  HENT: Positive for congestion  Negative for ear pain and sore throat  Eyes: Negative for pain and visual disturbance  Respiratory: Positive for cough and wheezing  Negative for shortness of breath  Cardiovascular: Negative for chest pain and palpitations  Gastrointestinal: Negative for abdominal pain and vomiting  Genitourinary: Negative for dysuria and hematuria  Musculoskeletal: Negative for arthralgias and back pain  Skin: Negative for color change and rash     Neurological: Positive for dizziness and headaches  Negative for seizures and syncope  All other systems reviewed and are negative  All systems reviewed and negative except as noted above or in HPI       No current facility-administered medications for this encounter  Current Outpatient Medications:   •  amLODIPine (NORVASC) 5 mg tablet, Take 1 tablet (5 mg total) by mouth daily, Disp: 30 tablet, Rfl: 0  •  clobetasol (TEMOVATE) 0 05 % ointment, Apply topically 2 (two) times a day Apply to affected area on hands and shins twice a day for up to 14 days  Then stop  Then apply for flares up to 14 days at a time  Apply pea size amount to affected area on ear twice daily for up to 3 days, then stop  Avoid face, groin, axilla   (Patient not taking: Reported on 10/27/2022), Disp: 30 g, Rfl: 0  •  Drospirenone 4 MG TABS, Take 4 mg by mouth daily (Patient not taking: No sig reported), Disp: 30 tablet, Rfl: 3  •  fluconazole (DIFLUCAN) 150 mg tablet, Take 150 mg by mouth once (Patient not taking: No sig reported), Disp: , Rfl:   •  fluticasone (FLONASE) 50 mcg/act nasal spray, 1 spray into each nostril daily (Patient not taking: Reported on 10/27/2022), Disp: 16 g, Rfl: 0  •  hydrOXYzine HCL (ATARAX) 25 mg tablet, Take 1 tablet (25 mg total) by mouth 2 (two) times a day as needed for anxiety (Patient not taking: No sig reported), Disp: 30 tablet, Rfl: 1  •  ketotifen (ZADITOR) 0 025 % ophthalmic solution, Administer 1 drop to both eyes 2 (two) times a day as needed (itchy eyes) (Patient not taking: No sig reported), Disp: 5 mL, Rfl: 0  •  loratadine (CLARITIN) 10 mg tablet, Take 1 tablet (10 mg total) by mouth daily, Disp: 90 tablet, Rfl: 1  •  norethindrone (AYGESTIN) 5 mg tablet, Take 1 tablet (5 mg total) by mouth daily for 10 days (Patient not taking: No sig reported), Disp: 10 tablet, Rfl: 0  •  norethindrone (Ortho Micronor) 0 35 MG tablet, Take 1 tablet (0 35 mg total) by mouth daily, Disp: 28 tablet, Rfl: 6  • omeprazole (PriLOSEC) 40 MG capsule, Take 1 capsule (40 mg total) by mouth daily (Patient not taking: Reported on 10/27/2022), Disp: 30 capsule, Rfl: 2  •  polyethylene glycol (GOLYTELY) 4000 mL solution, Take 4,000 mL by mouth once for 1 dose Take as directed by office, Disp: 4000 mL, Rfl: 0  •  polyethylene glycol (MIRALAX) 17 g packet, Take 17 g by mouth daily (Patient not taking: Reported on 10/27/2022), Disp: 14 each, Rfl: 0  •  simethicone (MYLICON,GAS-X) 663 MG CAPS, Take 1 capsule (125 mg total) by mouth 3 (three) times daily after meals (Patient not taking: Reported on 10/27/2022), Disp: 28 capsule, Rfl: 1  •  Symbicort 80-4 5 MCG/ACT inhaler, inhale 2 puffs by mouth twice a day Rinse mouth after use, Disp: 10 2 g, Rfl: 3  •  triamcinolone (KENALOG) 0 1 % ointment, Apply topically 2 (two) times a day (Patient not taking: Reported on 10/27/2022), Disp: 80 g, Rfl: 5  •  triamcinolone (KENALOG) 0 1 % ointment, Apply topically 2 (two) times a day Apply to affected areas  Avoid face, groin, axilla  (Patient not taking: Reported on 10/27/2022), Disp: 30 g, Rfl: 0    Historical Information   Past Medical History:   Diagnosis Date   • Abnormal mammogram of both breasts 10/30/2020   • Anemia    • Asthma    • Bilateral breast cysts     last assessed    Therese To 19    resolved   11/3/17    • Cervical cancer, FIGO stage I (HCC)    • Constipation    • Seizures (HCC)    • URI (upper respiratory infection)     under additonal type - Chronic     Past Surgical History:   Procedure Laterality Date   •  SECTION     • INGUINAL HERNIA REPAIR      last assessed   12/17/15     • DC COLONOSCOPY FLX DX W/COLLJ SPEC WHEN PFRMD N/A 9/15/2017    Procedure: COLONOSCOPY;  Surgeon: Rosario Andrade MD;  Location: [facility]; Service: Gastroenterology   • SKIN BIOPSY      last assessed        • UMBILICAL HERNIA REPAIR      last assessed   12/17/15    • URETHRAL DIVERTICULECTOMY      excision of urethral diverticulum    last assessed   12/17/15     Social History   Social History     Substance and Sexual Activity   Alcohol Use Yes    Comment: every other day     Social History     Substance and Sexual Activity   Drug Use Never     Social History     Tobacco Use   Smoking Status Every Day   • Packs/day: 1 00   • Types: Cigarettes   Smokeless Tobacco Never     Family History:   Family History   Problem Relation Age of Onset   • Hyperlipidemia Mother    • Hypertension Mother    • Breast cancer Mother         neoplasm/dx first age 39, mastectomy age 38, did have lymph biopsy positive with recurrance age around age 63   • Hyperlipidemia Maternal Grandmother    • Hypertension Maternal Grandmother    • Leukemia Maternal Grandmother        Meds/Allergies   (Not in a hospital admission)    Allergies   Allergen Reactions   • Percocet [Oxycodone-Acetaminophen]    • Doxycycline Hives, Myalgia and Rash     Category: Allergy;          Objective   Vitals: Blood pressure (!) 176/103, pulse 89, temperature 98 8 °F (37 1 °C), temperature source Oral, resp  rate 20, SpO2 100 %  Physical Exam  Vitals and nursing note reviewed  Constitutional:       General: She is not in acute distress  Appearance: She is not ill-appearing  HENT:      Head: Normocephalic and atraumatic  Right Ear: External ear normal       Left Ear: External ear normal       Nose: Nose normal       Mouth/Throat:      Pharynx: Oropharynx is clear  Eyes:      Extraocular Movements: Extraocular movements intact  Pupils: Pupils are equal, round, and reactive to light  Cardiovascular:      Rate and Rhythm: Normal rate and regular rhythm  Pulses: Normal pulses  Heart sounds: Normal heart sounds  No murmur heard  No friction rub  No gallop  Pulmonary:      Effort: Pulmonary effort is normal  No respiratory distress  Breath sounds: Normal breath sounds  No wheezing, rhonchi or rales  Abdominal:      General: Abdomen is flat  There is no distension  Palpations: Abdomen is soft  Tenderness: There is no abdominal tenderness  There is no guarding or rebound  Musculoskeletal:         General: No deformity  Normal range of motion  Cervical back: Normal range of motion  Right lower leg: No edema  Left lower leg: No edema  Skin:     General: Skin is warm and dry  Capillary Refill: Capillary refill takes less than 2 seconds  Findings: No rash  Neurological:      General: No focal deficit present  Mental Status: She is alert and oriented to person, place, and time  Gait: Gait normal    Psychiatric:         Mood and Affect: Mood normal             Lab Results: Lab Results: I have personally reviewed pertinent lab results          Labs Reviewed   CBC AND DIFFERENTIAL - Abnormal       Result Value Ref Range Status    WBC 5 97  4 31 - 10 16 Thousand/uL Final    RBC 4 97  3 81 - 5 12 Million/uL Final    Hemoglobin 12 2  11 5 - 15 4 g/dL Final    Hematocrit 39 2  34 8 - 46 1 % Final    MCV 79 (*) 82 - 98 fL Final    MCH 24 5 (*) 26 8 - 34 3 pg Final    MCHC 31 1 (*) 31 4 - 37 4 g/dL Final    RDW 15 6 (*) 11 6 - 15 1 % Final    MPV 10 4  8 9 - 12 7 fL Final    Platelets 117  202 - 390 Thousands/uL Final    nRBC 0  /100 WBCs Final    Neutrophils Relative 60  43 - 75 % Final    Immat GRANS % 0  0 - 2 % Final    Lymphocytes Relative 31  14 - 44 % Final    Monocytes Relative 5  4 - 12 % Final    Eosinophils Relative 3  0 - 6 % Final    Basophils Relative 1  0 - 1 % Final    Neutrophils Absolute 3 59  1 85 - 7 62 Thousands/µL Final    Immature Grans Absolute 0 01  0 00 - 0 20 Thousand/uL Final    Lymphocytes Absolute 1 85  0 60 - 4 47 Thousands/µL Final    Monocytes Absolute 0 32  0 17 - 1 22 Thousand/µL Final    Eosinophils Absolute 0 15  0 00 - 0 61 Thousand/µL Final    Basophils Absolute 0 05  0 00 - 0 10 Thousands/µL Final   BASIC METABOLIC PANEL    Sodium 174  135 - 147 mmol/L Final    Potassium 3 6  3 5 - 5 3 mmol/L Final    Chloride 107  96 - 108 mmol/L Final    CO2 25  21 - 32 mmol/L Final    ANION GAP 5  4 - 13 mmol/L Final    BUN 10  5 - 25 mg/dL Final    Creatinine 1 02  0 60 - 1 30 mg/dL Final    Comment: Standardized to IDMS reference method    Glucose 99  65 - 140 mg/dL Final    Comment: If the patient is fasting, the ADA then defines impaired fasting glucose as > 100 mg/dL and diabetes as > or equal to 123 mg/dL  Specimen collection should occur prior to Sulfasalazine administration due to the potential for falsely depressed results  Specimen collection should occur prior to Sulfapyridine administration due to the potential for falsely elevated results  Calcium 9 2  8 3 - 10 1 mg/dL Final    eGFR 66  ml/min/1 73sq m Final    Narrative:     [place] guidelines for Chronic Kidney Disease (CKD):   •  Stage 1 with normal or high GFR (GFR > 90 mL/min/1 73 square meters)  •  Stage 2 Mild CKD (GFR = 60-89 mL/min/1 73 square meters)  •  Stage 3A Moderate CKD (GFR = 45-59 mL/min/1 73 square meters)  •  Stage 3B Moderate CKD (GFR = 30-44 mL/min/1 73 square meters)  •  Stage 4 Severe CKD (GFR = 15-29 mL/min/1 73 square meters)  •  Stage 5 End Stage CKD (GFR <15 mL/min/1 73 square meters)  Note: GFR calculation is accurate only with a steady state creatinine      No orders to display        Medications - No data to display             Procedures                            Assessment/Plan     ED Medical Decision Makin y/o F presenting with multiple complaints  Vitals reviewed, pt noted hypertensive  Exam nonfocal  Plan to check basic labs to assess renal function in setting of HTN with questionable Lupus history  Renal function is reduced with gfr 66  Will start patient on norvasc for HTN and recommend pt f/u with PCP in coming days  Pt appreciative and agreeable with plan           Time reflects when diagnosis was documented in both MDM as applicable and the Disposition within this note     Time User Action Codes Description Comment    11/15/2022  3:07 PM Michael Po Add [I10] Asymptomatic hypertension     11/15/2022  3:07 PM Michael Po Remove [I10] Asymptomatic hypertension     11/15/2022  3:07 PM Michael Po Add [I10] Hypertension     11/15/2022  3:07 PM Michael Po Add [R51 9] Headache     11/15/2022  3:07 PM Michael Po Add [R05 9] Cough       ED Disposition     ED Disposition   Discharge    Condition   Stable    Date/Time   Tue Nov 15, 2022  3:09 PM    Comment   Genevieve White discharge to home/self care  Follow-up Information     Follow up With Specialties Details Why [address] [place] [address], RAHEEL Physician Assistant   [facility] [address]  [phone]           Discharge Medication List as of 11/15/2022  3:09 PM      START taking these medications    Details   amLODIPine (NORVASC) 5 mg tablet Take 1 tablet (5 mg total) by mouth daily, Starting Tue 11/15/2022, Normal                Current Discharge Medication List      CONTINUE these medications which have NOT CHANGED    Details   clobetasol (TEMOVATE) 0 05 % ointment Apply topically 2 (two) times a day Apply to affected area on hands and shins twice a day for up to 14 days  Then stop  Then apply for flares up to 14 days at a time  Apply pea size amount to affected area on ear twice daily for up to 3 days, then stop  Avoid face, groin, axilla    Qty: 30 g, Refills: 0    Associated Diagnoses: Hypersensitivity reaction, subsequent encounter      Drospirenone 4 MG TABS Take 4 mg by mouth daily  Qty: 30 tablet, Refills: 3    Associated Diagnoses: Abnormal uterine bleeding (AUB)      fluconazole (DIFLUCAN) 150 mg tablet Take 150 mg by mouth once      fluticasone (FLONASE) 50 mcg/act nasal spray 1 spray into each nostril daily  Qty: 16 g, Refills: 0    Associated Diagnoses: Ear pain      hydrOXYzine HCL (ATARAX) 25 mg tablet Take 1 tablet (25 mg total) by mouth 2 (two) times a day as needed for anxiety  Qty: 30 tablet, Refills: 1    Associated Diagnoses: Anxiety      ketotifen (ZADITOR) 0 025 % ophthalmic solution Administer 1 drop to both eyes 2 (two) times a day as needed (itchy eyes)  Qty: 5 mL, Refills: 0    Associated Diagnoses: Allergic conjunctivitis of both eyes      loratadine (CLARITIN) 10 mg tablet Take 1 tablet (10 mg total) by mouth daily  Qty: 90 tablet, Refills: 1    Associated Diagnoses: Allergic conjunctivitis of both eyes      norethindrone (AYGESTIN) 5 mg tablet Take 1 tablet (5 mg total) by mouth daily for 10 days  Qty: 10 tablet, Refills: 0    Associated Diagnoses: Vaginal bleeding      norethindrone (Ortho Micronor) 0 35 MG tablet Take 1 tablet (0 35 mg total) by mouth daily  Qty: 28 tablet, Refills: 6    Associated Diagnoses: Abnormal uterine bleeding (AUB)      omeprazole (PriLOSEC) 40 MG capsule Take 1 capsule (40 mg total) by mouth daily  Qty: 30 capsule, Refills: 2    Associated Diagnoses: Gastroesophageal reflux disease, unspecified whether esophagitis present;  Laryngopharyngeal reflux (LPR)      polyethylene glycol (GOLYTELY) 4000 mL solution Take 4,000 mL by mouth once for 1 dose Take as directed by office  Qty: 4000 mL, Refills: 0    Associated Diagnoses: Hx of colonic polyp      polyethylene glycol (MIRALAX) 17 g packet Take 17 g by mouth daily  Qty: 14 each, Refills: 0    Associated Diagnoses: Constipation, unspecified constipation type      simethicone (MYLICON,GAS-X) 871 MG CAPS Take 1 capsule (125 mg total) by mouth 3 (three) times daily after meals  Qty: 28 capsule, Refills: 1    Associated Diagnoses: Constipation, unspecified constipation type      Symbicort 80-4 5 MCG/ACT inhaler inhale 2 puffs by mouth twice a day Rinse mouth after use  Qty: 10 2 g, Refills: 3    Associated Diagnoses: Mild intermittent asthma without complication      !! triamcinolone (KENALOG) 0 1 % ointment Apply topically 2 (two) times a day  Qty: 80 g, Refills: 5 Associated Diagnoses: Neoplasm of uncertain behavior of skin      !! triamcinolone (KENALOG) 0 1 % ointment Apply topically 2 (two) times a day Apply to affected areas  Avoid face, groin, axilla  Qty: 30 g, Refills: 0    Associated Diagnoses: Rash       !! - Potential duplicate medications found  Please discuss with provider  Portions of the record may have been created with voice recognition software  Occasional wrong word or "sound a like" substitutions may have occurred due to the inherent limitations of voice recognition software  Read the chart carefully and recognize, using context, where substitutions have occurred         Claudetta Hay, MD  11/16/22 [id number]

## 2022-11-15 NOTE — ED ATTENDING ATTESTATION
11/15/2022  IPraveen MD, saw and evaluated the patient  I have discussed the patient with the resident/non-physician practitioner and agree with the resident's/non-physician practitioner's findings, Plan of Care, and MDM as documented in the resident's/non-physician practitioner's note, except where noted  All available labs and Radiology studies were reviewed  I was present for key portions of any procedure(s) performed by the resident/non-physician practitioner and I was immediately available to provide assistance  At this point I agree with the current assessment done in the Emergency Department  I have conducted an independent evaluation of this patient a history and physical is as follows:  Patient with cough, asthma exacerbation last week, now has ongoing headache and elevated blood pressure  Patient has been having elevated blood pressure, missed a primary care doctor appointment due to a death in the family  Patient has history of elevated ROME, currently undergoing evaluation for lupus  On exam here patient is hypertensive with otherwise nonfocal exam   Will plan to check labs, start antihypertensives, refer back to primary care    ED Course         Critical Care Time  Procedures

## 2022-12-06 ENCOUNTER — LAB (OUTPATIENT)
Dept: LAB | Facility: CLINIC | Age: 47
End: 2022-12-06

## 2022-12-06 ENCOUNTER — HOSPITAL ENCOUNTER (OUTPATIENT)
Dept: RADIOLOGY | Age: 47
Discharge: HOME | End: 2022-12-06

## 2022-12-06 ENCOUNTER — OFFICE VISIT (OUTPATIENT)
Dept: INTERNAL MEDICINE CLINIC | Facility: CLINIC | Age: 47
End: 2022-12-06

## 2022-12-06 VITALS
WEIGHT: 175 LBS | BODY MASS INDEX: 27.47 KG/M2 | DIASTOLIC BLOOD PRESSURE: 85 MMHG | SYSTOLIC BLOOD PRESSURE: 117 MMHG | HEIGHT: 67 IN | HEART RATE: 85 BPM | TEMPERATURE: 98 F

## 2022-12-06 DIAGNOSIS — B37.31 YEAST INFECTION OF THE VAGINA: ICD-10-CM

## 2022-12-06 DIAGNOSIS — I10 HYPERTENSION: ICD-10-CM

## 2022-12-06 DIAGNOSIS — H10.13 ALLERGIC CONJUNCTIVITIS OF BOTH EYES: ICD-10-CM

## 2022-12-06 DIAGNOSIS — Z00.00 ANNUAL PHYSICAL EXAM: ICD-10-CM

## 2022-12-06 DIAGNOSIS — Z01.419 WELL WOMAN EXAM WITH ROUTINE GYNECOLOGICAL EXAM: ICD-10-CM

## 2022-12-06 DIAGNOSIS — K21.00 GASTROESOPHAGEAL REFLUX DISEASE WITH ESOPHAGITIS, UNSPECIFIED WHETHER HEMORRHAGE: Primary | ICD-10-CM

## 2022-12-06 DIAGNOSIS — Z12.31 VISIT FOR SCREENING MAMMOGRAM: ICD-10-CM

## 2022-12-06 DIAGNOSIS — B37.0 ORAL THRUSH: ICD-10-CM

## 2022-12-06 LAB
ANION GAP SERPL CALCULATED.3IONS-SCNC: 4 MMOL/L (ref 4–13)
BASOPHILS # BLD AUTO: 0.02 THOUSANDS/ÂΜL (ref 0–0.1)
BASOPHILS NFR BLD AUTO: 0 % (ref 0–1)
BUN SERPL-MCNC: 7 MG/DL (ref 5–25)
CALCIUM SERPL-MCNC: 8.7 MG/DL (ref 8.3–10.1)
CHLORIDE SERPL-SCNC: 105 MMOL/L (ref 96–108)
CO2 SERPL-SCNC: 27 MMOL/L (ref 21–32)
CREAT SERPL-MCNC: 0.67 MG/DL (ref 0.6–1.3)
EOSINOPHIL # BLD AUTO: 0.09 THOUSAND/ÂΜL (ref 0–0.61)
EOSINOPHIL NFR BLD AUTO: 2 % (ref 0–6)
ERYTHROCYTE [DISTWIDTH] IN BLOOD BY AUTOMATED COUNT: 15.4 % (ref 11.6–15.1)
GFR SERPL CREATININE-BSD FRML MDRD: 106 ML/MIN/1.73SQ M
GLUCOSE P FAST SERPL-MCNC: 99 MG/DL (ref 65–99)
HCT VFR BLD AUTO: 37.9 % (ref 34.8–46.1)
HCV AB SER QL: NORMAL
HGB BLD-MCNC: 11.7 G/DL (ref 11.5–15.4)
IMM GRANULOCYTES # BLD AUTO: 0.01 THOUSAND/UL (ref 0–0.2)
IMM GRANULOCYTES NFR BLD AUTO: 0 % (ref 0–2)
LYMPHOCYTES # BLD AUTO: 1.64 THOUSANDS/ÂΜL (ref 0.6–4.47)
LYMPHOCYTES NFR BLD AUTO: 30 % (ref 14–44)
MCH RBC QN AUTO: 24.4 PG (ref 26.8–34.3)
MCHC RBC AUTO-ENTMCNC: 30.9 G/DL (ref 31.4–37.4)
MCV RBC AUTO: 79 FL (ref 82–98)
MONOCYTES # BLD AUTO: 0.47 THOUSAND/ÂΜL (ref 0.17–1.22)
MONOCYTES NFR BLD AUTO: 9 % (ref 4–12)
NEUTROPHILS # BLD AUTO: 3.2 THOUSANDS/ÂΜL (ref 1.85–7.62)
NEUTS SEG NFR BLD AUTO: 59 % (ref 43–75)
NRBC BLD AUTO-RTO: 0 /100 WBCS
PLATELET # BLD AUTO: 229 THOUSANDS/UL (ref 149–390)
PMV BLD AUTO: 10.7 FL (ref 8.9–12.7)
POTASSIUM SERPL-SCNC: 3.5 MMOL/L (ref 3.5–5.3)
RBC # BLD AUTO: 4.79 MILLION/UL (ref 3.81–5.12)
SODIUM SERPL-SCNC: 136 MMOL/L (ref 135–147)
TSH SERPL DL<=0.05 MIU/L-ACNC: 0.66 UIU/ML (ref 0.45–4.5)
WBC # BLD AUTO: 5.43 THOUSAND/UL (ref 4.31–10.16)

## 2022-12-06 RX ORDER — AMLODIPINE BESYLATE 5 MG/1
5 TABLET ORAL DAILY
Qty: 30 TABLET | Refills: 3 | Status: SHIPPED | OUTPATIENT
Start: 2022-12-06

## 2022-12-06 RX ORDER — FLUCONAZOLE 150 MG/1
150 TABLET ORAL ONCE
Qty: 1 TABLET | Refills: 0 | Status: SHIPPED | OUTPATIENT
Start: 2022-12-06 | End: 2022-12-06

## 2022-12-06 RX ORDER — LORATADINE 10 MG/1
10 TABLET ORAL DAILY
Qty: 90 TABLET | Refills: 1 | Status: SHIPPED | OUTPATIENT
Start: 2022-12-06 | End: 2023-06-04

## 2022-12-06 NOTE — PATIENT INSTRUCTIONS
Miss Sherie Solo,    Today we discussed many things including your Blood pressure/blurry vision, skin reaction, yeast infection/thrush, and general health practices for your annual physical     You seem to be doing well otherwise, but we have some plans for you to do before your next visit  - Get an Arm BP Cuff: Measure your BP in the AM and PM for 2 weeks and then call us back   > We will continue with Amlodipine 5   > Please schedule an appointment with the Pre-menospausal specialist  This may explain some of the symptoms you have been having   - Blurry Vision: Follow up with your opthalmology and let us know what they say  - Omeprazole/Prilosec Allergy: We will switch you to a different medication of the same class Esomeprazole   > If you still have allergic response, call us and we will try a new medication  - Skin Rash: Follow up with Dermatology, give them a call and get a new appointment  Tell them it is urgent    - Yeast Infection/Thrush: Prescribing you Fluconazole 150 x 1 dose, and a Spit and Swallow wash for thrush  - General Housekeeping:   > Follow up with us after your mammogram   > Get the labs done I have ordered prior to your next visit   > Check up in 3-4 mo    It was a pleasure being able to care for you today at our clinic with [facility],    Please feel free to call if you have any new issues/have any questions,    Anton Coleman MD

## 2022-12-06 NOTE — PROGRESS NOTES
[address] [facility]    NAME: Tessa Stapleton  AGE: 41 y o  SEX: female  : 1975     DATE: 2022     Assessment and Plan: This is a 42 y/o woman with a fairly complex history who comes here for annual physical with multiple problems  In regards to her BP, it is possible that her using a wrist cuff at home could be giving her falsely elevated results  While there is some concern for end-organ damage given her blurry vision, today her BP is well-controlled on Amlodipine  Her other sx's including blurriness, dizziness, poor energy may all be explained by menopause  She has f/u with a post-menopausal specialist in the future  Her skin rash is concerning, however Lupus has been r/o per Rheum and Pathology  She will need regular f/u with Derm to consider the causes for this anthracitic, blistering rash  Otherwise she is doing well  Her UTI/oral thrush should be easily treated with anti-fungals  # Hi BP  Blurry vision/Black spots  Sweating, Dizziness  Intense fatigue/malaise  - BP in clinic normoTN at 117/85  - Uses wrist cuff for home BP monitoring, may be giving false hi readings  - Has ophthalmologist she sess, will suggest getting f/u appt  - Can be explained by bobbi-menopause  She has f/u with menopausal specialist  - Will c/w Amlodipine 5, pending results from home BP readings using the upper arm  May consider either increasing dose or adding add'l antiHTN    1) c/w Amlodipine 5  2) Get arm cuff for home BP measurements   Measure BP over 2 weeks and call back to clinic with results  3) Schedule appt with Optho re: blurry vision  4) F/U with Menopause Specialist    # Blistering, Vesicular Reymundo Hands in Bobbi-Digital space b/l  Paroxysmal Blackening of Skin  - Seen by derm in past for similar rash on leg  - Bx remarkable for "hypersensitivity reaction" only  - Denies any use of new detergents/shampoos/lotions  - No Lupus per Rheum    1) F/U with Derm    # White Vaginal D/C  - Pt has had before, says this is likely "yeast infection"  - Responded to Fluconazoe in the past    1) Start Fluconazole 150 x 1    # Oral Thrush  - Not very significant on exam  - Clinically does sound like thrush  - No harm in treating    1) Start Nystatin suspension, 5 ml swash + spit qid for 7 d    # General House-Keeping    - Has Mammo set up for tomorrow, 12/6/2022  - Refused HBV/COVID/Flu/PNA vax  - Will get CBC, BMP, TSH  - BMI and Depression F/U completed  - Follows with Therapist OP, will defer pharmacologic intervention for depression/anxiety to psych      Problem List Items Addressed This Visit    None      Immunizations and preventive care screenings were discussed with patient today  Appropriate education was printed on patient's after visit summary  Counseling:  · Exercise: the importance of regular exercise/physical activity was discussed  Recommend exercise 3-5 times per week for at least 30 minutes  BMI Counseling: Body mass index is 27 41 kg/m²  The BMI is above normal  Nutrition recommendations include decreasing portion sizes and moderation in carbohydrate intake  Exercise recommendations include exercising 3-5 times per week  No pharmacotherapy was ordered  Rationale for BMI follow-up plan is due to patient being overweight or obese  Tobacco Cessation Counseling: Tobacco cessation counseling was not provided  The patient is sincerely urged to quit consumption of tobacco  She is not ready to quit tobacco        BMI Counseling: Body mass index is 27 41 kg/m²  The BMI is above normal  Nutrition recommendations include reducing portion sizes, 3-5 servings of fruits/vegetables daily and moderation in carbohydrate intake  Depression Screening Follow-up Plan: Patient's depression screening was positive with a PHQ-2 score of   Their PHQ-9 score was    Continue regular follow-up with their psychologist/therapist/psychiatrist who is managing their mental health condition(s)  No follow-ups on file  Follow Up in Clinic: w/i 3-4 Months     Chief Complaint:     Chief Complaint   Patient presents with   • Physical Exam     Blurry vision, black spot  3 months      History of Present Illness:     Adult Annual Physical   Patient here for a comprehensive physical exam  The patient reports problems - blurry vision/black spots x 3 mo, hi BP, "lupus flare", yeast infection, thrush, dry/cracked hands which occasionally turn black  Miss Vera Sears is a 40 y/o woman with h/o mild asthma, alpha thalassemia trait, overweight BMI, anxiety, irregular menses/menorrhagia, 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, and stress incontinence who is here for an annual physical      Over the past year she had developed a hyperpigmented, round rash on her anterior upper L thigh  She was seen by dermatology who bx this rash, which resulted as a "hypersensitivity reaction"  She was also seen by Rheum for concerns of Lupus  Her ROME was on the lower-limits of positive, and other antibodies were negative  It has been determined that she does not have SLE  About a month prior to presentation she presented to the [facility] ED wih c/o elevated BP, HA, cough, nasal congestion x 1 week  It was found at this visit that her BP was 176/103 and she was sent home with Amlodipine 5  Her cough/wheeze resolved with home-dose albuterol  Today she presents with multiple issues  First, she has "hi BP" per home readings "range in the 190s-210s at times" which is associated with "head pounding" and "blurry vision w/ black spots"  She also c/o "sweating when laying down" and "poor energy"  When asked about her home BP measurements, she says she uses "a wrist cuff"  She is currently tolerating amlodipine 5 well  She has not seen ophthalmology for her visual issues  A recent check-in with OB/Gyn also found sBP in the 170s  She was referred to a post-menopause specialist "Cheri Goodman"      In regards to her skin rash, she says her fingers "turn black" at times with no apparent preceding events  She notices "blisters" that "ooze clear fluid"  She also says her skin is "dry and cracks" occasionally bleeding  She has tried moisturizers and triamcinolone per Derm without relief  In regards to her yeast infection, she says she has been noticing "white D/C" from her vagina w/o odor  She has taken Fluconazole in the past with good response/no adverse reaction  She also says she has "thrush" which she explains as "white, chalky" spots on her tongue that "bleed when scraped" and "don't go away with brushing"  She says this has been a/w a lost in taste/overwhelmingly powerful taste of chalk  Finally, she says she has been seen by ENT who found "cobble stones at the back of my throat"  Because of this, she was placed on omeprazole  However, omeprazole gives her an "allergic reaction" which she describes as "difficulty swallowing, and feeling like there are bugs crawling in my skin"  She is interested in trying alternative PPIs  Diet and Physical Activity  · Diet/Nutrition: well balanced diet, low calorie diet and low fat diet  · Exercise: no formal exercise  Depression Screening  PHQ-2/9 Depression Screening         General Health  · Sleep: sleeps poorly, gets 1-3 hours of sleep on average and experiences daytime hypersomnolence  · Hearing: normal - bilateral   · Vision: vision problems: some occasional blurrying and black spots  · Dental: regular dental visits  /GYN Health  · Patient is: perimenopausal  · Last menstrual period: 1 month ago  · Contraceptive method: None  Review of Systems:     Review of Systems   Constitutional: Positive for activity change, diaphoresis and fatigue  Negative for chills, fever and unexpected weight change  HENT: Negative for congestion, hearing loss, mouth sores, postnasal drip, sinus pressure and tinnitus      Eyes: Positive for visual disturbance (Blurry vision/ occasional black spots)  Respiratory: Negative for cough, chest tightness, shortness of breath and wheezing  Cardiovascular: Negative for chest pain, palpitations and leg swelling  Gastrointestinal: Negative for abdominal pain, blood in stool, diarrhea, nausea and vomiting  Genitourinary: Positive for vaginal discharge (white, non-odorous)  Negative for decreased urine volume, dysuria, genital sores, hematuria and vaginal pain  Musculoskeletal:        Dry, blistering rash in between digits in hands  "Get black some times"   Skin: Positive for color change and rash  Allergic/Immunologic: Negative for immunocompromised state  Neurological: Positive for dizziness and headaches  Negative for tremors and weakness  Hematological: Negative for adenopathy  Does not bruise/bleed easily  Psychiatric/Behavioral: Positive for behavioral problems and sleep disturbance  The patient is nervous/anxious  Past Medical History:     Past Medical History:   Diagnosis Date   • Abnormal mammogram of both breasts 10/30/2020   • Anemia    • Asthma    • Bilateral breast cysts     last assessed    Otis Little 19    resolved   11/3/17    • Cervical cancer, FIGO stage I (HCC)    • Constipation    • Seizures (HCC)    • URI (upper respiratory infection)     under additonal type - Chronic      Past Surgical History:     Past Surgical History:   Procedure Laterality Date   •  SECTION     • INGUINAL HERNIA REPAIR      last assessed   12/17/15     • OH COLONOSCOPY FLX DX W/COLLJ SPEC WHEN PFRMD N/A 9/15/2017    Procedure: COLONOSCOPY;  Surgeon: Jaime Cullen MD;  Location: [facility]; Service: Gastroenterology   • SKIN BIOPSY      last assessed        • UMBILICAL HERNIA REPAIR      last assessed   12/17/15    • URETHRAL DIVERTICULECTOMY      excision of urethral diverticulum    last assessed   12/17/15      Social History:     Social History     Socioeconomic History   • Marital status: Single     Spouse name: None   • Number of children: 5   • Years of education: None   • Highest education level: None   Occupational History     Comment: full time employment   Tobacco Use   • Smoking status: Every Day     Packs/day: 0 50     Types: Cigarettes   • Smokeless tobacco: Never   Vaping Use   • Vaping Use: Never used   Substance and Sexual Activity   • Alcohol use: Yes     Comment: every other day   • Drug use: Never   • Sexual activity: Not Currently     Partners: Male     Birth control/protection: None   Other Topics Concern   • None   Social History Narrative    ** Merged History Encounter **         Daily caffeine consumption 4-5 servings a day  Single per allscript     Social Determinants of Health     Financial Resource Strain: Low Risk    • Difficulty of Paying Living Expenses: Not hard at all   Food Insecurity: No Food Insecurity   • Worried About Running Out of Food in the Last Year: Never true   • Ran Out of Food in the Last Year: Never true   Transportation Needs: No Transportation Needs   • Lack of Transportation (Medical): No   • Lack of Transportation (Non-Medical):  No   Physical Activity: Not on file   Stress: Not on file   Social Connections: Not on file   Intimate Partner Violence: Not on file   Housing Stability: Low Risk    • Unable to Pay for Housing in the Last Year: No   • Number of Places Lived in the Last Year: 1   • Unstable Housing in the Last Year: No      Family History:     Family History   Problem Relation Age of Onset   • Hyperlipidemia Mother    • Hypertension Mother    • Breast cancer Mother         neoplasm/dx first age 39, mastectomy age 38, did have lymph biopsy positive with recurrance age around age 61   • Hyperlipidemia Maternal Grandmother    • Hypertension Maternal Grandmother    • Leukemia Maternal Grandmother       Current Medications:     Current Outpatient Medications   Medication Sig Dispense Refill   • amLODIPine (NORVASC) 5 mg tablet Take 1 tablet (5 mg total) by mouth daily 30 tablet 0   • clobetasol (TEMOVATE) 0 05 % ointment Apply topically 2 (two) times a day Apply to affected area on hands and shins twice a day for up to 14 days  Then stop  Then apply for flares up to 14 days at a time  Apply pea size amount to affected area on ear twice daily for up to 3 days, then stop  Avoid face, groin, axilla   (Patient not taking: Reported on 10/27/2022) 30 g 0   • Drospirenone 4 MG TABS Take 4 mg by mouth daily (Patient not taking: Reported on 8/5/2022) 30 tablet 3   • fluconazole (DIFLUCAN) 150 mg tablet Take 150 mg by mouth once (Patient not taking: Reported on 8/5/2022)     • fluticasone (FLONASE) 50 mcg/act nasal spray 1 spray into each nostril daily (Patient not taking: Reported on 10/27/2022) 16 g 0   • hydrOXYzine HCL (ATARAX) 25 mg tablet Take 1 tablet (25 mg total) by mouth 2 (two) times a day as needed for anxiety (Patient not taking: Reported on 8/5/2022) 30 tablet 1   • ketotifen (ZADITOR) 0 025 % ophthalmic solution Administer 1 drop to both eyes 2 (two) times a day as needed (itchy eyes) (Patient not taking: Reported on 8/5/2022) 5 mL 0   • loratadine (CLARITIN) 10 mg tablet Take 1 tablet (10 mg total) by mouth daily 90 tablet 1   • norethindrone (AYGESTIN) 5 mg tablet Take 1 tablet (5 mg total) by mouth daily for 10 days (Patient not taking: Reported on 9/2/2021) 10 tablet 0   • norethindrone (Ortho Micronor) 0 35 MG tablet Take 1 tablet (0 35 mg total) by mouth daily (Patient not taking: Reported on 12/6/2022) 28 tablet 6   • omeprazole (PriLOSEC) 40 MG capsule Take 1 capsule (40 mg total) by mouth daily (Patient not taking: Reported on 10/27/2022) 30 capsule 2   • polyethylene glycol (GOLYTELY) 4000 mL solution Take 4,000 mL by mouth once for 1 dose Take as directed by office (Patient not taking: Reported on 12/6/2022) 4000 mL 0   • polyethylene glycol (MIRALAX) 17 g packet Take 17 g by mouth daily (Patient not taking: Reported on 10/27/2022) 14 each 0 • simethicone (MYLICON,GAS-X) 617 MG CAPS Take 1 capsule (125 mg total) by mouth 3 (three) times daily after meals (Patient not taking: Reported on 10/27/2022) 28 capsule 1   • Symbicort 80-4 5 MCG/ACT inhaler inhale 2 puffs by mouth twice a day Rinse mouth after use (Patient not taking: Reported on 12/6/2022) 10 2 g 3   • triamcinolone (KENALOG) 0 1 % ointment Apply topically 2 (two) times a day (Patient not taking: Reported on 10/27/2022) 80 g 5   • triamcinolone (KENALOG) 0 1 % ointment Apply topically 2 (two) times a day Apply to affected areas  Avoid face, groin, axilla  (Patient not taking: Reported on 10/27/2022) 30 g 0     No current facility-administered medications for this visit  Allergies: Allergies   Allergen Reactions   • Percocet [Oxycodone-Acetaminophen]    • Doxycycline Hives, Myalgia and Rash     Category: Allergy; Physical Exam:     /85 (BP Location: Right arm, Patient Position: Sitting, Cuff Size: Large)   Pulse 85   Temp 98 °F (36 7 °C) (Temporal)   Ht 5' 7" (1 702 m)   Wt 79 4 kg (175 lb)   BMI 27 41 kg/m²     Physical Exam  Constitutional:       Appearance: Normal appearance  She is not ill-appearing or diaphoretic  HENT:      Head: Normocephalic and atraumatic  Nose: No congestion or rhinorrhea  Mouth/Throat:      Mouth: Mucous membranes are moist       Pharynx: Oropharynx is clear  No oropharyngeal exudate or posterior oropharyngeal erythema  Eyes:      General: Vision grossly intact  No scleral icterus  Conjunctiva/sclera: Conjunctivae normal       Pupils: Pupils are equal, round, and reactive to light  Funduscopic exam:     Right eye: No hemorrhage or AV nicking  Red reflex present  Left eye: No hemorrhage or AV nicking  Red reflex present  Neck:      Vascular: No carotid bruit  Cardiovascular:      Rate and Rhythm: Normal rate and regular rhythm  Pulses: Normal pulses  Heart sounds: Normal heart sounds   No murmur heard     No friction rub  No gallop  Pulmonary:      Effort: Pulmonary effort is normal  No respiratory distress  Breath sounds: No wheezing or rhonchi  Chest:      Chest wall: No tenderness  Abdominal:      General: Abdomen is flat  There is no distension  Tenderness: There is no abdominal tenderness  There is no guarding or rebound  Musculoskeletal:         General: No swelling, tenderness or signs of injury  Arms:       Cervical back: Normal range of motion and neck supple  No tenderness  Right lower leg: No edema  Left lower leg: No edema  Comments: Hands and Legs are dry to touch  Hands have cracks in the inter-digitary spaces b/l along with some vesicles on the anterior sides  Anterior portions of digits also hyper-pigmented   Skin:     General: Skin is warm and dry  Findings: Lesion and rash present  Comments: See MSK for Graph   Neurological:      Mental Status: She is alert            China Dean MD  [address]

## 2022-12-07 DIAGNOSIS — J45.20 MILD INTERMITTENT ASTHMA WITHOUT COMPLICATION: ICD-10-CM

## 2022-12-07 RX ORDER — ALBUTEROL SULFATE 90 UG/1
2 AEROSOL, METERED RESPIRATORY (INHALATION) EVERY 6 HOURS PRN
Qty: 8 G | Refills: 0 | OUTPATIENT
Start: 2022-12-07 | End: 2023-12-07

## 2022-12-07 NOTE — TELEPHONE ENCOUNTER
I haven't seen the patient yet  She most recently saw Dr Carlos A Vigil and Dr Jonna Snyder prior to that

## 2022-12-13 ENCOUNTER — HOSPITAL ENCOUNTER (OUTPATIENT)
Dept: GASTROENTEROLOGY | Facility: HOSPITAL | Age: 47
Setting detail: OUTPATIENT SURGERY
Discharge: HOME | End: 2022-12-13
Attending: INTERNAL MEDICINE

## 2022-12-13 ENCOUNTER — ANESTHESIA (OUTPATIENT)
Dept: GASTROENTEROLOGY | Facility: HOSPITAL | Age: 47
End: 2022-12-13

## 2022-12-13 ENCOUNTER — ANESTHESIA EVENT (OUTPATIENT)
Dept: GASTROENTEROLOGY | Facility: HOSPITAL | Age: 47
End: 2022-12-13

## 2022-12-13 VITALS
RESPIRATION RATE: 18 BRPM | OXYGEN SATURATION: 100 % | TEMPERATURE: 96.1 F | DIASTOLIC BLOOD PRESSURE: 74 MMHG | SYSTOLIC BLOOD PRESSURE: 122 MMHG | HEART RATE: 76 BPM | BODY MASS INDEX: 27.47 KG/M2 | HEIGHT: 67 IN | WEIGHT: 175 LBS

## 2022-12-13 DIAGNOSIS — Z86.010 HX OF COLONIC POLYP: ICD-10-CM

## 2022-12-13 LAB
EXT PREGNANCY TEST URINE: NEGATIVE
EXT. CONTROL: NORMAL

## 2022-12-13 RX ORDER — PROPOFOL 10 MG/ML
INJECTION, EMULSION INTRAVENOUS AS NEEDED
Status: DISCONTINUED | OUTPATIENT
Start: 2022-12-13 | End: 2022-12-13

## 2022-12-13 RX ORDER — ALBUTEROL SULFATE 90 UG/1
2 AEROSOL, METERED RESPIRATORY (INHALATION) EVERY 6 HOURS PRN
COMMUNITY
End: 2022-12-14 | Stop reason: SDUPTHER

## 2022-12-13 RX ORDER — SODIUM CHLORIDE 9 MG/ML
INJECTION, SOLUTION INTRAVENOUS CONTINUOUS PRN
Status: DISCONTINUED | OUTPATIENT
Start: 2022-12-13 | End: 2022-12-13

## 2022-12-13 RX ORDER — SODIUM CHLORIDE 9 MG/ML
125 INJECTION, SOLUTION INTRAVENOUS CONTINUOUS
Status: DISCONTINUED | OUTPATIENT
Start: 2022-12-13 | End: 2022-12-17 | Stop reason: HOSPADM

## 2022-12-13 RX ORDER — LIDOCAINE HYDROCHLORIDE 10 MG/ML
INJECTION, SOLUTION EPIDURAL; INFILTRATION; INTRACAUDAL; PERINEURAL AS NEEDED
Status: DISCONTINUED | OUTPATIENT
Start: 2022-12-13 | End: 2022-12-13

## 2022-12-13 RX ADMIN — PROPOFOL 50 MG: 10 INJECTION, EMULSION INTRAVENOUS at 11:34

## 2022-12-13 RX ADMIN — PROPOFOL 50 MG: 10 INJECTION, EMULSION INTRAVENOUS at 11:24

## 2022-12-13 RX ADMIN — PROPOFOL 50 MG: 10 INJECTION, EMULSION INTRAVENOUS at 11:19

## 2022-12-13 RX ADMIN — PROPOFOL 50 MG: 10 INJECTION, EMULSION INTRAVENOUS at 11:17

## 2022-12-13 RX ADMIN — LIDOCAINE HYDROCHLORIDE 50 MG: 10 INJECTION, SOLUTION EPIDURAL; INFILTRATION; INTRACAUDAL; PERINEURAL at 11:14

## 2022-12-13 RX ADMIN — PROPOFOL 50 MG: 10 INJECTION, EMULSION INTRAVENOUS at 11:27

## 2022-12-13 RX ADMIN — SODIUM CHLORIDE 125 ML/HR: 0.9 INJECTION, SOLUTION INTRAVENOUS at 10:58

## 2022-12-13 RX ADMIN — PROPOFOL 100 MG: 10 INJECTION, EMULSION INTRAVENOUS at 11:14

## 2022-12-13 RX ADMIN — SODIUM CHLORIDE: 0.9 INJECTION, SOLUTION INTRAVENOUS at 11:11

## 2022-12-13 RX ADMIN — PROPOFOL 50 MG: 10 INJECTION, EMULSION INTRAVENOUS at 11:21

## 2022-12-13 RX ADMIN — PROPOFOL 50 MG: 10 INJECTION, EMULSION INTRAVENOUS at 11:30

## 2022-12-13 NOTE — ANESTHESIA POSTPROCEDURE EVALUATION
Post-Op Assessment Note    CV Status:  Stable  Pain Score: 0    Pain management: adequate     Mental Status:  Sleepy, awake and arousable   Hydration Status:  Stable   PONV Controlled:  None   Airway Patency:  Patent and adequate      Post Op Vitals Reviewed: Yes      Staff: Anesthesiologist, CRNA   Comments: Arouses easily in recovery to voice  Vital signs stable  No notable events documented      /67 (12/13/22 1141)    Temp (!) 96 1 °F (35 6 °C) (12/13/22 1141)    Pulse 64 (12/13/22 1141)   Resp 18 (12/13/22 1141)    SpO2 98 % (12/13/22 1141)

## 2022-12-13 NOTE — ANESTHESIA PREPROCEDURE EVALUATION
Procedure:  COLONOSCOPY    Relevant Problems   CARDIO   (+) Hypertension      GI/HEPATIC   (+) Gastroesophageal reflux disease      HEMATOLOGY   (+) Thalassemia trait, alpha      NEURO/PSYCH   (+) Anxiety      PULMONARY   (+) Mild intermittent asthma without complication      Other   (+) Current every day smoker      Seizures: last seizure was 2 years ago    Asthma: exacerbated with URIs and seasonal changes    Physical Exam    Airway    Mallampati score: II  TM Distance: >3 FB  Neck ROM: full     Dental   No notable dental hx     Cardiovascular      Pulmonary      Other Findings        Anesthesia Plan  ASA Score- 2     Anesthesia Type- IV sedation with anesthesia with ASA Monitors  Additional Monitors:   Airway Plan:     Comment: Urine hcg = negative    Last of PO bowel prep: 03:00    Used inhaler this morning and symbicort this morning    Patient educated on the possibility of recall and conversion to [place] in an emergency  Plan Factors-Exercise tolerance (METS): >4 METS  Chart reviewed  Patient summary reviewed  Patient is a current smoker  Patient instructed to abstain from smoking on day of procedure  Patient did not smoke on day of surgery  Induction- intravenous  Postoperative Plan-     Informed Consent- Anesthetic plan and risks discussed with patient  I personally reviewed this patient with the CRNA  Discussed and agreed on the Anesthesia Plan with the VITO Campbell

## 2022-12-14 DIAGNOSIS — J45.20 MILD INTERMITTENT ASTHMA WITHOUT COMPLICATION: Primary | ICD-10-CM

## 2022-12-14 RX ORDER — ALBUTEROL SULFATE 90 UG/1
2 AEROSOL, METERED RESPIRATORY (INHALATION) EVERY 6 HOURS PRN
Qty: 8.5 G | Refills: 3 | Status: CANCELLED | OUTPATIENT
Start: 2022-12-14

## 2022-12-14 RX ORDER — ALBUTEROL SULFATE 90 UG/1
2 AEROSOL, METERED RESPIRATORY (INHALATION) EVERY 6 HOURS PRN
Qty: 8.5 G | Refills: 3 | Status: SHIPPED | OUTPATIENT
Start: 2022-12-14 | End: 2023-03-06 | Stop reason: SDUPTHER

## 2022-12-14 NOTE — TELEPHONE ENCOUNTER
Patient called for the status  -- advised patient that the refill will be sent shortly since the PCP just received the request at 11:06am 12/14/22 --     Patient said thank you

## 2022-12-20 ENCOUNTER — NURSE TRIAGE (OUTPATIENT)
Dept: OTHER | Facility: OTHER | Age: 47
End: 2022-12-20

## 2022-12-21 ENCOUNTER — HOSPITAL ENCOUNTER (OUTPATIENT)
Dept: RADIOLOGY | Facility: HOSPITAL | Age: 47
Discharge: HOME | End: 2022-12-21

## 2022-12-21 ENCOUNTER — TELEPHONE (OUTPATIENT)
Dept: GASTROENTEROLOGY | Facility: CLINIC | Age: 47
End: 2022-12-21

## 2022-12-21 DIAGNOSIS — R10.32 LEFT LOWER QUADRANT ABDOMINAL PAIN: ICD-10-CM

## 2022-12-21 DIAGNOSIS — R10.32 LEFT LOWER QUADRANT ABDOMINAL PAIN: Primary | ICD-10-CM

## 2022-12-21 RX ADMIN — IOHEXOL 100 ML: 350 INJECTION, SOLUTION INTRAVENOUS at 10:14

## 2022-12-21 NOTE — TELEPHONE ENCOUNTER
Pt brother Stanley called, would like to be updated with more information when available. [phone]   s/p colonoscopy on 12/13  c/o intermittent severe LLQ abdominal pain, RL back pain, abdominal distention, tightness, and mild rectal bleed (only when wiping), fatigue, and hard stool since 12/13  No additional symptoms  On call Provider contacted and informed of patient’s concerns and status  Provider advises as follow: If symptoms are worsening recommend either outpatient CT or she can go to ED  Whichever she prefers  Patient prefers CT scan  Provider will order CT scan  Patient informed of provider’s recommendation, along with care advice  Verbalized understanding and agreeable with disposition  No further questions

## 2022-12-21 NOTE — TELEPHONE ENCOUNTER
Regarding: post colonscopy/ constipation/ blood in stool  ----- Message from Maryuri Gavin sent at 12/20/2022 11:41 PM EST -----  Pt called, " I had a colonoscopy 2 weeks ago and I am really constipated  I took gas-x and miralax and I still feel constipated  I had a bowel movement and there was blood with my stool  The blood was not a bright red, it was more from straining  There was also blood when I wiped   My stomach is very tight and bloated "

## 2022-12-21 NOTE — TELEPHONE ENCOUNTER
Reason for Disposition  • Any rectal bleeding occurring > 24 hours after colonoscopy  • SEVERE abdomen pain (e g , excruciating)    Answer Assessment - Initial Assessment Questions  1  DATE/TIME: "When did you have your colonoscopy?"       12/13  2  MAIN CONCERN: "What is your main concern right now?" "What questions do you have?"      Mild rectal bleed   3  ABDOMEN PAIN: "Are you having any abdomen (belly or stomach) pain?" If Yes, ask: "How bad is it?" (e g , Scale 1-10; mild, moderate, severe)  - MILD (1-3): doesn't interfere with normal activities, abdomen soft and not tender to touch      - MODERATE (4-7): interferes with normal activities or awakens from sleep, tender to touch      - SEVERE (8-10): excruciating pain, doubled over, unable to do any normal activities       "tight" Severe LLQ pain and RL back intermittent    4  OTHER SYMPTOMS: "What other symptoms are you having?" (e g , rectal bleeding, bloating or feeling gassy, passing gas, vomiting, dizziness, fever)  Bloating  Hard stool "pellets" since 12/13  5  ONSET: "When did your symptoms start?"    12/20  6   PATTERN: "Is the symptom(s) constant or does it come and go?" "Is your symptom(s) getting worse, better, or staying the same?"     Intermittent    Protocols used: COLONOSCOPY SYMPTOMS AND QUESTIONS-ADULT-AH

## 2022-12-21 NOTE — TELEPHONE ENCOUNTER
Was reached out by triage nurse regarding patient having severe LLQ abdominal pain, distention and tightness after her colonoscopy that has progressively been getting worse  Her colonoscopy showed a tortuous colon with extrinsic compression most notably in the cecum, otherwise normal colonoscopy  She will need a CT abdomen with iv contrast for further evaluation  I offered to order this as an outpatient on an urgent basis vs having it done in the ED  She prefers to do it as an outpatient, so I will place the order for her and she will get it done tomorrow       Alin Levy MD  Gastroenterology Fellow
(0) Absent

## 2022-12-22 ENCOUNTER — TELEPHONE (OUTPATIENT)
Dept: OBGYN CLINIC | Facility: CLINIC | Age: 47
End: 2022-12-22

## 2022-12-22 NOTE — TELEPHONE ENCOUNTER
Returned pt's call from yesterday  Pt would like Dr Eveleen Sandifer to look at recent CT scan advise , also would like a call when US results are received  US is scheduled for 12/23/22

## 2022-12-26 PROBLEM — Z01.419 WELL WOMAN EXAM WITH ROUTINE GYNECOLOGICAL EXAM: Status: RESOLVED | Noted: 2022-10-27 | Resolved: 2022-12-26

## 2023-01-03 ENCOUNTER — TELEMEDICINE (OUTPATIENT)
Dept: OBGYN CLINIC | Facility: CLINIC | Age: 48
End: 2023-01-03

## 2023-01-03 DIAGNOSIS — F32.A ANXIETY AND DEPRESSION: ICD-10-CM

## 2023-01-03 DIAGNOSIS — F41.9 ANXIETY AND DEPRESSION: ICD-10-CM

## 2023-01-03 DIAGNOSIS — N95.1 PERIMENOPAUSAL VASOMOTOR SYMPTOMS: Primary | ICD-10-CM

## 2023-01-03 DIAGNOSIS — D25.9 UTERINE LEIOMYOMA, UNSPECIFIED LOCATION: ICD-10-CM

## 2023-01-03 RX ORDER — RELUGOLIX, ESTRADIOL HEMIHYDRATE, AND NORETHINDRONE ACETATE 40; 1; .5 MG/1; MG/1; MG/1
40 TABLET, FILM COATED ORAL DAILY
Qty: 30 TABLET | Refills: 5 | Status: SHIPPED | OUTPATIENT
Start: 2023-01-03 | End: 2023-01-05

## 2023-01-03 RX ORDER — PAROXETINE 10 MG/1
10 TABLET, FILM COATED ORAL DAILY
Qty: 30 TABLET | Refills: 11 | Status: SHIPPED | OUTPATIENT
Start: 2023-01-03

## 2023-01-03 NOTE — LETTER
January 3, 2023     Galo Stephentos, [id number]    Patient: Chivo Pink   YOB: 1975   Date of Visit: 1/3/2023       Dear Dr Jenniffer Dickerson: Thank you for referring Aron Betancur to me for evaluation  Below are my notes for this consultation  If you have questions, please do not hesitate to call me  I look forward to following your patient along with you  Sincerely,        Socorro Clarke MD        CC: MD Socorro Rodriguez MD  1/3/2023 11:08 AM  Sign when Signing Visit    Virtual Regular Visit    Verification of patient location:    Patient is located in the following state in which I hold an active license [place]      Assessment/Plan:    Problem List Items Addressed This Visit        Other    Anxiety    Relevant Medications    PARoxetine (PAXIL) 10 mg tablet   Other Visit Diagnoses     Perimenopausal vasomotor symptoms    -  Primary    Uterine leiomyoma, unspecified location        Relevant Medications    Relugolix-Estradiol-Norethind (Myfembree) 40-1-0 5 MG TABS        1) This was a lengthy visit spent discussing the physiology of the menopausal transition, including the estrogen dominance associated with the early phases  This results in endometrial hyperplasia, uterine fibroid growth, menorrhagia, AUB, breast denseness and cysts, profound mood swings and hot flashes when hormonal levels plummet  She has all of these very classic signs  Reminded her the average time length of the perimenopause is 2-7 years! 2) The easiest way of achieving menstrual regulation as well as hormonal stabilization is with combination low dose OCP  This is not an option given her smoking status as well as HTN history  Depo Provera not an option due to history of weight gain with this  IUD will afford menstrual stabilization, but not stabilization of hormonal fluctuation  Progestin only pills have so far been ineffective     3) My recommendation for control of bleeding/fibroid growth and hormonal symptoms would ideally be Myfembree, the new relogolix/estradiol/norethindrone pill  This is the ONLY oral pill that would accomplish both fronts, and is NOT CONTRAINDICATED in smokers or patients with HTN  Lupron injections with addback therapy would also suffice, but she will be extremely symptomatic unless estradiol added back into the mix  I will try to get this covered, prior authorization needed  4) IN the meantime, she is to stay on the Micronor  I offered herbal supplements ( black cohosh, Estroven, Remifemin, etc) or my preference for antidepressant, which she prefers  Paxil is actually an excellent choice and has been used in lower dose forms for menopausal vasomotor symptoms  Paxil 10 mg daily issued, follow up with mental health practitioner  5) She will email me with progress report in 2 weeks, after she has seen in mental health, and hopefully I will have news about new medication coverage  This was a 50 minute visit with greater than 50% of time spent in face to face counseling and coordination of care       Reason for visit is   Chief Complaint   Patient presents with   • Virtual Regular Visit        Encounter provider Connie Au MD    Provider located at [facility] [place] [address]  [phone]      Recent Visits  No visits were found meeting these conditions  Showing recent visits within past 7 days and meeting all other requirements  Future Appointments  No visits were found meeting these conditions  Showing future appointments within next 150 days and meeting all other requirements       The patient was identified by name and date of birth  Kevin Wharton was informed that this is a telemedicine visit and that the visit is being conducted through the [employer]   She agrees to proceed     My office door was closed  No one else was in the room  She acknowledged consent and understanding of privacy and security of the video platform  The patient has agreed to participate and understands they can discontinue the visit at any time  Patient is aware this is a billable service  Subjective      Genevieve presents for hormonal consult, her first visit with me; referred by Bridget Morales and Moarima Rosado  Essie complains of:  1) Hot flashes and night sweats, that leaves her "submerged in sweat and her bed soaked"  This occurs virtually nightly, for past 2-3 months  2) Depression and anxiety  Has been on Paxil in past with good results, has upcoming appt with mental health therapist  Mood swings are more profound  Hx of suicidal attempt in past, denies SI now  3) Irregular periods, every 1-2 months, some very heavy with clots ( last one lasted 20 days!), some are light only lasting 3 days  Has had long history of iron deficiency anemia  Pelvic CT last month revealed at least 2 fibroids, 3 8cm and 2 6 cm in greatest diameter  Attempts by gyn to staunch flow include Micronor POP which was just started a few weeks ago  Insurance denied drosperinone only pill and norethindrone 5 mg doses  Has done Depo Provera in the past but gained #120 lb in one year, so refuses this medication  Previously not on contraception, NFP? For contraception  4) Light headedness  Recently started on HTN meds ( was 176/103) and feels much better on medication  PMHX: hx of asthma,allergies, HTN as above  FHX: Mom with HTN, asthma, breast CA dx age 39, but COD MVA at age 46  MGM with leukemia, MGF unknown  Dad COD by murder, PGP unknown  Siblings with HTN, sister with AMI age 22, HTN   5 kids ages: 28/26/21/17/11, asthma and allergies, stomach issues          Past Medical History:   Diagnosis Date   • Abdominal pain    • Abnormal mammogram of both breasts 10/30/2020   • Anemia    • Asthma    • Bilateral breast cysts last assessed    Joe Velascoton 19    resolved   11/3/17    • Cervical cancer, FIGO stage I (HCC)    • Colon polyp    • Constipation    • Diarrhea    • Seizures (HCC)    • URI (upper respiratory infection)     under additonal type - Chronic       Past Surgical History:   Procedure Laterality Date   •  SECTION     • INGUINAL HERNIA REPAIR      last assessed   12/17/15     • [place] COLONOSCOPY FLX DX W/COLLJ SPEC WHEN PFRMD N/A 9/15/2017    Procedure: COLONOSCOPY;  Surgeon: Ruby Davis MD;  Location: [facility]; Service: Gastroenterology   • SKIN BIOPSY      last assessed        • UMBILICAL HERNIA REPAIR      last assessed   12/17/15    • URETHRAL DIVERTICULECTOMY      excision of urethral diverticulum    last assessed   12/17/15       Current Outpatient Medications   Medication Sig Dispense Refill   • PARoxetine (PAXIL) 10 mg tablet Take 1 tablet (10 mg total) by mouth daily 30 tablet 11   • Relugolix-Estradiol-Norethind (Myfembree) 40-1-0 5 MG TABS Take 40 mg by mouth in the morning 30 tablet 5   • albuterol (PROVENTIL HFA,VENTOLIN HFA) 90 mcg/act inhaler Inhale 2 puffs every 6 (six) hours as needed for wheezing 8 5 g 3   • amLODIPine (NORVASC) 5 mg tablet Take 1 tablet (5 mg total) by mouth daily 30 tablet 3   • clobetasol (TEMOVATE) 0 05 % ointment Apply topically 2 (two) times a day Apply to affected area on hands and shins twice a day for up to 14 days  Then stop  Then apply for flares up to 14 days at a time  Apply pea size amount to affected area on ear twice daily for up to 3 days, then stop  Avoid face, groin, axilla   (Patient not taking: Reported on 10/27/2022) 30 g 0   • esomeprazole (NexIUM) 20 mg capsule Take 1 capsule (20 mg total) by mouth in the morning 30 capsule 0   • fluticasone (FLONASE) 50 mcg/act nasal spray 1 spray into each nostril daily (Patient not taking: Reported on 10/27/2022) 16 g 0   • hydrOXYzine HCL (ATARAX) 25 mg tablet Take 1 tablet (25 mg total) by mouth 2 (two) times a day as needed for anxiety (Patient not taking: Reported on 8/5/2022) 30 tablet 1   • ketotifen (ZADITOR) 0 025 % ophthalmic solution Administer 1 drop to both eyes 2 (two) times a day as needed (itchy eyes) (Patient not taking: Reported on 8/5/2022) 5 mL 0   • loratadine (CLARITIN) 10 mg tablet Take 1 tablet (10 mg total) by mouth daily 90 tablet 1   • nystatin (MYCOSTATIN) 500,000 units/5 mL suspension Apply 5 mL (500,000 Units total) to the mouth or throat 4 (four) times a day 60 mL 0   • polyethylene glycol (GOLYTELY) 4000 mL solution Take 4,000 mL by mouth once for 1 dose Take as directed by office (Patient not taking: Reported on 12/6/2022) 4000 mL 0   • polyethylene glycol (MIRALAX) 17 g packet Take 17 g by mouth daily (Patient not taking: Reported on 10/27/2022) 14 each 0   • simethicone (MYLICON,GAS-X) 114 MG CAPS Take 1 capsule (125 mg total) by mouth 3 (three) times daily after meals (Patient not taking: Reported on 10/27/2022) 28 capsule 1   • Symbicort 80-4 5 MCG/ACT inhaler inhale 2 puffs by mouth twice a day Rinse mouth after use 10 2 g 3   • triamcinolone (KENALOG) 0 1 % ointment Apply topically 2 (two) times a day Apply to affected areas  Avoid face, groin, axilla  (Patient not taking: Reported on 10/27/2022) 30 g 0     No current facility-administered medications for this visit  Allergies   Allergen Reactions   • Percocet [Oxycodone-Acetaminophen]    • Doxycycline Hives, Myalgia and Rash     Category: Allergy; Review of Systems   Constitutional: Positive for appetite change and fatigue  Negative for activity change and unexpected weight change  Respiratory: Positive for wheezing  Cardiovascular: Negative  Endocrine: Positive for heat intolerance  Genitourinary: Positive for menstrual problem  Neurological: Positive for light-headedness  Psychiatric/Behavioral: Positive for dysphoric mood and sleep disturbance  Negative for suicidal ideas   The patient is nervous/anxious  Video Exam    There were no vitals filed for this visit      Physical Exam

## 2023-01-03 NOTE — PROGRESS NOTES
Virtual Regular Visit    Verification of patient location:    Patient is located in the following state in which I hold an active license [place]      Assessment/Plan:    Problem List Items Addressed This Visit        Other    Anxiety    Relevant Medications    PARoxetine (PAXIL) 10 mg tablet   Other Visit Diagnoses     Perimenopausal vasomotor symptoms    -  Primary    Uterine leiomyoma, unspecified location        Relevant Medications    Relugolix-Estradiol-Norethind (Myfembree) 40-1-0 5 MG TABS        1) This was a lengthy visit spent discussing the physiology of the menopausal transition, including the estrogen dominance associated with the early phases  This results in endometrial hyperplasia, uterine fibroid growth, menorrhagia, AUB, breast denseness and cysts, profound mood swings and hot flashes when hormonal levels plummet  She has all of these very classic signs  Reminded her the average time length of the perimenopause is 2-7 years! 2) The easiest way of achieving menstrual regulation as well as hormonal stabilization is with combination low dose OCP  This is not an option given her smoking status as well as HTN history  Depo Provera not an option due to history of weight gain with this  IUD will afford menstrual stabilization, but not stabilization of hormonal fluctuation  Progestin only pills have so far been ineffective  3) My recommendation for control of bleeding/fibroid growth and hormonal symptoms would ideally be Myfembree, the new relogolix/estradiol/norethindrone pill  This is the ONLY oral pill that would accomplish both fronts, and is NOT CONTRAINDICATED in smokers or patients with HTN  Lupron injections with addback therapy would also suffice, but she will be extremely symptomatic unless estradiol added back into the mix  I will try to get this covered, prior authorization needed  4) IN the meantime, she is to stay on the Micronor   I offered herbal supplements ( black cohosh, Estroven, Remifemin, etc) or my preference for antidepressant, which she prefers  Paxil is actually an excellent choice and has been used in lower dose forms for menopausal vasomotor symptoms  Paxil 10 mg daily issued, follow up with mental health practitioner  5) She will email me with progress report in 2 weeks, after she has seen in mental health, and hopefully I will have news about new medication coverage  This was a 50 minute visit with greater than 50% of time spent in face to face counseling and coordination of care       Reason for visit is   Chief Complaint   Patient presents with   • Virtual Regular Visit        Encounter provider Silvio Gunderson MD    Provider located at [facility] [address] [place] [address]  [phone]      Recent Visits  No visits were found meeting these conditions  Showing recent visits within past 7 days and meeting all other requirements  Future Appointments  No visits were found meeting these conditions  Showing future appointments within next 150 days and meeting all other requirements       The patient was identified by name and date of birth  Jess Davis was informed that this is a telemedicine visit and that the visit is being conducted through the [employer]  She agrees to proceed     My office door was closed  No one else was in the room  She acknowledged consent and understanding of privacy and security of the video platform  The patient has agreed to participate and understands they can discontinue the visit at any time  Patient is aware this is a billable service  Fl[place]      Genevieve presents for hormonal consult, her first visit with me; referred by Bridget Cabrera and Krys Glaser  Essie complains of:  1) Hot flashes and night sweats, that leaves her "submerged in sweat and her bed soaked"   This occurs virtually nightly, for past 2-3 months  2) Depression and anxiety  Has been on Paxil in past with good results, has upcoming appt with mental health therapist  Mood swings are more profound  Hx of suicidal attempt in past, denies SI now  3) Irregular periods, every 1-2 months, some very heavy with clots ( last one lasted 20 days!), some are light only lasting 3 days  Has had long history of iron deficiency anemia  Pelvic CT last month revealed at least 2 fibroids, 3 8cm and 2 6 cm in greatest diameter  Attempts by gyn to staunch flow include Micronor POP which was just started a few weeks ago  Insurance denied drosperinone only pill and norethindrone 5 mg doses  Has done Depo Provera in the past but gained #120 lb in one year, so refuses this medication  Previously not on contraception, NFP? For contraception  4) Light headedness  Recently started on HTN meds ( was 176/103) and feels much better on medication  PMHX: hx of asthma,allergies, HTN as above  FHX: Mom with HTN, asthma, breast CA dx age 39, but COD MVA at age 46  MGM with leukemia, MGF unknown  Dad COD by murder, PGP unknown  Siblings with HTN, sister with AMI age 22, HTN   5 kids ages: 28/26//, asthma and allergies, stomach issues  Past Medical History:   Diagnosis Date   • Abdominal pain    • Abnormal mammogram of both breasts 10/30/2020   • Anemia    • Asthma    • Bilateral breast cysts     last assessed    Cecilia Brooks Jurist 19    resolved   11/3/17    • Cervical cancer, FIGO stage I (HCC)    • Colon polyp    • Constipation    • Diarrhea    • Seizures (HCC)    • URI (upper respiratory infection)     under additonal type - Chronic       Past Surgical History:   Procedure Laterality Date   •  SECTION     • INGUINAL HERNIA REPAIR      last assessed   12/17/15     • [place] COLONOSCOPY FLX DX W/COLLJ SPEC WHEN PFRMD N/A 9/15/2017    Procedure: COLONOSCOPY;  Surgeon: Harrold Buerger, MD;  Location: [facility];   Service: Gastroenterology   • SKIN BIOPSY      last assessed   73/50/00     • UMBILICAL HERNIA REPAIR      last assessed   12/17/15    • URETHRAL DIVERTICULECTOMY      excision of urethral diverticulum    last assessed   12/17/15       Current Outpatient Medications   Medication Sig Dispense Refill   • PARoxetine (PAXIL) 10 mg tablet Take 1 tablet (10 mg total) by mouth daily 30 tablet 11   • Relugolix-Estradiol-Norethind (Myfembree) 40-1-0 5 MG TABS Take 40 mg by mouth in the morning 30 tablet 5   • albuterol (PROVENTIL HFA,VENTOLIN HFA) 90 mcg/act inhaler Inhale 2 puffs every 6 (six) hours as needed for wheezing 8 5 g 3   • amLODIPine (NORVASC) 5 mg tablet Take 1 tablet (5 mg total) by mouth daily 30 tablet 3   • clobetasol (TEMOVATE) 0 05 % ointment Apply topically 2 (two) times a day Apply to affected area on hands and shins twice a day for up to 14 days  Then stop  Then apply for flares up to 14 days at a time  Apply pea size amount to affected area on ear twice daily for up to 3 days, then stop  Avoid face, groin, axilla   (Patient not taking: Reported on 10/27/2022) 30 g 0   • esomeprazole (NexIUM) 20 mg capsule Take 1 capsule (20 mg total) by mouth in the morning 30 capsule 0   • fluticasone (FLONASE) 50 mcg/act nasal spray 1 spray into each nostril daily (Patient not taking: Reported on 10/27/2022) 16 g 0   • hydrOXYzine HCL (ATARAX) 25 mg tablet Take 1 tablet (25 mg total) by mouth 2 (two) times a day as needed for anxiety (Patient not taking: Reported on 8/5/2022) 30 tablet 1   • ketotifen (ZADITOR) 0 025 % ophthalmic solution Administer 1 drop to both eyes 2 (two) times a day as needed (itchy eyes) (Patient not taking: Reported on 8/5/2022) 5 mL 0   • loratadine (CLARITIN) 10 mg tablet Take 1 tablet (10 mg total) by mouth daily 90 tablet 1   • nystatin (MYCOSTATIN) 500,000 units/5 mL suspension Apply 5 mL (500,000 Units total) to the mouth or throat 4 (four) times a day 60 mL 0   • polyethylene glycol (GOLYTELY) 4000 mL solution Take 4,000 mL by mouth once for 1 dose Take as directed by office (Patient not taking: Reported on 12/6/2022) 4000 mL 0   • polyethylene glycol (MIRALAX) 17 g packet Take 17 g by mouth daily (Patient not taking: Reported on 10/27/2022) 14 each 0   • simethicone (MYLICON,GAS-X) 809 MG CAPS Take 1 capsule (125 mg total) by mouth 3 (three) times daily after meals (Patient not taking: Reported on 10/27/2022) 28 capsule 1   • Symbicort 80-4 5 MCG/ACT inhaler inhale 2 puffs by mouth twice a day Rinse mouth after use 10 2 g 3   • triamcinolone (KENALOG) 0 1 % ointment Apply topically 2 (two) times a day Apply to affected areas  Avoid face, groin, axilla  (Patient not taking: Reported on 10/27/2022) 30 g 0     No current facility-administered medications for this visit  Allergies   Allergen Reactions   • Percocet [Oxycodone-Acetaminophen]    • Doxycycline Hives, Myalgia and Rash     Category: Allergy; Review of Systems   Constitutional: Positive for appetite change and fatigue  Negative for activity change and unexpected weight change  Respiratory: Positive for wheezing  Cardiovascular: Negative  Endocrine: Positive for heat intolerance  Genitourinary: Positive for menstrual problem  Neurological: Positive for light-headedness  Psychiatric/Behavioral: Positive for dysphoric mood and sleep disturbance  Negative for suicidal ideas  The patient is nervous/anxious  Video Exam    There were no vitals filed for this visit      Physical Exam

## 2023-01-05 ENCOUNTER — TELEPHONE (OUTPATIENT)
Dept: OBGYN CLINIC | Facility: CLINIC | Age: 48
End: 2023-01-05

## 2023-01-05 DIAGNOSIS — D25.9 UTERINE LEIOMYOMA, UNSPECIFIED LOCATION: Primary | ICD-10-CM

## 2023-01-05 NOTE — TELEPHONE ENCOUNTER
Thanks for this message  I sent in the other medication, Camilo Mendez, that they said I needed to try first  Guess what, this needs prior auth too   I guess just send them my same note  [facility]

## 2023-01-19 ENCOUNTER — HOSPITAL ENCOUNTER (EMERGENCY)
Facility: HOSPITAL | Age: 48
Discharge: HOME | End: 2023-01-19
Attending: EMERGENCY MEDICINE

## 2023-01-19 VITALS
RESPIRATION RATE: 18 BRPM | DIASTOLIC BLOOD PRESSURE: 100 MMHG | SYSTOLIC BLOOD PRESSURE: 154 MMHG | OXYGEN SATURATION: 100 % | HEART RATE: 79 BPM | TEMPERATURE: 98.3 F

## 2023-01-19 DIAGNOSIS — B34.9 VIRAL SYNDROME: Primary | ICD-10-CM

## 2023-01-19 LAB
FLUAV RNA RESP QL NAA+PROBE: NEGATIVE
FLUBV RNA RESP QL NAA+PROBE: NEGATIVE
RSV RNA RESP QL NAA+PROBE: NEGATIVE
SARS-COV-2 RNA RESP QL NAA+PROBE: NEGATIVE

## 2023-01-19 RX ORDER — IBUPROFEN 400 MG/1
400 TABLET ORAL ONCE
Status: COMPLETED | OUTPATIENT
Start: 2023-01-19 | End: 2023-01-19

## 2023-01-19 RX ORDER — OXYMETAZOLINE HYDROCHLORIDE 0.05 G/100ML
2 SPRAY NASAL ONCE
Status: COMPLETED | OUTPATIENT
Start: 2023-01-19 | End: 2023-01-19

## 2023-01-19 RX ORDER — LORATADINE 10 MG/1
10 TABLET ORAL ONCE
Status: DISCONTINUED | OUTPATIENT
Start: 2023-01-19 | End: 2023-01-19 | Stop reason: HOSPADM

## 2023-01-19 RX ORDER — ACETAMINOPHEN 325 MG/1
650 TABLET ORAL ONCE
Status: COMPLETED | OUTPATIENT
Start: 2023-01-19 | End: 2023-01-19

## 2023-01-19 RX ORDER — LORATADINE 10 MG/1
10 TABLET ORAL DAILY
Qty: 20 TABLET | Refills: 0 | Status: SHIPPED | OUTPATIENT
Start: 2023-01-19

## 2023-01-19 RX ADMIN — ACETAMINOPHEN 650 MG: 325 TABLET, FILM COATED ORAL at 17:01

## 2023-01-19 RX ADMIN — IBUPROFEN 400 MG: 400 TABLET ORAL at 17:01

## 2023-01-19 RX ADMIN — OXYMETAZOLINE HYDROCHLORIDE 2 SPRAY: 0.05 SPRAY NASAL at 17:01

## 2023-01-19 NOTE — ED PROVIDER NOTES
History  Chief Complaint   Patient presents with   • Earache     Pt c/o R sided ear pain and throat pain for three days, denies fevers      53-year-old female with no pertinent past medical history presenting to the emergency department due to an earache and sore throat  Patient notes that her daughter became ill with sore throat and fevers with intermittent cough on Friday  Patient also attending [facility] and notes multiple people that have been sick as well  She notes that her pain in her throat seems to go from the right side to the left side intermittently  She is still tolerated p o  intake  In the past 24 to 48 hours patient has started to develop a pain in the right ear  This is progressively worsened  Patient notes that it is difficult to sleep due to the pain and she is concerned that there is either a bacterial process or foreign body  Of note patient also being treated for a potential eye infection  Prior to Admission Medications   Prescriptions Last Dose Informant Patient Reported? Taking? Elagolix-Estrad-Noreth & Elago 300-1-0 5 & 300 MG CPPK   No No   Sig: Take one pill by mouth twice daily as directed   PARoxetine (PAXIL) 10 mg tablet   No No   Sig: Take 1 tablet (10 mg total) by mouth daily   Symbicort 80-4 5 MCG/ACT inhaler   No No   Sig: inhale 2 puffs by mouth twice a day Rinse mouth after use   albuterol (PROVENTIL HFA,VENTOLIN HFA) 90 mcg/act inhaler   No No   Sig: Inhale 2 puffs every 6 (six) hours as needed for wheezing   amLODIPine (NORVASC) 5 mg tablet   No No   Sig: Take 1 tablet (5 mg total) by mouth daily   clobetasol (TEMOVATE) 0 05 % ointment   No No   Sig: Apply topically 2 (two) times a day Apply to affected area on hands and shins twice a day for up to 14 days  Then stop  Then apply for flares up to 14 days at a time  Apply pea size amount to affected area on ear twice daily for up to 3 days, then stop  Avoid face, groin, axilla     Patient not taking: Reported on 10/27/2022   esomeprazole (NexIUM) 20 mg capsule   No No   Sig: Take 1 capsule (20 mg total) by mouth in the morning   fluticasone (FLONASE) 50 mcg/act nasal spray   No No   Si spray into each nostril daily   Patient not taking: Reported on 10/27/2022   hydrOXYzine HCL (ATARAX) 25 mg tablet   No No   Sig: Take 1 tablet (25 mg total) by mouth 2 (two) times a day as needed for anxiety   Patient not taking: Reported on 2022   ketotifen (ZADITOR) 0 025 % ophthalmic solution   No No   Sig: Administer 1 drop to both eyes 2 (two) times a day as needed (itchy eyes)   Patient not taking: Reported on 2022   loratadine (CLARITIN) 10 mg tablet   No No   Sig: Take 1 tablet (10 mg total) by mouth daily   nystatin (MYCOSTATIN) 500,000 units/5 mL suspension   No No   Sig: Apply 5 mL (500,000 Units total) to the mouth or throat 4 (four) times a day   polyethylene glycol (GOLYTELY) 4000 mL solution   No No   Sig: Take 4,000 mL by mouth once for 1 dose Take as directed by office   Patient not taking: Reported on 2022   polyethylene glycol (MIRALAX) 17 g packet   No No   Sig: Take 17 g by mouth daily   Patient not taking: Reported on 10/27/2022   simethicone (MYLICON,GAS-X) 406 MG CAPS   No No   Sig: Take 1 capsule (125 mg total) by mouth 3 (three) times daily after meals   Patient not taking: Reported on 10/27/2022   triamcinolone (KENALOG) 0 1 % ointment   No No   Sig: Apply topically 2 (two) times a day Apply to affected areas  Avoid face, groin, axilla  Patient not taking: Reported on 10/27/2022      Facility-Administered Medications: None       Past Medical History:   Diagnosis Date   • Abdominal pain    • Abnormal mammogram of both breasts 10/30/2020   • Anemia    • Asthma    • Bilateral breast cysts     last assessed    Fatmata Blight Fatmata Blight 19    resolved   11/3/17    • Cervical cancer, FIGO stage I (HCC)    • Colon polyp    • Constipation    • Diarrhea    • Seizures (HCC)    • URI (upper respiratory infection) under additonal type - Chronic       Past Surgical History:   Procedure Laterality Date   •  SECTION     • INGUINAL HERNIA REPAIR      last assessed   12/17/15     • UT COLONOSCOPY FLX DX W/COLLJ SPEC WHEN PFRMD N/A 9/15/2017    Procedure: COLONOSCOPY;  Surgeon: Radha Cunningham MD;  Location:  GI LAB; Service: Gastroenterology   • SKIN BIOPSY      last assessed        • UMBILICAL HERNIA REPAIR      last assessed   12/17/15    • URETHRAL DIVERTICULECTOMY      excision of urethral diverticulum    last assessed   12/17/15       Family History   Problem Relation Age of Onset   • Hyperlipidemia Mother    • Hypertension Mother    • Breast cancer Mother         neoplasm/dx first age 39, mastectomy age 38, did have lymph biopsy positive with recurrance age around age 63   • No Known Problems Father    • No Known Problems Sister    • No Known Problems Sister    • No Known Problems Sister    • No Known Problems Daughter    • No Known Problems Daughter    • No Known Problems Daughter    • No Known Problems Daughter    • Hyperlipidemia Maternal Grandmother    • Hypertension Maternal Grandmother    • Leukemia Maternal Grandmother    • No Known Problems Maternal Grandfather    • No Known Problems Paternal Grandmother    • No Known Problems Paternal Grandfather      I have reviewed and agree with the history as documented  E-Cigarette/Vaping   • E-Cigarette Use Never User      E-Cigarette/Vaping Substances   • Nicotine No    • THC No    • CBD No    • Flavoring No    • Other No    • Unknown No      Social History     Tobacco Use   • Smoking status: Every Day     Packs/day: 0 25     Types: Cigarettes   • Smokeless tobacco: Never   Vaping Use   • Vaping Use: Never used   Substance Use Topics   • Alcohol use: Yes     Comment: every other week   • Drug use: Never        Review of Systems   All other systems reviewed and are negative        Physical Exam  ED Triage Vitals   Temperature Pulse Respirations Blood Pressure SpO2   01/19/23 1621 01/19/23 1621 01/19/23 1621 01/19/23 1621 01/19/23 1621   98 3 °F (36 8 °C) 79 18 154/100 100 %      Temp Source Heart Rate Source Patient Position - Orthostatic VS BP Location FiO2 (%)   01/19/23 1621 01/19/23 1621 -- -- --   Oral Monitor         Pain Score       01/19/23 1701       8             Orthostatic Vital Signs  Vitals:    01/19/23 1621   BP: 154/100   Pulse: 79       Physical Exam  Vitals and nursing note reviewed  Constitutional:       General: She is not in acute distress  Appearance: She is well-developed  HENT:      Head: Normocephalic and atraumatic  Right Ear: There is impacted cerumen  Left Ear: Tympanic membrane normal  There is impacted cerumen  Ears:      Comments: Bulging right tympanic membrane with serous effusion     Mouth/Throat:      Mouth: Mucous membranes are moist       Pharynx: No oropharyngeal exudate  Eyes:      Conjunctiva/sclera: Conjunctivae normal    Cardiovascular:      Rate and Rhythm: Normal rate and regular rhythm  Heart sounds: No murmur heard  Pulmonary:      Effort: Pulmonary effort is normal  No respiratory distress  Breath sounds: Normal breath sounds  Abdominal:      Palpations: Abdomen is soft  Tenderness: There is no abdominal tenderness  Musculoskeletal:         General: No swelling  Cervical back: Neck supple  Skin:     General: Skin is warm and dry  Capillary Refill: Capillary refill takes less than 2 seconds  Neurological:      General: No focal deficit present  Mental Status: She is alert     Psychiatric:         Mood and Affect: Mood normal          ED Medications  Medications   oxymetazoline (AFRIN) 0 05 % nasal spray 2 spray (2 sprays Each Nare Given 1/19/23 1701)   ibuprofen (MOTRIN) tablet 400 mg (400 mg Oral Given 1/19/23 1701)   acetaminophen (TYLENOL) tablet 650 mg (650 mg Oral Given 1/19/23 1701)       Diagnostic Studies  Results Reviewed     Procedure Component Value Units Date/Time    FLU/RSV/COVID - if FLU/RSV clinically relevant [[id number]]  (Normal) Collected: 01/19/23 1731    Lab Status: Final result Specimen: Nares from Nose Updated: 01/19/23 1821     SARS-CoV-2 Negative     INFLUENZA A PCR Negative     INFLUENZA B PCR Negative     RSV PCR Negative    Narrative:      FOR PEDIATRIC PATIENTS - copy/paste COVID Guidelines URL to browser: [email or web address]  [employer]    SARS-CoV-2 assay is a Nucleic Acid Amplification assay intended for the  qualitative detection of nucleic acid from SARS-CoV-2 in nasopharyngeal  swabs  Results are for the presumptive identification of SARS-CoV-2 RNA  Positive results are indicative of infection with SARS-CoV-2, the virus  causing COVID-19, but do not rule out bacterial infection or co-infection  with other viruses  Laboratories within the [place] and its  territories are required to report all positive results to the appropriate  public health authorities  Negative results do not preclude SARS-CoV-2  infection and should not be used as the sole basis for treatment or other  patient management decisions  Negative results must be combined with  clinical observations, patient history, and epidemiological information  This test has not been FDA cleared or approved  This test has been authorized by FDA under an Emergency Use Authorization  (EUA)  This test is only authorized for the duration of time the  declaration that circumstances exist justifying the authorization of the  emergency use of an in vitro diagnostic tests for detection of SARS-CoV-2  virus and/or diagnosis of COVID-19 infection under section 564(b)(1) of  the Act, 21 U  S C  436MTB-3(W)(6), unless the authorization is terminated  or revoked sooner  The test has been validated but independent review by FDA  and CLIA is pending  Test performed using [employer]:  This RT-PCR assay targets N2,  a region unique to SARS-CoV-2  A conserved region in the E-gene was chosen  for pan-Sarbecovirus detection which includes SARS-CoV-2  According to CMS-2020-01-R, this platform meets the definition of high-throughput technology  No orders to display         Procedures  Procedures      ED Course                                       Medical Decision Making  51-year-old female presenting due to likely acute viral syndrome  Physical examination shows no signs of bacterial etiology requiring antibiotic treatment or other testing at this time  Will treat symptomatically  Obtain viral swab per patient request   Patient was discharged with further recommendations for home management  Provided return precautions  Viral syndrome: acute illness or injury  Risk  OTC drugs  Prescription drug management  Disposition  Final diagnoses:   Viral syndrome     Time reflects when diagnosis was documented in both MDM as applicable and the Disposition within this note     Time User Action Codes Description Comment    1/19/2023  5:59 PM Leonel Pereira Add [B34 9] Viral syndrome       ED Disposition     ED Disposition   Discharge    Condition   Stable    Date/Time   Thu Jan 19, 2023  5:59 PM    Comment   Genevieve Moran discharge to home/self care  Follow-up Information     Follow up With Specialties Details Why [address] [place] [address], RAHEEL Physician Assistant  As needed [address]  [phone]            Discharge Medication List as of 1/19/2023  6:01 PM      START taking these medications    Details   !! loratadine (CLARITIN) 10 mg tablet Take 1 tablet (10 mg total) by mouth daily, Starting Thu 1/19/2023, Normal       !! - Potential duplicate medications found  Please discuss with provider        CONTINUE these medications which have NOT CHANGED    Details   albuterol (PROVENTIL HFA,VENTOLIN HFA) 90 mcg/act inhaler Inhale 2 puffs every 6 (six) hours as needed for wheezing, Starting Wed 12/14/2022, Normal      amLODIPine (NORVASC) 5 mg tablet Take 1 tablet (5 mg total) by mouth daily, Starting Tue 12/6/2022, Normal      clobetasol (TEMOVATE) 0 05 % ointment Apply topically 2 (two) times a day Apply to affected area on hands and shins twice a day for up to 14 days  Then stop  Then apply for flares up to 14 days at a time  Apply pea size amount to affected area on ear twice daily for up to 3 days, then stop   A void face, groin, axilla , Starting Wed 10/27/2021, Normal      Elagolix-Estrad-Noreth & Elago 300-1-0 5 & 300 MG CPPK Take one pill by mouth twice daily as directed, Normal      esomeprazole (NexIUM) 20 mg capsule Take 1 capsule (20 mg total) by mouth in the morning, Starting Tue 12/6/2022, Until Thu 1/5/2023, Normal      fluticasone (FLONASE) 50 mcg/act nasal spray 1 spray into each nostril daily, Starting Sun 7/18/2021, Normal      hydrOXYzine HCL (ATARAX) 25 mg tablet Take 1 tablet (25 mg total) by mouth 2 (two) times a day as needed for anxiety, Starting Tue 2/16/2021, Normal      ketotifen (ZADITOR) 0 025 % ophthalmic solution Administer 1 drop to both eyes 2 (two) times a day as needed (itchy eyes), Starting Wed 11/25/2020, Normal      !! loratadine (CLARITIN) 10 mg tablet Take 1 tablet (10 mg total) by mouth daily, Starting Tue 12/6/2022, Until Sun 6/4/2023, Normal      nystatin (MYCOSTATIN) 500,000 units/5 mL suspension Apply 5 mL (500,000 Units total) to the mouth or throat 4 (four) times a day, Starting Tue 12/6/2022, Normal      PARoxetine (PAXIL) 10 mg tablet Take 1 tablet (10 mg total) by mouth daily, Starting Tue 1/3/2023, Normal      polyethylene glycol (GOLYTELY) 4000 mL solution Take 4,000 mL by mouth once for 1 dose Take as directed by office, Starting Tue 11/1/2022, Normal      polyethylene glycol (MIRALAX) 17 g packet Take 17 g by mouth daily, Starting Tue 7/19/2022, Normal      simethicone (MYLICON,GAS-X) 728 MG CAPS Take 1 capsule (125 mg total) by mouth 3 (three) times daily after meals, Starting Tue 7/19/2022, Normal      Symbicort 80-4 5 MCG/ACT inhaler inhale 2 puffs by mouth twice a day Rinse mouth after use, Normal      triamcinolone (KENALOG) 0 1 % ointment Apply topically 2 (two) times a day Apply to affected areas  Avoid face, groin, axilla , Starting Wed 9/22/2021, Normal       !! - Potential duplicate medications found  Please discuss with provider  No discharge procedures on file  PDMP Review       Value Time User    PDMP Reviewed  Yes 9/12/2021  4:12 AM Maren Huerta PA-C           ED Provider  Attending physically available and evaluated Narinder Ortiz  CELSO managed the patient along with the ED Attending      Electronically Signed by         Neva Lopez MD  01/19/23 6578

## 2023-01-19 NOTE — DISCHARGE INSTRUCTIONS
Thank you for coming to the ED for your care  We recommend taking claritin daily as well as using the afrin every 12 hours for the next 2-3 days  Do not use this medication more than 3 days in a row without taking at least 1 day off or it will cause rebound congestion  You can use it again after taking time off  Please also take tylenol and motrin for any further symptoms you develop  Please return to the ED with any further complaints

## 2023-01-19 NOTE — ED ATTENDING ATTESTATION
Kristal Toscano MD, saw and evaluated the patient  I have discussed the patient with the resident and agree with the resident's findings, Plan of Care, and MDM as documented in the resident's note, except where noted  All available labs and Radiology studies were reviewed  I was present for key portions of any procedure(s) performed by the resident and I was immediately available to provide assistance  At this point I agree with the current assessment done in the Emergency Department  I have conducted an independent evaluation of this patient a history and physical is as follows:    40 yo female with a history of anxiety and HTN presents to the ED complaining of an earache and a sore throat x 3 days  The patient reports a discomfort in her throat that "goes from the left to the right then back again"  No difficulty tolerating POs  No voice change  She is also experiencing a progressively worsening "ache" in the right ear  No hearing loss  No drainage from the ear  She denies fevers/chills  (+) 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  (+) Daughter with similar symptoms  No cough, chest pain, or shortness of breath  No neck pain or swelling  No other specific complaints  ROS: per resident physician note    Gen: NAD, AA&Ox3  HEENT: PERRL, EOMI, mmm  Right Ear: (+) serous effusion, no erythema or injection, no drainage, no mastoid tenderness  Throat: no erythema, no tonsillar swelling or exudates, uvula midline, no PTA  Neck: supple, no lymphadenopathy  CV: RRR  Lungs: CTA B/L  Abdomen: soft, NT/ND  Ext: no swelling or deformity  Neuro: 5/5 strength all extremities, sensation grossly intact  Skin: no rash    ED Course  The patient is well appearing with stable vital signs  1/4 Cent[facility] Criteria on exam  (+) Effusion without sings of infection in right ear  Symptoms and exam are most consistent with a viral URI   Viral swab sent to the lab per patient request  Plan for supportive care and close follow up with her PCP next week  The patient is agreeable to this plan  Strict return precautions provided        Critical Care Time  Procedures

## 2023-01-19 NOTE — Clinical Note
Nohemy Garzaradha was seen and treated in our emergency department on 1/19/2023  Diagnosis:     Genevieve    She may return on this date: 01/23/2023    May return sooner if symptoms improve  If you have any questions or concerns, please don't hesitate to call        Jose David Lowry MD    ______________________________           _______________          _______________  Hospital Representative                              Date                                Time

## 2023-03-06 ENCOUNTER — OFFICE VISIT (OUTPATIENT)
Dept: INTERNAL MEDICINE CLINIC | Facility: CLINIC | Age: 48
End: 2023-03-06

## 2023-03-06 VITALS
HEART RATE: 90 BPM | WEIGHT: 175 LBS | HEIGHT: 67 IN | TEMPERATURE: 98 F | BODY MASS INDEX: 27.47 KG/M2 | SYSTOLIC BLOOD PRESSURE: 149 MMHG | DIASTOLIC BLOOD PRESSURE: 93 MMHG | OXYGEN SATURATION: 98 %

## 2023-03-06 DIAGNOSIS — K59.00 CONSTIPATION, UNSPECIFIED CONSTIPATION TYPE: ICD-10-CM

## 2023-03-06 DIAGNOSIS — K21.9 GASTROESOPHAGEAL REFLUX DISEASE WITHOUT ESOPHAGITIS: ICD-10-CM

## 2023-03-06 DIAGNOSIS — J45.20 MILD INTERMITTENT ASTHMA WITHOUT COMPLICATION: ICD-10-CM

## 2023-03-06 DIAGNOSIS — I10 PRIMARY HYPERTENSION: Primary | ICD-10-CM

## 2023-03-06 RX ORDER — PRAZOSIN HYDROCHLORIDE 2 MG/1
2 CAPSULE ORAL
COMMUNITY
Start: 2023-02-17

## 2023-03-06 RX ORDER — OFLOXACIN 3 MG/ML
SOLUTION/ DROPS OPHTHALMIC
COMMUNITY
Start: 2023-01-05 | End: 2023-03-06 | Stop reason: ALTCHOICE

## 2023-03-06 RX ORDER — ALBUTEROL SULFATE 90 UG/1
2 AEROSOL, METERED RESPIRATORY (INHALATION) EVERY 6 HOURS PRN
Qty: 8.5 G | Refills: 3 | Status: SHIPPED | OUTPATIENT
Start: 2023-03-06

## 2023-03-06 RX ORDER — PAROXETINE HYDROCHLORIDE 20 MG/1
20 TABLET, FILM COATED ORAL EVERY MORNING
COMMUNITY
Start: 2023-02-17

## 2023-03-06 RX ORDER — ZOLPIDEM TARTRATE 10 MG/1
10 TABLET ORAL
COMMUNITY
Start: 2023-02-17

## 2023-03-06 RX ORDER — BUDESONIDE AND FORMOTEROL FUMARATE DIHYDRATE 80; 4.5 UG/1; UG/1
2 AEROSOL RESPIRATORY (INHALATION) 2 TIMES DAILY
Qty: 10.2 G | Refills: 3 | Status: SHIPPED | OUTPATIENT
Start: 2023-03-06

## 2023-03-06 RX ORDER — BUSPIRONE HYDROCHLORIDE 10 MG/1
10 TABLET ORAL 2 TIMES DAILY
COMMUNITY
Start: 2023-01-13 | End: 2023-03-06 | Stop reason: ALTCHOICE

## 2023-03-06 RX ORDER — FLUCONAZOLE 150 MG/1
TABLET ORAL
COMMUNITY
Start: 2022-12-06 | End: 2023-03-06 | Stop reason: ALTCHOICE

## 2023-03-06 RX ORDER — DOCUSATE SODIUM 100 MG/1
100 CAPSULE, LIQUID FILLED ORAL 2 TIMES DAILY
Qty: 60 CAPSULE | Refills: 2 | Status: SHIPPED | OUTPATIENT
Start: 2023-03-06

## 2023-03-06 RX ORDER — PREDNISONE 10 MG/1
TABLET ORAL
Qty: 30 TABLET | Refills: 0 | Status: SHIPPED | OUTPATIENT
Start: 2023-03-06

## 2023-03-06 NOTE — PROGRESS NOTES
Name: Devonte Clark      : 1975      MRN: [id number]  Encounter Provider: Ekta Solorio PA-C  Encounter Date: 3/6/2023   Encounter department: [address] [place] [address]    Assessment & Plan     1  Primary hypertension  Assessment & Plan:  Usually controlled, but states she did not take it yet today  She "rolled out of bed" and came to her appointment  Continue amlodipine 5 mg daily  Limit sodium/salt intake  Avoid alcohol and caffeine  2  Mild intermittent asthma without complication  Assessment & Plan:  Possible exacerbation  Recommend restarting loratadine daily  Restart Symbicort daily  Rinse mouth after use  Continue albuterol as needed  Will give prednisone taper for relief  Avoid irritants  Encouraged smoking cessation  Orders:  -     predniSONE 10 mg tablet; Take 5 tablets daily for two days, then 4 tablets daily for two days, 3 tablets daily for two days, 2 tablets daily for two days, 1 tablet daily for two days  -     albuterol (PROVENTIL HFA,VENTOLIN HFA) 90 mcg/act inhaler; Inhale 2 puffs every 6 (six) hours as needed for wheezing  -     budesonide-formoterol (Symbicort) 80-4 5 MCG/ACT inhaler; Inhale 2 puffs 2 (two) times a day Rinse mouth after use    3  Gastroesophageal reflux disease without esophagitis  Assessment & Plan:  Controlled  Continue Nexium 20 mg daily  Anti-GERD diet  4  Constipation, unspecified constipation type  Assessment & Plan:  History of constipation  Colonoscopy last year normal  Increase fiber intake  Continue docusate as needed  Orders:  -     Ambulatory Referral to Gastroenterology; Future  -     docusate sodium (COLACE) 100 mg capsule; Take 1 capsule (100 mg total) by mouth 2 (two) times a day    5  BMI 27 0-27 9,adult  Assessment & Plan:  See BMI counseling for nutrition and exercise recommendations  BMI Counseling: Body mass index is 27 41 kg/m²   The BMI is above normal  Nutrition recommendations include decreasing portion sizes, encouraging healthy choices of fruits and vegetables, decreasing fast food intake, consuming healthier snacks, limiting drinks that contain sugar, moderation in carbohydrate intake, increasing intake of lean protein, reducing intake of saturated and trans fat and reducing intake of cholesterol  Exercise recommendations include moderate physical activity 150 minutes/week, exercising 3-5 times per week and strength training exercises  No pharmacotherapy was ordered  Rationale for BMI follow-up plan is due to patient being overweight or obese  Depression Screening and Follow-up Plan: Patient was screened for depression during today's encounter  They screened negative with a PHQ-2 score of 0  Tobacco Cessation Counseling: Tobacco cessation counseling was provided  The patient is sincerely urged to quit consumption of tobacco  She is not ready to quit tobacco  Medication options and side effects of medication discussed  Patient refused medication  Subjective      Patient is a 41year old female presenting for follow up on HTN  States she has been compliant with her medications  Denies any medication side effects  She is concerned about a cough she has had for 1-2 months  States it is worse at night  Sometimes productive of clear phlegm  Occasional wheezing  No SOB  States she has been congested  Denies fever or chills  Denies sore throat  Denies ill contacts  She has tried OTC medications and her albuterol inhaler daily without relief  States she has not been using her Symbicort like she should  Review of Systems   Constitutional: Negative for appetite change, chills, diaphoresis, fatigue, fever and unexpected weight change  HENT: Positive for congestion and rhinorrhea  Negative for dental problem, ear discharge, ear pain, hearing loss, postnasal drip, sinus pressure, sinus pain, sore throat, tinnitus and trouble swallowing  Eyes: Negative for visual disturbance  Respiratory: Positive for cough and wheezing  Negative for chest tightness and shortness of breath  Cardiovascular: Negative for chest pain, palpitations and leg swelling  Gastrointestinal: Positive for constipation  Negative for abdominal pain, blood in stool, diarrhea, nausea and vomiting  Endocrine: Negative for cold intolerance, heat intolerance, polydipsia, polyphagia and polyuria  Musculoskeletal: Negative for arthralgias and myalgias  Skin: Negative for rash  Neurological: Negative for dizziness, tremors, weakness, light-headedness, numbness and headaches  Hematological: Negative for adenopathy  Current Outpatient Medications on File Prior to Visit   Medication Sig   • amLODIPine (NORVASC) 5 mg tablet Take 1 tablet (5 mg total) by mouth daily   • Elagolix-Estrad-Noreth & Elago 300-1-0 5 & 300 MG CPPK Take one pill by mouth twice daily as directed   • esomeprazole (NexIUM) 20 mg capsule Take 1 capsule (20 mg total) by mouth in the morning   • loratadine (CLARITIN) 10 mg tablet Take 1 tablet (10 mg total) by mouth daily   • PARoxetine (PAXIL) 20 mg tablet Take 20 mg by mouth every morning   • prazosin (MINIPRESS) 2 mg capsule Take 2 mg by mouth daily at bedtime   • zolpidem (AMBIEN) 10 mg tablet Take 10 mg by mouth daily at bedtime       Objective     /93 (BP Location: Left arm, Patient Position: Sitting, Cuff Size: Standard)   Pulse 90   Temp 98 °F (36 7 °C) (Temporal)   Ht 5' 7" (1 702 m)   Wt 79 4 kg (175 lb)   SpO2 98%   BMI 27 41 kg/m²     Physical Exam  Vitals and nursing note reviewed  Constitutional:       General: She is awake  She is not in acute distress  Appearance: Normal appearance  She is well-developed, well-groomed and overweight  She is not ill-appearing  HENT:      Head: Normocephalic and atraumatic        Right Ear: Hearing, tympanic membrane, ear canal and external ear normal       Left Ear: Hearing, tympanic membrane, ear canal and external ear normal       Nose: Congestion and rhinorrhea present  Rhinorrhea is clear  Mouth/Throat:      Lips: Pink  Mouth: Mucous membranes are moist       Pharynx: Oropharynx is clear  Uvula midline  No oropharyngeal exudate or posterior oropharyngeal erythema  Eyes:      General: No scleral icterus  Conjunctiva/sclera: Conjunctivae normal    Cardiovascular:      Rate and Rhythm: Normal rate and regular rhythm  Pulses: Normal pulses  Heart sounds: Normal heart sounds  No murmur heard  Pulmonary:      Effort: Pulmonary effort is normal  No respiratory distress  Breath sounds: Normal breath sounds and air entry  No decreased air movement  No decreased breath sounds, wheezing, rhonchi or rales  Comments: Occasional dry cough  Abdominal:      General: Abdomen is flat  Bowel sounds are normal  There is no distension  Palpations: Abdomen is soft  There is no mass  Tenderness: There is no abdominal tenderness  There is no right CVA tenderness, left CVA tenderness, guarding or rebound  Hernia: No hernia is present  Musculoskeletal:         General: Normal range of motion  Cervical back: Full passive range of motion without pain, normal range of motion and neck supple  Right lower leg: No edema  Left lower leg: No edema  Lymphadenopathy:      Cervical: No cervical adenopathy  Skin:     General: Skin is warm  Capillary Refill: Capillary refill takes less than 2 seconds  Coloration: Skin is not jaundiced  Findings: No rash  Neurological:      General: No focal deficit present  Mental Status: She is alert and oriented to person, place, and time  Mental status is at baseline  Sensory: Sensation is intact  Motor: Motor function is intact  Coordination: Coordination is intact  Gait: Gait is intact     Psychiatric:         Attention and Perception: Attention normal          Mood and Affect: Mood and affect normal  Speech: Speech normal          Behavior: Behavior normal  Behavior is cooperative           Cognition and Memory: Cognition normal        Germania Hyatt PA-C

## 2023-03-07 PROBLEM — R92.8 ABNORMAL ULTRASOUND OF BREAST: Status: RESOLVED | Noted: 2018-05-02 | Resolved: 2023-03-07

## 2023-03-07 PROBLEM — F17.200 CURRENT EVERY DAY SMOKER: Status: RESOLVED | Noted: 2019-03-05 | Resolved: 2023-03-07

## 2023-03-07 PROBLEM — K59.00 CONSTIPATION: Status: ACTIVE | Noted: 2023-03-07

## 2023-03-07 PROBLEM — R09.82 POST-NASAL DRIP: Status: RESOLVED | Noted: 2019-03-05 | Resolved: 2023-03-07

## 2023-03-07 PROBLEM — N92.0 MENORRHAGIA: Status: RESOLVED | Noted: 2017-04-25 | Resolved: 2023-03-07

## 2023-03-07 PROBLEM — N93.9 VAGINAL BLEEDING: Status: RESOLVED | Noted: 2017-06-12 | Resolved: 2023-03-07

## 2023-03-07 PROBLEM — E66.3 OVERWEIGHT (BMI 25.0-29.9): Status: RESOLVED | Noted: 2019-03-05 | Resolved: 2023-03-07

## 2023-03-07 PROBLEM — N92.6 IRREGULAR MENSTRUATION: Status: RESOLVED | Noted: 2021-05-26 | Resolved: 2023-03-07

## 2023-03-07 NOTE — ASSESSMENT & PLAN NOTE
History of constipation  Colonoscopy last year normal  Increase fiber intake  Continue docusate as needed

## 2023-03-07 NOTE — ASSESSMENT & PLAN NOTE
Usually controlled, but states she did not take it yet today  She "rolled out of bed" and came to her appointment  Continue amlodipine 5 mg daily  Limit sodium/salt intake  Avoid alcohol and caffeine

## 2023-03-07 NOTE — ASSESSMENT & PLAN NOTE
Possible exacerbation  Recommend restarting loratadine daily  Restart Symbicort daily  Rinse mouth after use  Continue albuterol as needed  Will give prednisone taper for relief  Avoid irritants  Encouraged smoking cessation

## 2023-04-05 ENCOUNTER — CONSULT (OUTPATIENT)
Dept: GASTROENTEROLOGY | Facility: CLINIC | Age: 48
End: 2023-04-05

## 2023-04-05 VITALS
TEMPERATURE: 97.1 F | SYSTOLIC BLOOD PRESSURE: 118 MMHG | WEIGHT: 174.8 LBS | HEIGHT: 67 IN | BODY MASS INDEX: 27.44 KG/M2 | DIASTOLIC BLOOD PRESSURE: 86 MMHG

## 2023-04-05 DIAGNOSIS — K59.00 CONSTIPATION, UNSPECIFIED CONSTIPATION TYPE: ICD-10-CM

## 2023-04-05 DIAGNOSIS — R68.81 EARLY SATIETY: ICD-10-CM

## 2023-04-05 DIAGNOSIS — K58.2 IRRITABLE BOWEL SYNDROME WITH BOTH CONSTIPATION AND DIARRHEA: Primary | ICD-10-CM

## 2023-04-05 NOTE — PATIENT INSTRUCTIONS
Scheduled date of EGD(as of today):05/18/2023  Physician performing EGD:Leigha  Location of EGD:[facility]  Instructions reviewed with patient by:Katalina  Clearances:  N/A    Patient given labs to have done

## 2023-04-05 NOTE — PROGRESS NOTES
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, anxiety, obesity, mild asthma, chronic constipation presenting for evaluation of bowel habit irregularity, early satiety, dysphagia, and emesis  Early Satiety, Emesis, Dysphagia  - Ddx of dysphagia and emesis includes GERD, esophageal ring/web, EoE; ddx of early satiety includes gastric outlet obstruction, gastroparesis  - Check one time A1c now  - Given plethora of sx, plan for EGD with esophageal biopsies and gastric biopsies now    Diarrhea, Constipation  - Sx most indicative of IBS-M  - Trial hydration, miralax, dulcolax, fiber supplementation (prescribed metamucil)  - F/u at next visit in 4 months  - Check celiac panel    1  Constipation, unspecified constipation type        Orders Placed This Encounter   Procedures   • Celiac Disease Panel   • Hemoglobin A1C   • EGD     ______________________________________________________________________    SUBJECTIVE:    Morro Mack is a 41 y o  female with hx of alpha thalassemia trait, possible SLE, 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, anxiety, obesity, mild asthma, chronic constipation presenting for evaluation of bowel habit irregularity, early satiety, dysphagia, and emesis  For bowel habits, has intermittent diarrhea and constipation  Will have periods of up to two weeks with constipation, only small BMs  Not resolved with miralax, hydration, dulcolax  Then will have weeks with 2-3 [place] 7 stools/day  No blood  Had colonoscopy 12/13/22 w Dr Tiffanie Travis; tortuous colon, able to reach cecum, no polyps, had internal hemorrhoids  Bulge in cecum, recommended for CT A/P w IV contrast, done on 12/21/22   This showed small left ovarian cyst, heterogenous hypodense mass in uterine fundus thought to be a fibroid, and cholelithiasis  Uterine mass being managed by her gyn  Has been evaluated by ENT for dysphagia  Was told she had significant reflux, was started on omeprazole  Was not able to tolerate omeprazole due to hives, and switched to nexium  Felt the nexium did not work, so stopped taking this  Today endorsing intermittent projectile vomiting of clear, nonbloody emesis  Also with early satiety, will have a few small meals and feel overly full, then have recurrent emesis  Has not been tested for DM  REVIEW OF SYSTEMS IS OTHERWISE NEGATIVE  Historical Information   Past Medical History:   Diagnosis Date   • Abdominal pain    • Abnormal mammogram of both breasts 10/30/2020   • Anemia    • Asthma    • Bilateral breast cysts     last assessed    [facility] 19    resolved   11/3/17    • Cervical cancer, FIGO stage I (HCC)    • Colon polyp    • Constipation    • Diarrhea    • Menorrhagia 2017   • Seizures (HCC)    • URI (upper respiratory infection)     under additonal type - Chronic     Past Surgical History:   Procedure Laterality Date   •  SECTION     • INGUINAL HERNIA REPAIR      last assessed   12/17/15     • IN COLONOSCOPY FLX DX W/COLLJ SPEC WHEN PFRMD N/A 9/15/2017    Procedure: COLONOSCOPY;  Surgeon: Donnie Leggett MD;  Location: [facility]; Service: Gastroenterology   • SKIN BIOPSY      last assessed        • UMBILICAL HERNIA REPAIR      last assessed   12/17/15    • URETHRAL DIVERTICULECTOMY      excision of urethral diverticulum    last assessed   12/17/15     Social History   Social History     Substance and Sexual Activity   Alcohol Use Yes    Comment: every other week     Social History     Substance and Sexual Activity   Drug Use Never     Social History     Tobacco Use   Smoking Status Every Day   • Packs/day: 0 25   • Types: Cigarettes   Smokeless Tobacco Never     Family History   Problem Relation Age of Onset "  • Hyperlipidemia Mother    • Hypertension Mother    • Breast cancer Mother         neoplasm/dx first age 39, mastectomy age 38, did have lymph biopsy positive with recurrance age around age 63   • No Known Problems Father    • No Known Problems Sister    • No Known Problems Sister    • No Known Problems Sister    • No Known Problems Daughter    • No Known Problems Daughter    • No Known Problems Daughter    • No Known Problems Daughter    • Hyperlipidemia Maternal Grandmother    • Hypertension Maternal Grandmother    • Leukemia Maternal Grandmother    • No Known Problems Maternal Grandfather    • No Known Problems Paternal Grandmother    • No Known Problems Paternal Grandfather        Meds/Allergies       Current Outpatient Medications:   •  albuterol (PROVENTIL HFA,VENTOLIN HFA) 90 mcg/act inhaler  •  amLODIPine (NORVASC) 5 mg tablet  •  budesonide-formoterol (Symbicort) 80-4 5 MCG/ACT inhaler  •  docusate sodium (COLACE) 100 mg capsule  •  Elagolix-Estrad-Noreth & Elago 300-1-0 5 & 300 MG CPPK  •  loratadine (CLARITIN) 10 mg tablet  •  PARoxetine (PAXIL) 20 mg tablet  •  prazosin (MINIPRESS) 2 mg capsule  •  zolpidem (AMBIEN) 10 mg tablet  •  esomeprazole (NexIUM) 20 mg capsule  •  predniSONE 10 mg tablet    Allergies   Allergen Reactions   • Percocet [Oxycodone-Acetaminophen]    • Doxycycline Hives, Myalgia and Rash     Category: Allergy;              Objective     Blood pressure 118/86, temperature (!) 97 1 °F (36 2 °C), temperature source Tympanic, height 5' 7\" (1 702 m), weight 79 3 kg (174 lb 12 8 oz)  Body mass index is 27 38 kg/m²  Wt Readings from Last 3 Encounters:   04/05/23 79 3 kg (174 lb 12 8 oz)   03/06/23 79 4 kg (175 lb)   12/13/22 79 4 kg (175 lb)        PHYSICAL EXAM:      General Appearance:   Alert, cooperative, no distress   HEENT:   Normocephalic, atraumatic, anicteric       Neck:  Supple, symmetrical, trachea midline   Lungs:   Clear to auscultation bilaterally; no rales, rhonchi or " wheezing; respirations unlabored    Heart[de-identified]   Regular rate and rhythm; no murmur, rub, or gallop     Abdomen:   Soft, non-tender, non-distended; normal bowel sounds; no masses, no organomegaly    Genitalia:   Deferred    Rectal:   Deferred    Extremities:  No cyanosis, clubbing or edema    Pulses:  2+ and symmetric    Skin:  No jaundice, rashes, or lesions    Lymph nodes:  No palpable cervical lymphadenopathy        Lab Results:       Lab Units 12/06/22  1027 11/15/22  1429 10/27/21  1217 09/12/21  0305 05/26/21  1110   SODIUM mmol/L 136 137 133*  --  138   POTASSIUM mmol/L 3 5 3 6 3 4*  --  4 1   CHLORIDE mmol/L 105 107 104  --  109*   CO2 mmol/L 27 25 25  --  23   CO2, I-STAT mmol/L  --   --   --  25  --    BUN mg/dL 7 10 9  --  13   CREATININE mg/dL 0 67 1 02 0 77  --  0 84   GLUCOSE RANDOM mg/dL  --  99  --   --   --    CALCIUM mg/dL 8 7 9 2 8 8  --  9 0            Lab Units 10/27/21  1217 05/26/21  1110   TOTAL PROTEIN g/dL 7 2 7 4   ALBUMIN g/dL 3 5 3 6   TOTAL BILIRUBIN mg/dL 0 70 0 35   AST U/L 11 10   ALT U/L 19 17   ALK PHOS U/L 50 59           Lab Units 12/06/22  1027 11/15/22  1429 10/27/21  1217 09/22/21  0954 09/12/21  0305 08/25/21  1428   WBC Thousand/uL 5 43 5 97 6 50 7 68  --  8 26   HEMOGLOBIN g/dL 11 7 12 2 11 1* 11 2*  --  12 6   I STAT HEMOGLOBIN g/dl  --   --   --   --  12 6  --    HEMATOCRIT % 37 9 39 2 35 5 35 3  --  39 6   HEMATOCRIT, ISTAT %  --   --   --   --  37  --    PLATELETS Thousands/uL 229 271 240 312  --  276   MCV fL 79* 79* 81* 80*  --  81*   MCH pg 24 4* 24 5* 25 2* 25 4*  --  25 7*   MCHC g/dL 30 9* 31 1* 31 3* 31 7  --  31 8   RDW % 15 4* 15 6* 15 7* 15 2*  --  15 4*   MPV fL 10 7 10 4 10 9 10 9  --  10 0   NRBC AUTO /100 WBCs 0 0 0 0  --  0   NEUTROS PCT % 59 60 65 62  --  52   IMMATURE GRANULOCYTES % % 0 0 1 1  --  0   LYMPHS PCT % 30 31 25 26  --  41   MONOS PCT % 9 5 6 7  --  5   EOS PCT % 2 3 2 3  --  1   BASOS PCT % 0 1 1 1  --  1   NEUTROS ABS Thousands/µL 3 20 3 59 4  26 4 89  --  4 30   IMMATURE GRANULOCYES ABS Thousand/uL 0 01 0 01 0 03 0 04  --  0 01   LYMPHS ABS Thousands/µL 1 64 1 85 1 65 2 00  --  3 39   MONOS ABS Thousand/µL 0 47 0 32 0 39 0 52  --  0 40   EOS ABS Thousand/µL 0 09 0 15 0 14 0 19  --  0 09   BASOS ABS Thousands/µL 0 02 0 05 0 03 0 04  --  0 07           Lab Units 09/22/21  0954 05/26/21  1110   IRON ug/dL 59 45*   FERRITIN ng/mL  --  11       No results found for: CRP    Lab Results   Component Value Date    [id number] 0 659 12/06/2022       Radiology Results:   No results found      Taylor Mansfield MD  PGY-4 Gastroenterology Fellow  4/5/2023 8:56 AM

## 2023-05-18 ENCOUNTER — APPOINTMENT (OUTPATIENT)
Dept: LAB | Facility: AMBULARY SURGERY CENTER | Age: 48
End: 2023-05-18

## 2023-05-18 ENCOUNTER — HOSPITAL ENCOUNTER (OUTPATIENT)
Dept: GASTROENTEROLOGY | Facility: AMBULARY SURGERY CENTER | Age: 48
Setting detail: OUTPATIENT SURGERY
End: 2023-05-18

## 2023-05-18 ENCOUNTER — ANESTHESIA EVENT (OUTPATIENT)
Dept: GASTROENTEROLOGY | Facility: AMBULARY SURGERY CENTER | Age: 48
End: 2023-05-18

## 2023-05-18 ENCOUNTER — ANESTHESIA (OUTPATIENT)
Dept: GASTROENTEROLOGY | Facility: AMBULARY SURGERY CENTER | Age: 48
End: 2023-05-18

## 2023-05-18 VITALS
SYSTOLIC BLOOD PRESSURE: 126 MMHG | RESPIRATION RATE: 20 BRPM | TEMPERATURE: 97.8 F | HEIGHT: 67 IN | BODY MASS INDEX: 27.15 KG/M2 | HEART RATE: 70 BPM | OXYGEN SATURATION: 99 % | WEIGHT: 173 LBS | DIASTOLIC BLOOD PRESSURE: 74 MMHG

## 2023-05-18 DIAGNOSIS — K58.2 IRRITABLE BOWEL SYNDROME WITH BOTH CONSTIPATION AND DIARRHEA: ICD-10-CM

## 2023-05-18 DIAGNOSIS — R68.81 EARLY SATIETY: ICD-10-CM

## 2023-05-18 LAB
EST. AVERAGE GLUCOSE BLD GHB EST-MCNC: 108 MG/DL
HBA1C MFR BLD: 5.4 %

## 2023-05-18 RX ORDER — SODIUM CHLORIDE, SODIUM LACTATE, POTASSIUM CHLORIDE, CALCIUM CHLORIDE 600; 310; 30; 20 MG/100ML; MG/100ML; MG/100ML; MG/100ML
125 INJECTION, SOLUTION INTRAVENOUS CONTINUOUS
Status: CANCELLED | OUTPATIENT
Start: 2023-05-18

## 2023-05-18 RX ORDER — PROPOFOL 10 MG/ML
INJECTION, EMULSION INTRAVENOUS AS NEEDED
Status: DISCONTINUED | OUTPATIENT
Start: 2023-05-18 | End: 2023-05-18

## 2023-05-18 RX ORDER — SODIUM CHLORIDE, SODIUM LACTATE, POTASSIUM CHLORIDE, CALCIUM CHLORIDE 600; 310; 30; 20 MG/100ML; MG/100ML; MG/100ML; MG/100ML
125 INJECTION, SOLUTION INTRAVENOUS CONTINUOUS
Status: DISCONTINUED | OUTPATIENT
Start: 2023-05-18 | End: 2023-05-22 | Stop reason: HOSPADM

## 2023-05-18 RX ORDER — SODIUM CHLORIDE, SODIUM LACTATE, POTASSIUM CHLORIDE, CALCIUM CHLORIDE 600; 310; 30; 20 MG/100ML; MG/100ML; MG/100ML; MG/100ML
INJECTION, SOLUTION INTRAVENOUS CONTINUOUS PRN
Status: DISCONTINUED | OUTPATIENT
Start: 2023-05-18 | End: 2023-05-18

## 2023-05-18 RX ADMIN — SODIUM CHLORIDE, SODIUM LACTATE, POTASSIUM CHLORIDE, AND CALCIUM CHLORIDE: .6; .31; .03; .02 INJECTION, SOLUTION INTRAVENOUS at 10:00

## 2023-05-18 RX ADMIN — PROPOFOL 150 MG: 10 INJECTION, EMULSION INTRAVENOUS at 10:02

## 2023-05-18 RX ADMIN — PROPOFOL 50 MG: 10 INJECTION, EMULSION INTRAVENOUS at 10:07

## 2023-05-18 RX ADMIN — PROPOFOL 50 MG: 10 INJECTION, EMULSION INTRAVENOUS at 10:04

## 2023-05-18 RX ADMIN — PROPOFOL 50 MG: 10 INJECTION, EMULSION INTRAVENOUS at 10:06

## 2023-05-18 NOTE — ANESTHESIA POSTPROCEDURE EVALUATION
Post-Op Assessment Note    CV Status:  Stable    Pain management: adequate     Mental Status:  Alert and awake   Hydration Status:  Euvolemic   PONV Controlled:  Controlled   Airway Patency:  Patent      Post Op Vitals Reviewed: Yes      Staff: Anesthesiologist, CRNA         No notable events documented      BP   111/68   Temp 97   Pulse 75   Resp 13   SpO2 94

## 2023-05-18 NOTE — ANESTHESIA PREPROCEDURE EVALUATION
Procedure:  EGD    Relevant Problems   CARDIO   (+) Hypertension      GI/HEPATIC   (+) Gastroesophageal reflux disease      HEMATOLOGY   (+) Thalassemia trait, alpha      NEURO/PSYCH   (+) Anxiety      PULMONARY   (+) Mild intermittent asthma without complication      Respiratory   (+) Laryngopharyngeal reflux (LPR)      Other   (+) Tobacco abuse        Physical Exam    Airway    Mallampati score: II  TM Distance: >3 FB  Neck ROM: full     Dental   Comment: Denies loose teeth,     Cardiovascular  Cardiovascular exam normal    Pulmonary  Pulmonary exam normal     Other Findings  Portions of exam deferred due to low yield and/or unknown COVID status      Anesthesia Plan  ASA Score- 2     Anesthesia Type- IV sedation with anesthesia with ASA Monitors  Additional Monitors:   Airway Plan:           Plan Factors-Exercise tolerance (METS): >4 METS  Chart reviewed  Existing labs reviewed  Patient summary reviewed  Patient is a current smoker  Induction- intravenous  Postoperative Plan-     Informed Consent- Anesthetic plan and risks discussed with patient  I personally reviewed this patient with the CRNA  Discussed and agreed on the Anesthesia Plan with the CRNA  Ronna Germain

## 2023-05-18 NOTE — H&P
History and Physical - [facility]  Kenny Brown 41 y o  female MRN: [id number]                  HPI: Kenny Brown is a 41y o  year old female who presents for dysphagia and early satiety      REVIEW OF SYSTEMS: Per the HPI, and otherwise unremarkable  Historical Information   Past Medical History:   Diagnosis Date   • Abdominal pain    • Abnormal mammogram of both breasts 10/30/2020   • Anemia    • Asthma    • Bilateral breast cysts     last assessed    [facility]    resolved   11/3/17    • Cervical cancer, FIGO stage I (HCC)    • Colon polyp    • Constipation    • Diarrhea    • Menorrhagia 2017   • Seizures (HCC)    • URI (upper respiratory infection)     under additonal type - Chronic     Past Surgical History:   Procedure Laterality Date   •  SECTION     • INGUINAL HERNIA REPAIR      last assessed   12/17/15     • OK COLONOSCOPY FLX DX W/COLLJ SPEC WHEN PFRMD N/A 9/15/2017    Procedure: COLONOSCOPY;  Surgeon: Gregory Welch MD;  Location: [facility]; Service: Gastroenterology   • SKIN BIOPSY      last assessed        • UMBILICAL HERNIA REPAIR      last assessed   12/17/15    • URETHRAL DIVERTICULECTOMY      excision of urethral diverticulum    last assessed   12/17/15     Social History   Social History     Substance and Sexual Activity   Alcohol Use Yes    Comment: every other week     Social History     Substance and Sexual Activity   Drug Use Never     Social History     Tobacco Use   Smoking Status Every Day   • Packs/day: 0 25   • Types: Cigarettes   Smokeless Tobacco Never     Family History   Problem Relation Age of Onset   • Hyperlipidemia Mother    • Hypertension Mother    • Breast cancer Mother         neoplasm/dx first age 41, mastectomy age 38, did have lymph biopsy positive with recurrance age around age 63   • No Known Problems Father    • No Known Problems Sister    • No Known Problems Sister    • No Known Problems Sister    • "No Known Problems Daughter    • No Known Problems Daughter    • No Known Problems Daughter    • No Known Problems Daughter    • Hyperlipidemia Maternal Grandmother    • Hypertension Maternal Grandmother    • Leukemia Maternal Grandmother    • No Known Problems Maternal Grandfather    • No Known Problems Paternal Grandmother    • No Known Problems Paternal Grandfather        Meds/Allergies       Current Outpatient Medications:   •  amLODIPine (NORVASC) 5 mg tablet  •  PARoxetine (PAXIL) 20 mg tablet  •  zolpidem (AMBIEN) 10 mg tablet  •  albuterol (PROVENTIL HFA,VENTOLIN HFA) 90 mcg/act inhaler  •  budesonide-formoterol (Symbicort) 80-4 5 MCG/ACT inhaler  •  docusate sodium (COLACE) 100 mg capsule  •  Elagolix-Estrad-Noreth & Elago 300-1-0 5 & 300 MG CPPK  •  esomeprazole (NexIUM) 20 mg capsule  •  loratadine (CLARITIN) 10 mg tablet  •  prazosin (MINIPRESS) 2 mg capsule  •  predniSONE 10 mg tablet  •  psyllium (METAMUCIL) 58 6 % packet    Current Facility-Administered Medications:   •  lactated ringers infusion, 125 mL/hr, Intravenous, Continuous    Allergies   Allergen Reactions   • Percocet [Oxycodone-Acetaminophen]    • Doxycycline Hives, Myalgia and Rash     Category: Allergy;          Objective     /76   Pulse 81   Temp 97 7 °F (36 5 °C)   Resp 17   Ht 5' 7\" (1 702 m)   Wt 78 5 kg (173 lb)   LMP 05/15/2023 (Approximate) Comment: current  Anesth aware  SpO2 100%   BMI 27 10 kg/m²       PHYSICAL EXAM    Gen: NAD  Head: NCAT  CV: RRR  CHEST: Clear  ABD: soft, NT/ND  EXT: no edema      ASSESSMENT/PLAN:  This is a 41y o  year old female here for EGD, and she is stable and optimized for her procedure        "

## 2023-05-18 NOTE — PROGRESS NOTES
Pt reports she felt her front tooth crack when bite block was put in  Small piece of tooth noted in her bed and placed in clear plastic bag  Dr Tatiana Bonilla and Dr Juvenal Mendez notified and came to see Pt

## 2023-05-19 LAB
ENDOMYSIUM IGA SER QL: NEGATIVE
GLIADIN PEPTIDE IGA SER-ACNC: 19 UNITS (ref 0–19)
GLIADIN PEPTIDE IGG SER-ACNC: 7 UNITS (ref 0–19)
IGA SERPL-MCNC: 242 MG/DL (ref 87–352)
TTG IGA SER-ACNC: 6 U/ML (ref 0–3)
TTG IGG SER-ACNC: <2 U/ML (ref 0–5)

## 2023-06-28 ENCOUNTER — TELEPHONE (OUTPATIENT)
Dept: INTERNAL MEDICINE CLINIC | Facility: CLINIC | Age: 48
End: 2023-06-28

## 2023-06-28 DIAGNOSIS — J45.20 MILD INTERMITTENT ASTHMA WITHOUT COMPLICATION: ICD-10-CM

## 2023-06-28 RX ORDER — ALBUTEROL SULFATE 90 UG/1
2 AEROSOL, METERED RESPIRATORY (INHALATION) EVERY 6 HOURS PRN
Qty: 8.5 G | Refills: 3 | Status: SHIPPED | OUTPATIENT
Start: 2023-06-28

## 2023-06-28 RX ORDER — BUDESONIDE AND FORMOTEROL FUMARATE DIHYDRATE 80; 4.5 UG/1; UG/1
2 AEROSOL RESPIRATORY (INHALATION) 2 TIMES DAILY
Qty: 10.2 G | Refills: 3 | Status: SHIPPED | OUTPATIENT
Start: 2023-06-28

## 2023-06-28 NOTE — TELEPHONE ENCOUNTER
Patient is requesting a refill for triamcinolone (KENALOG) 0 1 % ointment  Medication is not listed as current medication  Please advise

## 2023-06-29 ENCOUNTER — TELEPHONE (OUTPATIENT)
Dept: INTERNAL MEDICINE CLINIC | Facility: CLINIC | Age: 48
End: 2023-06-29

## 2023-10-11 ENCOUNTER — APPOINTMENT (EMERGENCY)
Dept: RADIOLOGY | Facility: HOSPITAL | Age: 48
End: 2023-10-11
Payer: COMMERCIAL

## 2023-10-11 ENCOUNTER — HOSPITAL ENCOUNTER (EMERGENCY)
Facility: HOSPITAL | Age: 48
Discharge: HOME | End: 2023-10-11
Attending: EMERGENCY MEDICINE
Payer: COMMERCIAL

## 2023-10-11 VITALS
TEMPERATURE: 97.3 F | DIASTOLIC BLOOD PRESSURE: 83 MMHG | RESPIRATION RATE: 20 BRPM | HEART RATE: 73 BPM | SYSTOLIC BLOOD PRESSURE: 140 MMHG | OXYGEN SATURATION: 100 %

## 2023-10-11 DIAGNOSIS — J45.901 ASTHMA WITH ACUTE EXACERBATION: Primary | ICD-10-CM

## 2023-10-11 LAB
ANION GAP SERPL CALCULATED.3IONS-SCNC: 9 MMOL/L
ATRIAL RATE: 79 BPM
BASOPHILS # BLD AUTO: 0.07 THOUSANDS/ÂΜL (ref 0–0.1)
BASOPHILS NFR BLD AUTO: 1 % (ref 0–1)
BUN SERPL-MCNC: 11 MG/DL (ref 5–25)
CALCIUM SERPL-MCNC: 8.9 MG/DL (ref 8.4–10.2)
CHLORIDE SERPL-SCNC: 103 MMOL/L (ref 96–108)
CO2 SERPL-SCNC: 25 MMOL/L (ref 21–32)
CREAT SERPL-MCNC: 0.72 MG/DL (ref 0.6–1.3)
EOSINOPHIL # BLD AUTO: 0.11 THOUSAND/ÂΜL (ref 0–0.61)
EOSINOPHIL NFR BLD AUTO: 2 % (ref 0–6)
ERYTHROCYTE [DISTWIDTH] IN BLOOD BY AUTOMATED COUNT: 15 % (ref 11.6–15.1)
EXT PREGNANCY TEST URINE: NEGATIVE
EXT. CONTROL: NORMAL
GFR SERPL CREATININE-BSD FRML MDRD: 100 ML/MIN/1.73SQ M
GLUCOSE SERPL-MCNC: 88 MG/DL (ref 65–140)
HCT VFR BLD AUTO: 36.8 % (ref 34.8–46.1)
HGB BLD-MCNC: 12.1 G/DL (ref 11.5–15.4)
IMM GRANULOCYTES # BLD AUTO: 0.02 THOUSAND/UL (ref 0–0.2)
IMM GRANULOCYTES NFR BLD AUTO: 0 % (ref 0–2)
LYMPHOCYTES # BLD AUTO: 2.66 THOUSANDS/ÂΜL (ref 0.6–4.47)
LYMPHOCYTES NFR BLD AUTO: 42 % (ref 14–44)
MCH RBC QN AUTO: 25.4 PG (ref 26.8–34.3)
MCHC RBC AUTO-ENTMCNC: 32.9 G/DL (ref 31.4–37.4)
MCV RBC AUTO: 77 FL (ref 82–98)
MONOCYTES # BLD AUTO: 0.56 THOUSAND/ÂΜL (ref 0.17–1.22)
MONOCYTES NFR BLD AUTO: 9 % (ref 4–12)
NEUTROPHILS # BLD AUTO: 2.91 THOUSANDS/ÂΜL (ref 1.85–7.62)
NEUTS SEG NFR BLD AUTO: 46 % (ref 43–75)
NRBC BLD AUTO-RTO: 0 /100 WBCS
P AXIS: 62 DEGREES
PLATELET # BLD AUTO: 305 THOUSANDS/UL (ref 149–390)
PMV BLD AUTO: 9.8 FL (ref 8.9–12.7)
POTASSIUM SERPL-SCNC: 4.4 MMOL/L (ref 3.5–5.3)
PR INTERVAL: 166 MS
QRS AXIS: 37 DEGREES
QRSD INTERVAL: 70 MS
QT INTERVAL: 390 MS
QTC INTERVAL: 447 MS
RBC # BLD AUTO: 4.76 MILLION/UL (ref 3.81–5.12)
SODIUM SERPL-SCNC: 137 MMOL/L (ref 135–147)
T WAVE AXIS: 15 DEGREES
VENTRICULAR RATE: 79 BPM
WBC # BLD AUTO: 6.33 THOUSAND/UL (ref 4.31–10.16)

## 2023-10-11 PROCEDURE — 80048 BASIC METABOLIC PNL TOTAL CA: CPT

## 2023-10-11 PROCEDURE — 93005 ELECTROCARDIOGRAM TRACING: CPT

## 2023-10-11 PROCEDURE — 81025 URINE PREGNANCY TEST: CPT

## 2023-10-11 PROCEDURE — G1004 CDSM NDSC: HCPCS

## 2023-10-11 PROCEDURE — 99285 EMERGENCY DEPT VISIT HI MDM: CPT

## 2023-10-11 PROCEDURE — 70491 CT SOFT TISSUE NECK W/DYE: CPT

## 2023-10-11 PROCEDURE — 85025 COMPLETE CBC W/AUTO DIFF WBC: CPT

## 2023-10-11 PROCEDURE — 36415 COLL VENOUS BLD VENIPUNCTURE: CPT

## 2023-10-11 PROCEDURE — 99284 EMERGENCY DEPT VISIT MOD MDM: CPT | Performed by: EMERGENCY MEDICINE

## 2023-10-11 PROCEDURE — 71260 CT THORAX DX C+: CPT

## 2023-10-11 PROCEDURE — 94640 AIRWAY INHALATION TREATMENT: CPT

## 2023-10-11 PROCEDURE — 93010 ELECTROCARDIOGRAM REPORT: CPT | Performed by: INTERNAL MEDICINE

## 2023-10-11 RX ORDER — ALBUTEROL SULFATE 2.5 MG/3ML
5 SOLUTION RESPIRATORY (INHALATION) ONCE
Status: COMPLETED | OUTPATIENT
Start: 2023-10-11 | End: 2023-10-11

## 2023-10-11 RX ORDER — PREDNISONE 20 MG/1
20 TABLET ORAL 2 TIMES DAILY WITH MEALS
Qty: 10 TABLET | Refills: 0 | Status: SHIPPED | OUTPATIENT
Start: 2023-10-11 | End: 2023-10-16

## 2023-10-11 RX ORDER — PREDNISONE 20 MG/1
40 TABLET ORAL ONCE
Status: COMPLETED | OUTPATIENT
Start: 2023-10-11 | End: 2023-10-11

## 2023-10-11 RX ADMIN — IPRATROPIUM BROMIDE 0.5 MG: 0.5 SOLUTION RESPIRATORY (INHALATION) at 10:49

## 2023-10-11 RX ADMIN — ALBUTEROL SULFATE 5 MG: 2.5 SOLUTION RESPIRATORY (INHALATION) at 10:49

## 2023-10-11 RX ADMIN — IOHEXOL 85 ML: 350 INJECTION, SOLUTION INTRAVENOUS at 14:37

## 2023-10-11 RX ADMIN — PREDNISONE 40 MG: 20 TABLET ORAL at 10:48

## 2023-10-11 NOTE — ED ATTENDING ATTESTATION
Final Diagnoses:     1. Asthma with acute exacerbation      ED Course as of 10/12/23 0719   Wed Oct 11, 2023   1017 This is a 56 y/o F here for acute on chronic coughing. It's been there since January 2022. It feels somewhat related to asthma. Over 2 days, increased SOB, using nebs more often. Fullness of RIGHT neck and RIGHT chest. No air on the right side for some reason, or at least sensation of that. Cough is non-prodcutive, stable. Can't lay flat. Sitting forwards helped. Couple people with viral symptoms for a few days at her house. COVID neg test at home. Vomiting x2 at home yesterday, NBNB. PMH: lupus, thalassemia   1019 General: VSS, NAD, awake, alert. Well-nourished, well-developed. Appears stated age. Head: Normocephalic, atraumatic, nontender. Eyes: PERRL, EOM-I. No diplopia. No hyphema. No subconjunctival hemorrhages. Symmetrical lids. ENTAtraumatic external nose and ears. MMM  No stridor. Normal phonation. No drooling. Base of mouth is soft. No mastoid tenderness. Neck: Symmetric, trachea midline. No JVD. No fullness of neck  No ecchymosis. CV: Peripheral pulses +2 throughout. No chest wall tenderness. Lungs:   Unlabored   Audible wheezing   No retractions  No crepitus. No tachypnea. No paradoxical motion. Abd: +BS, soft, NT/ND.   MSK:   FROM   No lower extremity edema. Back:   No CVAT  Skin: Dry, intact. Neuro: AAOx3, GCS 15, CN II-XII grossly intact. Motor grossly intact.   Psychiatric/Behavioral: Appropriate mood and affect   Exam: deferred       1019 A/P:  - URI -> WARI  - supportive care  - Steroids  - CXR for PNA  - Offer viral testing.  - f/u PCP.    1022 Procedure Note: EKG  Date/Time: 10/11/23 10:22 AM   Interpreted by: IRINA Kirk   Indications / Diagnosis: cough  ECG reviewed by me, the ED Provider: yes   The EKG demonstrates:  Rhythm: HR 79 normal sinus  Intervals: normal intervals  Axis: normal axis  QRS/Blocks: normal QRS  ST Changes: No acute ST Changes, no STD/PAULINE. Similar to previous EKG. 1332 PREGNANCY TEST URINE: Negative       I, Vanessa Mcgraw MD, saw and evaluated the patient. All available labs and X-rays were ordered by me or the resident / non-physician and have been reviewed by myself. I discussed the patient with the resident / non-physician and agree with the resident's / non-physician practitioner's findings and plan as documented in the resident's / non-physician practicitioner's note, except where noted. At this point, I agree with the current assessment done in the ED. I was present during key portions of all procedures performed unless otherwise stated. Nursing Triage:     Chief Complaint   Patient presents with    Shortness of Breath     Pt feels she has pneumonia. Right sided chest pressure when laying flat that makes her feel like she is suffocating. Had a viral cough since January and it has persisted. HPI:   As above     ASSESSMENT + PLAN:   As per ED course     Physical:     Vitals:    10/11/23 1200 10/11/23 1345 10/11/23 1445 10/11/23 1545   BP: 129/88 132/76 150/93 140/83   BP Location: Right arm Right arm Left arm    Pulse: 77 79 81 73   Resp: 15 16 20 20   Temp:       TempSrc:       SpO2: 98% 100% 100% 100%       - There are no obvious limitations to social determinants of care. - Nursing note reviewed. - Vitals reviewed. - Orders placed by myself and/or advanced practitioner / resident.    - Previous chart was reviewed  - No language barrier.   - History obtained from patient. - There are no limitations to the history obtained:     Past Medical:    has a past medical history of Abdominal pain, Abnormal mammogram of both breasts (10/30/2020), Anemia, Asthma, Bilateral breast cysts, Cervical cancer, FIGO stage I ([address]), Colon polyp, Constipation, Diarrhea, Menorrhagia (4/25/2017), Seizures ([address]), and URI (upper respiratory infection).     Past Surgical:    has a past surgical history that includes  section; pr colonoscopy flx dx w/collj spec when pfrmd (N/A, 9/15/2017); Skin biopsy; Urethral diverticulectomy; Inguinal hernia repair; and Umbilical hernia repair. Social:     Social History     Substance and Sexual Activity   Alcohol Use Yes    Comment: every other week     Social History     Tobacco Use   Smoking Status Every Day    Packs/day: 0.25    Types: Cigarettes   Smokeless Tobacco Never     Social History     Substance and Sexual Activity   Drug Use Never       Echo:   No results found for this or any previous visit. No results found for this or any previous visit. Cath:    No results found for this or any previous visit. Code Status: No Order  Advance Directive and Living Will:      Power of :    POLST:    Medications   albuterol inhalation solution 5 mg (5 mg Nebulization Given 10/11/23 1049)   ipratropium (ATROVENT) 0.02 % inhalation solution 0.5 mg (0.5 mg Nebulization Given 10/11/23 1049)   predniSONE tablet 40 mg (40 mg Oral Given 10/11/23 1048)   iohexol (OMNIPAQUE) 350 MG/ML injection (MULTI-DOSE) 85 mL (85 mL Intravenous Given 10/11/23 1437)     CT soft tissue neck   Final Result   Unremarkable neck CT. Workstation performed: [id number]         CT chest w contrast   Final Result      1. No acute abnormality in the chest.      2.  Small hiatal hernia with mild fluid in the esophagus, possibly secondary to reflux.                Workstation performed: [id number]           Orders Placed This Encounter   Procedures    POC Cardiac US    CT soft tissue neck    CT chest w contrast    CBC and differential    Basic metabolic panel    POCT pregnancy, urine    ECG 12 lead     Labs Reviewed   CBC AND DIFFERENTIAL - Abnormal       Result Value Ref Range Status    WBC 6.33  4.31 - 10.16 Thousand/uL Final    RBC 4.76  3.81 - 5.12 Million/uL Final    Hemoglobin 12.1  11.5 - 15.4 g/dL Final    Hematocrit 36.8  34.8 - 46.1 % Final    MCV 77 (*) 82 - 98 fL Final    MCH 25.4 (*) 26.8 - 34.3 pg Final    MCHC 32.9  31.4 - 37.4 g/dL Final    RDW 15.0  11.6 - 15.1 % Final    MPV 9.8  8.9 - 12.7 fL Final    Platelets 841  509 - 390 Thousands/uL Final    nRBC 0  /100 WBCs Final    Neutrophils Relative 46  43 - 75 % Final    Immat GRANS % 0  0 - 2 % Final    Lymphocytes Relative 42  14 - 44 % Final    Monocytes Relative 9  4 - 12 % Final    Eosinophils Relative 2  0 - 6 % Final    Basophils Relative 1  0 - 1 % Final    Neutrophils Absolute 2.91  1.85 - 7.62 Thousands/µL Final    Immature Grans Absolute 0.02  0.00 - 0.20 Thousand/uL Final    Lymphocytes Absolute 2.66  0.60 - 4.47 Thousands/µL Final    Monocytes Absolute 0.56  0.17 - 1.22 Thousand/µL Final    Eosinophils Absolute 0.11  0.00 - 0.61 Thousand/µL Final    Basophils Absolute 0.07  0.00 - 0.10 Thousands/µL Final   POCT PREGNANCY, URINE - Normal    EXT Preg Test, Ur Negative   Final    Control Valid   Final   BASIC METABOLIC PANEL    Sodium 167  135 - 147 mmol/L Final    Potassium 4.4  3.5 - 5.3 mmol/L Final    Chloride 103  96 - 108 mmol/L Final    CO2 25  21 - 32 mmol/L Final    ANION GAP 9  mmol/L Final    BUN 11  5 - 25 mg/dL Final    Creatinine 0.72  0.60 - 1.30 mg/dL Final    Comment: Standardized to IDMS reference method    Glucose 88  65 - 140 mg/dL Final    Comment: If the patient is fasting, the ADA then defines impaired fasting glucose as > 100 mg/dL and diabetes as > or equal to 123 mg/dL.     Calcium 8.9  8.4 - 10.2 mg/dL Final    eGFR 100  ml/min/1.73sq m Final    Narrative:     [place] guidelines for Chronic Kidney Disease (CKD):     Stage 1 with normal or high GFR (GFR > 90 mL/min/1.73 square meters)    Stage 2 Mild CKD (GFR = 60-89 mL/min/1.73 square meters)    Stage 3A Moderate CKD (GFR = 45-59 mL/min/1.73 square meters)    Stage 3B Moderate CKD (GFR = 30-44 mL/min/1.73 square meters)    Stage 4 Severe CKD (GFR = 15-29 mL/min/1.73 square meters)    Stage 5 End Stage CKD (GFR <15 mL/min/1.73 square meters)  Note: GFR calculation is accurate only with a steady state creatinine     Time reflects when diagnosis was documented in both MDM as applicable and the Disposition within this note       Time User Action Codes Description Comment    10/11/2023  3:15 PM Irene Campbell [N50.575] Asthma with acute exacerbation           ED Disposition       ED Disposition   Discharge    Condition   Stable    Date/Time   Wed Oct 11, 2023  3:55 PM    Comment   Genevieve Null discharge to home/self care. Follow-up Information       Follow up With Specialties Details Why Contact Info Additional Information    [address] [facility]   [address] [facility]  [id number]  [address], [address] [place], [place], [address]           Discharge Medication List as of 10/11/2023  4:10 PM        START taking these medications    Details   ! ! predniSONE 20 mg tablet Take 1 tablet (20 mg total) by mouth 2 (two) times a day with meals for 5 days, Starting Wed 10/11/2023, Until Mon 10/16/2023, Normal       !! - Potential duplicate medications found. Please discuss with provider.         CONTINUE these medications which have NOT CHANGED    Details   albuterol (PROVENTIL HFA,VENTOLIN HFA) 90 mcg/act inhaler Inhale 2 puffs every 6 (six) hours as needed for wheezing, Starting Wed 6/28/2023, Normal      amLODIPine (NORVASC) 5 mg tablet Take 1 tablet (5 mg total) by mouth daily, Starting Tue 12/6/2022, Normal      budesonide-formoterol (Symbicort) 80-4.5 MCG/ACT inhaler Inhale 2 puffs 2 (two) times a day Rinse mouth after use, Starting Wed 6/28/2023, Normal      docusate sodium (COLACE) 100 mg capsule Take 1 capsule (100 mg total) by mouth 2 (two) times a day, Starting Mon 3/6/2023, Normal      Elagolix-Estrad-Noreth & Elago 300-1-0.5 & 300 MG CPPK Take one pill by mouth twice daily as directed, Normal      esomeprazole (NexIUM) 20 mg capsule Take 1 capsule (20 mg total) by mouth in the morning, Starting Tue 12/6/2022, Until Mon 3/6/2023, Normal      loratadine (CLARITIN) 10 mg tablet Take 1 tablet (10 mg total) by mouth daily, Starting Tue 12/6/2022, Until Sun 6/4/2023, Normal      PARoxetine (PAXIL) 20 mg tablet Take 20 mg by mouth every morning, Starting Fri 2/17/2023, Historical Med      prazosin (MINIPRESS) 2 mg capsule Take 2 mg by mouth daily at bedtime, Starting Fri 2/17/2023, Historical Med      !! predniSONE 10 mg tablet Take 5 tablets daily for two days, then 4 tablets daily for two days, 3 tablets daily for two days, 2 tablets daily for two days, 1 tablet daily for two days, Normal      psyllium (METAMUCIL) 58.6 % packet Take 1 packet by mouth 2 (two) times a day, Starting Wed 4/5/2023, Until Fri 5/5/2023, Normal      zolpidem (AMBIEN) 10 mg tablet Take 10 mg by mouth daily at bedtime, Starting Fri 2/17/2023, Historical Med       !! - Potential duplicate medications found. Please discuss with provider. No discharge procedures on file. Prior to Admission Medications   Prescriptions Last Dose Informant Patient Reported? Taking?    Elagolix-Estrad-Noreth & Elago 300-1-0.5 & 300 MG CPPK  Self No No   Sig: Take one pill by mouth twice daily as directed   PARoxetine (PAXIL) 20 mg tablet  Self Yes No   Sig: Take 20 mg by mouth every morning   albuterol (PROVENTIL HFA,VENTOLIN HFA) 90 mcg/act inhaler   No No   Sig: Inhale 2 puffs every 6 (six) hours as needed for wheezing   amLODIPine (NORVASC) 5 mg tablet  Self No No   Sig: Take 1 tablet (5 mg total) by mouth daily   budesonide-formoterol (Symbicort) 80-4.5 MCG/ACT inhaler   No No   Sig: Inhale 2 puffs 2 (two) times a day Rinse mouth after use   docusate sodium (COLACE) 100 mg capsule  Self No No   Sig: Take 1 capsule (100 mg total) by mouth 2 (two) times a day   esomeprazole (NexIUM) 20 mg capsule   No No   Sig: Take 1 capsule (20 mg total) by mouth in the morning   loratadine (CLARITIN) 10 mg tablet  Self No No   Sig: Take 1 tablet (10 mg total) by mouth daily   prazosin (MINIPRESS) 2 mg capsule  Self Yes No   Sig: Take 2 mg by mouth daily at bedtime   predniSONE 10 mg tablet  Self No No   Sig: Take 5 tablets daily for two days, then 4 tablets daily for two days, 3 tablets daily for two days, 2 tablets daily for two days, 1 tablet daily for two days   Patient not taking: Reported on 4/5/2023   psyllium (METAMUCIL) 58.6 % packet   No No   Sig: Take 1 packet by mouth 2 (two) times a day   zolpidem (AMBIEN) 10 mg tablet  Self Yes No   Sig: Take 10 mg by mouth daily at bedtime      Facility-Administered Medications: None                        Portions of the record may have been created with voice recognition software. Occasional wrong word or "sound a like" substitutions may have occurred due to the inherent limitations of voice recognition software. Read the chart carefully and recognize, using context, where substitutions have occurred.     Electronically signed by:  Yessica Nails
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
## 2023-10-11 NOTE — ED PROCEDURE NOTE
Procedure  POC Cardiac US    Date/Time: 10/11/2023 10:50 AM    Performed by: Ingrid Basurto MD  Authorized by: Ingrid Basurto MD    Patient location:  ED  Other Assisting Provider: Yes (comment) Abhi Betts)    Procedure details:     Exam Type:  Diagnostic    Indications: suspected volume depletion and dyspnea      Assessment / Evaluation for: cardiac function, pericardial effusion and intravascular volume status      Exam Type: initial exam      Image quality: diagnostic      Image availability:  Images available in PACS and video obtained  Patient Details:     Cardiac Rhythm:  Regular    Mechanical ventilation: No    Cardiac findings:     Echo technique: limited 2D      Views obtained: parasternal long axis, parasternal short axis and apical      Pericardial effusion: absent      Tamponade physiology: absent      Wall motion: normal      LV systolic function: normal      RV dilation: none    Pulmonary findings:     Left Lung Findings: left lung sliding      Right lung findings: right lung sliding      B-lines: no B-lines present    IVC findings:     IVC Inspiratory Collapse: normal    Interpretation:     Fluid Status:  Euvolemic                   Ingrid Basurto MD  10/11/23 1106

## 2023-10-11 NOTE — DISCHARGE INSTRUCTIONS
Your evaluation suggests that your symptoms are due to a non emergent cause. Please follow up with your primary care physician within two days. Take the prednisone as prescribed. Return to the Emergency Department if you experience worsening or concerning symptoms. Thank you for choosing us for your care.

## 2023-10-11 NOTE — Clinical Note
Claudia Rodríguez was seen and treated in our emergency department on 10/11/2023. Diagnosis:     Hamzah Bernal  may return to school on return date. She may return on this date: 10/12/2023    Arya Gaston was seen in our ED today. Please excuse Arya Gaston for any work/school missed and allow Genevieve Weathers to return on the date listed above. If you have any questions or concerns, please don't hesitate to call.       Resa Scheuermann, DO    ______________________________           _______________          _______________  Hospital Representative                              Date                                Time

## 2023-10-11 NOTE — ED PROVIDER NOTES
History  Chief Complaint   Patient presents with    Shortness of Breath     Pt feels she has pneumonia. Right sided chest pressure when laying flat that makes her feel like she is suffocating. Had a viral cough since January and it has persisted. Patient is a 65-year-old female with a significant past medical history of lupus, asthma, presenting for evaluation of shortness of breath. She reports that over the last several months she has had an ongoing cough and some intermittent shortness of breath. She says that over the last 2 days she has had worsening tightness and decreased air movement mostly on her right side. She feels like she has swelling on the side from the right chest and into the right neck. She tried taking her home nebulizer treatment with some improvement. She denies any chest pain. She denies any hemoptysis. She otherwise denies acute complaints. Prior to Admission Medications   Prescriptions Last Dose Informant Patient Reported? Taking?    Elagolix-Estrad-Noreth & Elago 300-1-0.5 & 300 MG CPPK  Self No No   Sig: Take one pill by mouth twice daily as directed   PARoxetine (PAXIL) 20 mg tablet  Self Yes No   Sig: Take 20 mg by mouth every morning   albuterol (PROVENTIL HFA,VENTOLIN HFA) 90 mcg/act inhaler   No No   Sig: Inhale 2 puffs every 6 (six) hours as needed for wheezing   amLODIPine (NORVASC) 5 mg tablet  Self No No   Sig: Take 1 tablet (5 mg total) by mouth daily   budesonide-formoterol (Symbicort) 80-4.5 MCG/ACT inhaler   No No   Sig: Inhale 2 puffs 2 (two) times a day Rinse mouth after use   docusate sodium (COLACE) 100 mg capsule  Self No No   Sig: Take 1 capsule (100 mg total) by mouth 2 (two) times a day   esomeprazole (NexIUM) 20 mg capsule   No No   Sig: Take 1 capsule (20 mg total) by mouth in the morning   loratadine (CLARITIN) 10 mg tablet  Self No No   Sig: Take 1 tablet (10 mg total) by mouth daily   prazosin (MINIPRESS) 2 mg capsule  Self Yes No   Sig: Take 2 mg by mouth daily at bedtime   predniSONE 10 mg tablet  Self No No   Sig: Take 5 tablets daily for two days, then 4 tablets daily for two days, 3 tablets daily for two days, 2 tablets daily for two days, 1 tablet daily for two days   Patient not taking: Reported on 2023   psyllium (METAMUCIL) 58.6 % packet   No No   Sig: Take 1 packet by mouth 2 (two) times a day   zolpidem (AMBIEN) 10 mg tablet  Self Yes No   Sig: Take 10 mg by mouth daily at bedtime      Facility-Administered Medications: None       Past Medical History:   Diagnosis Date    Abdominal pain     Abnormal mammogram of both breasts 10/30/2020    Anemia     Asthma     Bilateral breast cysts     last assessed. .. Joaquin Downey 19    resolved. ....11/3/17     Cervical cancer, FIGO stage I (HCC)     Colon polyp     Constipation     Diarrhea     Menorrhagia 2017    Seizures (HCC)     URI (upper respiratory infection)     under additonal type - Chronic       Past Surgical History:   Procedure Laterality Date     SECTION      INGUINAL HERNIA REPAIR      last assessed. ....12/17/15      WI COLONOSCOPY FLX DX W/COLLJ SPEC WHEN PFRMD N/A 9/15/2017    Procedure: COLONOSCOPY;  Surgeon: Abiel Vu MD;  Location: [facility]; Service: Gastroenterology    SKIN BIOPSY      last assessed. ....      UMBILICAL HERNIA REPAIR      last assessed. ....12/17/15     URETHRAL DIVERTICULECTOMY      excision of urethral diverticulum. ..... last assessed. ...12/17/15       Family History   Problem Relation Age of Onset    Hyperlipidemia Mother     Hypertension Mother     Breast cancer Mother         neoplasm/dx first age 39, mastectomy age 38, did have lymph biopsy positive with recurrance age around age 61    No Known Problems Father     No Known Problems Sister     No Known Problems Sister     No Known Problems Sister     No Known Problems Daughter     No Known Problems Daughter     No Known Problems Daughter     No Known Problems Daughter     Hyperlipidemia Maternal Grandmother     Hypertension Maternal Grandmother     Leukemia Maternal Grandmother     No Known Problems Maternal Grandfather     No Known Problems Paternal Grandmother     No Known Problems Paternal Grandfather      I have reviewed and agree with the history as documented. E-Cigarette/Vaping    E-Cigarette Use Never User      E-Cigarette/Vaping Substances    Nicotine No     THC No     CBD No     Flavoring No     Other No     Unknown No      Social History     Tobacco Use    Smoking status: Every Day     Packs/day: 0.25     Types: Cigarettes    Smokeless tobacco: Never   Vaping Use    Vaping Use: Never used   Substance Use Topics    Alcohol use: Yes     Comment: every other week    Drug use: Never        Review of Systems   Constitutional:  Negative for fever. HENT:  Negative for sore throat, trouble swallowing and voice change. Respiratory:  Positive for cough, chest tightness and shortness of breath. Cardiovascular:  Negative for chest pain. Gastrointestinal:  Negative for abdominal pain, diarrhea, nausea and vomiting. All other systems reviewed and are negative. Physical Exam  ED Triage Vitals   Temperature Pulse Respirations Blood Pressure SpO2   10/11/23 0955 10/11/23 0955 10/11/23 0955 10/11/23 0955 10/11/23 0955   (!) 97.3 °F (36.3 °C) 94 18 (!) 136/112 100 %      Temp Source Heart Rate Source Patient Position - Orthostatic VS BP Location FiO2 (%)   10/11/23 0955 10/11/23 1015 10/11/23 0955 10/11/23 0955 --   Temporal Monitor Lying Right arm       Pain Score       10/11/23 0955       10 - Worst Possible Pain             Orthostatic Vital Signs  Vitals:    10/11/23 1200 10/11/23 1345 10/11/23 1445 10/11/23 1545   BP: 129/88 132/76 150/93 140/83   Pulse: 77 79 81 73   Patient Position - Orthostatic VS: Lying Lying Sitting        Physical Exam  Vitals and nursing note reviewed. Constitutional:       General: She is not in acute distress. Appearance: Normal appearance.  She is not ill-appearing or toxic-appearing. HENT:      Head: Normocephalic and atraumatic. Right Ear: External ear normal.      Left Ear: External ear normal.      Nose: Nose normal.   Eyes:      General: No scleral icterus. Right eye: No discharge. Left eye: No discharge. Extraocular Movements: Extraocular movements intact. Conjunctiva/sclera: Conjunctivae normal.   Neck:      Vascular: No JVD. Comments: No soft tissue swelling. No crepitus or pain out of proportion. The oropharynx is normal.  Cardiovascular:      Rate and Rhythm: Normal rate. Heart sounds: Normal heart sounds. No murmur heard. No friction rub. No gallop. Pulmonary:      Effort: Pulmonary effort is normal. No respiratory distress. Breath sounds: Wheezing present. Abdominal:      General: Abdomen is flat. There is no distension. Palpations: Abdomen is soft. There is no mass. Tenderness: There is no abdominal tenderness. Genitourinary:     Comments: Deferred  Skin:     General: Skin is warm and dry. Neurological:      General: No focal deficit present. Mental Status: She is alert.          ED Medications  Medications   albuterol inhalation solution 5 mg (5 mg Nebulization Given 10/11/23 1049)   ipratropium (ATROVENT) 0.02 % inhalation solution 0.5 mg (0.5 mg Nebulization Given 10/11/23 1049)   predniSONE tablet 40 mg (40 mg Oral Given 10/11/23 1048)   iohexol (OMNIPAQUE) 350 MG/ML injection (MULTI-DOSE) 85 mL (85 mL Intravenous Given 10/11/23 1437)       Diagnostic Studies  Results Reviewed       Procedure Component Value Units Date/Time    POCT pregnancy, urine [[id number]]  (Normal) Resulted: 10/11/23 1330    Lab Status: Final result Updated: 10/11/23 1330     EXT Preg Test, Ur Negative     Control Valid    Basic metabolic panel [[id number]] Collected: 10/11/23 1108    Lab Status: Final result Specimen: Blood from Arm, Right Updated: 10/11/23 1220     Sodium 137 mmol/L      Potassium 4.4 mmol/L      Chloride 103 mmol/L      CO2 25 mmol/L      ANION GAP 9 mmol/L      BUN 11 mg/dL      Creatinine 0.72 mg/dL      Glucose 88 mg/dL      Calcium 8.9 mg/dL      eGFR 100 ml/min/1.73sq m     Narrative:      National Kidney Disease Foundation guidelines for Chronic Kidney Disease (CKD):     Stage 1 with normal or high GFR (GFR > 90 mL/min/1.73 square meters)    Stage 2 Mild CKD (GFR = 60-89 mL/min/1.73 square meters)    Stage 3A Moderate CKD (GFR = 45-59 mL/min/1.73 square meters)    Stage 3B Moderate CKD (GFR = 30-44 mL/min/1.73 square meters)    Stage 4 Severe CKD (GFR = 15-29 mL/min/1.73 square meters)    Stage 5 End Stage CKD (GFR <15 mL/min/1.73 square meters)  Note: GFR calculation is accurate only with a steady state creatinine    CBC and differential [[id number]]  (Abnormal) Collected: 10/11/23 1108    Lab Status: Final result Specimen: Blood from Arm, Right Updated: 10/11/23 1121     WBC 6.33 Thousand/uL      RBC 4.76 Million/uL      Hemoglobin 12.1 g/dL      Hematocrit 36.8 %      MCV 77 fL      MCH 25.4 pg      MCHC 32.9 g/dL      RDW 15.0 %      MPV 9.8 fL      Platelets 951 Thousands/uL      nRBC 0 /100 WBCs      Neutrophils Relative 46 %      Immat GRANS % 0 %      Lymphocytes Relative 42 %      Monocytes Relative 9 %      Eosinophils Relative 2 %      Basophils Relative 1 %      Neutrophils Absolute 2.91 Thousands/µL      Immature Grans Absolute 0.02 Thousand/uL      Lymphocytes Absolute 2.66 Thousands/µL      Monocytes Absolute 0.56 Thousand/µL      Eosinophils Absolute 0.11 Thousand/µL      Basophils Absolute 0.07 Thousands/µL                    CT soft tissue neck   Final Result by WILLIAM Ochoa MD ([id number])   Unremarkable neck CT. Workstation performed: [id number]         CT chest w contrast   Final Result by Charley Bautista MD (69/81 [id number])      1. No acute abnormality in the chest.      2.  Small hiatal hernia with mild fluid in the esophagus, possibly secondary to reflux. Workstation performed: [id number]               Procedures  Procedures      ED Course  ED Course as of 10/11/23 2150   Wed Oct 11, 2023   1004 Procedure Note: EKG  Date/Time: 10/11/23 10:04 AM   Interpreted by: Hillary Ford   Indications / Diagnosis: SOB  ECG reviewed by me, the ED Provider: yes   The EKG demonstrates:  Rhythm: normal sinus  Intervals: normal intervals  Axis: normal axis  QRS/Blocks: normal QRS  ST Changes: No acute ST Changes, no STD/PAULINE.   1332 PREGNANCY TEST URINE: Negative                             SBIRT 20yo+      Flowsheet Row Most Recent Value   Initial Alcohol Screen: US AUDIT-C     1. How often do you have a drink containing alcohol? 6 Filed at: 10/11/2023 1504   2. How many drinks containing alcohol do you have on a typical day you are drinking? 3 Filed at: 10/11/2023 1504   3b. FEMALE Any Age, or MALE 65+: How often do you have 4 or more drinks on one occassion? 6 Filed at: 10/11/2023 1504   Audit-C Score 15 Filed at: 10/11/2023 1504   Full Alcohol Screen: US AUDIT    4. How often during the last year have you found that you were not able to stop drinking once you had started? 4 Filed at: 10/11/2023 1504   5. How often during past year have you failed to do what was normally expected of you because of drinking? 0 Filed at: 10/11/2023 1504   6. How often in past year have you needed a first drink in the morning to get yourself going after a heavy drinking session? 0 Filed at: 10/11/2023 1504   7. How often in past year have you had feeling of guilt or remorse after drinking? 2 Filed at: 10/11/2023 1504   8. How often in past year have you been unable to remember what happened night before because you had been drinking? 0 Filed at: 10/11/2023 1504   9. Have you or someone else been injured as a result of your drinking? 0 Filed at: 10/11/2023 1504   10.  Has a relative, friend, doctor or other health worker been concerned about your drinking and suggested you cut down?  0 Filed at: 10/11/2023 1504   AUDIT Total Score 21 Filed at: 10/11/2023 1504   CHELA: How many times in the past year have you. .. Used an illegal drug or used a prescription medication for non-medical reasons? Never Filed at: 10/11/2023 1504                  Medical Decision Making  Patient is a 82-year-old female presenting for evaluation of shortness of breath. Based on history and evaluation, differential diagnosis includes but is not limited to: Acute asthma exacerbation, pneumothorax, intrathoracic mass, neck mass. Plan: CBC, BMP, nebulizer, steroids, CT chest, CT neck, reassessment    Labs unremarkable. CT imaging without acute emergent abnormalities on radiology interpretation and my independent review. On reassessment, patient continues to be well-appearing. Suspect that patient's symptoms are likely in the setting of acute asthma exacerbation. Stable for discharge with primary care follow-up. Patient given information for pulmonology to follow-up with as well. Patient given prescription for prednisone. Patient seems understand this plan and is agreeable. All questions answered. Patient discharged home with return precautions. Amount and/or Complexity of Data Reviewed  Labs: ordered. Decision-making details documented in ED Course. Radiology: ordered. Risk  Prescription drug management. Disposition  Final diagnoses:   Asthma with acute exacerbation     Time reflects when diagnosis was documented in both MDM as applicable and the Disposition within this note       Time User Action Codes Description Comment    10/11/2023  3:15 PM Yumiko Lazaro Add [T88.571] Asthma with acute exacerbation           ED Disposition       ED Disposition   Discharge    Condition   Stable    Date/Time   Wed Oct 11, 2023 [id number] [facility] discharge to home/self care.                    Follow-up Information       Follow up With Specialties Details Why Contact Info Additional Information     [address] [facility]   [address] [facility]  [id number]  [address], [address] [place], [address], [address]             Discharge Medication List as of 10/11/2023  4:10 PM        START taking these medications    Details   ! ! predniSONE 20 mg tablet Take 1 tablet (20 mg total) by mouth 2 (two) times a day with meals for 5 days, Starting Wed 10/11/2023, Until Mon 10/16/2023, Normal       !! - Potential duplicate medications found. Please discuss with provider.         CONTINUE these medications which have NOT CHANGED    Details   albuterol (PROVENTIL HFA,VENTOLIN HFA) 90 mcg/act inhaler Inhale 2 puffs every 6 (six) hours as needed for wheezing, Starting Wed 6/28/2023, Normal      amLODIPine (NORVASC) 5 mg tablet Take 1 tablet (5 mg total) by mouth daily, Starting Tue 12/6/2022, Normal      budesonide-formoterol (Symbicort) 80-4.5 MCG/ACT inhaler Inhale 2 puffs 2 (two) times a day Rinse mouth after use, Starting Wed 6/28/2023, Normal      docusate sodium (COLACE) 100 mg capsule Take 1 capsule (100 mg total) by mouth 2 (two) times a day, Starting Mon 3/6/2023, Normal      Elagolix-Estrad-Noreth & Elago 300-1-0.5 & 300 MG CPPK Take one pill by mouth twice daily as directed, Normal      esomeprazole (NexIUM) 20 mg capsule Take 1 capsule (20 mg total) by mouth in the morning, Starting Tue 12/6/2022, Until Mon 3/6/2023, Normal      loratadine (CLARITIN) 10 mg tablet Take 1 tablet (10 mg total) by mouth daily, Starting Tue 12/6/2022, Until Sun 6/4/2023, Normal      PARoxetine (PAXIL) 20 mg tablet Take 20 mg by mouth every morning, Starting Fri 2/17/2023, Historical Med      prazosin (MINIPRESS) 2 mg capsule Take 2 mg by mouth daily at bedtime, Starting Fri 2/17/2023, Historical Med      !! predniSONE 10 mg tablet Take 5 tablets daily for two days, then 4 tablets daily for two days, 3 tablets daily for two days, 2 tablets daily for two days, 1 tablet daily for two days, Normal      psyllium (METAMUCIL) 58.6 % packet Take 1 packet by mouth 2 (two) times a day, Starting Wed 4/5/2023, Until Fri 5/5/2023, Normal      zolpidem (AMBIEN) 10 mg tablet Take 10 mg by mouth daily at bedtime, Starting Fri 2/17/2023, Historical Med       !! - Potential duplicate medications found. Please discuss with provider. No discharge procedures on file. PDMP Review         Value Time User    PDMP Reviewed  Yes 9/12/2021  4:12 AM Darby Grace PA-C             ED Provider  Attending physically available and evaluated Nicci Ca. I managed the patient along with the ED Attending.     Electronically Signed by           Misty Oro DO  10/11/23 4012

## 2023-10-11 NOTE — ED NOTES
Patient waiting results of cat scan. Drink offered, patient declined.       Edwin Keyes, RN  10/11/23 6177

## 2023-10-11 NOTE — ED NOTES
Patient reports feeling safe to go home with her 25year old daughter and her 29year old daughter is at home     Eunice Castillo, [address]  10/11/23 8446

## 2024-04-02 ENCOUNTER — TELEPHONE (OUTPATIENT)
Dept: INTERNAL MEDICINE CLINIC | Facility: CLINIC | Age: 49
End: 2024-04-02

## 2024-09-23 ENCOUNTER — TELEPHONE (OUTPATIENT)
Dept: INTERNAL MEDICINE CLINIC | Facility: CLINIC | Age: 49
End: 2024-09-23

## 2024-09-23 NOTE — TELEPHONE ENCOUNTER
----- Message from Jennifer Armstrong PA-C sent at 9/23/2024  8:58 AM EDT -----  Please call patient to schedule annual physical. Thank you

## 2024-10-01 ENCOUNTER — OFFICE VISIT (OUTPATIENT)
Dept: INTERNAL MEDICINE CLINIC | Facility: CLINIC | Age: 49
End: 2024-10-01

## 2024-10-01 DIAGNOSIS — E66.3 OVERWEIGHT WITH BODY MASS INDEX (BMI) OF 26 TO 26.9 IN ADULT: ICD-10-CM

## 2024-10-01 DIAGNOSIS — N95.1 PERIMENOPAUSE: ICD-10-CM

## 2024-10-01 DIAGNOSIS — Z00.00 ANNUAL PHYSICAL EXAM: Primary | ICD-10-CM

## 2024-10-01 DIAGNOSIS — Z87.891 PERSONAL HISTORY OF NICOTINE DEPENDENCE: ICD-10-CM

## 2024-10-01 DIAGNOSIS — I10 PRIMARY HYPERTENSION: ICD-10-CM

## 2024-10-01 DIAGNOSIS — Z13.220 ENCOUNTER FOR SCREENING FOR LIPID DISORDER: ICD-10-CM

## 2024-10-01 DIAGNOSIS — R42 VERTIGO: ICD-10-CM

## 2024-10-01 DIAGNOSIS — Z12.4 CERVICAL CANCER SCREENING: ICD-10-CM

## 2024-10-01 DIAGNOSIS — F41.9 ANXIETY: ICD-10-CM

## 2024-10-01 DIAGNOSIS — Z12.31 SCREENING MAMMOGRAM FOR BREAST CANCER: ICD-10-CM

## 2024-10-01 DIAGNOSIS — J45.20 MILD INTERMITTENT ASTHMA WITHOUT COMPLICATION: ICD-10-CM

## 2024-10-01 DIAGNOSIS — R76.8 POSITIVE ANA (ANTINUCLEAR ANTIBODY): ICD-10-CM

## 2024-10-01 PROBLEM — N60.01 CYST OF RIGHT BREAST: Status: RESOLVED | Noted: 2017-11-03 | Resolved: 2024-10-01

## 2024-10-01 PROBLEM — K21.9 GASTROESOPHAGEAL REFLUX DISEASE: Status: RESOLVED | Noted: 2022-08-05 | Resolved: 2024-10-01

## 2024-10-01 PROBLEM — R87.619 ATYPICAL GLANDULAR CELLS ON CERVICAL PAP SMEAR: Status: RESOLVED | Noted: 2017-05-11 | Resolved: 2024-10-01

## 2024-10-01 PROBLEM — K59.00 CONSTIPATION: Status: RESOLVED | Noted: 2023-03-07 | Resolved: 2024-10-01

## 2024-10-01 PROBLEM — N93.9 ABNORMAL UTERINE BLEEDING (AUB): Status: RESOLVED | Noted: 2022-10-27 | Resolved: 2024-10-01

## 2024-10-01 PROBLEM — K21.9 LARYNGOPHARYNGEAL REFLUX (LPR): Status: RESOLVED | Noted: 2022-08-05 | Resolved: 2024-10-01

## 2024-10-01 PROBLEM — Z87.42 HX OF ABNORMAL CERVICAL PAP SMEAR: Status: RESOLVED | Noted: 2022-10-27 | Resolved: 2024-10-01

## 2024-10-01 PROCEDURE — 99396 PREV VISIT EST AGE 40-64: CPT | Performed by: PHYSICIAN ASSISTANT

## 2024-10-01 RX ORDER — ALBUTEROL SULFATE 90 UG/1
2 INHALANT RESPIRATORY (INHALATION) EVERY 4 HOURS PRN
Qty: 18 G | Refills: 2 | Status: SHIPPED | OUTPATIENT
Start: 2024-10-01

## 2024-10-01 RX ORDER — PAROXETINE 20 MG/1
20 TABLET, FILM COATED ORAL EVERY MORNING
Qty: 90 TABLET | Refills: 3 | Status: SHIPPED | OUTPATIENT
Start: 2024-10-01

## 2024-10-01 NOTE — PATIENT INSTRUCTIONS
"Patient Education     Routine physical for adults   The Basics   Written by the doctors and editors at [facility]   What is a physical? -- A physical is a routine visit, or \"check-up,\" with your doctor. You might also hear it called a \"wellness visit\" or \"preventive visit.\"  During each visit, the doctor will:   Ask about your physical and mental health   Ask about your habits, behaviors, and lifestyle   Do an exam   Give you vaccines if needed   Talk to you about any medicines you take   Give advice about your health   Answer your questions  Getting regular check-ups is an important part of taking care of your health. It can help your doctor find and treat any problems you have. But it's also important for preventing health problems.  A routine physical is different from a \"sick visit.\" A sick visit is when you see a doctor because of a health concern or problem. Since physicals are scheduled ahead of time, you can think about what you want to ask the doctor.  How often should I get a physical? -- It depends on your age and health. In general, for people age 21 years and older:   If you are younger than 50 years, you might be able to get a physical every 3 years.   If you are 50 years or older, your doctor might recommend a physical every year.  If you have an ongoing health condition, like diabetes or high blood pressure, your doctor will probably want to see you more often.  What happens during a physical? -- In general, each visit will include:   Physical exam - The doctor or nurse will check your height, weight, heart rate, and blood pressure. They will also look at your eyes and ears. They will ask about how you are feeling and whether you have any symptoms that bother you.   Medicines - It's a good idea to bring a list of all the medicines you take to each doctor visit. Your doctor will talk to you about your medicines and answer any questions. Tell them if you are having any side effects that bother you. You " "should also tell them if you are having trouble paying for any of your medicines.   Habits and behaviors - This includes:   Your diet   Your exercise habits   Whether you smoke, drink alcohol, or use drugs   Whether you are sexually active   Whether you feel safe at home  Your doctor will talk to you about things you can do to improve your health and lower your risk of health problems. They will also offer help and support. For example, if you want to quit smoking, they can give you advice and might prescribe medicines. If you want to improve your diet or get more physical activity, they can help you with this, too.   Lab tests, if needed - The tests you get will depend on your age and situation. For example, your doctor might want to check your:   Cholesterol   Blood sugar   Iron level   Vaccines - The recommended vaccines will depend on your age, health, and what vaccines you already had. Vaccines are very important because they can prevent certain serious or deadly infections.   Discussion of screening - \"Screening\" means checking for diseases or other health problems before they cause symptoms. Your doctor can recommend screening based on your age, risk, and preferences. This might include tests to check for:   Cancer, such as breast, prostate, cervical, ovarian, colorectal, prostate, lung, or skin cancer   Sexually transmitted infections, such as chlamydia and gonorrhea   Mental health conditions like depression and anxiety  Your doctor will talk to you about the different types of screening tests. They can help you decide which screenings to have. They can also explain what the results might mean.   Answering questions - The physical is a good time to ask the doctor or nurse questions about your health. If needed, they can refer you to other doctors or specialists, too.  Adults older than 65 years often need other care, too. As you get older, your doctor will talk to you about:   How to prevent falling at " home   Hearing or vision tests   Memory testing   How to take your medicines safely   Making sure that you have the help and support you need at home  All topics are updated as new evidence becomes available and our peer review process is complete.  This topic retrieved from [employer] on: May 02, 2024.  Topic 024758 Version 1.0  Release: 32.4.3 - C32.122  © 2024 UpToDate, Inc. and/or its affiliates. All rights reserved.  Consumer Information Use and Disclaimer   Disclaimer: This generalized information is a limited summary of diagnosis, treatment, and/or medication information. It is not meant to be comprehensive and should be used as a tool to help the user understand and/or assess potential diagnostic and treatment options. It does NOT include all information about conditions, treatments, medications, side effects, or risks that may apply to a specific patient. It is not intended to be medical advice or a substitute for the medical advice, diagnosis, or treatment of a health care provider based on the health care provider's examination and assessment of a patient's specific and unique circumstances. Patients must speak with a health care provider for complete information about their health, medical questions, and treatment options, including any risks or benefits regarding use of medications. This information does not endorse any treatments or medications as safe, effective, or approved for treating a specific patient. UpToDate, Inc. and its affiliates disclaim any warranty or liability relating to this information or the use thereof.The use of this information is governed by the Terms of Use, available at https://www.woltersPadinmotionuwer.com/en/know/clinical-effectiveness-terms. 2024© UpToDate, Inc. and its affiliates and/or licensors. All rights reserved.  Copyright   © 2024 UpToDate, Inc. and/or its affiliates. All rights reserved.

## 2024-10-01 NOTE — PROGRESS NOTES
Adult Annual Physical  Name: Genevieve Meza      : 1975      MRN: [id number]  Encounter Provider: Jennifer Armstrong PA-C  Encounter Date: 10/1/2024   Encounter department: [facility]    Assessment & Plan  Annual physical exam  Discussed general health recommendations and screening guidelines. Agreeable to screening labs. Due for breast cancer screening, agreeable to mammogram. Up to date on cervical cancer screening. Up to date on colorectal cancer screening. Declines immunizations. Recommend routine dental and eye exams. Next physical one year.        Vertigo  Intermittent episodes of vertigo, likely BPPV. Will order labs to rule out reversible causes. Ensure adequate hydration and rest. Encouraged healthy well balanced diet. Avoid caffeine and alcohol. Declines meclizine. Discussed vestibular therapy, declines at this time. Discussed obtaing a carotid duple as well, she declines at this time. Return to care if symptoms worsen or fail to improve.   Orders:    CBC and differential; Future    Comprehensive metabolic panel; Future    TSH, 3rd generation with Free T4 reflex; Future    Vitamin B12; Future    Primary hypertension  BP Readings from Last 3 Encounters:   10/01/24 126/85   10/11/23 140/83   23 126/74      History of HTN. Currently off medications. Controlled. Limit sodium and salt intake. Avoid alcohol and caffeine.        Perimenopause  Previously on Paxil 20 mg once daily for perimenopause/anxiety. Mainly mood swings and hot flashes. She would like to restart this. Start 1/2 tablet Paxil 20 mg once a day for one week. If tolerated, increase to 20 mg once daily. Discussed treatment course. May take 4-6 weeks to reach maximum efficacy. Declines  at this time.   Orders:    PARoxetine (PAXIL) 20 mg tablet; Take 1 tablet (20 mg total) by mouth every morning    Anxiety  See above.   Orders:    Ambulatory Referral to Social Work Care Management Program;  Future    Mild intermittent asthma without complication  Controlled. Continue albuterol as needed. Discussed PFTs, she declined.   Orders:    albuterol (PROVENTIL HFA,VENTOLIN HFA) 90 mcg/act inhaler; Inhale 2 puffs every 4 (four) hours as needed for wheezing or shortness of breath    Positive ROME (antinuclear antibody)  Patient complains of generalized aches/pains and fatigue. Previous rheum work up negative. Will recheck today. Likely fibromyalgia.   Orders:    ROME Screen w/ Reflex to Titer/Pattern; Future    Sedimentation rate, automated; Future    C-reactive protein; Future    Systemic Lupus Profile A; Future    Personal history of nicotine dependence  Encouraged smoking cessation.        Screening mammogram for breast cancer    Orders:    Mammo screening bilateral w 3d and cad; Future    Cervical cancer screening    Orders:    Ambulatory Referral to Obstetrics / Gynecology; Future    Encounter for screening for lipid disorder    Orders:    Lipid panel; Future    Overweight with body mass index (BMI) of 26 to 26.9 in adult         Immunizations and preventive care screenings were discussed with patient today. Appropriate education was printed on patient's after visit summary.    Counseling:  Alcohol/drug use: discussed moderation in alcohol intake, the recommendations for healthy alcohol use, and avoidance of illicit drug use.  Dental Health: discussed importance of regular tooth brushing, flossing, and dental visits.  Injury prevention: discussed safety/seat belts, safety helmets, smoke detectors, carbon monoxide detectors, and smoking near bedding or upholstery.  Sexual health: discussed sexually transmitted diseases, partner selection, use of condoms, avoidance of unintended pregnancy, and contraceptive alternatives.  Exercise: the importance of regular exercise/physical activity was discussed. Recommend exercise 3-5 times per week for at least 30 minutes.     BMI Counseling: Body mass index is 26.8 kg/m².  The BMI is above normal. Nutrition recommendations include decreasing portion sizes, encouraging healthy choices of fruits and vegetables, decreasing fast food intake, consuming healthier snacks, limiting drinks that contain sugar, moderation in carbohydrate intake, increasing intake of lean protein, reducing intake of saturated and trans fat and reducing intake of cholesterol. Exercise recommendations include moderate physical activity 150 minutes/week, exercising 3-5 times per week and strength training exercises. No pharmacotherapy was ordered. Rationale for BMI follow-up plan is due to patient being overweight or obese.     Depression Screening and Follow-up Plan: Patient was screened for depression during today's encounter. They screened negative with a PHQ-2 score of 0.    Tobacco Cessation Counseling: Tobacco cessation counseling was provided. The patient is sincerely urged to quit consumption of tobacco. She is not ready to quit tobacco. Medication options and side effects of medication discussed. Patient refused medication.         History of Present Illness     Adult Annual Physical:  Patient presents for annual physical. She is complaining of vertigo. States it has been present for 1-2 years. Comes and goes. May occur for 2 weeks at a time. States she recently was having episodes of it, but is no longer have ing symptoms. States it is the worst in the morning when she wakes up. Feels lightheaded and sometimes room spinning sensation. Will have nausea as well. No vomiting. No cold or flu-like symptoms. Nothing makes it better or worse. She has not tried anything for this yet. .     Diet and Physical Activity:  - Diet/Nutrition: limited junk food and well balanced diet.  - Exercise: no formal exercise.    Depression Screening:  - PHQ-2 Score: 0    General Health:  - Sleep: sleeps well and 7-8 hours of sleep on average.  - Hearing: normal hearing bilateral ears.  - Vision: no vision problems and most recent  eye exam > 1 year ago.  - Dental: regular dental visits and brushes teeth twice daily.    /GYN Health:  - Follows with GYN: no.   - Menopause: perimenopausal.   - Last menstrual cycle: 8/26/2024.   - History of STDs: no  - Contraception: barrier methods.      Advanced Care Planning:  - Has an advanced directive?: no    - Has a durable medical POA?: no    - ACP document given to patient?: no      Review of Systems   Constitutional:  Negative for appetite change, chills, diaphoresis, fatigue, fever and unexpected weight change.   HENT:  Negative for congestion, dental problem, ear discharge, ear pain, hearing loss, postnasal drip, rhinorrhea, sinus pressure, sinus pain, sore throat, tinnitus and trouble swallowing.    Eyes:  Negative for visual disturbance.   Respiratory:  Negative for cough, chest tightness, shortness of breath and wheezing.    Cardiovascular:  Negative for chest pain, palpitations and leg swelling.   Gastrointestinal:  Negative for abdominal pain, blood in stool, constipation, diarrhea, nausea and vomiting.   Endocrine: Negative for cold intolerance, heat intolerance, polydipsia, polyphagia and polyuria.   Musculoskeletal:  Positive for arthralgias and myalgias. Negative for gait problem and joint swelling.   Skin:  Negative for rash.   Neurological:  Positive for dizziness and light-headedness. Negative for tremors, weakness, numbness and headaches.   Hematological:  Negative for adenopathy.     Past Medical History   Past Medical History:   Diagnosis Date    Abdominal pain     Abnormal mammogram of both breasts 10/30/2020    Anemia     Asthma     Bilateral breast cysts     last assessed.....4/11/17    resolved.....11/3/17     Cervical cancer, FIGO stage I (HCC)     Colon polyp     Constipation     Diarrhea     Gastroesophageal reflux disease 08/05/2022    Menorrhagia 04/25/2017    Seizures (HCC)     URI (upper respiratory infection)     under additonal type - Chronic     Past Surgical History:    Procedure Laterality Date     SECTION      INGUINAL HERNIA REPAIR      last assessed.....12/17/15      CT COLONOSCOPY FLX DX W/COLLJ SPEC WHEN PFRMD N/A 9/15/2017    Procedure: COLONOSCOPY;  Surgeon: Jorge Millan MD;  Location:  GI LAB;  Service: Gastroenterology    SKIN BIOPSY      last assessed.....12/17/15      UMBILICAL HERNIA REPAIR      last assessed.....12/17/15     URETHRAL DIVERTICULECTOMY      excision of urethral diverticulum......last assessed....12/17/15     Family History   Problem Relation Age of Onset    Hyperlipidemia Mother     Hypertension Mother     Breast cancer Mother         neoplasm/dx first age 36, mastectomy age 40, did have lymph biopsy positive with recurrance age around age 60    No Known Problems Father     No Known Problems Sister     No Known Problems Sister     No Known Problems Sister     No Known Problems Daughter     No Known Problems Daughter     No Known Problems Daughter     No Known Problems Daughter     Hyperlipidemia Maternal Grandmother     Hypertension Maternal Grandmother     Leukemia Maternal Grandmother     No Known Problems Maternal Grandfather     No Known Problems Paternal Grandmother     No Known Problems Paternal Grandfather      No current outpatient medications on file prior to visit.     No current facility-administered medications on file prior to visit.     Allergies   Allergen Reactions    Percocet [Oxycodone-Acetaminophen]     Doxycycline Hives, Myalgia and Rash     Category: Allergy;         No current outpatient medications on file prior to visit.     No current facility-administered medications on file prior to visit.      Social History     Tobacco Use    Smoking status: Every Day     Current packs/day: 0.25     Types: Cigarettes    Smokeless tobacco: Never   Vaping Use    Vaping status: Never Used   Substance and Sexual Activity    Alcohol use: Yes     Comment: every other week    Drug use: Never    Sexual activity: Not Currently      "Partners: Male     Birth control/protection: None       Objective     /85 (BP Location: Left arm, Patient Position: Sitting, Cuff Size: Standard)   Pulse 87   Temp 97.8 °F (36.6 °C) (Temporal)   Ht 5' 7\" (1.702 m)   Wt 77.6 kg (171 lb 2 oz)   LMP 08/26/2024 (Within Weeks)   SpO2 98%   Breastfeeding No   BMI 26.80 kg/m²     Physical Exam  Vitals and nursing note reviewed.   Constitutional:       General: She is awake. She is not in acute distress.     Appearance: Normal appearance. She is well-developed, well-groomed and overweight. She is not ill-appearing.   HENT:      Head: Normocephalic and atraumatic.      Right Ear: Tympanic membrane, ear canal and external ear normal.      Left Ear: Tympanic membrane, ear canal and external ear normal.      Nose: Nose normal.      Mouth/Throat:      Lips: Pink.      Mouth: Mucous membranes are moist.      Pharynx: Oropharynx is clear. Uvula midline. No oropharyngeal exudate or posterior oropharyngeal erythema.   Eyes:      General: No scleral icterus.     Extraocular Movements: Extraocular movements intact.      Right eye: Normal extraocular motion and no nystagmus.      Left eye: Normal extraocular motion and no nystagmus.      Conjunctiva/sclera: Conjunctivae normal.      Pupils: Pupils are equal, round, and reactive to light.   Neck:      Vascular: No carotid bruit, hepatojugular reflux or JVD.   Cardiovascular:      Rate and Rhythm: Normal rate and regular rhythm.      Pulses: Normal pulses.           Radial pulses are 2+ on the right side and 2+ on the left side.      Heart sounds: Normal heart sounds. No murmur heard.  Pulmonary:      Effort: Pulmonary effort is normal. No respiratory distress.      Breath sounds: Normal breath sounds and air entry. No decreased air movement. No decreased breath sounds, wheezing, rhonchi or rales.   Abdominal:      General: Abdomen is flat. Bowel sounds are normal. There is no distension.      Palpations: Abdomen is soft. " There is no mass.      Tenderness: There is no abdominal tenderness. There is no guarding or rebound.      Hernia: No hernia is present.   Musculoskeletal:         General: Normal range of motion.      Cervical back: Neck supple.      Right lower leg: No edema.      Left lower leg: No edema.   Lymphadenopathy:      Cervical: No cervical adenopathy.   Skin:     General: Skin is warm.      Capillary Refill: Capillary refill takes less than 2 seconds.      Coloration: Skin is not jaundiced.      Findings: No rash.   Neurological:      General: No focal deficit present.      Mental Status: She is alert and oriented to person, place, and time. Mental status is at baseline.      Motor: Motor function is intact.      Coordination: Coordination is intact.      Gait: Gait is intact.   Psychiatric:         Attention and Perception: Attention normal.         Mood and Affect: Mood and affect normal.         Speech: Speech normal.         Behavior: Behavior normal. Behavior is cooperative.       Administrative Statements   I have spent a total time of 30 minutes in caring for this patient on the day of the visit/encounter including Diagnostic results, Prognosis, Risks and benefits of tx options, Instructions for management, Patient and family education, Importance of tx compliance, Risk factor reductions, Impressions, Counseling / Coordination of care, Reviewing / ordering tests, medicine, procedures  , and Obtaining or reviewing history  .

## 2024-10-09 VITALS
HEIGHT: 67 IN | DIASTOLIC BLOOD PRESSURE: 85 MMHG | OXYGEN SATURATION: 98 % | WEIGHT: 171.13 LBS | TEMPERATURE: 97.8 F | HEART RATE: 87 BPM | BODY MASS INDEX: 26.86 KG/M2 | SYSTOLIC BLOOD PRESSURE: 126 MMHG

## 2024-10-09 PROBLEM — R42 VERTIGO: Status: ACTIVE | Noted: 2024-10-09

## 2024-10-09 NOTE — ASSESSMENT & PLAN NOTE
Previously on Paxil 20 mg once daily for perimenopause/anxiety. Mainly mood swings and hot flashes. She would like to restart this. Start 1/2 tablet Paxil 20 mg once a day for one week. If tolerated, increase to 20 mg once daily. Discussed treatment course. May take 4-6 weeks to reach maximum efficacy. Declines BH at this time.   Orders:    PARoxetine (PAXIL) 20 mg tablet; Take 1 tablet (20 mg total) by mouth every morning

## 2024-10-09 NOTE — ASSESSMENT & PLAN NOTE
Controlled. Continue albuterol as needed. Discussed PFTs, she declined.   Orders:    albuterol (PROVENTIL HFA,VENTOLIN HFA) 90 mcg/act inhaler; Inhale 2 puffs every 4 (four) hours as needed for wheezing or shortness of breath

## 2024-10-09 NOTE — ASSESSMENT & PLAN NOTE
Patient complains of generalized aches/pains and fatigue. Previous rheum work up negative. Will recheck today. Likely fibromyalgia.   Orders:    ROME Screen w/ Reflex to Titer/Pattern; Future    Sedimentation rate, automated; Future    C-reactive protein; Future    Systemic Lupus Profile A; Future

## 2024-10-09 NOTE — ASSESSMENT & PLAN NOTE
Intermittent episodes of vertigo, likely BPPV. Will order labs to rule out reversible causes. Ensure adequate hydration and rest. Encouraged healthy well balanced diet. Avoid caffeine and alcohol. Declines meclizine. Discussed vestibular therapy, declines at this time. Discussed obtaing a carotid duple as well, she declines at this time. Return to care if symptoms worsen or fail to improve.   Orders:    CBC and differential; Future    Comprehensive metabolic panel; Future    TSH, 3rd generation with Free T4 reflex; Future    Vitamin B12; Future

## 2024-10-09 NOTE — ASSESSMENT & PLAN NOTE
BP Readings from Last 3 Encounters:   10/01/24 126/85   10/11/23 140/83   05/18/23 126/74      History of HTN. Currently off medications. Controlled. Limit sodium and salt intake. Avoid alcohol and caffeine.

## 2024-10-15 ENCOUNTER — PATIENT OUTREACH (OUTPATIENT)
Dept: INTERNAL MEDICINE CLINIC | Facility: CLINIC | Age: 49
End: 2024-10-15

## 2024-10-15 NOTE — PROGRESS NOTES
JOVANY received a new referral on 10/01 in regard to pt with concerns about anxiety. Per review of chart pt to restart 20 mg of Paxil once a day. It is noted in pt chart BH declined. JOVANY contacted pt today and introduced self and role. Per pt she was receiving OP MH services through [facility] previously when she was insured and she did not like their agency. Pt also shared she has just applied for MA and would prefer to wait for determination prior to discussing other OP MH options. JOVANY will add to onsite report for follow up. JOVANY will remain available.

## 2024-11-05 ENCOUNTER — PATIENT OUTREACH (OUTPATIENT)
Dept: INTERNAL MEDICINE CLINIC | Facility: CLINIC | Age: 49
End: 2024-11-05

## 2024-11-05 NOTE — PROGRESS NOTES
SW has reached out to pt to f/u with her to assist with OP MH  referral. Pt had indicated he wanted to see if she was approved for insurance before setting up an appointment.  Pt shared that she was turned down for MA.  She did not know why?  SW did reach out to [facility] Financial Counselor who indicated he wouls check to see why it was denied. He also shares their  Office can help pt's apply for the [employer] program but she may not be eligible.  He will f/u with DPW and contact pt.    Pt has mentioned there are additional test the her PCP wants her to complete.  SW has also reviewed wit pt the  Program and [facility] Assistance/Mere [facility] program . Contact info also provided.    QUYEN has also offered to assist pt with a referral to [facility] to ask for  funding.  Pt is agreeable and a 3 way call was made to [facility] Intake and Referral [phone]. They were able to take the request and will f/u with pt in 7-10 working days.   Pt is aware they will do a liability application and assist as able.    Patient does not have any further questions, concerns, or other needs at this time.  Patient has my contact # and PCP office # if needed.  Social Work to remain available to assist as indicated.    Please re-consult Social Work if needed.

## 2024-11-08 ENCOUNTER — PATIENT OUTREACH (OUTPATIENT)
Dept: INTERNAL MEDICINE CLINIC | Facility: CLINIC | Age: 49
End: 2024-11-08

## 2024-11-08 NOTE — PROGRESS NOTES
SW received an In Basket update from [facility] Financial Counselor who shares that pt's MA application is still pending.  SW did reach out to pt to update her with this info and to ask if she has connected with [employer] as yet but had agustin michellee a message.  Pt instructed to return call if needed.

## 2024-12-06 ENCOUNTER — APPOINTMENT (OUTPATIENT)
Dept: LAB | Facility: CLINIC | Age: 49
End: 2024-12-06
Payer: COMMERCIAL

## 2024-12-06 DIAGNOSIS — Z13.220 ENCOUNTER FOR SCREENING FOR LIPID DISORDER: ICD-10-CM

## 2024-12-06 DIAGNOSIS — R42 VERTIGO: ICD-10-CM

## 2024-12-06 DIAGNOSIS — R76.8 POSITIVE ANA (ANTINUCLEAR ANTIBODY): ICD-10-CM

## 2024-12-06 LAB
ALBUMIN SERPL BCG-MCNC: 4 G/DL (ref 3.5–5)
ALP SERPL-CCNC: 43 U/L (ref 34–104)
ALT SERPL W P-5'-P-CCNC: 12 U/L (ref 7–52)
ANA SER QL IA: NEGATIVE
ANION GAP SERPL CALCULATED.3IONS-SCNC: 6 MMOL/L (ref 4–13)
AST SERPL W P-5'-P-CCNC: 13 U/L (ref 13–39)
BASOPHILS # BLD AUTO: 0.04 THOUSANDS/ÂΜL (ref 0–0.1)
BASOPHILS NFR BLD AUTO: 1 % (ref 0–1)
BILIRUB SERPL-MCNC: 0.29 MG/DL (ref 0.2–1)
BUN SERPL-MCNC: 12 MG/DL (ref 5–25)
CALCIUM SERPL-MCNC: 8.9 MG/DL (ref 8.4–10.2)
CHLORIDE SERPL-SCNC: 105 MMOL/L (ref 96–108)
CHOLEST SERPL-MCNC: 176 MG/DL (ref ?–200)
CO2 SERPL-SCNC: 26 MMOL/L (ref 21–32)
CREAT SERPL-MCNC: 0.66 MG/DL (ref 0.6–1.3)
CRP SERPL QL: 4.2 MG/L
EOSINOPHIL # BLD AUTO: 0.13 THOUSAND/ÂΜL (ref 0–0.61)
EOSINOPHIL NFR BLD AUTO: 2 % (ref 0–6)
ERYTHROCYTE [DISTWIDTH] IN BLOOD BY AUTOMATED COUNT: 15.7 % (ref 11.6–15.1)
ERYTHROCYTE [SEDIMENTATION RATE] IN BLOOD: 18 MM/HOUR (ref 0–19)
GFR SERPL CREATININE-BSD FRML MDRD: 105 ML/MIN/1.73SQ M
GLUCOSE P FAST SERPL-MCNC: 97 MG/DL (ref 65–99)
HCT VFR BLD AUTO: 36.8 % (ref 34.8–46.1)
HDLC SERPL-MCNC: 54 MG/DL
HGB BLD-MCNC: 11.5 G/DL (ref 11.5–15.4)
IMM GRANULOCYTES # BLD AUTO: 0.01 THOUSAND/UL (ref 0–0.2)
IMM GRANULOCYTES NFR BLD AUTO: 0 % (ref 0–2)
LDLC SERPL CALC-MCNC: 101 MG/DL (ref 0–100)
LYMPHOCYTES # BLD AUTO: 1.94 THOUSANDS/ÂΜL (ref 0.6–4.47)
LYMPHOCYTES NFR BLD AUTO: 33 % (ref 14–44)
MCH RBC QN AUTO: 26 PG (ref 26.8–34.3)
MCHC RBC AUTO-ENTMCNC: 31.3 G/DL (ref 31.4–37.4)
MCV RBC AUTO: 83 FL (ref 82–98)
MONOCYTES # BLD AUTO: 0.38 THOUSAND/ÂΜL (ref 0.17–1.22)
MONOCYTES NFR BLD AUTO: 7 % (ref 4–12)
NEUTROPHILS # BLD AUTO: 3.39 THOUSANDS/ÂΜL (ref 1.85–7.62)
NEUTS SEG NFR BLD AUTO: 57 % (ref 43–75)
NONHDLC SERPL-MCNC: 122 MG/DL
NRBC BLD AUTO-RTO: 0 /100 WBCS
PLATELET # BLD AUTO: 235 THOUSANDS/UL (ref 149–390)
PMV BLD AUTO: 10.5 FL (ref 8.9–12.7)
POTASSIUM SERPL-SCNC: 3.8 MMOL/L (ref 3.5–5.3)
PROT SERPL-MCNC: 6.4 G/DL (ref 6.4–8.4)
RBC # BLD AUTO: 4.43 MILLION/UL (ref 3.81–5.12)
SODIUM SERPL-SCNC: 137 MMOL/L (ref 135–147)
TRIGL SERPL-MCNC: 106 MG/DL (ref ?–150)
TSH SERPL DL<=0.05 MIU/L-ACNC: 1 UIU/ML (ref 0.45–4.5)
VIT B12 SERPL-MCNC: 288 PG/ML (ref 180–914)
WBC # BLD AUTO: 5.89 THOUSAND/UL (ref 4.31–10.16)

## 2024-12-06 PROCEDURE — 80053 COMPREHEN METABOLIC PANEL: CPT

## 2024-12-06 PROCEDURE — 84443 ASSAY THYROID STIM HORMONE: CPT

## 2024-12-06 PROCEDURE — 82607 VITAMIN B-12: CPT

## 2024-12-06 PROCEDURE — 85652 RBC SED RATE AUTOMATED: CPT

## 2024-12-06 PROCEDURE — 86235 NUCLEAR ANTIGEN ANTIBODY: CPT | Performed by: PHYSICIAN ASSISTANT

## 2024-12-06 PROCEDURE — 86431 RHEUMATOID FACTOR QUANT: CPT | Performed by: PHYSICIAN ASSISTANT

## 2024-12-06 PROCEDURE — 80061 LIPID PANEL: CPT

## 2024-12-06 PROCEDURE — 86140 C-REACTIVE PROTEIN: CPT

## 2024-12-06 PROCEDURE — 86038 ANTINUCLEAR ANTIBODIES: CPT

## 2024-12-06 PROCEDURE — 85025 COMPLETE CBC W/AUTO DIFF WBC: CPT

## 2024-12-06 PROCEDURE — 36415 COLL VENOUS BLD VENIPUNCTURE: CPT

## 2024-12-06 PROCEDURE — 86225 DNA ANTIBODY NATIVE: CPT

## 2024-12-09 LAB — MISCELLANEOUS LAB TEST RESULT: NORMAL

## 2024-12-09 NOTE — PROGRESS NOTES
Name: Genevieve Meza      : 1975      MRN: [id number]  Encounter Provider: Layne Niño MD  Encounter Date: 2024   [facility] [place]  :  Assessment & Plan        History of Present Illness {?Quick Links Encounters * My Last Note * Last Note in Specialty * Snapshot * Since Last Visit * History :23436}  [id number]      Genevieve Meza is a 49 y.o. female who presents ***  {History obtained from(Optional):05132}    Review of Systems  {Select to insert medical history sections (Optional):32556}     Objective {?Quick Links Trend Vitals * Enter New Vitals * Results Review * Timeline (Adult) * Labs * Imaging * Cardiology * Procedures * Lung Cancer Screening * Surgical eConsent :26384}  There were no vitals taken for this visit.     Physical Exam    {Administrative / Billing Section (Optional):86633}

## 2024-12-09 NOTE — PROGRESS NOTES
OB/GYN ANNUAL H&P  24    Subjective    Genevieve Meza is a 49 y.o. female who presents for annual well woman exam.  Last Pap smear  was NILM and HPV negative; has history of NICOLE and CIN1 in ; due for next Pap in .  Last mammogram WNL. LMP . She is perimenopausal and periods are irregular in frequency and flow, usually lasting 3-5 days. No intermenstrual bleeding, spotting, or discharge.  She currently lives with her  and four children. She feels safe at home. 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    Current contraception: none  Patient is occasionally (every few months) sexually active with one male partner ; partner has erectile dysfunction.  Patient requests STI testing today: yes - GC/CT collected today, HIV, Hep B, Hep C, and RPR ordered  History of abnormal Pap smear: yes (see below) - next due in   - 17-NICOLE, HPV neg  - 17: Colpo- 7:00 LSIL/CIN1 10:00 benign, ECC benign  - 17: EMB: negative  - 7/15/21: NILM, HPV neg  - 21: EMB benign  - 10/27/22: : NILM, HPV neg  Regular self breast awareness: yes  History of abnormal mammogram: yes -  (BIRADS2), scheduled for follow-up   Family history of breast, endometrial, ovarian cancer: yes - mother w/breast cancer at age 38  History of abnormal lipids: yes - controlled with diet/exercise  Colon cancer screening: yes - up to date, follows with PCP  Gardasil vaccine: did not receive  Cigarette smokin ppd  EtOH: approx 12 beers per week  Recreational drug use: denies    Menstrual History:  OB History          5    Para   5    Term   5            AB        Living   5         SAB        IAB        Ectopic        Multiple        Live Births   5                Menarche age: 15 y/o  Patient's last menstrual period was 2024.       The following portions of the patient's history were reviewed and updated as appropriate: allergies, current medications, past  "family history, past medical history, past social history, past surgical history, and problem list.  Pregnancies were complicated by C/S for fetal distress.     PHQ-2/9 Depression Screening             Past Medical History:   Diagnosis Date    Abdominal pain     Abnormal mammogram of both breasts 10/30/2020    Anemia     Asthma     Bilateral breast cysts     last assessed.....4/11/17    resolved.....11/3/17     Cervical cancer, FIGO stage I (HCC)     Colon polyp     Constipation     Diarrhea     Gastroesophageal reflux disease 08/05/2022    Menorrhagia 04/25/2017    Seizures (HCC)     URI (upper respiratory infection)     under additonal type - Chronic     Review of Systems  Review of Systems   Constitutional:  Negative for chills and fever.   Respiratory:  Negative for cough and shortness of breath.    Gastrointestinal:  Negative for diarrhea, nausea and vomiting.   Genitourinary:  Negative for dysuria and hematuria.   Skin:  Negative for color change and rash.   Neurological:  Negative for dizziness, syncope and headaches.         Objective      /81 (BP Location: Right arm, Patient Position: Sitting, Cuff Size: Standard)   Pulse 80   Ht 5' 7\" (1.702 m)   Wt 75.8 kg (167 lb)   LMP 12/04/2024   BMI 26.16 kg/m²     Physical Exam  Constitutional:       Appearance: Normal appearance.   Genitourinary:      Vulva and rectum normal.      Genitourinary Comments: Small Nabothian cysts interspersed across external cervix  Approximate 1 cm polypoid-appearing protrusion at inferior edge of cervical os, removed with polyp forceps and hemostasis reinforced with silver nitrate      Right Labia: No rash, tenderness, lesions, skin changes or Bartholin's cyst.     Left Labia: No tenderness, lesions, skin changes, Bartholin's cyst or rash.     No vaginal discharge, tenderness or ulceration.        Right Adnexa: not tender, not full, not palpable, no mass present and not absent.     Left Adnexa: not tender, not full, not " palpable, no mass present and not absent.           Uterus is anteverted.   HENT:      Head: Normocephalic and atraumatic.   Cardiovascular:      Rate and Rhythm: Normal rate and regular rhythm.   Pulmonary:      Effort: Pulmonary effort is normal.      Breath sounds: Normal breath sounds.   Abdominal:      General: Abdomen is flat.      Palpations: Abdomen is soft.   Neurological:      General: No focal deficit present.      Mental Status: She is alert and oriented to person, place, and time.   Skin:     General: Skin is warm and dry.   Psychiatric:         Mood and Affect: Mood normal.         Behavior: Behavior normal.           Assessment/Plan:    1.  Well Woman Exam: Routine well woman exam done today.  2.  Cervical Cancer Screening: Pap and HPV:Pap with HPV was not done today.  Next due in 2025. Current ASCCP Guidelines reviewed.  3.  Breast Cancer Screening: Mammogram scheduled for February 2025.  4.  STI Screening: The patient accepted STD testing. GC/CT collected, and HIV, Hep C, Hep B, RPR ordered. Safe sex practices have been discussed.  5. Contraceptive Counseling: The patient is occasionally sexually active. Contraception: none, declines at this time.    6. GYN Counseling:The following were reviewed in today's visit: breast self exam, STD testing, HIV risk factors and prevention, family planning choices, menopause, healthy diet, and tobacco cessation.  7. Tobacco Cessation: Patient expresses desires to cut down or quit smoking. Nicotine patch ordered.  8. Alcohol Cessation: Patient expresses dependence on alcohol and desire to cut down. She declines AA Meetings or similar talk therapy. Ambulatory Referral to Addiction Services placed.  7. Colon Cancer Screening: Up to date on screening. Next due in 2032.  8. Polyp Removal: External cervical polyp visualized on speculum exam; removed without issue and sent for pathology. Follow-up results.  9. RTO: Patient to return to office in 12 months for annual exam  or earlier if need arises.    D/w  [employer].    Layne Niño MD  OB/GYN PGY-2  12/12/20249:24 AM

## 2024-12-12 ENCOUNTER — ANNUAL EXAM (OUTPATIENT)
Dept: OBGYN CLINIC | Facility: CLINIC | Age: 49
End: 2024-12-12

## 2024-12-12 VITALS
SYSTOLIC BLOOD PRESSURE: 135 MMHG | HEART RATE: 80 BPM | BODY MASS INDEX: 26.21 KG/M2 | WEIGHT: 167 LBS | DIASTOLIC BLOOD PRESSURE: 81 MMHG | HEIGHT: 67 IN

## 2024-12-12 DIAGNOSIS — Z11.3 SCREENING EXAMINATION FOR STI: ICD-10-CM

## 2024-12-12 DIAGNOSIS — N84.1 CERVICAL POLYP: ICD-10-CM

## 2024-12-12 DIAGNOSIS — Z01.419 WELL WOMAN EXAM WITH ROUTINE GYNECOLOGICAL EXAM: Primary | ICD-10-CM

## 2024-12-12 DIAGNOSIS — F10.90 ALCOHOL USE DISORDER: ICD-10-CM

## 2024-12-12 DIAGNOSIS — F17.200 TOBACCO USE DISORDER: ICD-10-CM

## 2024-12-12 PROCEDURE — 87491 CHLMYD TRACH DNA AMP PROBE: CPT

## 2024-12-12 PROCEDURE — 87591 N.GONORRHOEAE DNA AMP PROB: CPT

## 2024-12-12 PROCEDURE — 88305 TISSUE EXAM BY PATHOLOGIST: CPT | Performed by: PATHOLOGY

## 2024-12-12 RX ORDER — NICOTINE 21 MG/24HR
1 PATCH, TRANSDERMAL 24 HOURS TRANSDERMAL EVERY 24 HOURS
Qty: 28 PATCH | Refills: 0 | Status: SHIPPED | OUTPATIENT
Start: 2024-12-12

## 2024-12-13 LAB
C TRACH DNA SPEC QL NAA+PROBE: NEGATIVE
N GONORRHOEA DNA SPEC QL NAA+PROBE: NEGATIVE

## 2024-12-16 ENCOUNTER — TELEPHONE (OUTPATIENT)
Dept: PSYCHIATRY | Facility: CLINIC | Age: 49
End: 2024-12-16

## 2024-12-16 ENCOUNTER — RESULTS FOLLOW-UP (OUTPATIENT)
Dept: INTERNAL MEDICINE CLINIC | Facility: CLINIC | Age: 49
End: 2024-12-16

## 2024-12-16 DIAGNOSIS — E53.8 LOW SERUM VITAMIN B12: Primary | ICD-10-CM

## 2024-12-16 RX ORDER — CYANOCOBALAMIN (VITAMIN B-12) 500 MCG
500 TABLET ORAL DAILY
Qty: 90 TABLET | Refills: 3 | Status: SHIPPED | OUTPATIENT
Start: 2024-12-16

## 2024-12-16 NOTE — TELEPHONE ENCOUNTER
NP Referral to the [facility] office received from [facility] for alcohol use. Called and spoke to Genevieve regarding referral. Genevieve states that she last drank alcohol yesterday. Genevieve has no language barrier or hearing impairment. Genevieve states that she does have psychiatric diagnoses of anxiety, depression, and PTSD.  She feels safe, has no current SI/HI, nor any community treatment team. Address and insurance reviewed. Scheduled Genevieve with Amparo JACOB CM, on 12/19/24 at 9 am.    For your review.

## 2024-12-17 DIAGNOSIS — Z00.6 ENCOUNTER FOR EXAMINATION FOR NORMAL COMPARISON OR CONTROL IN CLINICAL RESEARCH PROGRAM: ICD-10-CM

## 2024-12-17 PROCEDURE — 88305 TISSUE EXAM BY PATHOLOGIST: CPT | Performed by: PATHOLOGY

## 2024-12-24 DIAGNOSIS — M79.7 FIBROMYALGIA: Primary | ICD-10-CM

## 2024-12-24 RX ORDER — GABAPENTIN 300 MG/1
CAPSULE ORAL
Qty: 261 CAPSULE | Refills: 2 | Status: SHIPPED | OUTPATIENT
Start: 2024-12-24 | End: 2025-03-24

## 2025-01-09 ENCOUNTER — APPOINTMENT (OUTPATIENT)
Dept: LAB | Facility: CLINIC | Age: 50
End: 2025-01-09
Payer: COMMERCIAL

## 2025-01-09 ENCOUNTER — OFFICE VISIT (OUTPATIENT)
Dept: INTERNAL MEDICINE CLINIC | Facility: CLINIC | Age: 50
End: 2025-01-09

## 2025-01-09 VITALS
WEIGHT: 173 LBS | SYSTOLIC BLOOD PRESSURE: 123 MMHG | DIASTOLIC BLOOD PRESSURE: 76 MMHG | BODY MASS INDEX: 27.1 KG/M2 | HEART RATE: 85 BPM | TEMPERATURE: 98.3 F

## 2025-01-09 DIAGNOSIS — Z11.3 SCREENING EXAMINATION FOR STI: ICD-10-CM

## 2025-01-09 DIAGNOSIS — E66.3 OVERWEIGHT WITH BODY MASS INDEX (BMI) OF 27 TO 27.9 IN ADULT: ICD-10-CM

## 2025-01-09 DIAGNOSIS — N95.1 PERIMENOPAUSE: ICD-10-CM

## 2025-01-09 DIAGNOSIS — Z87.891 PERSONAL HISTORY OF NICOTINE DEPENDENCE: ICD-10-CM

## 2025-01-09 DIAGNOSIS — N93.9 ABNORMAL UTERINE BLEEDING: ICD-10-CM

## 2025-01-09 DIAGNOSIS — F10.90 ALCOHOL USE DISORDER: ICD-10-CM

## 2025-01-09 DIAGNOSIS — J45.20 MILD INTERMITTENT ASTHMA WITHOUT COMPLICATION: ICD-10-CM

## 2025-01-09 DIAGNOSIS — F41.9 ANXIETY: ICD-10-CM

## 2025-01-09 DIAGNOSIS — Z00.6 ENCOUNTER FOR EXAMINATION FOR NORMAL COMPARISON OR CONTROL IN CLINICAL RESEARCH PROGRAM: ICD-10-CM

## 2025-01-09 DIAGNOSIS — I10 PRIMARY HYPERTENSION: ICD-10-CM

## 2025-01-09 DIAGNOSIS — M79.7 FIBROMYALGIA: Primary | ICD-10-CM

## 2025-01-09 PROCEDURE — 86780 TREPONEMA PALLIDUM: CPT

## 2025-01-09 PROCEDURE — 3078F DIAST BP <80 MM HG: CPT | Performed by: PHYSICIAN ASSISTANT

## 2025-01-09 PROCEDURE — 87340 HEPATITIS B SURFACE AG IA: CPT

## 2025-01-09 PROCEDURE — 87389 HIV-1 AG W/HIV-1&-2 AB AG IA: CPT

## 2025-01-09 PROCEDURE — 36415 COLL VENOUS BLD VENIPUNCTURE: CPT

## 2025-01-09 PROCEDURE — 3074F SYST BP LT 130 MM HG: CPT | Performed by: PHYSICIAN ASSISTANT

## 2025-01-09 PROCEDURE — 86803 HEPATITIS C AB TEST: CPT

## 2025-01-09 PROCEDURE — 99214 OFFICE O/P EST MOD 30 MIN: CPT | Performed by: PHYSICIAN ASSISTANT

## 2025-01-09 RX ORDER — PREGABALIN 75 MG/1
75 CAPSULE ORAL 2 TIMES DAILY
Qty: 60 CAPSULE | Refills: 0 | Status: SHIPPED | OUTPATIENT
Start: 2025-01-09 | End: 2025-01-17

## 2025-01-10 LAB
HBV SURFACE AG SER QL: NORMAL
HCV AB SER QL: NORMAL
HIV 1+2 AB+HIV1 P24 AG SERPL QL IA: NORMAL
HIV 2 AB SERPL QL IA: NORMAL
HIV1 AB SERPL QL IA: NORMAL
HIV1 P24 AG SERPL QL IA: NORMAL
TREPONEMA PALLIDUM IGG+IGM AB [PRESENCE] IN SERUM OR PLASMA BY IMMUNOASSAY: NORMAL

## 2025-01-14 ENCOUNTER — TELEPHONE (OUTPATIENT)
Dept: ADMINISTRATIVE | Facility: OTHER | Age: 50
End: 2025-01-14

## 2025-01-14 PROBLEM — R76.8 POSITIVE ANA (ANTINUCLEAR ANTIBODY): Status: RESOLVED | Noted: 2021-09-27 | Resolved: 2025-01-14

## 2025-01-14 PROBLEM — E53.8 LOW SERUM VITAMIN B12: Status: ACTIVE | Noted: 2025-01-14

## 2025-01-14 PROBLEM — F10.90 ALCOHOL USE DISORDER: Status: ACTIVE | Noted: 2025-01-14

## 2025-01-14 NOTE — TELEPHONE ENCOUNTER
----- Message from Jennifer Bridges PA-C sent at 1/14/2025  8:20 AM EST -----  01/14/25 8:20 AM    Hello, our patient Genevieve Meza has had Medicare AWV completed/performed. Please assist in updating the patient chart by pulling the Care Everywhere (CE) document. and pulling the document from encounters Tab within Chart Review. The date of service is 10/1/24.     Thank you,  Jennifer Bridges PA-C  [facility]

## 2025-01-14 NOTE — ASSESSMENT & PLAN NOTE
History of HTN. Currently off medications. Controlled. Limit sodium and salt intake. Avoid alcohol and caffeine.

## 2025-01-14 NOTE — TELEPHONE ENCOUNTER
Upon review of the In Basket request we have noted that this ov performed at a [facility] prac - there is a missing component or it would have auto pulled in  -     because of this we are requesting that you forward this request/concern to the appropriate education email address. The Quality team members assigned to this email will be more than happy to assist you.    Any additional questions or concerns should be emailed to the Practice Liaisons via the appropriate education email address, please do not reply via In Basket.    Thank you  Elizabeth Guido   [facility]

## 2025-01-14 NOTE — ASSESSMENT & PLAN NOTE
She feels she has been doing since starting Paxil. Continue 20 mg once daily. She declines BH at this time.

## 2025-01-14 NOTE — ASSESSMENT & PLAN NOTE
Patient admits to drinking daily, unsure how many per day. States she is aware she has a problem. She drinks to feel better. She would like to discuss with addiction services. Referral placed.   Orders:    Ambulatory referral to Addiction Services; Future

## 2025-01-14 NOTE — ASSESSMENT & PLAN NOTE
Central Line    Date/Time: 11/9/2024 1:41 PM    Performed by: Christ Stanford MD  Authorized by: Christ Stanford MD    Consent:     Consent obtained:  Written    Consent given by:  Patient    Risks, benefits, and alternatives were discussed: yes    Universal protocol:     Procedure explained and questions answered to patient or proxy's satisfaction: yes      Relevant documents present and verified: yes      Test results available: yes      Imaging studies available: yes      Required blood products, implants, devices, and special equipment available: yes      Site/side marked: yes      Immediately prior to procedure, a time out was called: yes      Patient identity confirmed:  Verbally with patient, arm band, provided demographic data and hospital-assigned identification number  Pre-procedure details:     Indication(s): hemodynamic monitoring      Hand hygiene: Hand hygiene performed prior to insertion      Sterile barrier technique: All elements of maximal sterile technique followed      Skin preparation:  Chlorhexidine    Skin preparation agent: Skin preparation agent completely dried prior to procedure    Sedation:     Sedation type:  Moderate sedation  Anesthesia:     Anesthesia method:  Local infiltration    Local anesthetic:  Lidocaine 2% w/o epi  Procedure details:     Location:  R internal jugular    Site selection rationale:  For PA catheter    Patient position:  Supine    Catheter size: 7.5 Fr.    Landmarks identified: yes      Ultrasound guidance: yes      Ultrasound guidance timing: real time      Sterile ultrasound techniques: Sterile gel and sterile probe covers were used      Number of attempts:  1    Successful placement: yes    Post-procedure details:     Post-procedure:  Dressing applied and line sutured    Assessment:  Blood return through all ports and free fluid flow (CXR ordered.)    Procedure completion:  Tolerated well, no immediate complications    Complications:  None  Comments:       Likely due to perimenopause. She has declined hormonal treatment options in the past. Not eligible for estrogen regimens due to tobacco use and CV risk. Recommend discussing with GYN. She was agreeable. States she will call to schedule.         Successful PA catheter placement. Supervised by Dr. Romero.     Catheter locked at 82 cm.

## 2025-01-14 NOTE — ASSESSMENT & PLAN NOTE
Recent rheumatoid work up unremarkable. Likely fibromyalgia. Some improvement with gabapentin, but had side effects. Discussed other treatment options. She was agreeable to try Lyrica. Discussed possible side effects. Start Lyrica 75 mg BID. Recommend supportive treatment as well.  Follow up in one month, sooner if needed.   Orders:    pregabalin (LYRICA) 75 mg capsule; Take 1 capsule (75 mg total) by mouth 2 (two) times a day

## 2025-01-14 NOTE — PROGRESS NOTES
Name: Genevieve Meza      : 1975      MRN: [id number]  Encounter Provider: Jennifer Bridges PA-C  Encounter Date: 2025   Encounter department: [facility] [place]  :  Assessment & Plan  Fibromyalgia  Recent rheumatoid work up unremarkable. Likely fibromyalgia. Some improvement with gabapentin, but had side effects. Discussed other treatment options. She was agreeable to try Lyrica. Discussed possible side effects. Start Lyrica 75 mg BID. Recommend supportive treatment as well.  Follow up in one month, sooner if needed.   Orders:    pregabalin (LYRICA) 75 mg capsule; Take 1 capsule (75 mg total) by mouth 2 (two) times a day    Primary hypertension  History of HTN. Currently off medications. Controlled. Limit sodium and salt intake. Avoid alcohol and caffeine.        Mild intermittent asthma without complication  Controlled. Continue albuterol as needed. Discussed PFTs, she declined.        Abnormal uterine bleeding  Likely due to perimenopause. She has declined hormonal treatment options in the past. Not eligible for estrogen regimens due to tobacco use and CV risk. Recommend discussing with GYN. She was agreeable. States she will call to schedule.        Alcohol use disorder  Patient admits to drinking daily, unsure how many per day. States she is aware she has a problem. She drinks to feel better. She would like to discuss with addiction services. Referral placed.   Orders:    Ambulatory referral to Addiction Services; Future    Anxiety  She feels she has been doing since starting Paxil. Continue 20 mg once daily. She declines BH at this time.        Perimenopause  Paxil helps with perimenopause symptoms, including hot flashes per patient.       Personal history of nicotine dependence  Encouraged smoking cessation.        Overweight with body mass index (BMI) of 27 to 27.9 in adult           BMI Counseling: Body mass index is 27.1 kg/m². The BMI is above normal. Nutrition  recommendations include decreasing portion sizes, encouraging healthy choices of fruits and vegetables, decreasing fast food intake, consuming healthier snacks, limiting drinks that contain sugar, moderation in carbohydrate intake, increasing intake of lean protein, reducing intake of saturated and trans fat and reducing intake of cholesterol. Exercise recommendations include moderate physical activity 150 minutes/week, exercising 3-5 times per week and strength training exercises. No pharmacotherapy was ordered. Rationale for BMI follow-up plan is due to patient being overweight or obese.     Tobacco Cessation Counseling: Tobacco cessation counseling was provided. The patient is sincerely urged to quit consumption of tobacco. She is not ready to quit tobacco. Medication options and side effects of medication discussed. Patient refused medication.       History of Present Illness     Patient is a 49 year old female presenting for follow up on fibromyalgia. States she has pain everywhere. Everything hurts. She can not describe it. Denies any injuries or trauma. Denies rashes, swelling, or ecchymosis. Denies numbness, tingling, or weakness. She started gabapentin about 2 weeks ago. States it has helped with the pain, but she is concerned about side effects. States she has had weight gain and is more hungry. She is also very agitated and has mood swings. States she has been fighting with her  more. It has also caused her to be more drowsy and fatigued. She would like to discuss trying something else.   She has abnormal periods as well. States she has a regular period once a month. Typically lasts 3 days. States every  other month or so, she has a very heavy period. She changes a pad every 1-2 hours. She states it lasts up to a week as well. Denies pain. Denies vaginal discharge. Denies nausea or vomiting. Denies fever or chills.       Review of Systems   Constitutional:  Negative for appetite change, chills,  diaphoresis, fatigue, fever and unexpected weight change.   HENT:  Negative for congestion, dental problem, ear discharge, ear pain, hearing loss, postnasal drip, rhinorrhea, sinus pressure, sinus pain, sore throat, tinnitus and trouble swallowing.    Eyes:  Negative for visual disturbance.   Respiratory:  Negative for cough, chest tightness, shortness of breath and wheezing.    Cardiovascular:  Negative for chest pain, palpitations and leg swelling.   Gastrointestinal:  Negative for abdominal pain, blood in stool, constipation, diarrhea, nausea and vomiting.   Endocrine: Negative for cold intolerance, heat intolerance, polydipsia, polyphagia and polyuria.   Genitourinary:  Positive for menstrual problem.   Musculoskeletal:  Positive for arthralgias and myalgias. Negative for gait problem and joint swelling.   Skin:  Negative for rash.   Neurological:  Negative for dizziness, tremors, weakness, light-headedness, numbness and headaches.   Hematological:  Negative for adenopathy.   Psychiatric/Behavioral:  Positive for agitation and dysphoric mood. Negative for self-injury, sleep disturbance and suicidal ideas. The patient is nervous/anxious.        Objective   /76 (BP Location: Left arm, Patient Position: Sitting, Cuff Size: Standard)   Pulse 85   Temp 98.3 °F (36.8 °C) (Temporal)   Wt 78.5 kg (173 lb)   LMP 01/06/2025   Breastfeeding No   BMI 27.10 kg/m²      Physical Exam  Vitals and nursing note reviewed.   Constitutional:       General: She is awake. She is not in acute distress.     Appearance: Normal appearance. She is well-developed, well-groomed and overweight. She is not ill-appearing.   HENT:      Head: Normocephalic and atraumatic.      Right Ear: Tympanic membrane, ear canal and external ear normal.      Left Ear: Tympanic membrane, ear canal and external ear normal.      Nose: Nose normal.      Mouth/Throat:      Lips: Pink.      Mouth: Mucous membranes are moist.      Pharynx: Oropharynx is  clear. Uvula midline. No oropharyngeal exudate or posterior oropharyngeal erythema.   Eyes:      General: No scleral icterus.     Extraocular Movements: Extraocular movements intact.      Right eye: Normal extraocular motion and no nystagmus.      Left eye: Normal extraocular motion and no nystagmus.      Conjunctiva/sclera: Conjunctivae normal.      Pupils: Pupils are equal, round, and reactive to light.   Neck:      Vascular: No carotid bruit, hepatojugular reflux or JVD.   Cardiovascular:      Rate and Rhythm: Normal rate and regular rhythm.      Pulses: Normal pulses.           Radial pulses are 2+ on the right side and 2+ on the left side.      Heart sounds: Normal heart sounds. No murmur heard.  Pulmonary:      Effort: Pulmonary effort is normal. No respiratory distress.      Breath sounds: Normal breath sounds and air entry. No decreased air movement. No decreased breath sounds, wheezing, rhonchi or rales.   Abdominal:      General: Abdomen is flat. Bowel sounds are normal. There is no distension.      Palpations: Abdomen is soft. There is no mass.      Tenderness: There is no abdominal tenderness. There is no guarding or rebound.      Hernia: No hernia is present.   Musculoskeletal:         General: Normal range of motion.      Cervical back: Neck supple.      Right lower leg: No edema.      Left lower leg: No edema.   Lymphadenopathy:      Cervical: No cervical adenopathy.   Skin:     General: Skin is warm.      Capillary Refill: Capillary refill takes less than 2 seconds.      Coloration: Skin is not jaundiced.      Findings: No rash.   Neurological:      General: No focal deficit present.      Mental Status: She is alert and oriented to person, place, and time. Mental status is at baseline.      Motor: Motor function is intact.      Coordination: Coordination is intact.      Gait: Gait is intact.   Psychiatric:         Attention and Perception: Attention normal.         Mood and Affect: Mood and affect  normal.         Speech: Speech normal.         Behavior: Behavior normal. Behavior is cooperative.       Administrative Statements   I have spent a total time of 30 minutes in caring for this patient on the day of the visit/encounter including Diagnostic results, Prognosis, Risks and benefits of tx options, Instructions for management, Patient and family education, Importance of tx compliance, Risk factor reductions, Impressions, Counseling / Coordination of care, Reviewing / ordering tests, medicine, procedures  , and Obtaining or reviewing history  . Topics discussed with the patient / family include symptom assessment and management, medication review, goals of care, supportive listening, and anticipatory guidance.

## 2025-01-17 ENCOUNTER — TELEMEDICINE (OUTPATIENT)
Dept: OTHER | Facility: HOSPITAL | Age: 50
End: 2025-01-17
Payer: COMMERCIAL

## 2025-01-17 DIAGNOSIS — R39.9 LOWER URINARY TRACT SYMPTOMS: ICD-10-CM

## 2025-01-17 DIAGNOSIS — H10.33 ACUTE BACTERIAL CONJUNCTIVITIS OF BOTH EYES: Primary | ICD-10-CM

## 2025-01-17 PROBLEM — G40.A01 PETIT MAL SEIZURE STATUS (HCC): Status: ACTIVE | Noted: 2025-01-17

## 2025-01-17 PROCEDURE — 99213 OFFICE O/P EST LOW 20 MIN: CPT | Performed by: PHYSICIAN ASSISTANT

## 2025-01-17 RX ORDER — PHENAZOPYRIDINE HYDROCHLORIDE 200 MG/1
200 TABLET, FILM COATED ORAL ONCE
Qty: 1 TABLET | Refills: 0 | Status: SHIPPED | OUTPATIENT
Start: 2025-01-17 | End: 2025-01-17

## 2025-01-17 RX ORDER — KETOROLAC TROMETHAMINE 5 MG/ML
1 SOLUTION OPHTHALMIC 4 TIMES DAILY PRN
Qty: 5 ML | Refills: 0 | Status: SHIPPED | OUTPATIENT
Start: 2025-01-17

## 2025-01-17 RX ORDER — POLYMYXIN B SULFATE AND TRIMETHOPRIM 1; 10000 MG/ML; [USP'U]/ML
1 SOLUTION OPHTHALMIC EVERY 4 HOURS
Qty: 5 ML | Refills: 0 | Status: SHIPPED | OUTPATIENT
Start: 2025-01-17 | End: 2025-01-24

## 2025-01-17 NOTE — PATIENT INSTRUCTIONS
"Thank you for choosing to trust [facility] with your care today. Virtual visits are very convenient, but do have some limitations. Schedule a follow-up appointment with your primary care physician for recheck in person in 2-3 days-especially if symptoms aren't improving. If you cannot see your PCP, you can schedule a follow up appointment at a [facility]. Go to the emergency department if you develop any new or worsening symptoms including eye pain, changes in vision, or anything else that is concerning.    Excuses can be found in \"Letters\" section of [employer] [facility]inda. Can print if opened from a web browser  [facility] phone number is [phone] if you need assistance or have further questions    [phone]  Schedule or Reschedule Outpatient Testing - Option 2  Billing - Option 3  General Info - Option 4  [employer] Help - Option 5  Comprehensive Spine Program - Option 6      Patient Education     Conjunctivitis (pink eye)   The Basics   Written by the doctors and editors at [facility]   What is pink eye? -- Pink eye is a term people use to describe an infection or irritation of the eye. The medical term for pink eye is \"conjunctivitis.\"  If you have pink eye, your eye (or eyes) might:   Turn pink or red   Weep or ooze a gooey liquid   Become itchy or burn   Get stuck shut, especially when you first wake up  Pink eye can be caused by an infection, allergies, or an unknown irritation.  Can you catch pink eye from someone else? -- Yes. When pink eye is caused by an infection, it can spread easily. Usually, people catch it from touching something that has been in contact with an infected person's eye. It can also be spread when an infected person touches someone else, and then that person touches their eye.  If someone you know has pink eye, avoid touching their pillowcases, towels, or other personal items.  When should I see a doctor or nurse? -- See your doctor or nurse if your eye hurts, or if " "you still have trouble seeing clearly after blinking. If you do not have these problems, but think you might have pink eye, your doctor or nurse might be able to give you advice over the phone.  Can pink eye be treated? -- Most cases of pink eye go away on their own without treatment. But some types of pink eye can be treated.  When pink eye is caused by infection, it is usually caused by a virus, so antibiotics will not help. Still, pink eye caused by a virus can last several days.   Pink eye caused by an infection with bacteria can be treated with antibiotic eye drops, gel, or ointment.   Pink eye caused by other problems can be treated with eye drops normally used to treat allergies. These drops will not cure the pink eye, but they can help with itchiness and irritation.  When using eye drops for infection, do not touch your healthy eye after touching your infected eye. Also, do not touch the bottle or dropper directly onto 1 eye and then use it in the other. These things can cause the infection to spread from 1 eye to the other.  If your eyelids feel swollen, it might also help to hold a cool wet cloth on the area.  What if I wear contact lenses? -- If you wear contact lenses and you have symptoms of pink eye, it is really important to have a doctor look at your eyes. In people who wear contacts, the symptoms of pink eye can be caused by \"corneal abrasion.\" Corneal abrasion is a scratch on the eye and can be a serious problem.  During treatment for eye infections, you might need to stop wearing your contacts for a short time. If your contacts are disposable, throw them away and use new ones. If your contacts are not disposable, you need to carefully clean them. You should also throw away your contact lens case and get a new one.  When can I go back to work or school? -- If you have pink eye caused by an infection, remember that it can spread very easily. The best way to avoid spreading it is to stay away from " other people until you no longer have symptoms. If this is not possible, wash your hands often (figure 1). It's also important to avoid touching your eyes and sharing items that could spread the infection.  Schools and day cares usually have rules about when a child with pink eye can return. If a child has a bacterial infection, they will probably need to stay home until they have gotten antibiotic eye drops or ointment for 24 hours.  Can pink eye be prevented? -- To keep from getting or spreading pink eye caused by an infection:   Wash your hands often with soap and water.   Try not to touch your eyes.   Avoid sharing towels, bedding, or other personal items with a person who has pink eye.  If your pink eye is caused by allergies, it might help to stay inside with the windows shut as much as possible during peak allergy seasons.  What problems should I watch for? -- Call your doctor or nurse if:   You have trouble seeing clearly after blinking.   Your eye is still red or has drainage after 3 days.   You have eye pain that is getting worse.  All topics are updated as new evidence becomes available and our peer review process is complete.  This topic retrieved from [employer] on: Feb 28, 2024.  Topic 25272 Version 20.0  Release: 32.2.4 - C32.58  © 2024 UpToDate, Inc. and/or its affiliates. All rights reserved.  figure 1: How to wash your hands     Wet your hands with clean water, and apply a small amount of soap. Lather and rub hands together for at least 20 seconds. Clean your wrists, palms, backs of your hands, between your fingers, tips of your fingers, thumbs, and under and around your nails. Rinse well, and dry your hands using a clean towel.  Graphic 817443 Version 7.0  Consumer Information Use and Disclaimer   Disclaimer: This generalized information is a limited summary of diagnosis, treatment, and/or medication information. It is not meant to be comprehensive and should be used as a tool to help the user  understand and/or assess potential diagnostic and treatment options. It does NOT include all information about conditions, treatments, medications, side effects, or risks that may apply to a specific patient. It is not intended to be medical advice or a substitute for the medical advice, diagnosis, or treatment of a health care provider based on the health care provider's examination and assessment of a patient's specific and unique circumstances. Patients must speak with a health care provider for complete information about their health, medical questions, and treatment options, including any risks or benefits regarding use of medications. This information does not endorse any treatments or medications as safe, effective, or approved for treating a specific patient. UpToDate, Inc. and its affiliates disclaim any warranty or liability relating to this information or the use thereof.The use of this information is governed by the Terms of Use, available at https://www.[employer].com/en/know/clinical-effectiveness-terms. 2024© UpToDate, Inc. and its affiliates and/or licensors. All rights reserved.  Copyright   © 2024 UpToDate, Inc. and/or its affiliates. All rights reserved.

## 2025-01-17 NOTE — PROGRESS NOTES
Virtual Regular Visit  Name: Genevieve Meza      : 1975      MRN: [id number]  Encounter Provider: Shannon D Severino, PA-C  Encounter Date: 2025   Encounter department: VIRTUAL CARE       Verification of patient location:  Patient is located at Home in the following state in which I hold an active license [place] :  Assessment & Plan  Acute bacterial conjunctivitis of both eyes    Orders:    polymyxin b-trimethoprim (POLYTRIM) ophthalmic solution; Administer 1 drop to both eyes every 4 (four) hours for 7 days    ketorolac (ACULAR) 0.5 % ophthalmic solution; Administer 1 drop into the left eye 4 (four) times a day as needed (itching and irritation)    Lower urinary tract symptoms    Orders:    Ambulatory Referral to Urology; Future    phenazopyridine (PYRIDIUM) 200 mg tablet; Take 1 tablet (200 mg total) by mouth once for 1 dose        Encounter provider Shannon D Severino, PA-C    The patient was identified by name and date of birth. Genevieve Meza was informed that this is a telemedicine visit and that the visit is being conducted through the Epic Embedded platform. She agrees to proceed..  My office door was closed. No one else was in the room.  She acknowledged consent and understanding of privacy and security of the video platform. The patient has agreed to participate and understands they can discontinue the visit at any time.    Patient is aware this is a billable service.     History was obtained from: History obtained from: patient and  around  History of Present Illness     Pt reports conjunctivitis starting 5 days ago. Unsure if dust got into her eye. It was itchy and she rubbed it. When she woke up the next day had crusting.  Reports L eye redness, itching, discharge. When she tried to wipe it away she thinks she scratched her eye (medial canthus). Was having pain. Tried cool compress with relief, tea bags without relief. Now having pus in the R eye too. No vision changes. Wears  glasses. No painful eye movements.     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 Feels she is urinating from her vagina again.      Review of Systems   Constitutional:  Negative for fever.   HENT:  Negative for nosebleeds.    Eyes:  Positive for pain, discharge and redness. Negative for visual disturbance.   Respiratory:  Negative for shortness of breath.    Cardiovascular:  Negative for chest pain.   Gastrointestinal:  Negative for blood in stool.   Genitourinary:  Negative for hematuria.   Musculoskeletal:  Negative for gait problem.   Skin:  Negative for rash.   Neurological:  Negative for seizures.   Psychiatric/Behavioral:  Negative for behavioral problems.        Objective   LMP 01/06/2025     Physical Exam  Constitutional:       General: She is not in acute distress.     Appearance: Normal appearance. She is not toxic-appearing.   HENT:      Head: Normocephalic and atraumatic.      Nose: No rhinorrhea.      Mouth/Throat:      Mouth: Mucous membranes are moist.   Eyes:      Conjunctiva/sclera: Conjunctivae normal.      Comments: Both eyes injected   Pulmonary:      Effort: Pulmonary effort is normal. No respiratory distress.      Breath sounds: No wheezing (no gross audible wheeze through computer).   Musculoskeletal:      Cervical back: Normal range of motion.   Skin:     Findings: No rash (on face or neck).   Neurological:      Mental Status: She is alert.      Cranial Nerves: No dysarthria or facial asymmetry.   Psychiatric:         Mood and Affect: Mood normal.         Behavior: Behavior normal.         Visit Time  Total Visit Duration: 17 minutes not including the time spent for establishing the audio/video connection.

## 2025-01-20 LAB
APOB+LDLR+PCSK9 GENE MUT ANL BLD/T: NOT DETECTED
BRCA1+BRCA2 DEL+DUP + FULL MUT ANL BLD/T: NOT DETECTED
MLH1+MSH2+MSH6+PMS2 GN DEL+DUP+FUL M: NOT DETECTED

## 2025-01-21 ENCOUNTER — TELEPHONE (OUTPATIENT)
Dept: OTHER | Age: 50
End: 2025-01-21

## 2025-01-24 ENCOUNTER — TELEPHONE (OUTPATIENT)
Dept: OTHER | Age: 50
End: 2025-01-24

## 2025-01-24 NOTE — TELEPHONE ENCOUNTER
PT contacted for aptt confirmation    Additional Notes: Patient consent was obtained to proceed with the visit and recommended plan of care after discussion of all risks and benefits, including the risks of COVID-19 exposure. Detail Level: Simple

## 2025-02-10 ENCOUNTER — OFFICE VISIT (OUTPATIENT)
Dept: INTERNAL MEDICINE CLINIC | Facility: CLINIC | Age: 50
End: 2025-02-10

## 2025-02-10 VITALS
WEIGHT: 179 LBS | DIASTOLIC BLOOD PRESSURE: 88 MMHG | TEMPERATURE: 97.6 F | SYSTOLIC BLOOD PRESSURE: 141 MMHG | HEIGHT: 67 IN | BODY MASS INDEX: 28.09 KG/M2 | HEART RATE: 96 BPM

## 2025-02-10 DIAGNOSIS — F41.9 ANXIETY: ICD-10-CM

## 2025-02-10 DIAGNOSIS — H10.89 OTHER CONJUNCTIVITIS OF LEFT EYE: ICD-10-CM

## 2025-02-10 DIAGNOSIS — F10.90 ALCOHOL USE DISORDER: ICD-10-CM

## 2025-02-10 DIAGNOSIS — M79.7 FIBROMYALGIA: ICD-10-CM

## 2025-02-10 DIAGNOSIS — B86 SCABIES: Primary | ICD-10-CM

## 2025-02-10 PROCEDURE — 3077F SYST BP >= 140 MM HG: CPT | Performed by: PHYSICIAN ASSISTANT

## 2025-02-10 PROCEDURE — 99214 OFFICE O/P EST MOD 30 MIN: CPT | Performed by: PHYSICIAN ASSISTANT

## 2025-02-10 PROCEDURE — 3079F DIAST BP 80-89 MM HG: CPT | Performed by: PHYSICIAN ASSISTANT

## 2025-02-11 ENCOUNTER — TELEPHONE (OUTPATIENT)
Dept: INTERNAL MEDICINE CLINIC | Facility: CLINIC | Age: 50
End: 2025-02-11

## 2025-02-11 NOTE — TELEPHONE ENCOUNTER
Patient called in stating that she saw pcp on 2/10 ans she was going to sent 2 medication to the pharmacy, however patient stated that nothing has been sent. Can someone please look into this and advise

## 2025-02-12 ENCOUNTER — TELEPHONE (OUTPATIENT)
Dept: INTERNAL MEDICINE CLINIC | Facility: CLINIC | Age: 50
End: 2025-02-12

## 2025-02-12 PROBLEM — G40.A01 PETIT MAL SEIZURE STATUS (HCC): Status: RESOLVED | Noted: 2025-01-17 | Resolved: 2025-02-12

## 2025-02-12 RX ORDER — PREGABALIN 75 MG/1
CAPSULE ORAL
COMMUNITY
Start: 2025-02-11

## 2025-02-12 RX ORDER — ERYTHROMYCIN 5 MG/G
0.5 OINTMENT OPHTHALMIC EVERY 6 HOURS SCHEDULED
Qty: 3.5 G | Refills: 1 | Status: SHIPPED | OUTPATIENT
Start: 2025-02-12

## 2025-02-12 RX ORDER — PERMETHRIN 50 MG/G
CREAM TOPICAL
Qty: 60 G | Refills: 2 | Status: SHIPPED | OUTPATIENT
Start: 2025-02-12

## 2025-02-12 NOTE — ASSESSMENT & PLAN NOTE
Patient admits to drinking daily, unsure how many per day. States she is aware she has a problem. She drinks to feel better. She would like to discuss with addiction services. Referral placed previously. She will call to schedule when ready.

## 2025-02-12 NOTE — TELEPHONE ENCOUNTER
Patient stated she was not able to  her Lyrica last month as it was too expensive. Can we look into this and see if it was the co-pay cost or if a prior authorization was needed? The patient was not sure.

## 2025-02-12 NOTE — ASSESSMENT & PLAN NOTE
Rheumatoid work up unremarkable. Likely fibromyalgia. Some improvement with gabapentin, but had side effects. Discussed other treatment options. She was agreeable to try Lyrica. Discussed possible side effects. She was not able to pick this up due to cost. Will look into this to see if a prior authorization is needed or if it is due to the copay cost. Pending outcome will start Lyrica 75 mg BID. Recommend supportive treatment as well. Follow up in two months, sooner if needed.
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
## 2025-02-19 NOTE — TELEPHONE ENCOUNTER
Called and spoke to Bud the pharmacist from [facility]. Per Bud patient picked up her Lyrica on 2/12/25 at 3:27pm with $0 co-payment. Bud stated she most likely attempted to  the medication sooner then allowed. Nothing further needed.

## 2025-03-25 ENCOUNTER — HOSPITAL ENCOUNTER (EMERGENCY)
Facility: HOSPITAL | Age: 50
End: 2025-03-26
Attending: EMERGENCY MEDICINE
Payer: COMMERCIAL

## 2025-03-25 DIAGNOSIS — R45.850 HOMICIDAL IDEATION: Primary | ICD-10-CM

## 2025-03-25 DIAGNOSIS — E53.8 LOW SERUM VITAMIN B12: ICD-10-CM

## 2025-03-25 DIAGNOSIS — M79.7 FIBROMYALGIA: ICD-10-CM

## 2025-03-25 DIAGNOSIS — I10 PRIMARY HYPERTENSION: ICD-10-CM

## 2025-03-25 DIAGNOSIS — F10.90 ALCOHOL USE DISORDER: ICD-10-CM

## 2025-03-25 DIAGNOSIS — J45.20 MILD INTERMITTENT ASTHMA WITHOUT COMPLICATION: ICD-10-CM

## 2025-03-25 LAB
ETHANOL EXG-MCNC: 0.07 MG/DL
ETHANOL EXG-MCNC: 0.17 MG/DL
ETHANOL EXG-MCNC: 0.2 MG/DL

## 2025-03-25 PROCEDURE — 81025 URINE PREGNANCY TEST: CPT

## 2025-03-25 PROCEDURE — 82075 ASSAY OF BREATH ETHANOL: CPT

## 2025-03-25 PROCEDURE — 99285 EMERGENCY DEPT VISIT HI MDM: CPT

## 2025-03-25 RX ORDER — DIAZEPAM 5 MG/1
5 TABLET ORAL ONCE
Status: COMPLETED | OUTPATIENT
Start: 2025-03-25 | End: 2025-03-25

## 2025-03-25 RX ORDER — OLANZAPINE 10 MG/1
10 TABLET, ORALLY DISINTEGRATING ORAL ONCE
Status: COMPLETED | OUTPATIENT
Start: 2025-03-25 | End: 2025-03-25

## 2025-03-25 RX ADMIN — DIAZEPAM 5 MG: 5 TABLET ORAL at 14:49

## 2025-03-25 RX ADMIN — OLANZAPINE 10 MG: 10 TABLET, ORALLY DISINTEGRATING ORAL at 14:49

## 2025-03-25 NOTE — ED ATTENDING ATTESTATION
3/25/2025  I, Inga Barrera DO, saw and evaluated the patient. I have discussed the patient with the resident/non-physician practitioner and agree with the resident's/non-physician practitioner's findings, Plan of Care, and MDM as documented in the resident's/non-physician practitioner's note, except where noted. All available labs and Radiology studies were reviewed.  I was present for key portions of any procedure(s) performed by the resident/non-physician practitioner and I was immediately available to provide assistance.       At this point I agree with the current assessment done in the Emergency Department.  I have conducted an independent evaluation of this patient a history and physical is as follows:    49-year-old female presents for psychiatric evaluation.  Patient states she has been unable to sleep.  Takes her medications occasionally but not every day.  Reports homicidal ideations.  Therapist recommended coming to the emergency department.  Patient requesting help.  On exam-no acute distress, somewhat anxious, heart regular, no respiratory distress, moving all extremities, no obvious sign of trauma.  Plan-will give patient medication for anxiolysis, consult crisis for evaluation.  Patient likely to sign a 201 but would do a 302 if she refused 201    ED Course         Critical Care Time  Procedures

## 2025-03-25 NOTE — ED NOTES
2 Bags of belongings Trauma bay shelf 2.  Shirt  Pants  Sneakers  Underwear       Raegan Freed RN  03/25/25 [id number]

## 2025-03-26 ENCOUNTER — HOSPITAL ENCOUNTER (INPATIENT)
Facility: HOSPITAL | Age: 50
LOS: 19 days | Discharge: HOME | DRG: 751 | End: 2025-04-14
Attending: PSYCHIATRY & NEUROLOGY | Admitting: PSYCHIATRY & NEUROLOGY
Payer: COMMERCIAL

## 2025-03-26 VITALS
SYSTOLIC BLOOD PRESSURE: 126 MMHG | TEMPERATURE: 97.5 F | HEART RATE: 80 BPM | DIASTOLIC BLOOD PRESSURE: 90 MMHG | RESPIRATION RATE: 16 BRPM | OXYGEN SATURATION: 98 %

## 2025-03-26 DIAGNOSIS — F41.9 ANXIETY: ICD-10-CM

## 2025-03-26 DIAGNOSIS — K59.00 CONSTIPATION: ICD-10-CM

## 2025-03-26 DIAGNOSIS — F33.3 MAJOR DEPRESSIVE DISORDER, RECURRENT, SEVERE WITH PSYCHOTIC FEATURES (HCC): Primary | Chronic | ICD-10-CM

## 2025-03-26 DIAGNOSIS — E53.8 LOW SERUM VITAMIN B12: ICD-10-CM

## 2025-03-26 DIAGNOSIS — Z91.018 MULTIPLE FOOD ALLERGIES: ICD-10-CM

## 2025-03-26 DIAGNOSIS — I10 PRIMARY HYPERTENSION: ICD-10-CM

## 2025-03-26 DIAGNOSIS — E55.9 VITAMIN D DEFICIENCY: ICD-10-CM

## 2025-03-26 DIAGNOSIS — J45.20 MILD INTERMITTENT ASTHMA WITHOUT COMPLICATION: ICD-10-CM

## 2025-03-26 DIAGNOSIS — M79.7 FIBROMYALGIA: ICD-10-CM

## 2025-03-26 DIAGNOSIS — F10.90 ALCOHOL USE DISORDER: ICD-10-CM

## 2025-03-26 PROBLEM — F10.10 ALCOHOL ABUSE, CONTINUOUS: Chronic | Status: ACTIVE | Noted: 2025-01-14

## 2025-03-26 PROBLEM — F31.62 BIPOLAR DISORDER, CURRENT EPISODE MIXED, MODERATE (HCC): Status: ACTIVE | Noted: 2025-03-26

## 2025-03-26 PROBLEM — F31.4 BIPOLAR I DISORDER, MOST RECENT EPISODE DEPRESSED, SEVERE WITHOUT PSYCHOTIC FEATURES (HCC): Chronic | Status: ACTIVE | Noted: 2025-03-26

## 2025-03-26 PROBLEM — F31.4 BIPOLAR I DISORDER, MOST RECENT EPISODE DEPRESSED, SEVERE WITHOUT PSYCHOTIC FEATURES (HCC): Chronic | Status: ACTIVE | Noted: 2017-04-11

## 2025-03-26 LAB
AMPHETAMINES SERPL QL SCN: NEGATIVE
BARBITURATES UR QL: NEGATIVE
BENZODIAZ UR QL: NEGATIVE
COCAINE UR QL: NEGATIVE
EXT PREGNANCY TEST URINE: NEGATIVE
EXT. CONTROL: NORMAL
FENTANYL UR QL SCN: NEGATIVE
HYDROCODONE UR QL SCN: NEGATIVE
METHADONE UR QL: NEGATIVE
OPIATES UR QL SCN: NEGATIVE
OXYCODONE+OXYMORPHONE UR QL SCN: NEGATIVE
PCP UR QL: NEGATIVE
THC UR QL: NEGATIVE

## 2025-03-26 PROCEDURE — 80307 DRUG TEST PRSMV CHEM ANLYZR: CPT

## 2025-03-26 PROCEDURE — 99285 EMERGENCY DEPT VISIT HI MDM: CPT | Performed by: EMERGENCY MEDICINE

## 2025-03-26 RX ORDER — ALBUTEROL SULFATE 90 UG/1
2 INHALANT RESPIRATORY (INHALATION) EVERY 4 HOURS PRN
Status: DISCONTINUED | OUTPATIENT
Start: 2025-03-26 | End: 2025-04-14 | Stop reason: HOSPADM

## 2025-03-26 RX ORDER — OLANZAPINE 5 MG/1
5 TABLET, FILM COATED ORAL
Status: DISCONTINUED | OUTPATIENT
Start: 2025-03-26 | End: 2025-04-14 | Stop reason: HOSPADM

## 2025-03-26 RX ORDER — IBUPROFEN 400 MG/1
800 TABLET, FILM COATED ORAL EVERY 8 HOURS PRN
Status: CANCELLED | OUTPATIENT
Start: 2025-03-26

## 2025-03-26 RX ORDER — IBUPROFEN 600 MG/1
600 TABLET, FILM COATED ORAL EVERY 6 HOURS PRN
Status: CANCELLED | OUTPATIENT
Start: 2025-03-26

## 2025-03-26 RX ORDER — OLANZAPINE 10 MG/1
10 TABLET, FILM COATED ORAL
Status: DISCONTINUED | OUTPATIENT
Start: 2025-03-26 | End: 2025-04-14 | Stop reason: HOSPADM

## 2025-03-26 RX ORDER — PRAZOSIN HYDROCHLORIDE 2 MG/1
2 CAPSULE ORAL
COMMUNITY
End: 2025-04-14

## 2025-03-26 RX ORDER — AMOXICILLIN 250 MG
1 CAPSULE ORAL DAILY PRN
Status: CANCELLED | OUTPATIENT
Start: 2025-03-26

## 2025-03-26 RX ORDER — PROPRANOLOL HYDROCHLORIDE 10 MG/1
10 TABLET ORAL EVERY 8 HOURS PRN
Status: DISCONTINUED | OUTPATIENT
Start: 2025-03-26 | End: 2025-04-14 | Stop reason: HOSPADM

## 2025-03-26 RX ORDER — LORAZEPAM 2 MG/ML
2 INJECTION INTRAMUSCULAR
Status: CANCELLED | OUTPATIENT
Start: 2025-03-26

## 2025-03-26 RX ORDER — BENZTROPINE MESYLATE 1 MG/1
1 TABLET ORAL
Status: CANCELLED | OUTPATIENT
Start: 2025-03-26

## 2025-03-26 RX ORDER — NICOTINE 21 MG/24HR
1 PATCH, TRANSDERMAL 24 HOURS TRANSDERMAL DAILY
Status: CANCELLED | OUTPATIENT
Start: 2025-03-27

## 2025-03-26 RX ORDER — MAGNESIUM HYDROXIDE/ALUMINUM HYDROXICE/SIMETHICONE 120; 1200; 1200 MG/30ML; MG/30ML; MG/30ML
30 SUSPENSION ORAL EVERY 4 HOURS PRN
Status: CANCELLED | OUTPATIENT
Start: 2025-03-26

## 2025-03-26 RX ORDER — OLANZAPINE 2.5 MG/1
2.5 TABLET, FILM COATED ORAL
Status: DISCONTINUED | OUTPATIENT
Start: 2025-03-26 | End: 2025-04-14 | Stop reason: HOSPADM

## 2025-03-26 RX ORDER — PROPRANOLOL HYDROCHLORIDE 10 MG/1
10 TABLET ORAL EVERY 8 HOURS PRN
Status: CANCELLED | OUTPATIENT
Start: 2025-03-26

## 2025-03-26 RX ORDER — POLYETHYLENE GLYCOL 3350 17 G/17G
17 POWDER, FOR SOLUTION ORAL DAILY PRN
Status: DISCONTINUED | OUTPATIENT
Start: 2025-03-26 | End: 2025-03-28

## 2025-03-26 RX ORDER — GABAPENTIN 100 MG/1
100 CAPSULE ORAL 3 TIMES DAILY
Status: DISCONTINUED | OUTPATIENT
Start: 2025-03-26 | End: 2025-03-27

## 2025-03-26 RX ORDER — IBUPROFEN 400 MG/1
400 TABLET, FILM COATED ORAL EVERY 4 HOURS PRN
Status: DISCONTINUED | OUTPATIENT
Start: 2025-03-26 | End: 2025-04-14 | Stop reason: HOSPADM

## 2025-03-26 RX ORDER — OLANZAPINE 2.5 MG/1
2.5 TABLET, FILM COATED ORAL
Status: CANCELLED | OUTPATIENT
Start: 2025-03-26

## 2025-03-26 RX ORDER — AMOXICILLIN 250 MG
1 CAPSULE ORAL DAILY PRN
Status: DISCONTINUED | OUTPATIENT
Start: 2025-03-26 | End: 2025-03-28 | Stop reason: SDUPTHER

## 2025-03-26 RX ORDER — BENZTROPINE MESYLATE 1 MG/1
1 TABLET ORAL
Status: DISCONTINUED | OUTPATIENT
Start: 2025-03-26 | End: 2025-04-14 | Stop reason: HOSPADM

## 2025-03-26 RX ORDER — OLANZAPINE 10 MG/2ML
5 INJECTION, POWDER, FOR SOLUTION INTRAMUSCULAR
Status: CANCELLED | OUTPATIENT
Start: 2025-03-26

## 2025-03-26 RX ORDER — IBUPROFEN 600 MG/1
600 TABLET, FILM COATED ORAL EVERY 6 HOURS PRN
Status: DISCONTINUED | OUTPATIENT
Start: 2025-03-26 | End: 2025-04-14 | Stop reason: HOSPADM

## 2025-03-26 RX ORDER — OLANZAPINE 10 MG/2ML
10 INJECTION, POWDER, FOR SOLUTION INTRAMUSCULAR
Status: CANCELLED | OUTPATIENT
Start: 2025-03-26

## 2025-03-26 RX ORDER — BENZTROPINE MESYLATE 1 MG/ML
1 INJECTION, SOLUTION INTRAMUSCULAR; INTRAVENOUS
Status: DISCONTINUED | OUTPATIENT
Start: 2025-03-26 | End: 2025-04-14 | Stop reason: HOSPADM

## 2025-03-26 RX ORDER — HYDROXYZINE HYDROCHLORIDE 25 MG/1
50 TABLET, FILM COATED ORAL
Status: CANCELLED | OUTPATIENT
Start: 2025-03-26

## 2025-03-26 RX ORDER — TRAZODONE HYDROCHLORIDE 50 MG/1
50 TABLET ORAL
Status: DISCONTINUED | OUTPATIENT
Start: 2025-03-26 | End: 2025-04-11

## 2025-03-26 RX ORDER — OLANZAPINE 10 MG/2ML
10 INJECTION, POWDER, FOR SOLUTION INTRAMUSCULAR
Status: DISCONTINUED | OUTPATIENT
Start: 2025-03-26 | End: 2025-04-14 | Stop reason: HOSPADM

## 2025-03-26 RX ORDER — LORAZEPAM 1 MG/1
1 TABLET ORAL
Status: DISCONTINUED | OUTPATIENT
Start: 2025-03-26 | End: 2025-04-14 | Stop reason: HOSPADM

## 2025-03-26 RX ORDER — BUPROPION HYDROCHLORIDE 75 MG/1
75 TABLET ORAL 2 TIMES DAILY
COMMUNITY
End: 2025-04-14

## 2025-03-26 RX ORDER — BISACODYL 10 MG
10 SUPPOSITORY, RECTAL RECTAL DAILY PRN
Status: CANCELLED | OUTPATIENT
Start: 2025-03-26

## 2025-03-26 RX ORDER — HYDROXYZINE HYDROCHLORIDE 25 MG/1
25 TABLET, FILM COATED ORAL
Status: DISCONTINUED | OUTPATIENT
Start: 2025-03-26 | End: 2025-04-14 | Stop reason: HOSPADM

## 2025-03-26 RX ORDER — LORAZEPAM 2 MG/ML
1 INJECTION INTRAMUSCULAR EVERY 4 HOURS PRN
Status: CANCELLED | OUTPATIENT
Start: 2025-03-26

## 2025-03-26 RX ORDER — HYDROXYZINE HYDROCHLORIDE 25 MG/1
25 TABLET, FILM COATED ORAL
Status: CANCELLED | OUTPATIENT
Start: 2025-03-26

## 2025-03-26 RX ORDER — BENZTROPINE MESYLATE 1 MG/ML
1 INJECTION, SOLUTION INTRAMUSCULAR; INTRAVENOUS
Status: CANCELLED | OUTPATIENT
Start: 2025-03-26

## 2025-03-26 RX ORDER — LORAZEPAM 2 MG/ML
1 INJECTION INTRAMUSCULAR EVERY 4 HOURS PRN
Status: DISCONTINUED | OUTPATIENT
Start: 2025-03-26 | End: 2025-04-14 | Stop reason: HOSPADM

## 2025-03-26 RX ORDER — OLANZAPINE 10 MG/1
5 TABLET ORAL
Status: CANCELLED | OUTPATIENT
Start: 2025-03-26

## 2025-03-26 RX ORDER — IBUPROFEN 400 MG/1
800 TABLET, FILM COATED ORAL EVERY 8 HOURS PRN
Status: DISCONTINUED | OUTPATIENT
Start: 2025-03-26 | End: 2025-04-14 | Stop reason: HOSPADM

## 2025-03-26 RX ORDER — OLANZAPINE 10 MG/1
10 TABLET ORAL
Status: CANCELLED | OUTPATIENT
Start: 2025-03-26

## 2025-03-26 RX ORDER — BISACODYL 10 MG
10 SUPPOSITORY, RECTAL RECTAL DAILY PRN
Status: DISCONTINUED | OUTPATIENT
Start: 2025-03-26 | End: 2025-03-28

## 2025-03-26 RX ORDER — IBUPROFEN 400 MG/1
400 TABLET, FILM COATED ORAL EVERY 4 HOURS PRN
Status: CANCELLED | OUTPATIENT
Start: 2025-03-26

## 2025-03-26 RX ORDER — GABAPENTIN 100 MG/1
100 CAPSULE ORAL 3 TIMES DAILY
COMMUNITY
End: 2025-04-14

## 2025-03-26 RX ORDER — HYDROXYZINE HYDROCHLORIDE 50 MG/1
50 TABLET, FILM COATED ORAL
Status: DISCONTINUED | OUTPATIENT
Start: 2025-03-26 | End: 2025-04-14 | Stop reason: HOSPADM

## 2025-03-26 RX ORDER — NICOTINE 21 MG/24HR
21 PATCH, TRANSDERMAL 24 HOURS TRANSDERMAL ONCE
Status: DISCONTINUED | OUTPATIENT
Start: 2025-03-26 | End: 2025-03-26 | Stop reason: HOSPADM

## 2025-03-26 RX ORDER — LORAZEPAM 2 MG/ML
2 INJECTION INTRAMUSCULAR
Status: DISCONTINUED | OUTPATIENT
Start: 2025-03-26 | End: 2025-04-14 | Stop reason: HOSPADM

## 2025-03-26 RX ORDER — GABAPENTIN 100 MG/1
100 CAPSULE ORAL 3 TIMES DAILY
Status: CANCELLED | OUTPATIENT
Start: 2025-03-26

## 2025-03-26 RX ORDER — POLYETHYLENE GLYCOL 3350 17 G/17G
17 POWDER, FOR SOLUTION ORAL DAILY PRN
Status: CANCELLED | OUTPATIENT
Start: 2025-03-26

## 2025-03-26 RX ORDER — NICOTINE 21 MG/24HR
1 PATCH, TRANSDERMAL 24 HOURS TRANSDERMAL DAILY
Status: DISCONTINUED | OUTPATIENT
Start: 2025-03-27 | End: 2025-04-14 | Stop reason: HOSPADM

## 2025-03-26 RX ORDER — OLANZAPINE 10 MG/2ML
5 INJECTION, POWDER, FOR SOLUTION INTRAMUSCULAR
Status: DISCONTINUED | OUTPATIENT
Start: 2025-03-26 | End: 2025-04-14 | Stop reason: HOSPADM

## 2025-03-26 RX ORDER — LORAZEPAM 1 MG/1
1 TABLET ORAL
Status: CANCELLED | OUTPATIENT
Start: 2025-03-26

## 2025-03-26 RX ORDER — MAGNESIUM HYDROXIDE/ALUMINUM HYDROXICE/SIMETHICONE 120; 1200; 1200 MG/30ML; MG/30ML; MG/30ML
30 SUSPENSION ORAL EVERY 4 HOURS PRN
Status: DISCONTINUED | OUTPATIENT
Start: 2025-03-26 | End: 2025-04-14 | Stop reason: HOSPADM

## 2025-03-26 RX ORDER — TRAZODONE HYDROCHLORIDE 50 MG/1
50 TABLET ORAL
Status: CANCELLED | OUTPATIENT
Start: 2025-03-26

## 2025-03-26 RX ADMIN — GABAPENTIN 100 MG: 100 CAPSULE ORAL at 16:21

## 2025-03-26 RX ADMIN — TRAZODONE HYDROCHLORIDE 50 MG: 50 TABLET ORAL at 21:03

## 2025-03-26 RX ADMIN — NICOTINE 21 MG: 21 PATCH, EXTENDED RELEASE TRANSDERMAL at 02:10

## 2025-03-26 RX ADMIN — GABAPENTIN 100 MG: 100 CAPSULE ORAL at 21:03

## 2025-03-26 NOTE — NURSING NOTE
PTA meds confirmed. Per pt, various meds were last picked up at unknown time at a [facility] pharmacy that has since closed. Pt transitioned to [facility] on file afterwards and reports insurance inconsistencies has led to difficulty with full medication compliance. GLENN Lee notified.
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
## 2025-03-26 NOTE — ED NOTES
Patient is accepted at [facility]  Patient is accepted by Dr. Sergio Benjamin    Transportation is arranged with [facility].     Waiting for transport time      Nurse report is to be called to [phone] prior to patient transfer.

## 2025-03-26 NOTE — ED PROVIDER NOTES
Behavioral Health Progress Note    Subjective:  No acute distress.  Resting comfortably in bed.     Objective  Vitals:    03/25/25 1408 03/26/25 0802   BP: 134/94 126/90   BP Location: Left arm Left arm   Pulse: 89 80   Resp: 20 16   Temp: 97.6 °F (36.4 °C) 97.5 °F (36.4 °C)   TempSrc: Oral Tympanic   SpO2: 97% 98%         GENERAL: No acute distress  CV: Regular rate and rhythm  LUNGS: Nonlabored respirations, CTA B/L  ABDOMEN: Abdomen soft, non-tender, nondistended.    Assessment and Plan: Homicidal ideation      Pending behavioral health placement  Will plan to administer as needed medications for agitation or anxiety  Patient otherwise calm and cooperative    MD Sherif Huddleston MD  03/27/25 2033

## 2025-03-26 NOTE — EMTALA/ACUTE CARE TRANSFER
[facility] EMERGENCY DEPARTMENT  [address] [address]  Dept: [phone]      EMTALA TRANSFER CONSENT    NAME Genevieve Meza                                         1975                              MRN [id number]    I have been informed of my rights regarding examination, treatment, and transfer   by Dr. Salma Brito MD    Benefits: Specialized equipment and/or services available at the receiving facility (Include comment)________________________    Risks: Potential for delay in receiving treatment      Consent for Transfer:  I acknowledge that my medical condition has been evaluated and explained to me by the emergency department physician or other qualified medical person and/or my attending physician, who has recommended that I be transferred to the service of  Accepting Physician: Dr. Hill at Accepting Facility Name, City & State : [facility]. The above potential benefits of such transfer, the potential risks associated with such transfer, and the probable risks of not being transferred have been explained to me, and I fully understand them.  The doctor has explained that, in my case, the benefits of transfer outweigh the risks.  I agree to be transferred.    I authorize the performance of emergency medical procedures and treatments upon me in both transit and upon arrival at the receiving facility.  Additionally, I authorize the release of any and all medical records to the receiving facility and request they be transported with me, if possible.  I understand that the safest mode of transportation during a medical emergency is an ambulance and that the Hospital advocates the use of this mode of transport. Risks of traveling to the receiving facility by car, including absence of medical control, life sustaining equipment, such as oxygen, and medical personnel has been explained to me and I fully understand them.    (TORI CORRECT BOX BELOW)  [  ]  I  consent to the stated transfer and to be transported by ambulance/helicopter.  [  ]  I consent to the stated transfer, but refuse transportation by ambulance and accept full responsibility for my transportation by car.  I understand the risks of non-ambulance transfers and I exonerate the Hospital and its staff from any deterioration in my condition that results from this refusal.    X___________________________________________    DATE  25  TIME________  Signature of patient or legally responsible individual signing on patient behalf           RELATIONSHIP TO PATIENT_________________________          Provider Certification    NAME Genevieve Meza                                         1975                              MRN [id number]    A medical screening exam was performed on the above named patient.  Based on the examination:    Condition Necessitating Transfer The primary encounter diagnosis was Homicidal ideation. Diagnoses of Low serum vitamin B12, Fibromyalgia, Mild intermittent asthma without complication, Primary hypertension, and Alcohol use disorder were also pertinent to this visit.    Patient Condition: The patient has been stabilized such that within reasonable medical probability, no material deterioration of the patient condition or the condition of the unborn child(emmy) is likely to result from the transfer    Reason for Transfer: Level of Care needed not available at this facility    Transfer Requirements: Facility [facility]   Space available and qualified personnel available for treatment as acknowledged by    Agreed to accept transfer and to provide appropriate medical treatment as acknowledged by       Dr. Hill  Appropriate medical records of the examination and treatment of the patient are provided at the time of transfer   STAFF INITIAL WHEN COMPLETED _______  Transfer will be performed by qualified personnel from [facility]  and appropriate transfer equipment as required,  including the use of necessary and appropriate life support measures.    Provider Certification: I have examined the patient and explained the following risks and benefits of being transferred/refusing transfer to the patient/family:  General risk, such as traffic hazards, adverse weather conditions, rough terrain or turbulence, possible failure of equipment (including vehicle or aircraft), or consequences of actions of persons outside the control of the transport personnel      Based on these reasonable risks and benefits to the patient and/or the unborn child(emmy), and based upon the information available at the time of the patient’s examination, I certify that the medical benefits reasonably to be expected from the provision of appropriate medical treatments at another medical facility outweigh the increasing risks, if any, to the individual’s medical condition, and in the case of labor to the unborn child, from effecting the transfer.    X____________________________________________ DATE 03/26/25        TIME_______      ORIGINAL - SEND TO MEDICAL RECORDS   COPY - SEND WITH PATIENT DURING TRANSFER

## 2025-03-26 NOTE — ED NOTES
2 bags of belongings given to [facility]     Karley Thomas, LORETTA  03/26/25 1447       Karley Thomas, LORETTA  03/26/25 1440

## 2025-03-26 NOTE — ED NOTES
Insurance Authorization for admission:   Phone call placed to  [facility]  Phone number: [phone]     Spoke to Frank WHALEY     3 days approved.  Level of care: Inpatient Psych  Review on 3/29/2025  Authorization # Upon admit

## 2025-03-26 NOTE — PLAN OF CARE
Problem: Ineffective Coping  Goal: Cooperates with admission process  Description: Interventions: - Complete admission process  Outcome: Not Progressing  Goal: Participates in unit activities  Description: Interventions:- Provide therapeutic environment - Provide required programming - Redirect inappropriate behaviors   Outcome: Not Progressing  Goal: Patient/Family verbalizes awareness of resources  Outcome: Not Progressing     Problem: Depression  Goal: Treatment Goal: Demonstrate behavioral control of depressive symptoms, verbalize feelings of improved mood/affect, and adopt new coping skills prior to discharge  Outcome: Not Progressing  Goal: Verbalize thoughts and feelings  Description: Interventions:- Assess and re-assess patient's level of risk - Engage patient in 1:1 interactions, daily, for a minimum of 15 minutes - Encourage patient to express feelings, fears, frustrations, hopes   Outcome: Not Progressing  Goal: Attend and participate in unit activities, including therapeutic, recreational, and educational groups  Description: Interventions:- Provide therapeutic and educational activities daily, encourage attendance and participation, and document same in the medical record   Outcome: Not Progressing     Problem: Anxiety  Goal: Anxiety is at manageable level  Description: Interventions:- Assess and monitor patient's anxiety level. - Monitor for signs and symptoms (heart palpitations, chest pain, shortness of breath, headaches, nausea, feeling jumpy, restlessness, irritable, apprehensive). - Collaborate with interdisciplinary team and initiate plan and interventions as ordered.- [place] patient to unit/surroundings- Explain treatment plan- Encourage participation in care- Encourage verbalization of concerns/fears- Identify coping mechanisms- Assist in developing anxiety-reducing skills- Administer/offer alternative therapies- Limit or eliminate stimulants  Outcome: Not Progressing     Problem: Risk for  Violence/Aggression Toward Others  Goal: Treatment Goal: Refrain from acts of violence/aggression during length of stay, and demonstrate improved impulse control at the time of discharge  Outcome: Not Progressing  Goal: Refrain from harming others  Outcome: Not Progressing  Goal: Control angry outbursts  Description: Interventions:- Monitor patient closely, per order- Ensure early verbal de-escalation- Monitor prn medication needs- Set reasonable/therapeutic limits, outline behavioral expectations, and consequences - Provide a non-threatening milieu, utilizing the least restrictive interventions   Outcome: Not Progressing     Problem: Potential for Falls  Goal: Patient will remain free of falls  Description: -Remind patient to notify staff when needed.  -Continue to assess pt's LOC and safety awareness  Outcome: Not Progressing
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
## 2025-03-27 PROBLEM — F43.12 POST-TRAUMATIC STRESS DISORDER, CHRONIC: Chronic | Status: ACTIVE | Noted: 2025-03-27

## 2025-03-27 PROBLEM — F40.01 PANIC DISORDER WITH AGORAPHOBIA: Chronic | Status: ACTIVE | Noted: 2025-03-27

## 2025-03-27 PROBLEM — E55.9 VITAMIN D DEFICIENCY: Status: ACTIVE | Noted: 2025-03-27

## 2025-03-27 PROBLEM — F60.9 PERSONALITY DISORDER (HCC): Chronic | Status: ACTIVE | Noted: 2025-03-27

## 2025-03-27 PROBLEM — F63.9 IMPULSE CONTROL DISORDER: Chronic | Status: ACTIVE | Noted: 2025-03-27

## 2025-03-27 PROBLEM — F41.1 GAD (GENERALIZED ANXIETY DISORDER): Chronic | Status: ACTIVE | Noted: 2025-03-27

## 2025-03-27 PROBLEM — E78.2 ELEVATED TRIGLYCERIDES WITH HIGH CHOLESTEROL: Status: ACTIVE | Noted: 2025-03-27

## 2025-03-27 PROBLEM — Z00.8 MEDICAL CLEARANCE FOR PSYCHIATRIC ADMISSION: Status: ACTIVE | Noted: 2022-10-27

## 2025-03-27 PROBLEM — F33.3 MAJOR DEPRESSIVE DISORDER, RECURRENT, SEVERE WITH PSYCHOTIC FEATURES (HCC): Chronic | Status: ACTIVE | Noted: 2017-04-11

## 2025-03-27 PROBLEM — Z72.0 TOBACCO ABUSE: Status: ACTIVE | Noted: 2025-03-27

## 2025-03-27 LAB
25(OH)D3 SERPL-MCNC: 11.5 NG/ML (ref 30–100)
ALBUMIN SERPL BCG-MCNC: 4.2 G/DL (ref 3.5–5)
ALP SERPL-CCNC: 40 U/L (ref 34–104)
ALT SERPL W P-5'-P-CCNC: 11 U/L (ref 7–52)
ANION GAP SERPL CALCULATED.3IONS-SCNC: 8 MMOL/L (ref 4–13)
AST SERPL W P-5'-P-CCNC: 15 U/L (ref 13–39)
ATRIAL RATE: 73 BPM
ATRIAL RATE: 77 BPM
BASOPHILS # BLD AUTO: 0.03 THOUSANDS/ÂΜL (ref 0–0.1)
BASOPHILS NFR BLD AUTO: 1 % (ref 0–1)
BILIRUB SERPL-MCNC: 0.46 MG/DL (ref 0.2–1)
BUN SERPL-MCNC: 11 MG/DL (ref 5–25)
CALCIUM SERPL-MCNC: 8.9 MG/DL (ref 8.4–10.2)
CHLORIDE SERPL-SCNC: 102 MMOL/L (ref 96–108)
CHOLEST SERPL-MCNC: 186 MG/DL (ref ?–200)
CO2 SERPL-SCNC: 27 MMOL/L (ref 21–32)
CREAT SERPL-MCNC: 0.75 MG/DL (ref 0.6–1.3)
EOSINOPHIL # BLD AUTO: 0.19 THOUSAND/ÂΜL (ref 0–0.61)
EOSINOPHIL NFR BLD AUTO: 3 % (ref 0–6)
ERYTHROCYTE [DISTWIDTH] IN BLOOD BY AUTOMATED COUNT: 15.4 % (ref 11.6–15.1)
EST. AVERAGE GLUCOSE BLD GHB EST-MCNC: 114 MG/DL
FOLATE SERPL-MCNC: 9.1 NG/ML
GFR SERPL CREATININE-BSD FRML MDRD: 95 ML/MIN/1.73SQ M
GLUCOSE P FAST SERPL-MCNC: 91 MG/DL (ref 65–99)
GLUCOSE SERPL-MCNC: 91 MG/DL (ref 65–140)
HBA1C MFR BLD: 5.6 %
HCG SERPL QL: NEGATIVE
HCT VFR BLD AUTO: 35.6 % (ref 34.8–46.1)
HDLC SERPL-MCNC: 68 MG/DL
HGB BLD-MCNC: 11.3 G/DL (ref 11.5–15.4)
IMM GRANULOCYTES # BLD AUTO: 0.01 THOUSAND/UL (ref 0–0.2)
IMM GRANULOCYTES NFR BLD AUTO: 0 % (ref 0–2)
LDLC SERPL CALC-MCNC: 88 MG/DL (ref 0–100)
LYMPHOCYTES # BLD AUTO: 2.91 THOUSANDS/ÂΜL (ref 0.6–4.47)
LYMPHOCYTES NFR BLD AUTO: 49 % (ref 14–44)
MAGNESIUM SERPL-MCNC: 2.1 MG/DL (ref 1.9–2.7)
MCH RBC QN AUTO: 25.2 PG (ref 26.8–34.3)
MCHC RBC AUTO-ENTMCNC: 31.7 G/DL (ref 31.4–37.4)
MCV RBC AUTO: 79 FL (ref 82–98)
MONOCYTES # BLD AUTO: 0.49 THOUSAND/ÂΜL (ref 0.17–1.22)
MONOCYTES NFR BLD AUTO: 8 % (ref 4–12)
NEUTROPHILS # BLD AUTO: 2.32 THOUSANDS/ÂΜL (ref 1.85–7.62)
NEUTS SEG NFR BLD AUTO: 39 % (ref 43–75)
NONHDLC SERPL-MCNC: 118 MG/DL
NRBC BLD AUTO-RTO: 0 /100 WBCS
P AXIS: 54 DEGREES
P AXIS: 57 DEGREES
PLATELET # BLD AUTO: 240 THOUSANDS/UL (ref 149–390)
PMV BLD AUTO: 10.9 FL (ref 8.9–12.7)
POTASSIUM SERPL-SCNC: 4 MMOL/L (ref 3.5–5.3)
PR INTERVAL: 148 MS
PR INTERVAL: 172 MS
PROT SERPL-MCNC: 6.8 G/DL (ref 6.4–8.4)
QRS AXIS: 31 DEGREES
QRS AXIS: 35 DEGREES
QRSD INTERVAL: 74 MS
QRSD INTERVAL: 74 MS
QT INTERVAL: 392 MS
QT INTERVAL: 392 MS
QTC INTERVAL: 432 MS
QTC INTERVAL: 444 MS
RBC # BLD AUTO: 4.49 MILLION/UL (ref 3.81–5.12)
SODIUM SERPL-SCNC: 137 MMOL/L (ref 135–147)
T WAVE AXIS: -12 DEGREES
T WAVE AXIS: -9 DEGREES
TREPONEMA PALLIDUM IGG+IGM AB [PRESENCE] IN SERUM OR PLASMA BY IMMUNOASSAY: NORMAL
TRIGL SERPL-MCNC: 151 MG/DL (ref ?–150)
TSH SERPL DL<=0.05 MIU/L-ACNC: 2.28 UIU/ML (ref 0.45–4.5)
VENTRICULAR RATE: 73 BPM
VENTRICULAR RATE: 77 BPM
VIT B12 SERPL-MCNC: 343 PG/ML (ref 180–914)
WBC # BLD AUTO: 5.95 THOUSAND/UL (ref 4.31–10.16)

## 2025-03-27 PROCEDURE — 82607 VITAMIN B-12: CPT | Performed by: NURSE PRACTITIONER

## 2025-03-27 PROCEDURE — 93005 ELECTROCARDIOGRAM TRACING: CPT

## 2025-03-27 PROCEDURE — 83036 HEMOGLOBIN GLYCOSYLATED A1C: CPT | Performed by: PSYCHIATRY & NEUROLOGY

## 2025-03-27 PROCEDURE — 93010 ELECTROCARDIOGRAM REPORT: CPT

## 2025-03-27 PROCEDURE — 85025 COMPLETE CBC W/AUTO DIFF WBC: CPT | Performed by: NURSE PRACTITIONER

## 2025-03-27 PROCEDURE — 80053 COMPREHEN METABOLIC PANEL: CPT | Performed by: NURSE PRACTITIONER

## 2025-03-27 PROCEDURE — 99223 1ST HOSP IP/OBS HIGH 75: CPT | Performed by: PSYCHIATRY & NEUROLOGY

## 2025-03-27 PROCEDURE — 84443 ASSAY THYROID STIM HORMONE: CPT | Performed by: NURSE PRACTITIONER

## 2025-03-27 PROCEDURE — 82746 ASSAY OF FOLIC ACID SERUM: CPT | Performed by: NURSE PRACTITIONER

## 2025-03-27 PROCEDURE — 84703 CHORIONIC GONADOTROPIN ASSAY: CPT | Performed by: NURSE PRACTITIONER

## 2025-03-27 PROCEDURE — 83735 ASSAY OF MAGNESIUM: CPT | Performed by: NURSE PRACTITIONER

## 2025-03-27 PROCEDURE — 99222 1ST HOSP IP/OBS MODERATE 55: CPT | Performed by: PHYSICIAN ASSISTANT

## 2025-03-27 PROCEDURE — 80061 LIPID PANEL: CPT | Performed by: NURSE PRACTITIONER

## 2025-03-27 PROCEDURE — 86780 TREPONEMA PALLIDUM: CPT | Performed by: PSYCHIATRY & NEUROLOGY

## 2025-03-27 PROCEDURE — 82306 VITAMIN D 25 HYDROXY: CPT | Performed by: NURSE PRACTITIONER

## 2025-03-27 RX ORDER — LORAZEPAM 1 MG/1
2 TABLET ORAL EVERY 8 HOURS PRN
Status: DISCONTINUED | OUTPATIENT
Start: 2025-03-27 | End: 2025-03-27

## 2025-03-27 RX ORDER — LORAZEPAM 1 MG/1
2 TABLET ORAL ONCE
Status: COMPLETED | OUTPATIENT
Start: 2025-03-27 | End: 2025-03-27

## 2025-03-27 RX ORDER — SERTRALINE HYDROCHLORIDE 25 MG/1
25 TABLET, FILM COATED ORAL DAILY
Status: DISCONTINUED | OUTPATIENT
Start: 2025-03-27 | End: 2025-03-28

## 2025-03-27 RX ORDER — DIVALPROEX SODIUM 250 MG/1
250 TABLET, DELAYED RELEASE ORAL 2 TIMES DAILY
Status: DISCONTINUED | OUTPATIENT
Start: 2025-03-27 | End: 2025-03-27

## 2025-03-27 RX ORDER — QUETIAPINE FUMARATE 50 MG/1
50 TABLET, FILM COATED ORAL
Status: DISCONTINUED | OUTPATIENT
Start: 2025-03-27 | End: 2025-03-28

## 2025-03-27 RX ORDER — LANOLIN ALCOHOL/MO/W.PET/CERES
100 CREAM (GRAM) TOPICAL DAILY
Status: DISCONTINUED | OUTPATIENT
Start: 2025-03-27 | End: 2025-04-14 | Stop reason: HOSPADM

## 2025-03-27 RX ORDER — PRAZOSIN HYDROCHLORIDE 1 MG/1
1 CAPSULE ORAL
Status: DISCONTINUED | OUTPATIENT
Start: 2025-03-27 | End: 2025-04-08

## 2025-03-27 RX ORDER — DIVALPROEX SODIUM 500 MG/1
500 TABLET, DELAYED RELEASE ORAL 2 TIMES DAILY
Status: DISCONTINUED | OUTPATIENT
Start: 2025-03-27 | End: 2025-04-03

## 2025-03-27 RX ORDER — DIVALPROEX SODIUM 250 MG/1
250 TABLET, DELAYED RELEASE ORAL ONCE
Status: COMPLETED | OUTPATIENT
Start: 2025-03-27 | End: 2025-03-27

## 2025-03-27 RX ORDER — FOLIC ACID 1 MG/1
1 TABLET ORAL DAILY
Status: DISCONTINUED | OUTPATIENT
Start: 2025-03-27 | End: 2025-04-14 | Stop reason: HOSPADM

## 2025-03-27 RX ORDER — AMOXICILLIN 250 MG
1 CAPSULE ORAL 2 TIMES DAILY PRN
Status: DISCONTINUED | OUTPATIENT
Start: 2025-03-27 | End: 2025-04-12

## 2025-03-27 RX ADMIN — DIVALPROEX SODIUM 250 MG: 250 TABLET, DELAYED RELEASE ORAL at 15:54

## 2025-03-27 RX ADMIN — Medication 2000 UNITS: at 11:31

## 2025-03-27 RX ADMIN — LORAZEPAM 2 MG: 1 TABLET ORAL at 08:14

## 2025-03-27 RX ADMIN — SERTRALINE HYDROCHLORIDE 25 MG: 25 TABLET ORAL at 12:53

## 2025-03-27 RX ADMIN — FOLIC ACID 1 MG: 1 TABLET ORAL at 15:54

## 2025-03-27 RX ADMIN — TRAZODONE HYDROCHLORIDE 50 MG: 50 TABLET ORAL at 21:06

## 2025-03-27 RX ADMIN — BISACODYL 10 MG: 10 SUPPOSITORY RECTAL at 21:07

## 2025-03-27 RX ADMIN — LORAZEPAM 2 MG: 1 TABLET ORAL at 16:27

## 2025-03-27 RX ADMIN — DIVALPROEX SODIUM 250 MG: 250 TABLET, DELAYED RELEASE ORAL at 12:53

## 2025-03-27 RX ADMIN — Medication 1 TABLET: at 15:54

## 2025-03-27 RX ADMIN — SENNOSIDES AND DOCUSATE SODIUM 1 TABLET: 50; 8.6 TABLET ORAL at 13:32

## 2025-03-27 RX ADMIN — NICOTINE 1 PATCH: 21 PATCH, EXTENDED RELEASE TRANSDERMAL at 08:18

## 2025-03-27 RX ADMIN — DIVALPROEX SODIUM 500 MG: 500 TABLET, DELAYED RELEASE ORAL at 21:06

## 2025-03-27 RX ADMIN — PRAZOSIN HYDROCHLORIDE 1 MG: 1 CAPSULE ORAL at 21:06

## 2025-03-27 RX ADMIN — CYANOCOBALAMIN TAB 1000 MCG 1000 MCG: 1000 TAB at 11:27

## 2025-03-27 RX ADMIN — POLYETHYLENE GLYCOL 3350 17 G: 17 POWDER, FOR SOLUTION ORAL at 17:59

## 2025-03-27 RX ADMIN — IBUPROFEN 800 MG: 400 TABLET ORAL at 15:55

## 2025-03-27 RX ADMIN — QUETIAPINE FUMARATE 50 MG: 50 TABLET ORAL at 21:07

## 2025-03-27 RX ADMIN — GABAPENTIN 100 MG: 100 CAPSULE ORAL at 08:15

## 2025-03-27 RX ADMIN — THIAMINE HCL TAB 100 MG 100 MG: 100 TAB at 15:54
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
## 2025-03-27 NOTE — ASSESSMENT & PLAN NOTE
Vit D 11.5  Initiate PO supplement 2000 U q day   Recommend repeat level testing w/in 10-12 weeks per PCP

## 2025-03-27 NOTE — INTERIM OP NOTE
"Patient is visible, cooperative and calm in the unit, denies SI/HI/VH/AH, stated \"I feel a lot better as I got some sleep\". Coloring in the day room.  Compliant with HS meds, Q15 minutes checks on going for safety at this time.     PRN Trazodone 50mg given at 2103 for assistance with sleep.   "

## 2025-03-27 NOTE — ASSESSMENT & PLAN NOTE
Dx in outpatient setting; rheumatological workup grossly benign  Maintained on Lyrica and gabapentin  C/w gabapentin; defer reinitiation of Lyrica to psych team w/ concern for polypharmacy
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
## 2025-03-27 NOTE — ASSESSMENT & PLAN NOTE
Admission labs: CBC, CMP, TSH, Folate, lipid panel, syphilis, Vit B12 acceptable   Vitals stable   ECG acceptable  Patient is medically cleared for admission to [facility] and treatment of underlying psychiatric illness based on available results  Please contact [facility] with any questions or concerns

## 2025-03-27 NOTE — TREATMENT PLAN
TREATMENT PLAN REVIEW - Behavioral Health Genevieve Meza 49 y.o. 1975 female MRN: [id number]    [facility] 3P BEHAVIORAL HLTH Room / Bed: [facility] 379/[facility] 379-01 Encounter: [id number]        Admit Date/Time:  3/26/2025  3:12 PM    Treatment Team:   MD Chhaya Gregorio CRNP Zaniah Galliani Desmon Williams Heather M Huff Brooke Rickert Rebecca Sussman Victoria Suhoski, LORETTA Burch, LORETTA Dominguez, PAJJ Owens    Diagnosis: Principal Problem:    Major depressive disorder, recurrent, severe with psychotic features (HCC)  Active Problems:    Medical clearance for psychiatric admission    Fibromyalgia    Alcohol abuse, continuous    Tobacco abuse    Elevated triglycerides with high cholesterol    Personality disorder (HCC)    Post-traumatic stress disorder, chronic    SAADIA (generalized anxiety disorder)    Panic disorder with agoraphobia    Impulse control disorder      Patient Strengths/Assets: ability for insight, average or above intelligence, compliant with medication, motivation for treatment/growth, negotiates basic needs, patient is on a voluntary commitment, strong michelle       Patient Barriers/Limitations: chronic mental illness, family conflict, limited insight, poor past treatment response     Short Term Goals: decrease in depressive symptoms, decrease in anxiety symptoms, decrease in paranoid thoughts, decrease in psychotic symptoms, decrease in homicidal thoughts, decrease in level of agitation, ability to stay safe on the unit, ability to stay free from restraints, improvement in insight, acceptance of need for psychiatric treatment, acceptance of psychiatric medications    Long Term Goals: improvement in depression, improvement in anxiety, free of suicidal thoughts, free of homicidal thoughts, resolution of psychotic symptoms, improved insight, no agitation on the unit, no aggressive behavior on the unit,  able to express basic needs, acceptance of need for psychiatric medications, acceptance of need for psychiatric treatment, acceptance of need for psychiatric follow up after discharge, acceptance of psychiatric diagnosis, adequate self care, adequate sleep, adequate appetite    Progress Towards Goals: starting psychiatric medications as prescribed, participates in milieu therapy, participates more in milieu therapy, less irritable, still has psychotic symptoms    Recommended Treatment: medication management, patient medication education, group therapy, milieu therapy, continued Behavioral Health psychiatric evaluation/assessment process     Treatment Frequency: daily medication monitoring, group and milieu therapy daily, monitoring through interdisciplinary rounds, monitoring through weekly patient care conferences    Expected Discharge Date: 7 days - 4/3/2025    Discharge Plan: referral for outpatient drug and alcohol counseling, referral for outpatient medication management with a psychiatrist, referral for outpatient psychotherapy, referrals as indicated    Treatment Plan Created/Updated By: Favian Guardado DO

## 2025-03-27 NOTE — NURSING NOTE
Pt c/o not having a bowel movement for ~4-5 days. Administered senna 1333, advised increased fluids, prune juice and to report and BM. Will reassess.

## 2025-03-27 NOTE — ASSESSMENT & PLAN NOTE
Endorses binge drinking when depressed/anxious; drank 6 beers for the last couple of days PTA  C/w CIWA   Cessation encouraged

## 2025-03-27 NOTE — PLAN OF CARE
Problem: Ineffective Coping  Goal: Cooperates with admission process  Description: Interventions: - Complete admission process  Outcome: Not Progressing  Goal: Participates in unit activities  Description: Interventions:- Provide therapeutic environment - Provide required programming - Redirect inappropriate behaviors   Outcome: Not Progressing  Goal: Patient/Family verbalizes awareness of resources  Outcome: Not Progressing     Problem: Depression  Goal: Treatment Goal: Demonstrate behavioral control of depressive symptoms, verbalize feelings of improved mood/affect, and adopt new coping skills prior to discharge  Outcome: Not Progressing  Goal: Verbalize thoughts and feelings  Description: Interventions:- Assess and re-assess patient's level of risk - Engage patient in 1:1 interactions, daily, for a minimum of 15 minutes - Encourage patient to express feelings, fears, frustrations, hopes   Outcome: Not Progressing  Goal: Attend and participate in unit activities, including therapeutic, recreational, and educational groups  Description: Interventions:- Provide therapeutic and educational activities daily, encourage attendance and participation, and document same in the medical record   Outcome: Not Progressing     Problem: Anxiety  Goal: Anxiety is at manageable level  Description: Interventions:- Assess and monitor patient's anxiety level. - Monitor for signs and symptoms (heart palpitations, chest pain, shortness of breath, headaches, nausea, feeling jumpy, restlessness, irritable, apprehensive). - Collaborate with interdisciplinary team and initiate plan and interventions as ordered.- [place] patient to unit/surroundings- Explain treatment plan- Encourage participation in care- Encourage verbalization of concerns/fears- Identify coping mechanisms- Assist in developing anxiety-reducing skills- Administer/offer alternative therapies- Limit or eliminate stimulants  Outcome: Not Progressing     Problem: Risk for  Violence/Aggression Toward Others  Goal: Treatment Goal: Refrain from acts of violence/aggression during length of stay, and demonstrate improved impulse control at the time of discharge  Outcome: Not Progressing  Goal: Refrain from harming others  Outcome: Not Progressing  Goal: Control angry outbursts  Description: Interventions:- Monitor patient closely, per order- Ensure early verbal de-escalation- Monitor prn medication needs- Set reasonable/therapeutic limits, outline behavioral expectations, and consequences - Provide a non-threatening milieu, utilizing the least restrictive interventions   Outcome: Not Progressing     Problem: Potential for Falls  Goal: Patient will remain free of falls  Description: -Remind patient to notify staff when needed.  -Continue to assess pt's LOC and safety awareness  Outcome: Not Progressing

## 2025-03-27 NOTE — PROGRESS NOTES
03/27/25 0841   Team Meeting   Meeting Type Daily Rounds   Team Members Present   Team Members Present Physician;Nurse;   Physician Team Member Sergio   Nursing Team Member Annette   [facility] Management Team Member Roman   Patient/Family Present   Patient Present No   Patient's Family Present No     Pt new admit on unit. 201. Vague HI. Increased anxiety. Noncompliant with medications. Reporting poor sleep. Alcohol use.

## 2025-03-27 NOTE — NURSING NOTE
Pt intermittently visible on unit. Positive appetite. Able to express needs appropriately with organization. Calm and cooperative with fair eye contact and concentration. Denies and current SI/HI/AVH, no depression, slight anxiety, reported by patient to be related to alcohol withdrawal and characteristic. Stable mood. Will monitor for safety and for CIWA protocol. 2nd CIWA score and assessment delayed d/t pt showering and then in with physician. Will re-assess when available.

## 2025-03-27 NOTE — NURSING NOTE
"Patient denies any S/S effect from withdrawal on CIWA score of 5 due to anxiety but stated \"my anxiety is more about the things happening in the world, I watch news a lot, I see what our President is doing that makes my anxiety go high\", encouraged to concentrate more on self healing and rest.  Patient agreeable and denies SI/HI/VH/AH, PRN Trazodone 50mg given with bed time meds.     "

## 2025-03-28 PROCEDURE — 99232 SBSQ HOSP IP/OBS MODERATE 35: CPT | Performed by: PSYCHIATRY & NEUROLOGY

## 2025-03-28 RX ORDER — LORAZEPAM 1 MG/1
2 TABLET ORAL ONCE
Status: COMPLETED | OUTPATIENT
Start: 2025-03-28 | End: 2025-03-28

## 2025-03-28 RX ORDER — POLYETHYLENE GLYCOL 3350 17 G/17G
17 POWDER, FOR SOLUTION ORAL DAILY PRN
Status: DISCONTINUED | OUTPATIENT
Start: 2025-03-28 | End: 2025-04-12

## 2025-03-28 RX ORDER — BISACODYL 10 MG
10 SUPPOSITORY, RECTAL RECTAL DAILY PRN
Status: DISCONTINUED | OUTPATIENT
Start: 2025-03-28 | End: 2025-04-12

## 2025-03-28 RX ORDER — QUETIAPINE FUMARATE 100 MG/1
100 TABLET, FILM COATED ORAL
Status: DISCONTINUED | OUTPATIENT
Start: 2025-03-28 | End: 2025-03-31

## 2025-03-28 RX ORDER — SERTRALINE HYDROCHLORIDE 25 MG/1
25 TABLET, FILM COATED ORAL
Status: COMPLETED | OUTPATIENT
Start: 2025-03-28 | End: 2025-03-28

## 2025-03-28 RX ADMIN — SERTRALINE HYDROCHLORIDE 25 MG: 25 TABLET ORAL at 08:28

## 2025-03-28 RX ADMIN — FOLIC ACID 1 MG: 1 TABLET ORAL at 08:28

## 2025-03-28 RX ADMIN — MINERAL OIL 1 ENEMA: 100 ENEMA RECTAL at 18:10

## 2025-03-28 RX ADMIN — THIAMINE HCL TAB 100 MG 100 MG: 100 TAB at 08:28

## 2025-03-28 RX ADMIN — NICOTINE 1 PATCH: 21 PATCH, EXTENDED RELEASE TRANSDERMAL at 08:28

## 2025-03-28 RX ADMIN — DIVALPROEX SODIUM 500 MG: 500 TABLET, DELAYED RELEASE ORAL at 21:09

## 2025-03-28 RX ADMIN — Medication 2000 UNITS: at 08:28

## 2025-03-28 RX ADMIN — IBUPROFEN 800 MG: 400 TABLET ORAL at 21:23

## 2025-03-28 RX ADMIN — CYANOCOBALAMIN TAB 1000 MCG 1000 MCG: 1000 TAB at 08:28

## 2025-03-28 RX ADMIN — SERTRALINE HYDROCHLORIDE 25 MG: 25 TABLET ORAL at 21:10

## 2025-03-28 RX ADMIN — LORAZEPAM 2 MG: 1 TABLET ORAL at 14:19

## 2025-03-28 RX ADMIN — Medication 1 TABLET: at 08:28

## 2025-03-28 RX ADMIN — DIVALPROEX SODIUM 500 MG: 500 TABLET, DELAYED RELEASE ORAL at 08:28

## 2025-03-28 RX ADMIN — QUETIAPINE FUMARATE 100 MG: 100 TABLET ORAL at 21:10

## 2025-03-28 RX ADMIN — PRAZOSIN HYDROCHLORIDE 1 MG: 1 CAPSULE ORAL at 21:10

## 2025-03-28 RX ADMIN — TRAZODONE HYDROCHLORIDE 50 MG: 50 TABLET ORAL at 21:19

## 2025-03-28 NOTE — CONSULTS
Pt stated she can have egg whites and eggs in foods or milk in foods, just not to drink. Does not have enough menu choices and would like allergies removed. She knows what she can eat.    PAST MEDICAL HISTORY:  Depression     Gout     HLD (hyperlipidemia)     HTN (hypertension)     Parkinson's disease     Prostate cancer

## 2025-03-28 NOTE — PROGRESS NOTES
03/28/25 1400   Activity/Group Checklist   Group Other (Comment)  (Group Art Therapy/Psychodynamic, Creatively Explore Relationship with Affirmations followed by Process Discussion)   Attendance Attended   Attendance Duration (min) Greater than 60  (90 min)   Interactions Interacted appropriately   Affect/Mood Appropriate   Goals Achieved Discussed coping strategies;Able to listen to others;Able to engage in interactions;Able to self-disclose;Able to recieve feedback;Able to give feedback to another;Able to manage/cope with feelings;Identified feelings;Able to reflect/comment on own behavior

## 2025-03-28 NOTE — PLAN OF CARE
Problem: Ineffective Coping  Goal: Cooperates with admission process  Description: Interventions: - Complete admission process  Outcome: Progressing  Goal: Participates in unit activities  Description: Interventions:- Provide therapeutic environment - Provide required programming - Redirect inappropriate behaviors   Outcome: Progressing  Goal: Patient/Family verbalizes awareness of resources  Outcome: Progressing     Problem: Depression  Goal: Treatment Goal: Demonstrate behavioral control of depressive symptoms, verbalize feelings of improved mood/affect, and adopt new coping skills prior to discharge  Outcome: Progressing  Goal: Verbalize thoughts and feelings  Description: Interventions:- Assess and re-assess patient's level of risk - Engage patient in 1:1 interactions, daily, for a minimum of 15 minutes - Encourage patient to express feelings, fears, frustrations, hopes   Outcome: Progressing  Goal: Attend and participate in unit activities, including therapeutic, recreational, and educational groups  Description: Interventions:- Provide therapeutic and educational activities daily, encourage attendance and participation, and document same in the medical record   Outcome: Progressing     Problem: Anxiety  Goal: Anxiety is at manageable level  Description: Interventions:- Assess and monitor patient's anxiety level. - Monitor for signs and symptoms (heart palpitations, chest pain, shortness of breath, headaches, nausea, feeling jumpy, restlessness, irritable, apprehensive). - Collaborate with interdisciplinary team and initiate plan and interventions as ordered.- [place] patient to unit/surroundings- Explain treatment plan- Encourage participation in care- Encourage verbalization of concerns/fears- Identify coping mechanisms- Assist in developing anxiety-reducing skills- Administer/offer alternative therapies- Limit or eliminate stimulants  Outcome: Progressing     Problem: Risk for Violence/Aggression Toward  Others  Goal: Treatment Goal: Refrain from acts of violence/aggression during length of stay, and demonstrate improved impulse control at the time of discharge  Outcome: Progressing  Goal: Refrain from harming others  Outcome: Progressing  Goal: Control angry outbursts  Description: Interventions:- Monitor patient closely, per order- Ensure early verbal de-escalation- Monitor prn medication needs- Set reasonable/therapeutic limits, outline behavioral expectations, and consequences - Provide a non-threatening milieu, utilizing the least restrictive interventions   Outcome: Progressing     Problem: Potential for Falls  Goal: Patient will remain free of falls  Description: -Remind patient to notify staff when needed.  -Continue to assess pt's LOC and safety awareness  Outcome: Progressing     Problem: DISCHARGE PLANNING - CARE MANAGEMENT  Goal: Discharge to post-acute care or home with appropriate resources  Description: INTERVENTIONS:- Conduct assessment to determine patient/family and health care team treatment goals, and need for post-acute services based on payer coverage, community resources, and patient preferences, and barriers to discharge- Address psychosocial, clinical, and financial barriers to discharge as identified in assessment in conjunction with the patient/family and health care team- Arrange appropriate level of post-acute services according to patient’s   needs and preference and payer coverage in collaboration with the physician and health care team- Communicate with and update the patient/family, physician, and health care team regarding progress on the discharge plan- Arrange appropriate transportation to post-acute venues  Outcome: Progressing

## 2025-03-28 NOTE — NURSING NOTE
"Visible in the milieu for meals. Calm and cooperative. Describes their mood as \"alright\". Expresses some anxiety. Denies depression, SI, HI, and hallucinations of any kind.    Medication compliant.    Encouraged hygiene and groups.     Most recent CIWA score 2.    Staff availability reinforced.   "

## 2025-03-28 NOTE — PROGRESS NOTES
03/28/25 0842   Team Meeting   Meeting Type Daily Rounds   Team Members Present   Team Members Present Physician;Nurse;   Physician Team Member Sergio   Nursing Team Member [facility] Team Member Roman   Patient/Family Present   Patient Present No   Patient's Family Present No     Pt denying psych symptoms. Reporting good sleep. CIWA. Med and meal compliant

## 2025-03-28 NOTE — NURSING NOTE
"Pt calm, cooperative. Denies any SI/HI/AVH at this time, endorses mild anxiety due to not having a BM in 4-5 days. CIWA @ 2017 = 1. Pt also c/o poor sleep and insomnia. Requested and given PRN PO Trazodone 50mg and Dulcolax rectal suppository @ 2107. Pt visible in milieu, social with select peers. Adherent with scheduled medications. Q15 minute checks maintained for safety.     2207: Dulcolax mildly effective, pt states she had a \"small BM\". Trazodone not effective as pt is still up at this time.   "

## 2025-03-28 NOTE — NURSING NOTE
Patient's CIWA score at 1400 was 10. Patient threw up, complained of visual hallucinations, anxiety, and a headache. Per CIWA protocol one time order placed for and patient received ativan 2 mg PO at 1419. Patient expressed not feeling well.     Patient laid down in bed and when they woke up patient expressed feeling better.

## 2025-03-28 NOTE — NURSING NOTE
Patient complained of ongoing constipation unrelieved by senokot, miralax, and a suppository. On-call [facility] provider (Rachell) contacted via [facility] secure chat. Patient received Enema one time order. Patient reports medication was very effective when asked.

## 2025-03-28 NOTE — PROGRESS NOTES
Progress Note - Behavioral Health   Name: Genevieve Meza 49 y.o. female I MRN: [id number]  Unit/Bed#: -01 I Date of Admission: 3/26/2025   Date of Service: 3/28/2025 I Hospital Day: 2    Assessment & Plan  Major depressive disorder, recurrent, severe with psychotic features (HCC)  As of 03/28/2025 via patient is reporting that her sleep and appetite have significantly improved compared to prior to admission.  She is reporting drowsiness after starting her medications.  Otherwise denying symptoms.    Plan:  Continue Depakote 500 mg twice daily for impulse control  Will obtain Depakote level on 03/30  Continue prazosin 1 mg daily at bedtime for nightmares  Increase Seroquel 50 mg to 100 mg nightly  Increase Zoloft to 50 mg at night starting on 03/29  Patient will receive 25 mg at night on 03/28 making a total of 50 mg that day    Alcohol abuse, continuous  See plan under principal problem  Medical clearance for psychiatric admission  Management per medical team  Fibromyalgia  Management per medical team  Tobacco abuse  See plan under principal problem  Elevated triglycerides with high cholesterol  Management per medical team  Personality disorder (HCC)  See plan under principal problem  Post-traumatic stress disorder, chronic  See plan under principal problem  SAADIA (generalized anxiety disorder)  See plan under principal problem  Panic disorder with agoraphobia  See plan under principal problem  Impulse control disorder  See plan under principal problem  Vitamin D deficiency  Daily Vitamin D3 2,000 units  Management per medical team    Current medications:  Current Facility-Administered Medications   Medication Dose Route Frequency Provider Last Rate    albuterol  2 puff Inhalation Q4H PRN Aye Hill MD      aluminum-magnesium hydroxide-simethicone  30 mL Oral Q4H PRN GLENN Hong      benztropine  1 mg Intramuscular Q4H PRN Max 6/day GLENN Hong      benztropine  1 mg Oral Q4H PRN Max 6/day  GLENN Hong      bisacodyl  10 mg Rectal Daily PRN GLENN Hong      Cholecalciferol  2,000 Units Oral Daily Aye Hill MD      cyanocobalamin  1,000 mcg Oral Daily Cassidy Dominguez PA-C      divalproex sodium  500 mg Oral BID Favian Guardado DO      folic acid  1 mg Oral Daily Aye Hill MD      hydrOXYzine HCL  25 mg Oral Q6H PRN Max 4/day GLENN Hong      hydrOXYzine HCL  50 mg Oral Q4H PRN Max 4/day GLENN Hong      Or    LORazepam  1 mg Intramuscular Q4H PRN GLENN Hong      ibuprofen  400 mg Oral Q4H PRN GLENN Hong      ibuprofen  600 mg Oral Q6H PRN GLENN Hong      ibuprofen  800 mg Oral Q8H PRN LGENN Hong      LORazepam  1 mg Oral Q4H PRN Max 6/day GLENN Hong      Or    LORazepam  2 mg Intramuscular Q6H PRN Max 3/day GLENN Hong      multivitamin-minerals  1 tablet Oral Daily Aye Hill MD      nicotine  1 patch Transdermal Daily GLENN Hong      OLANZapine  10 mg Oral Q3H PRN Max 3/day GLENN Hong      Or    OLANZapine  10 mg Intramuscular Q3H PRN Max 3/day GLENN Hong      OLANZapine  5 mg Oral Q3H PRN Max 6/day GLENN Hong      Or    OLANZapine  5 mg Intramuscular Q3H PRN Max 6/day GLENN Hong      OLANZapine  2.5 mg Oral Q3H PRN Max 8/day GLENN Hong      polyethylene glycol  17 g Oral Daily PRN GLENN Hong      prazosin  1 mg Oral HS Aye Hill MD      propranolol  10 mg Oral Q8H PRN GLENN Hong      QUEtiapine  50 mg Oral HS Favian Guardado DO      senna-docusate sodium  1 tablet Oral BID PRN Cassidy Dominguez PA-C      sertraline  25 mg Oral Daily Favian Guardado DO      thiamine  100 mg Oral Daily Aye Hill MD      traZODone  50 mg Oral HS PRN GLENN Hong          Risks/Benefits of Treatment:     Risks, benefits, and possible side effects of medications explained to patient and patient verbalizes understanding and agreement for  "treatment.    Progress Toward Goals: improving    Treatment Planning:     All current active medications have been reviewed.  Continue to monitor response to treatment and assess for potential side effects of medications.  Encourage group therapy, milieu therapy and occupational therapy  Collaboration with medical service for medical comorbidities as indicated.  Behavioral Health checks for safety monitoring.  Long Stay Certification : Not Applicable  Estimated Discharge Day:    Legal Status : Voluntary 201 commitment.    Subjective     Behavior over the last 24 hours: improved.    Per nursing, Genevieve reported mild anxiety. Had some constipation and got a suppository which was effective. Patient received 2 mg Ativan twice yesterday due to CIWA scores.      She reported her mood today is \"so-so.\"  She reports that she is sleeping much better compared to before admission.  However, she is reporting that she is feeling drowsy this morning and feels like that may be from starting new medications.  She reports her appetite is significantly improved also.  Aside from drowsiness she is denying medication side effects.  She denied suicidal and homicidal ideation.  She had to move rooms because of her roommate snoring but did not experience homicidal ideation last night.  She denied auditory and visual hallucinations at this time.  She has not heard any voices in 2 days.    Sleep: normal  Appetite: normal  Medication side effects: No  ROS: review of systems as noted above in HPI/Subjective report, all other systems are negative    Objective :  Temp:  [98.1 °F (36.7 °C)-98.9 °F (37.2 °C)] 98.3 °F (36.8 °C)  HR:  [] 82  BP: (110-163)/(70-95) 120/77  Resp:  [15-18] 18  SpO2:  [98 %-100 %] 99 %  O2 Device: None (Room air)    Temp:  [98.1 °F (36.7 °C)-98.9 °F (37.2 °C)] 98.3 °F (36.8 °C)  HR:  [] 82  BP: (110-163)/(70-95) 120/77  Resp:  [15-18] 18  SpO2:  [98 %-100 %] 99 %  O2 Device: None (Room air)    Mental Status " "Evaluation:    Appearance:  disheveled, marginal hygiene, wearing hospital clothes, looks stated age   Behavior:  pleasant, cooperative   Speech:  normal rate, normal volume, normal pitch   Mood:  \"So-so.\"   Affect:  blunted   Thought Process:  organized   Thought Content:  some paranoia   Perceptual Disturbances: denies auditory or visual hallucinations when asked, but appears preoccupied   Risk Potential: Suicidal Ideation -  None at present  Homicidal Ideation -  None at present  Potential for Aggression - Yes, due to history of violence   Sensorium:  oriented to person, place, and time/date   Memory:  recent and remote memory grossly intact   Consciousness:  alert and awake   Attention/Concentration: attention span and concentration are age appropriate   Insight:  limited   Judgment: limited   Gait/Station: normal gait/station, normal balance   Motor Activity: no abnormal movements       Lab Results: I have reviewed the following results:  Results from the past 24 hours: No results found for this or any previous visit (from the past 24 hours).    Administrative Statements     Counseling / Coordination of Care:   Patient's progress discussed with staff in treatment team meeting.  Medication changes reviewed with staff in treatment team meeting..    Favian Guardado DO 03/28/25  PGY-II  "

## 2025-03-28 NOTE — ASSESSMENT & PLAN NOTE
As of 03/28/2025 via patient is reporting that her sleep and appetite have significantly improved compared to prior to admission.  She is reporting drowsiness after starting her medications.  Otherwise denying symptoms.    Plan:  Continue Depakote 500 mg twice daily for impulse control  Will obtain Depakote level on 03/30  Continue prazosin 1 mg daily at bedtime for nightmares  Increase Seroquel 50 mg to 100 mg nightly  Increase Zoloft to 50 mg at night starting on 03/29  Patient will receive 25 mg at night on 03/28 making a total of 50 mg that day

## 2025-03-29 PROCEDURE — 99232 SBSQ HOSP IP/OBS MODERATE 35: CPT | Performed by: PSYCHIATRY & NEUROLOGY

## 2025-03-29 RX ADMIN — QUETIAPINE FUMARATE 100 MG: 100 TABLET ORAL at 21:07

## 2025-03-29 RX ADMIN — IBUPROFEN 800 MG: 400 TABLET ORAL at 08:17

## 2025-03-29 RX ADMIN — DIVALPROEX SODIUM 500 MG: 500 TABLET, DELAYED RELEASE ORAL at 08:13

## 2025-03-29 RX ADMIN — Medication 2000 UNITS: at 08:13

## 2025-03-29 RX ADMIN — CYANOCOBALAMIN TAB 1000 MCG 1000 MCG: 1000 TAB at 08:13

## 2025-03-29 RX ADMIN — TRAZODONE HYDROCHLORIDE 50 MG: 50 TABLET ORAL at 21:07

## 2025-03-29 RX ADMIN — SERTRALINE HYDROCHLORIDE 50 MG: 50 TABLET ORAL at 21:07

## 2025-03-29 RX ADMIN — IBUPROFEN 800 MG: 400 TABLET ORAL at 21:06

## 2025-03-29 RX ADMIN — HYDROXYZINE HYDROCHLORIDE 50 MG: 50 TABLET, FILM COATED ORAL at 22:33

## 2025-03-29 RX ADMIN — DIVALPROEX SODIUM 500 MG: 500 TABLET, DELAYED RELEASE ORAL at 21:07

## 2025-03-29 RX ADMIN — SENNOSIDES AND DOCUSATE SODIUM 1 TABLET: 50; 8.6 TABLET ORAL at 22:33

## 2025-03-29 RX ADMIN — FOLIC ACID 1 MG: 1 TABLET ORAL at 08:13

## 2025-03-29 RX ADMIN — PRAZOSIN HYDROCHLORIDE 1 MG: 1 CAPSULE ORAL at 21:07

## 2025-03-29 RX ADMIN — THIAMINE HCL TAB 100 MG 100 MG: 100 TAB at 08:13

## 2025-03-29 RX ADMIN — Medication 1 TABLET: at 08:13

## 2025-03-29 RX ADMIN — NICOTINE 1 PATCH: 21 PATCH, EXTENDED RELEASE TRANSDERMAL at 08:15

## 2025-03-29 RX ADMIN — HYDROXYZINE HYDROCHLORIDE 50 MG: 50 TABLET, FILM COATED ORAL at 11:59

## 2025-03-29 NOTE — NURSING NOTE
"Complained of anxiety, unable to identify cause. Observed utilizing coping skills (puzzles, socializing with peers) but anxiety has become overwelming. Wilson Anxiety Scale score 19. Received PRN atarax 50 mg PO for moderate anxiety at 1159.    When reassessed one hour later at 1259 patient ha snot had anymore complaints. Observed sitting in the dayroom. When asked if the medication helped patient shook their head up and down and said \"yes\".    Medication effective.   "

## 2025-03-29 NOTE — PLAN OF CARE
Problem: Ineffective Coping  Goal: Participates in unit activities  Description: Interventions:- Provide therapeutic environment - Provide required programming - Redirect inappropriate behaviors   Outcome: Progressing     Problem: Depression  Goal: Attend and participate in unit activities, including therapeutic, recreational, and educational groups  Description: Interventions:- Provide therapeutic and educational activities daily, encourage attendance and participation, and document same in the medical record   Outcome: Progressing

## 2025-03-29 NOTE — NURSING NOTE
Complained of trouble falling asleep and requested trazodone. Received PRN trazodone 50 mg PO for insomnia at 2119.    Upon reassessment one hour later at 2219 patient observed resting in bed comfortably with their eyes closed. Respirations observed. Medication effective.

## 2025-03-29 NOTE — ASSESSMENT & PLAN NOTE
As above 3/29/2025: Patient complaining of anxiety but denies any other concerns.  Zoloft was increased by her regular treatment team to 50 mg from tonight.  At this time plan is to continue with the current meds with no changes.  Will continue to monitor her for her mood, behavior, safety, sleep and response to meds while she is here.  Depakote level will be checked tomorrow.    As of 03/28/2025 via patient is reporting that her sleep and appetite have significantly improved compared to prior to admission.  She is reporting drowsiness after starting her medications.  Otherwise denying symptoms.    Plan:  Continue Depakote 500 mg twice daily for impulse control  Will obtain Depakote level on 03/30  Continue prazosin 1 mg daily at bedtime for nightmares  Increase Seroquel 50 mg to 100 mg nightly  Increase Zoloft to 50 mg at night starting on 03/29  Patient will receive 25 mg at night on 03/28 making a total of 50 mg that day

## 2025-03-29 NOTE — NURSING NOTE
Visible in the milieu. Calm and cooperative. Observed working on a puzzle in the dayroom. Pleasant during conversation. Reports anxiety, encouraged and discussed coping skills but reminded patient to come to this writer if symptoms become overwhelming. Denies SI, HI, and hallucinations of any kind.    Medication compliant.    Encouraged to keep going to groups.    Staff availability reinforced.

## 2025-03-29 NOTE — PLAN OF CARE
Problem: Ineffective Coping  Goal: Cooperates with admission process  Description: Interventions: - Complete admission process  Outcome: Progressing  Goal: Participates in unit activities  Description: Interventions:- Provide therapeutic environment - Provide required programming - Redirect inappropriate behaviors   Outcome: Progressing  Goal: Patient/Family verbalizes awareness of resources  Outcome: Progressing     Problem: Depression  Goal: Treatment Goal: Demonstrate behavioral control of depressive symptoms, verbalize feelings of improved mood/affect, and adopt new coping skills prior to discharge  Outcome: Progressing  Goal: Verbalize thoughts and feelings  Description: Interventions:- Assess and re-assess patient's level of risk - Engage patient in 1:1 interactions, daily, for a minimum of 15 minutes - Encourage patient to express feelings, fears, frustrations, hopes   Outcome: Progressing  Goal: Attend and participate in unit activities, including therapeutic, recreational, and educational groups  Description: Interventions:- Provide therapeutic and educational activities daily, encourage attendance and participation, and document same in the medical record   Outcome: Progressing     Problem: Anxiety  Goal: Anxiety is at manageable level  Description: Interventions:- Assess and monitor patient's anxiety level. - Monitor for signs and symptoms (heart palpitations, chest pain, shortness of breath, headaches, nausea, feeling jumpy, restlessness, irritable, apprehensive). - Collaborate with interdisciplinary team and initiate plan and interventions as ordered.- [place] patient to unit/surroundings- Explain treatment plan- Encourage participation in care- Encourage verbalization of concerns/fears- Identify coping mechanisms- Assist in developing anxiety-reducing skills- Administer/offer alternative therapies- Limit or eliminate stimulants  Outcome: Progressing     Problem: Risk for Violence/Aggression Toward  Others  Goal: Treatment Goal: Refrain from acts of violence/aggression during length of stay, and demonstrate improved impulse control at the time of discharge  Outcome: Progressing  Goal: Refrain from harming others  Outcome: Progressing  Goal: Control angry outbursts  Description: Interventions:- Monitor patient closely, per order- Ensure early verbal de-escalation- Monitor prn medication needs- Set reasonable/therapeutic limits, outline behavioral expectations, and consequences - Provide a non-threatening milieu, utilizing the least restrictive interventions   Outcome: Progressing     Problem: Potential for Falls  Goal: Patient will remain free of falls  Description: -Remind patient to notify staff when needed.  -Continue to assess pt's LOC and safety awareness  Outcome: Progressing     Problem: DISCHARGE PLANNING - CARE MANAGEMENT  Goal: Discharge to post-acute care or home with appropriate resources  Description: INTERVENTIONS:- Conduct assessment to determine patient/family and health care team treatment goals, and need for post-acute services based on payer coverage, community resources, and patient preferences, and barriers to discharge- Address psychosocial, clinical, and financial barriers to discharge as identified in assessment in conjunction with the patient/family and health care team- Arrange appropriate level of post-acute services according to patient’s   needs and preference and payer coverage in collaboration with the physician and health care team- Communicate with and update the patient/family, physician, and health care team regarding progress on the discharge plan- Arrange appropriate transportation to post-acute venues  Outcome: Progressing

## 2025-03-29 NOTE — NURSING NOTE
Vital signs attempted and patient sleeping and would not consent. No symptoms of withdrawal present.

## 2025-03-29 NOTE — PROGRESS NOTES
Progress Note - Behavioral Health   Name: Genevieve Meza 49 y.o. female I MRN: [id number]  Unit/Bed#: -01 I Date of Admission: 3/26/2025   Date of Service: 3/29/2025 I Hospital Day: 3    Assessment & Plan  Major depressive disorder, recurrent, severe with psychotic features (HCC)  As above 3/29/2025: Patient complaining of anxiety but denies any other concerns.  Zoloft was increased by her regular treatment team to 50 mg from tonight.  At this time plan is to continue with the current meds with no changes.  Will continue to monitor her for her mood, behavior, safety, sleep and response to meds while she is here.  Depakote level will be checked tomorrow.    As of 03/28/2025 via patient is reporting that her sleep and appetite have significantly improved compared to prior to admission.  She is reporting drowsiness after starting her medications.  Otherwise denying symptoms.    Plan:  Continue Depakote 500 mg twice daily for impulse control  Will obtain Depakote level on 03/30  Continue prazosin 1 mg daily at bedtime for nightmares  Increase Seroquel 50 mg to 100 mg nightly  Increase Zoloft to 50 mg at night starting on 03/29  Patient will receive 25 mg at night on 03/28 making a total of 50 mg that day    Alcohol abuse, continuous  See plan under principal problem  Medical clearance for psychiatric admission  Management per medical team  Fibromyalgia  Management per medical team  Tobacco abuse  See plan under principal problem  Elevated triglycerides with high cholesterol  Management per medical team  Personality disorder (HCC)  See plan under principal problem  Post-traumatic stress disorder, chronic  See plan under principal problem  SAADIA (generalized anxiety disorder)  See plan under principal problem  Panic disorder with agoraphobia  See plan under principal problem  Impulse control disorder  See plan under principal problem  Vitamin D deficiency  Daily Vitamin D3 2,000 units  Management per medical  team    Current medications:  Current Facility-Administered Medications   Medication Dose Route Frequency Provider Last Rate    albuterol  2 puff Inhalation Q4H PRN Aye Hill MD      aluminum-magnesium hydroxide-simethicone  30 mL Oral Q4H PRN GLENN Hong      benztropine  1 mg Intramuscular Q4H PRN Max 6/day GLENN Hong      benztropine  1 mg Oral Q4H PRN Max 6/day GLENN Hong      bisacodyl  10 mg Rectal Daily PRN GLENN Hong      Cholecalciferol  2,000 Units Oral Daily Aye Hill MD      cyanocobalamin  1,000 mcg Oral Daily Cassidy Dominguez PA-C      divalproex sodium  500 mg Oral BID Favian Guardado DO      folic acid  1 mg Oral Daily Aye Hill MD      hydrOXYzine HCL  25 mg Oral Q6H PRN Max 4/day GLENN Hong      hydrOXYzine HCL  50 mg Oral Q4H PRN Max 4/day GLENN Hong      Or    LORazepam  1 mg Intramuscular Q4H PRN GLENN Hong      ibuprofen  400 mg Oral Q4H PRN GLENN Hong      ibuprofen  600 mg Oral Q6H PRN GLENN Hong      ibuprofen  800 mg Oral Q8H PRN GELNN Hong      LORazepam  1 mg Oral Q4H PRN Max 6/day GLENN Hong      Or    LORazepam  2 mg Intramuscular Q6H PRN Max 3/day GLENN Hong      multivitamin-minerals  1 tablet Oral Daily Aye Hill MD      nicotine  1 patch Transdermal Daily GLENN Hong      OLANZapine  10 mg Oral Q3H PRN Max 3/day GLENN Hong      Or    OLANZapine  10 mg Intramuscular Q3H PRN Max 3/day GLENN Hong      OLANZapine  5 mg Oral Q3H PRN Max 6/day GLENN Hong      Or    OLANZapine  5 mg Intramuscular Q3H PRN Max 6/day GLENN Hong      OLANZapine  2.5 mg Oral Q3H PRN Max 8/day GLENN Hong      polyethylene glycol  17 g Oral Daily PRN GLENN Hong      prazosin  1 mg Oral HS Aye Hill MD      propranolol  10 mg Oral Q8H PRN GLENN Hong      QUEtiapine  100 mg Oral HS DO eamon Álvarez-docusate sodium  1 tablet  Oral BID PRN Cassidy Dominguez PA-C      sertraline  50 mg Oral HS Favian Guardado DO      thiamine  100 mg Oral Daily Aye Hill MD      traZODone  50 mg Oral HS PRN GLENN Hong          Risks/Benefits of Treatment:     Risks, benefits, and possible side effects of medications explained to patient and patient verbalizes understanding and agreement for treatment.  Risks of medications in pregnancy explained if female patient. Patient verbalizes understanding and agrees to notify her doctor if she becomes pregnant.    Progress Toward Goals: improving    Treatment Planning:     All current active medications have been reviewed.  Continue to monitor response to treatment and assess for potential side effects of medications.  Encourage group therapy, milieu therapy and occupational therapy  Collaboration with medical service for medical comorbidities as indicated.  Behavioral Health checks for safety monitoring.    Estimated Discharge Day: 4/4/2025       Subjective   Patient was seen in the milieu today.  She reports she is doing okay.  Denies any depression but rates her anxiety at 8 on a scale of 0-10 with 10 being the worst.  She denies any alcohol withdrawal symptoms other than anxiety.  Denies any nausea or vomiting.  Denies any auditory visual hallucination.  Was fully oriented to time place and person during the meeting.  Calm and pleasant and no agitation was noted.  Denies any SI or HI.  She reports her sleep and appetite has been very good.  Does not endorse any paranoia or delusional ideation.  Reports compliant with her medication and denies any side effect.  As per the nurse patient required trazodone last night for asleep.  It was very effective.  She has been visible in the milieu today.  Calm and cooperative.  Had complained of anxiety but no other concerns reported.  As per the nurse overnight patient CIWA was 0    Behavior over the last 24 hours: Improving    As mentioned above her Zoloft  was increased as per the treatment team to 50 mg at night from today.  Seroquel was increased to 100 mg at bedtime yesterday.      Objective :  Temp:  [97.3 °F (36.3 °C)-98.8 °F (37.1 °C)] 97.3 °F (36.3 °C)  HR:  [79-92] 79  BP: (110-135)/(72-88) 110/72  Resp:  [16] 16  SpO2:  [97 %-100 %] 97 %  O2 Device: None (Room air)    Temp:  [97.3 °F (36.3 °C)-98.8 °F (37.1 °C)] 97.3 °F (36.3 °C)  HR:  [79-92] 79  BP: (110-135)/(72-88) 110/72  Resp:  [16] 16  SpO2:  [97 %-100 %] 97 %  O2 Device: None (Room air)    Mental Status Evaluation:    Appearance:  casually dressed   Behavior:  cooperative, calm   Speech:  normal rate, normal volume, normal pitch   Mood:  anxious   Affect:  normal range and intensity, appropriate   Thought Process:  organized   Thought Content:  no overt delusions   Perceptual Disturbances: no auditory hallucinations, no visual hallucinations   Risk Potential: Suicidal Ideation -  None  Homicidal Ideation -  None  Potential for Aggression - No   Sensorium:  oriented to person, place, and time/date   Memory:  recent and remote memory grossly intact   Consciousness:  alert and awake   Attention/Concentration: normal attention span   Insight:  fair   Judgment: fair   Gait/Station: normal gait/station, normal balance   Motor Activity: no abnormal movements       Lab Results: I have reviewed the following results:  Most Recent Labs:   Lab Results   Component Value Date    WBC 5.95 03/27/2025    RBC 4.49 03/27/2025    HGB 11.3 (L) 03/27/2025    HCT 35.6 03/27/2025     03/27/2025    RDW 15.4 (H) 03/27/2025    NEUTROABS 2.32 03/27/2025    TOTANEUTABS 4.02 05/26/2021    SODIUM 137 03/27/2025    K 4.0 03/27/2025     03/27/2025    CO2 27 03/27/2025    BUN 11 03/27/2025    CREATININE 0.75 03/27/2025    GLUC 91 03/27/2025    CALCIUM 8.9 03/27/2025    AST 15 03/27/2025    ALT 11 03/27/2025    ALKPHOS 40 03/27/2025    TP 6.8 03/27/2025    ALB 4.2 03/27/2025    TBILI 0.46 03/27/2025    CHOLESTEROL 186  03/27/2025    HDL 68 03/27/2025    TRIG 151 (H) 03/27/2025    LDLCALC 88 03/27/2025    NONHDLC 118 03/27/2025    MIT2HCYMKMTR 2.278 03/27/2025    FREET4 0.89 05/26/2021    PREGUR negative 08/25/2021    PREGSERUM Negative 03/27/2025    SYPHILISAB Non-reactive 01/09/2025    HGBA1C 5.6 03/27/2025     03/27/2025       Administrative Statements     Counseling / Coordination of Care:   Patient's progress reviewed with nursing staff.  Medication changes reviewed with nursing staff..    Chucky Hernandez MD 03/29/25

## 2025-03-30 LAB
ALBUMIN SERPL BCG-MCNC: 3.6 G/DL (ref 3.5–5)
ALP SERPL-CCNC: 38 U/L (ref 34–104)
ALT SERPL W P-5'-P-CCNC: 7 U/L (ref 7–52)
ANION GAP SERPL CALCULATED.3IONS-SCNC: 8 MMOL/L (ref 4–13)
AST SERPL W P-5'-P-CCNC: 9 U/L (ref 13–39)
BASOPHILS # BLD AUTO: 0.03 THOUSANDS/ÂΜL (ref 0–0.1)
BASOPHILS NFR BLD AUTO: 1 % (ref 0–1)
BILIRUB SERPL-MCNC: 0.26 MG/DL (ref 0.2–1)
BUN SERPL-MCNC: 10 MG/DL (ref 5–25)
CALCIUM SERPL-MCNC: 8.9 MG/DL (ref 8.4–10.2)
CHLORIDE SERPL-SCNC: 101 MMOL/L (ref 96–108)
CO2 SERPL-SCNC: 27 MMOL/L (ref 21–32)
CREAT SERPL-MCNC: 0.82 MG/DL (ref 0.6–1.3)
EOSINOPHIL # BLD AUTO: 0.28 THOUSAND/ÂΜL (ref 0–0.61)
EOSINOPHIL NFR BLD AUTO: 5 % (ref 0–6)
ERYTHROCYTE [DISTWIDTH] IN BLOOD BY AUTOMATED COUNT: 15.2 % (ref 11.6–15.1)
GFR SERPL CREATININE-BSD FRML MDRD: 85 ML/MIN/1.73SQ M
GLUCOSE SERPL-MCNC: 103 MG/DL (ref 65–140)
HCT VFR BLD AUTO: 31.7 % (ref 34.8–46.1)
HGB BLD-MCNC: 10.2 G/DL (ref 11.5–15.4)
IMM GRANULOCYTES # BLD AUTO: 0.01 THOUSAND/UL (ref 0–0.2)
IMM GRANULOCYTES NFR BLD AUTO: 0 % (ref 0–2)
LYMPHOCYTES # BLD AUTO: 2.39 THOUSANDS/ÂΜL (ref 0.6–4.47)
LYMPHOCYTES NFR BLD AUTO: 43 % (ref 14–44)
MCH RBC QN AUTO: 25.5 PG (ref 26.8–34.3)
MCHC RBC AUTO-ENTMCNC: 32.2 G/DL (ref 31.4–37.4)
MCV RBC AUTO: 79 FL (ref 82–98)
MONOCYTES # BLD AUTO: 0.63 THOUSAND/ÂΜL (ref 0.17–1.22)
MONOCYTES NFR BLD AUTO: 12 % (ref 4–12)
NEUTROPHILS # BLD AUTO: 2.16 THOUSANDS/ÂΜL (ref 1.85–7.62)
NEUTS SEG NFR BLD AUTO: 39 % (ref 43–75)
NRBC BLD AUTO-RTO: 0 /100 WBCS
PLATELET # BLD AUTO: 218 THOUSANDS/UL (ref 149–390)
PMV BLD AUTO: 10.1 FL (ref 8.9–12.7)
POTASSIUM SERPL-SCNC: 4.1 MMOL/L (ref 3.5–5.3)
PROT SERPL-MCNC: 5.6 G/DL (ref 6.4–8.4)
RBC # BLD AUTO: 4 MILLION/UL (ref 3.81–5.12)
SODIUM SERPL-SCNC: 136 MMOL/L (ref 135–147)
VALPROATE SERPL-MCNC: 68 UG/ML (ref 50–125)
WBC # BLD AUTO: 5.5 THOUSAND/UL (ref 4.31–10.16)

## 2025-03-30 PROCEDURE — 85025 COMPLETE CBC W/AUTO DIFF WBC: CPT | Performed by: PSYCHIATRY & NEUROLOGY

## 2025-03-30 PROCEDURE — 80053 COMPREHEN METABOLIC PANEL: CPT | Performed by: PSYCHIATRY & NEUROLOGY

## 2025-03-30 PROCEDURE — 99232 SBSQ HOSP IP/OBS MODERATE 35: CPT | Performed by: PSYCHIATRY & NEUROLOGY

## 2025-03-30 PROCEDURE — 80164 ASSAY DIPROPYLACETIC ACD TOT: CPT | Performed by: PSYCHIATRY & NEUROLOGY

## 2025-03-30 RX ADMIN — SENNOSIDES AND DOCUSATE SODIUM 1 TABLET: 50; 8.6 TABLET ORAL at 10:56

## 2025-03-30 RX ADMIN — Medication 1 TABLET: at 08:12

## 2025-03-30 RX ADMIN — NICOTINE 1 PATCH: 21 PATCH, EXTENDED RELEASE TRANSDERMAL at 08:13

## 2025-03-30 RX ADMIN — PRAZOSIN HYDROCHLORIDE 1 MG: 1 CAPSULE ORAL at 21:18

## 2025-03-30 RX ADMIN — DIVALPROEX SODIUM 500 MG: 500 TABLET, DELAYED RELEASE ORAL at 21:18

## 2025-03-30 RX ADMIN — TRAZODONE HYDROCHLORIDE 50 MG: 50 TABLET ORAL at 21:18

## 2025-03-30 RX ADMIN — IBUPROFEN 800 MG: 400 TABLET ORAL at 10:56

## 2025-03-30 RX ADMIN — POLYETHYLENE GLYCOL 3350 17 G: 17 POWDER, FOR SOLUTION ORAL at 17:04

## 2025-03-30 RX ADMIN — CYANOCOBALAMIN TAB 1000 MCG 1000 MCG: 1000 TAB at 08:12

## 2025-03-30 RX ADMIN — QUETIAPINE FUMARATE 100 MG: 100 TABLET ORAL at 21:18

## 2025-03-30 RX ADMIN — THIAMINE HCL TAB 100 MG 100 MG: 100 TAB at 08:13

## 2025-03-30 RX ADMIN — DIVALPROEX SODIUM 500 MG: 500 TABLET, DELAYED RELEASE ORAL at 08:12

## 2025-03-30 RX ADMIN — FOLIC ACID 1 MG: 1 TABLET ORAL at 08:12

## 2025-03-30 RX ADMIN — IBUPROFEN 800 MG: 400 TABLET ORAL at 21:18

## 2025-03-30 RX ADMIN — SERTRALINE HYDROCHLORIDE 50 MG: 50 TABLET ORAL at 21:18

## 2025-03-30 RX ADMIN — Medication 2000 UNITS: at 08:12

## 2025-03-30 NOTE — NURSING NOTE
Upon reassessment one hour later at 1156 from the PRN senokot S 8.6-50 mg PO patient received at 1056 for constipation patient expresses that they are still awaiting for medication to take effect.

## 2025-03-30 NOTE — NURSING NOTE
Observed sitting in the milieu most of the evening working on a puzzle. Social with select peers. Denies depression, anxiety, thoughts to harm self or others, and hallucinations of any kind.    Medication compliant.    Denies any unmet needs/concerns at this time.

## 2025-03-30 NOTE — NURSING NOTE
"Visible in the dayroom most of the morning continuing to work on the puzzle that they started yesterday. Calm and cooperative. Preoccupied with bowels, reminded patient they went well on Friday evening. Patient expresses they experience these symptoms often due to their \"IBS\" and they are making them a bit anxious. Interested in conversation and pleasant. Denies depression, thoughts to harm self or others, and hallucinations of any kind.    Medication complaint.    Encouraged hygiene and to keep attending groups.     Staff availability reinforced.   "

## 2025-03-30 NOTE — PROGRESS NOTES
Progress Note - Behavioral Health   Name: Genevieve Meza 49 y.o. female I MRN: [id number]  Unit/Bed#: -01 I Date of Admission: 3/26/2025   Date of Service: 3/30/2025 I Hospital Day: 4    Assessment & Plan  Major depressive disorder, recurrent, severe with psychotic features (HCC)  As above 3/30/2025: Patient complaining of anxiety which she attributes secondary to leg and back pain.  Also complains of constipation.  Patient using as needed medication for it.  Zoloft was increased by her regular treatment team to 50 mg yesterday.  At this time plan is to continue with the current meds with no changes.  Will continue to monitor her for her mood, behavior, safety, sleep and response to meds while she is here.  Patient underwent blood work today and the result as mentioned below.  Her Depakote level is 68.  Her hemoglobin and hematocrit was low.      Plan:  Continue Depakote 500 mg twice daily for impulse control  Depakote level as of 3/30 was 68  Continue prazosin 1 mg daily at bedtime for nightmares  Increase Seroquel 50 mg to 100 mg nightly  Increase Zoloft to 50 mg at night starting on 03/29  Patient will receive 25 mg at night on 03/28 making a total of 50 mg that day    Alcohol abuse, continuous  See plan under principal problem  Medical clearance for psychiatric admission  Management per medical team  Fibromyalgia  Management per medical team  Tobacco abuse  See plan under principal problem  Elevated triglycerides with high cholesterol  Management per medical team  Personality disorder (HCC)  See plan under principal problem  Post-traumatic stress disorder, chronic  See plan under principal problem  SAADIA (generalized anxiety disorder)  See plan under principal problem  Panic disorder with agoraphobia  See plan under principal problem  Impulse control disorder  See plan under principal problem  Vitamin D deficiency  Daily Vitamin D3 2,000 units  Management per medical team    Current medications:  Current  Facility-Administered Medications   Medication Dose Route Frequency Provider Last Rate    albuterol  2 puff Inhalation Q4H PRN Aye Hill MD      aluminum-magnesium hydroxide-simethicone  30 mL Oral Q4H PRN GLENN Hong      benztropine  1 mg Intramuscular Q4H PRN Max 6/day GLENN Hong      benztropine  1 mg Oral Q4H PRN Max 6/day GLENN Hong      bisacodyl  10 mg Rectal Daily PRN GLENN Hong      Cholecalciferol  2,000 Units Oral Daily Aye Hill MD      cyanocobalamin  1,000 mcg Oral Daily Cassidy Dominguez PA-C      divalproex sodium  500 mg Oral BID Favian Guardado DO      folic acid  1 mg Oral Daily Aye Hill MD      hydrOXYzine HCL  25 mg Oral Q6H PRN Max 4/day GLENN Hong      hydrOXYzine HCL  50 mg Oral Q4H PRN Max 4/day GLENN Hong      Or    LORazepam  1 mg Intramuscular Q4H PRN GLENN Hong      ibuprofen  400 mg Oral Q4H PRN GLENN Hong      ibuprofen  600 mg Oral Q6H PRN GLENN Hong      ibuprofen  800 mg Oral Q8H PRN GLENN Hong      LORazepam  1 mg Oral Q4H PRN Max 6/day GLENN Hong      Or    LORazepam  2 mg Intramuscular Q6H PRN Max 3/day GLENN Hong      multivitamin-minerals  1 tablet Oral Daily Aye Hill MD      nicotine  1 patch Transdermal Daily GLENN Hong      OLANZapine  10 mg Oral Q3H PRN Max 3/day GLENN Hong      Or    OLANZapine  10 mg Intramuscular Q3H PRN Max 3/day GLENN Hong      OLANZapine  5 mg Oral Q3H PRN Max 6/day GLENN Hong      Or    OLANZapine  5 mg Intramuscular Q3H PRN Max 6/day GLENN Hong      OLANZapine  2.5 mg Oral Q3H PRN Max 8/day GLENN Hnog      polyethylene glycol  17 g Oral Daily PRN GLENN Hong      prazosin  1 mg Oral HS Aye Hill MD      propranolol  10 mg Oral Q8H PRN GLENN Hong      QUEtiapine  100 mg Oral HS Favian Guardado DO      senna-docusate sodium  1 tablet Oral BID PRN Cassidy Dominguez PA-C       sertraline  50 mg Oral HS Favian Guardado DO      thiamine  100 mg Oral Daily Aye Hill MD      traZODone  50 mg Oral HS PRN GLENN Hong          Risks/Benefits of Treatment:     Risks, benefits, and possible side effects of medications explained to patient and patient verbalizes understanding and agreement for treatment.  Risks of medications in pregnancy explained if female patient. Patient verbalizes understanding and agrees to notify her doctor if she becomes pregnant.    Progress Toward Goals: improving    Treatment Planning:     All current active medications have been reviewed.  Continue to monitor response to treatment and assess for potential side effects of medications.  Encourage group therapy, milieu therapy and occupational therapy  Collaboration with medical service for medical comorbidities as indicated.  Behavioral Health checks for safety monitoring.    Estimated Discharge Day: 4/4/2025       Subjective   Patient was seen in the milieu today.  She reports she is doing okay.  Denies any depression but complains of anxiety.  She attributes that to her back and leg pain.  She also reports struggling with constipation and requiring PRN's.  She denies any alcohol withdrawal symptoms other than anxiety.  Denies any nausea or vomiting.  Denies any auditory visual hallucination.  Was fully oriented to time place and person during the meeting.  Calm and pleasant and no agitation was noted.  Denies any SI or HI.  She reports her sleep and appetite has been very good.  Does not endorse any paranoia or delusional ideation.  Reports compliant with her medication and denies any side effect.  As per the nurse patient required trazodone last night for asleep.  It was very effective.  She has been visible in the milieu today.  Calm and cooperative.  Had complained of anxiety but no other concerns reported.  As per the nurse overnight patient CIWA was 0    Behavior over the last 24 hours:  Improving    As mentioned above her Zoloft was increased as per the treatment team to 50 mg at night from yesterday.  Seroquel was increased to 100 mg at bedtime last Friday.      Objective :  Temp:  [97.4 °F (36.3 °C)-98.9 °F (37.2 °C)] 97.4 °F (36.3 °C)  HR:  [77-88] 87  BP: ()/(58-79) 123/70  Resp:  [16] 16  SpO2:  [97 %-100 %] 100 %  O2 Device: None (Room air)    Temp:  [97.4 °F (36.3 °C)-98.9 °F (37.2 °C)] 97.4 °F (36.3 °C)  HR:  [77-88] 87  BP: ()/(58-79) 123/70  Resp:  [16] 16  SpO2:  [97 %-100 %] 100 %  O2 Device: None (Room air)    Mental Status Evaluation:    Appearance:  casually dressed   Behavior:  cooperative, calm   Speech:  normal rate, normal volume, normal pitch   Mood:  anxious   Affect:  normal range and intensity, appropriate   Thought Process:  organized   Thought Content:  no overt delusions   Perceptual Disturbances: no auditory hallucinations, no visual hallucinations   Risk Potential: Suicidal Ideation -  None  Homicidal Ideation -  None  Potential for Aggression - No   Sensorium:  oriented to person, place, and time/date   Memory:  recent and remote memory grossly intact   Consciousness:  alert and awake   Attention/Concentration: normal attention span   Insight:  fair   Judgment: fair   Gait/Station: normal gait/station, normal balance   Motor Activity: no abnormal movements       Lab Results: I have reviewed the following results:  Most Recent Labs:   Lab Results   Component Value Date    WBC 5.50 03/30/2025    RBC 4.00 03/30/2025    HGB 10.2 (L) 03/30/2025    HCT 31.7 (L) 03/30/2025     03/30/2025    RDW 15.2 (H) 03/30/2025    NEUTROABS 2.16 03/30/2025    TOTANEUTABS 4.02 05/26/2021    SODIUM 136 03/30/2025    K 4.1 03/30/2025     03/30/2025    CO2 27 03/30/2025    BUN 10 03/30/2025    CREATININE 0.82 03/30/2025    GLUC 103 03/30/2025    CALCIUM 8.9 03/30/2025    AST 9 (L) 03/30/2025    ALT 7 03/30/2025    ALKPHOS 38 03/30/2025    TP 5.6 (L) 03/30/2025    ALB  3.6 03/30/2025    TBILI 0.26 03/30/2025    CHOLESTEROL 186 03/27/2025    HDL 68 03/27/2025    TRIG 151 (H) 03/27/2025    LDLCALC 88 03/27/2025    NONHDLC 118 03/27/2025    VALPROICTOT 68 03/30/2025    POQ4RWMDITQL 2.278 03/27/2025    FREET4 0.89 05/26/2021    PREGUR negative 08/25/2021    PREGSERUM Negative 03/27/2025    SYPHILISAB Non-reactive 01/09/2025    HGBA1C 5.6 03/27/2025     03/27/2025       Administrative Statements     Counseling / Coordination of Care:   Patient's progress reviewed with nursing staff.  Medication changes reviewed with nursing staff..    Chucky Hernandez MD 03/30/25

## 2025-03-30 NOTE — NURSING NOTE
Complained of trouble falling asleep, requested additional medication. Received PRN trazodone 50 mg PO for insomnia at 2107.

## 2025-03-30 NOTE — PLAN OF CARE
Problem: Ineffective Coping  Goal: Cooperates with admission process  Description: Interventions: - Complete admission process  Outcome: Progressing  Goal: Participates in unit activities  Description: Interventions:- Provide therapeutic environment - Provide required programming - Redirect inappropriate behaviors   Outcome: Progressing  Goal: Patient/Family verbalizes awareness of resources  Outcome: Progressing     Problem: Depression  Goal: Treatment Goal: Demonstrate behavioral control of depressive symptoms, verbalize feelings of improved mood/affect, and adopt new coping skills prior to discharge  Outcome: Progressing  Goal: Verbalize thoughts and feelings  Description: Interventions:- Assess and re-assess patient's level of risk - Engage patient in 1:1 interactions, daily, for a minimum of 15 minutes - Encourage patient to express feelings, fears, frustrations, hopes   Outcome: Progressing  Goal: Attend and participate in unit activities, including therapeutic, recreational, and educational groups  Description: Interventions:- Provide therapeutic and educational activities daily, encourage attendance and participation, and document same in the medical record   Outcome: Progressing     Problem: Anxiety  Goal: Anxiety is at manageable level  Description: Interventions:- Assess and monitor patient's anxiety level. - Monitor for signs and symptoms (heart palpitations, chest pain, shortness of breath, headaches, nausea, feeling jumpy, restlessness, irritable, apprehensive). - Collaborate with interdisciplinary team and initiate plan and interventions as ordered.- [place] patient to unit/surroundings- Explain treatment plan- Encourage participation in care- Encourage verbalization of concerns/fears- Identify coping mechanisms- Assist in developing anxiety-reducing skills- Administer/offer alternative therapies- Limit or eliminate stimulants  Outcome: Progressing

## 2025-03-30 NOTE — NURSING NOTE
Patient reporting constipation, given senokot PRN and ibuprofen for back and leg pain upon request.

## 2025-03-30 NOTE — NURSING NOTE
PRN trazodone 50mg administered at 2107 was effective on reassessment. Patient is resting in bed..

## 2025-03-30 NOTE — ASSESSMENT & PLAN NOTE
As above 3/30/2025: Patient complaining of anxiety which she attributes secondary to leg and back pain.  Also complains of constipation.  Patient using as needed medication for it.  Zoloft was increased by her regular treatment team to 50 mg yesterday.  At this time plan is to continue with the current meds with no changes.  Will continue to monitor her for her mood, behavior, safety, sleep and response to meds while she is here.  Patient underwent blood work today and the result as mentioned below.  Her Depakote level is 68.  Her hemoglobin and hematocrit was low.      Plan:  Continue Depakote 500 mg twice daily for impulse control  Depakote level as of 3/30 was 68  Continue prazosin 1 mg daily at bedtime for nightmares  Increase Seroquel 50 mg to 100 mg nightly  Increase Zoloft to 50 mg at night starting on 03/29  Patient will receive 25 mg at night on 03/28 making a total of 50 mg that day

## 2025-03-30 NOTE — NURSING NOTE
Patient reports anxiety and inquired about ativan. Patient also c/o constipation. PRN atarax 50mg and senna were administered at 2233. Patient retreated to bed.    On reassessment, patient appears to be in a deep sleep and did not wake when name was called. PRN atarax 50mg was effective. Patient has not reported BM yet.

## 2025-03-31 PROCEDURE — 99232 SBSQ HOSP IP/OBS MODERATE 35: CPT | Performed by: PSYCHIATRY & NEUROLOGY

## 2025-03-31 RX ADMIN — PRAZOSIN HYDROCHLORIDE 1 MG: 1 CAPSULE ORAL at 21:15

## 2025-03-31 RX ADMIN — Medication 1 TABLET: at 08:19

## 2025-03-31 RX ADMIN — NICOTINE 1 PATCH: 21 PATCH, EXTENDED RELEASE TRANSDERMAL at 08:19

## 2025-03-31 RX ADMIN — DIVALPROEX SODIUM 500 MG: 500 TABLET, DELAYED RELEASE ORAL at 21:15

## 2025-03-31 RX ADMIN — QUETIAPINE FUMARATE 150 MG: 100 TABLET ORAL at 21:15

## 2025-03-31 RX ADMIN — FOLIC ACID 1 MG: 1 TABLET ORAL at 08:19

## 2025-03-31 RX ADMIN — IBUPROFEN 800 MG: 400 TABLET ORAL at 21:14

## 2025-03-31 RX ADMIN — SERTRALINE HYDROCHLORIDE 50 MG: 50 TABLET ORAL at 21:16

## 2025-03-31 RX ADMIN — DIVALPROEX SODIUM 500 MG: 500 TABLET, DELAYED RELEASE ORAL at 08:19

## 2025-03-31 RX ADMIN — THIAMINE HCL TAB 100 MG 100 MG: 100 TAB at 08:19

## 2025-03-31 RX ADMIN — CYANOCOBALAMIN TAB 1000 MCG 1000 MCG: 1000 TAB at 08:20

## 2025-03-31 RX ADMIN — Medication 2000 UNITS: at 08:19

## 2025-03-31 NOTE — NURSING NOTE
Complained of trouble falling asleep, received PRN trazodone 50 mg PO for insomnia at 2118.    Upon reassessment one hour later at 2218 patient observed resting in bed comfortably with their eyes closed. Respirations observed. Medication effective.

## 2025-03-31 NOTE — NURSING NOTE
Calm and cooperative, pleasant during conversation. Endorses some anxiety related to passing bowels. Denies depression, SI, HI, and hallucinations of any kind.    Medication compliant.    Denies any unmet needs/concerns at this time.

## 2025-03-31 NOTE — PROGRESS NOTES
Progress Note - Behavioral Health   Name: Genevieve Meza 49 y.o. female I MRN: [id number]  Unit/Bed#: -01 I Date of Admission: 3/26/2025   Date of Service: 3/31/2025 I Hospital Day: 5    Assessment & Plan  Major depressive disorder, recurrent, severe with psychotic features (HCC)  As above 3/31/2025 patient is reporting some worsening anxiety.  This could very well likely be due to the Zoloft.  She is in agreement to continue the Zoloft and be monitored.  She otherwise is not reporting any psychiatric symptoms today.  However, yesterday the patient had brief homicidal ideation towards her peers on the unit because they are making too much noise.  She is in agreement to continue adjusting medications as needed.    Plan:  Continue Depakote 500 mg twice daily for impulse control  Depakote level as of 3/30 was 68  Continue prazosin 1 mg daily at bedtime for nightmares  Increase Seroquel 100 mg to 150 mg nightly  Continue Zoloft to 50 mg at night starting on     Alcohol abuse, continuous  See plan under principal problem  Medical clearance for psychiatric admission  Management per medical team  Fibromyalgia  Management per medical team  Tobacco abuse  See plan under principal problem  Elevated triglycerides with high cholesterol  Management per medical team  Personality disorder (HCC)  See plan under principal problem  Post-traumatic stress disorder, chronic  See plan under principal problem  SAADIA (generalized anxiety disorder)  See plan under principal problem  Panic disorder with agoraphobia  See plan under principal problem  Impulse control disorder  See plan under principal problem  Vitamin D deficiency  Daily Vitamin D3 2,000 units  Management per medical team    Current medications:  Current Facility-Administered Medications   Medication Dose Route Frequency Provider Last Rate    albuterol  2 puff Inhalation Q4H PRN Aye Hill MD      aluminum-magnesium hydroxide-simethicone  30 mL Oral Q4H PRN Juli  GLENN Lee      benztropine  1 mg Intramuscular Q4H PRN Max 6/day GLENN Hong      benztropine  1 mg Oral Q4H PRN Max 6/day GLENN Hong      bisacodyl  10 mg Rectal Daily PRN GLENN Hong      Cholecalciferol  2,000 Units Oral Daily Aye Hill MD      cyanocobalamin  1,000 mcg Oral Daily Cassidy Dominguez PA-C      divalproex sodium  500 mg Oral BID Favian Guardado DO      folic acid  1 mg Oral Daily Aye Hill MD      hydrOXYzine HCL  25 mg Oral Q6H PRN Max 4/day Juli Lee, GLENN      hydrOXYzine HCL  50 mg Oral Q4H PRN Max 4/day GLENN Hong      Or    LORazepam  1 mg Intramuscular Q4H PRN Juli Lee, GLENN      ibuprofen  400 mg Oral Q4H PRN Juli Lee, GLENN      ibuprofen  600 mg Oral Q6H PRN Juli Lee, GLENN      ibuprofen  800 mg Oral Q8H PRN GLENN Hong      LORazepam  1 mg Oral Q4H PRN Max 6/day GLENN Hong      Or    LORazepam  2 mg Intramuscular Q6H PRN Max 3/day GLENN Hong      multivitamin-minerals  1 tablet Oral Daily Aye Hill MD      nicotine  1 patch Transdermal Daily GLENN Hong      OLANZapine  10 mg Oral Q3H PRN Max 3/day GLENN Hong      Or    OLANZapine  10 mg Intramuscular Q3H PRN Max 3/day GLENN Hong      OLANZapine  5 mg Oral Q3H PRN Max 6/day GLENN Hong      Or    OLANZapine  5 mg Intramuscular Q3H PRN Max 6/day GLENN Hong      OLANZapine  2.5 mg Oral Q3H PRN Max 8/day GLENN Hong      polyethylene glycol  17 g Oral Daily PRN GLENN Hong      prazosin  1 mg Oral HS Aye Hill MD      propranolol  10 mg Oral Q8H PRN GLENN Hong      QUEtiapine  100 mg Oral HS Favian Guardado DO      senna-docusate sodium  1 tablet Oral BID PRN Cassidy Dominguez PA-C      sertraline  50 mg Oral HS Favian Guardado DO      thiamine  100 mg Oral Daily Aye Hill MD      traZODone  50 mg Oral HS PRN GLENN Hong          Risks/Benefits of Treatment:     Risks, benefits, and  "possible side effects of medications explained to patient and patient verbalizes understanding and agreement for treatment.    Progress Toward Goals: improving    Treatment Planning:     All current active medications have been reviewed.  Continue to monitor response to treatment and assess for potential side effects of medications.  Encourage group therapy, milieu therapy and occupational therapy  Collaboration with medical service for medical comorbidities as indicated.  Behavioral Health checks for safety monitoring.  Long Stay Certification : Not Applicable  Estimated Discharge Day: 4/4/2025   Legal Status : Voluntary 201 commitment.    Subjective     Behavior over the last 24 hours: improving.    Per nursing, Genevieve had some worsening anxiety over the weekend and difficulty sleeping overnight but otherwise is doing well.     She reported her mood today is \"not too good.\"  She explained that she was not feeling well because she has been constipated for 3 days without any bowel movements.  She also is experiencing abdominal pain, back pain, and leg pain.  Patient took as needed constipation medications in the past which were helpful and she plans to try them today.  Otherwise she reports she is doing mostly well.  She had some nighttime awakenings overnight but otherwise slept well.  She has an adequate appetite.  She denies any medication side effects.  However, patient does endorse worsening anxiety over the weekend which could hypothetically be due to her Zoloft.  She denied suicidal ideation.  She endorsed homicidal ideation yesterday towards her peers due to them being loud.  She did not have a plan.  She denied auditory and visual hallucinations.    Sleep: frequent awakenings  Appetite: normal  Medication side effects: No  ROS: review of systems as noted above in HPI/Subjective report, all other systems are negative    Objective :  Temp:  [96.8 °F (36 °C)-97.8 °F (36.6 °C)] 97.8 °F (36.6 °C)  HR:  [] " "82  BP: (101-141)/(58-86) 112/72  Resp:  [16-18] 18  SpO2:  [95 %-100 %] 96 %  O2 Device: None (Room air)    Temp:  [96.8 °F (36 °C)-97.8 °F (36.6 °C)] 97.8 °F (36.6 °C)  HR:  [] 82  BP: (101-141)/(58-86) 112/72  Resp:  [16-18] 18  SpO2:  [95 %-100 %] 96 %  O2 Device: None (Room air)    Mental Status Evaluation:    Appearance:  casually dressed, adequate grooming, dressed appropriately, looks stated age   Behavior:  pleasant, cooperative   Speech:  normal rate, normal volume, normal pitch   Mood:  \"Not too good.\"   Affect:  blunted   Thought Process:  organized, goal directed, logical   Thought Content:  somatic preoccupation   Perceptual Disturbances: no auditory hallucinations, no visual hallucinations, does not appear responding to internal stimuli   Risk Potential: Suicidal Ideation -  None at present  Homicidal Ideation -  None at present, but recently had HI without a plan to harm peers on unit  Potential for Aggression - Yes, due to history of violence   Sensorium:  oriented to person, place, and time/date   Memory:  recent and remote memory grossly intact   Consciousness:  alert and awake   Attention/Concentration: attention span and concentration are age appropriate   Insight:  improving   Judgment: improving   Gait/Station: normal gait/station, normal balance   Motor Activity: no abnormal movements       Lab Results: I have reviewed the following results:  Results from the past 24 hours: No results found for this or any previous visit (from the past 24 hours).    Administrative Statements     Counseling / Coordination of Care:   Patient's progress discussed with staff in treatment team meeting.  Medication changes reviewed with staff in treatment team meeting..    Favian Guardado DO 03/31/25  "

## 2025-03-31 NOTE — ASSESSMENT & PLAN NOTE
As above 3/31/2025 patient is reporting some worsening anxiety.  This could very well likely be due to the Zoloft.  She is in agreement to continue the Zoloft and be monitored.  She otherwise is not reporting any psychiatric symptoms today.  However, yesterday the patient had brief homicidal ideation towards her peers on the unit because they are making too much noise.  She is in agreement to continue adjusting medications as needed.    Plan:  Continue Depakote 500 mg twice daily for impulse control  Depakote level as of 3/30 was 68  Continue prazosin 1 mg daily at bedtime for nightmares  Increase Seroquel 100 mg to 150 mg nightly  Continue Zoloft to 50 mg at night starting on

## 2025-03-31 NOTE — PROGRESS NOTES
03/31/25 0840   Team Meeting   Meeting Type Daily Rounds   Team Members Present   Team Members Present Physician;Nurse;   Physician Team Member Sergio   Nursing Team Member Annette   Care Management Team Member Roman   Patient/Family Present   Patient Present No   Patient's Family Present No     Pt remains on CIWAs. Endorsing anxiety at times.

## 2025-03-31 NOTE — PLAN OF CARE
Problem: Ineffective Coping  Goal: Cooperates with admission process  Description: Interventions: - Complete admission process  Outcome: Progressing  Goal: Participates in unit activities  Description: Interventions:- Provide therapeutic environment - Provide required programming - Redirect inappropriate behaviors   Outcome: Progressing  Goal: Patient/Family verbalizes awareness of resources  Outcome: Progressing     Problem: Depression  Goal: Treatment Goal: Demonstrate behavioral control of depressive symptoms, verbalize feelings of improved mood/affect, and adopt new coping skills prior to discharge  Outcome: Progressing  Goal: Verbalize thoughts and feelings  Description: Interventions:- Assess and re-assess patient's level of risk - Engage patient in 1:1 interactions, daily, for a minimum of 15 minutes - Encourage patient to express feelings, fears, frustrations, hopes   Outcome: Progressing  Goal: Attend and participate in unit activities, including therapeutic, recreational, and educational groups  Description: Interventions:- Provide therapeutic and educational activities daily, encourage attendance and participation, and document same in the medical record   Outcome: Progressing     Problem: Anxiety  Goal: Anxiety is at manageable level  Description: Interventions:- Assess and monitor patient's anxiety level. - Monitor for signs and symptoms (heart palpitations, chest pain, shortness of breath, headaches, nausea, feeling jumpy, restlessness, irritable, apprehensive). - Collaborate with interdisciplinary team and initiate plan and interventions as ordered.- [place] patient to unit/surroundings- Explain treatment plan- Encourage participation in care- Encourage verbalization of concerns/fears- Identify coping mechanisms- Assist in developing anxiety-reducing skills- Administer/offer alternative therapies- Limit or eliminate stimulants  Outcome: Progressing     Problem: Risk for Violence/Aggression Toward  Others  Goal: Treatment Goal: Refrain from acts of violence/aggression during length of stay, and demonstrate improved impulse control at the time of discharge  Outcome: Progressing  Goal: Refrain from harming others  Outcome: Progressing  Goal: Control angry outbursts  Description: Interventions:- Monitor patient closely, per order- Ensure early verbal de-escalation- Monitor prn medication needs- Set reasonable/therapeutic limits, outline behavioral expectations, and consequences - Provide a non-threatening milieu, utilizing the least restrictive interventions   Outcome: Progressing

## 2025-03-31 NOTE — NURSING NOTE
Complained of constipation and stomach discomfort. Received PRN miralax 17 g for constipation refractory to senokot-S at 1704. When reassessed patient reports medication has not yet taken effect.

## 2025-03-31 NOTE — NURSING NOTE
"Pt denies SI, HI and A/V hallucinations. Compliant with meds and meals. Observed with mildly irritable appearance. States her sleep last night was \"so so.\" Is behaviorally appropriate on unit, cooperative with unit rules and denies complaints. No evidence of adverse side effects from meds. Is visible in bedroom and common areas at select times. Current treatment plan effective.   "

## 2025-04-01 PROCEDURE — 99232 SBSQ HOSP IP/OBS MODERATE 35: CPT | Performed by: PSYCHIATRY & NEUROLOGY

## 2025-04-01 RX ORDER — QUETIAPINE FUMARATE 100 MG/1
200 TABLET, FILM COATED ORAL
Status: DISCONTINUED | OUTPATIENT
Start: 2025-04-01 | End: 2025-04-02

## 2025-04-01 RX ADMIN — HYDROXYZINE HYDROCHLORIDE 50 MG: 50 TABLET, FILM COATED ORAL at 08:38

## 2025-04-01 RX ADMIN — THIAMINE HCL TAB 100 MG 100 MG: 100 TAB at 08:14

## 2025-04-01 RX ADMIN — CYANOCOBALAMIN TAB 1000 MCG 1000 MCG: 1000 TAB at 08:14

## 2025-04-01 RX ADMIN — Medication 1 TABLET: at 08:14

## 2025-04-01 RX ADMIN — FOLIC ACID 1 MG: 1 TABLET ORAL at 08:14

## 2025-04-01 RX ADMIN — DIVALPROEX SODIUM 500 MG: 500 TABLET, DELAYED RELEASE ORAL at 21:14

## 2025-04-01 RX ADMIN — TRAZODONE HYDROCHLORIDE 50 MG: 50 TABLET ORAL at 21:14

## 2025-04-01 RX ADMIN — TRAZODONE HYDROCHLORIDE 50 MG: 50 TABLET ORAL at 01:26

## 2025-04-01 RX ADMIN — QUETIAPINE FUMARATE 200 MG: 100 TABLET ORAL at 21:14

## 2025-04-01 RX ADMIN — Medication 2000 UNITS: at 08:14

## 2025-04-01 RX ADMIN — SERTRALINE HYDROCHLORIDE 50 MG: 50 TABLET ORAL at 14:10

## 2025-04-01 RX ADMIN — NICOTINE 1 PATCH: 21 PATCH, EXTENDED RELEASE TRANSDERMAL at 08:16

## 2025-04-01 RX ADMIN — DIVALPROEX SODIUM 500 MG: 500 TABLET, DELAYED RELEASE ORAL at 08:14

## 2025-04-01 NOTE — PROGRESS NOTES
Progress Note - Behavioral Health   Name: Genevieve Meza 49 y.o. female I MRN: [id number]  Unit/Bed#: -01 I Date of Admission: 3/26/2025   Date of Service: 4/1/2025 I Hospital Day: 6    Assessment & Plan  Major depressive disorder, recurrent, severe with psychotic features (HCC)  As above 04/01/2025 the patient is reporting poor sleep and continued intermittent homicidal ideation without a plan.  Otherwise she is denying symptoms.  She has not experienced auditory hallucinations in 5 days.    Plan:  Continue Depakote 500 mg twice daily for impulse control  Depakote level as of 3/30 was 68  Continue prazosin 1 mg daily at bedtime for nightmares  Increase Seroquel 150 mg to 200 mg nightly  Change Zoloft to 50 mg from nighttime to in the morning    Alcohol abuse, continuous  See plan under principal problem  Medical clearance for psychiatric admission  Management per medical team  Fibromyalgia  Management per medical team  Tobacco abuse  See plan under principal problem  Elevated triglycerides with high cholesterol  Management per medical team  Personality disorder (HCC)  See plan under principal problem  Post-traumatic stress disorder, chronic  See plan under principal problem  SAADIA (generalized anxiety disorder)  See plan under principal problem  Panic disorder with agoraphobia  See plan under principal problem  Impulse control disorder  See plan under principal problem  Vitamin D deficiency  Daily Vitamin D3 2,000 units  Management per medical team    Current medications:  Current Facility-Administered Medications   Medication Dose Route Frequency Provider Last Rate    albuterol  2 puff Inhalation Q4H PRN Aye Hill MD      aluminum-magnesium hydroxide-simethicone  30 mL Oral Q4H PRN GLENN Hong      benztropine  1 mg Intramuscular Q4H PRN Max 6/day GLENN Hong      benztropine  1 mg Oral Q4H PRN Max 6/day GLENN Hong      bisacodyl  10 mg Rectal Daily PRN GLENN Hong       Cholecalciferol  2,000 Units Oral Daily Aye Hill MD      cyanocobalamin  1,000 mcg Oral Daily Cassidy Dominguez PA-C      divalproex sodium  500 mg Oral BID Favian Guardado DO      folic acid  1 mg Oral Daily Aye Hill MD      hydrOXYzine HCL  25 mg Oral Q6H PRN Max 4/day GLENN Hong      hydrOXYzine HCL  50 mg Oral Q4H PRN Max 4/day GLENN Hong      Or    LORazepam  1 mg Intramuscular Q4H PRN GLENN Hong      ibuprofen  400 mg Oral Q4H PRN GLENN Hong      ibuprofen  600 mg Oral Q6H PRN GLENN Hong      ibuprofen  800 mg Oral Q8H PRN GLENN Hong      LORazepam  1 mg Oral Q4H PRN Max 6/day GLENN Hong      Or    LORazepam  2 mg Intramuscular Q6H PRN Max 3/day GLENN Hong      multivitamin-minerals  1 tablet Oral Daily Aye Hill MD      nicotine  1 patch Transdermal Daily GLENN Hong      OLANZapine  10 mg Oral Q3H PRN Max 3/day GLENN Hong      Or    OLANZapine  10 mg Intramuscular Q3H PRN Max 3/day GLENN Hong      OLANZapine  5 mg Oral Q3H PRN Max 6/day GLENN Hong      Or    OLANZapine  5 mg Intramuscular Q3H PRN Max 6/day GLENN Hong      OLANZapine  2.5 mg Oral Q3H PRN Max 8/day GLENN Hong      polyethylene glycol  17 g Oral Daily PRN GLENN Hong      prazosin  1 mg Oral HS Aye Hill MD      propranolol  10 mg Oral Q8H PRN GLENN Hong      QUEtiapine  150 mg Oral HS Favian Guardado DO      senna-docusate sodium  1 tablet Oral BID PRN Cassidy Dominguez PA-C      sertraline  50 mg Oral Daily Favian Guardado DO      thiamine  100 mg Oral Daily Aye Hill MD      traZODone  50 mg Oral HS PRN GLENN Hong          Risks/Benefits of Treatment:     Risks, benefits, and possible side effects of medications explained to patient and patient verbalizes understanding and agreement for treatment.    Progress Toward Goals: improving    Treatment Planning:     All current active  "medications have been reviewed.  Continue to monitor response to treatment and assess for potential side effects of medications.  Encourage group therapy, milieu therapy and occupational therapy  Collaboration with medical service for medical comorbidities as indicated.  Behavioral Health checks for safety monitoring.  Long Stay Certification : Not Applicable  Estimated Discharge Day: 4/4/2025   Legal Status : Voluntary 201 commitment.    Subjective     Behavior over the last 24 hours: improving.    Per nursing, Genevieve patient continues to deny psych symptoms but does report some mild anxiety.  CIWA was discontinued yesterday.  She did report poor sleep overnight.     She reported her mood today is \"so-so.\"  She stated she is not feeling well because she was unable to sleep last night.  She stated that it is worse than the night prior when she was having frequent nighttime awakenings.  Last night she did not sleep at all.  She endorsed that she is eating well.  She is denying medication side effects at this time.  She denied suicidal ideation.  She stated that she had homicidal ideation yesterday towards various peers on the unit.  She denied auditory and visual hallucinations.  She endorsed she has not experienced auditory hallucinations in 5 days.    Sleep: decreased  Appetite: normal  Medication side effects: No  ROS: review of systems as noted above in HPI/Subjective report, all other systems are negative    Objective :  Temp:  [97.1 °F (36.2 °C)-97.7 °F (36.5 °C)] 97.1 °F (36.2 °C)  HR:  [78-93] 78  BP: (112-119)/(66-86) 119/86  Resp:  [16-18] 17  SpO2:  [97 %-99 %] 99 %  O2 Device: None (Room air)    Temp:  [97.1 °F (36.2 °C)-97.7 °F (36.5 °C)] 97.1 °F (36.2 °C)  HR:  [78-93] 78  BP: (112-119)/(66-86) 119/86  Resp:  [16-18] 17  SpO2:  [97 %-99 %] 99 %  O2 Device: None (Room air)    Mental Status Evaluation:    Appearance:  age appropriate, marginal hygiene, dressed in hospital attire, looks older than stated " "age   Behavior:  pleasant, cooperative   Speech:  normal rate, normal volume, normal pitch   Mood:  \"So-so.\"   Affect:  blunted   Thought Process:  organized, logical   Thought Content:  no overt delusions   Perceptual Disturbances: no auditory hallucinations, no visual hallucinations, does not appear responding to internal stimuli   Risk Potential: Suicidal Ideation -  None at present  Homicidal Ideation -  Yes, without plan  Potential for Aggression - Yes, due to history of violence   Sensorium:  oriented to person, place, and time/date   Memory:  recent and remote memory grossly intact   Consciousness:  alert and awake   Attention/Concentration: attention span and concentration are age appropriate   Insight:  limited   Judgment: limited   Gait/Station: normal gait/station, normal balance   Motor Activity: no abnormal movements       Lab Results: I have reviewed the following results:  Results from the past 24 hours: No results found for this or any previous visit (from the past 24 hours).    Administrative Statements     Counseling / Coordination of Care:   Patient's progress discussed with staff in treatment team meeting.  Medication changes reviewed with staff in treatment team meeting..    Favian Guardado DO 04/01/25  PGY-II  "

## 2025-04-01 NOTE — NURSING NOTE
Pt reported moderate anxiety, secondary to insomnia and inability to sleep throughout night. Wilson score 19. Atarax 50 mg PO administered.

## 2025-04-01 NOTE — PROGRESS NOTES
04/01/25 0840   Team Meeting   Meeting Type Daily Rounds   Team Members Present   Team Members Present Physician;Nurse;   Physician Team Member Sergio   Nursing Team Member Doyle   Care Management Team Member Roman   Patient/Family Present   Patient Present No   Patient's Family Present No     Pt denying psych symptoms. Mild anxiety. CIWAs discontinued. Med and meal compliant. Reported poor sleep last night.

## 2025-04-01 NOTE — PLAN OF CARE
Problem: Ineffective Coping  Goal: Cooperates with admission process  Description: Interventions: - Complete admission process  Outcome: Progressing  Goal: Participates in unit activities  Description: Interventions:- Provide therapeutic environment - Provide required programming - Redirect inappropriate behaviors   Outcome: Progressing  Goal: Patient/Family verbalizes awareness of resources  Outcome: Progressing     Problem: Depression  Goal: Treatment Goal: Demonstrate behavioral control of depressive symptoms, verbalize feelings of improved mood/affect, and adopt new coping skills prior to discharge  Outcome: Progressing  Goal: Verbalize thoughts and feelings  Description: Interventions:- Assess and re-assess patient's level of risk - Engage patient in 1:1 interactions, daily, for a minimum of 15 minutes - Encourage patient to express feelings, fears, frustrations, hopes   Outcome: Progressing  Goal: Attend and participate in unit activities, including therapeutic, recreational, and educational groups  Description: Interventions:- Provide therapeutic and educational activities daily, encourage attendance and participation, and document same in the medical record   Outcome: Progressing     Problem: Anxiety  Goal: Anxiety is at manageable level  Description: Interventions:- Assess and monitor patient's anxiety level. - Monitor for signs and symptoms (heart palpitations, chest pain, shortness of breath, headaches, nausea, feeling jumpy, restlessness, irritable, apprehensive). - Collaborate with interdisciplinary team and initiate plan and interventions as ordered.- Bronson patient to unit/surroundings- Explain treatment plan- Encourage participation in care- Encourage verbalization of concerns/fears- Identify coping mechanisms- Assist in developing anxiety-reducing skills- Administer/offer alternative therapies- Limit or eliminate stimulants  Outcome: Progressing     Problem: Risk for Violence/Aggression Toward  Others  Goal: Treatment Goal: Refrain from acts of violence/aggression during length of stay, and demonstrate improved impulse control at the time of discharge  Outcome: Progressing  Goal: Refrain from harming others  Outcome: Progressing  Goal: Control angry outbursts  Description: Interventions:- Monitor patient closely, per order- Ensure early verbal de-escalation- Monitor prn medication needs- Set reasonable/therapeutic limits, outline behavioral expectations, and consequences - Provide a non-threatening milieu, utilizing the least restrictive interventions   Outcome: Progressing     Problem: Potential for Falls  Goal: Patient will remain free of falls  Description: -Remind patient to notify staff when needed.  -Continue to assess pt's LOC and safety awareness  Outcome: Progressing     Problem: DISCHARGE PLANNING - CARE MANAGEMENT  Goal: Discharge to post-acute care or home with appropriate resources  Description: INTERVENTIONS:- Conduct assessment to determine patient/family and health care team treatment goals, and need for post-acute services based on payer coverage, community resources, and patient preferences, and barriers to discharge- Address psychosocial, clinical, and financial barriers to discharge as identified in assessment in conjunction with the patient/family and health care team- Arrange appropriate level of post-acute services according to patient’s   needs and preference and payer coverage in collaboration with the physician and health care team- Communicate with and update the patient/family, physician, and health care team regarding progress on the discharge plan- Arrange appropriate transportation to post-acute venues  Outcome: Not Progressing

## 2025-04-01 NOTE — NURSING NOTE
Per unit protocol, provider Dr. Yu contacted via EPIC and CIWA orders discontinued by provider at this time. Pt has been scoring <7 since 3/28 @ 14:00. Pt denies all withdrawal SS currently.     Pt visible, calm cooperative this evening. Pt is social with staff and peers. Pt denies SI/HI/AH/VH and is medication adherent. Pt tells nurse she had poor sleep last night, and pt was educated by RN about medication changes for this evening and to approach staff is experiencing difficulty sleeping or interrupted sleep throughout the night. Pt agreeable. Pt endorses ongoing 8/10 back and bilateral leg pain, PRN motrin 800mg for severe pain given at 21:14; effective. Pt resting currently.

## 2025-04-01 NOTE — ASSESSMENT & PLAN NOTE
As above 04/01/2025 the patient is reporting poor sleep and continued intermittent homicidal ideation without a plan.  Otherwise she is denying symptoms.  She has not experienced auditory hallucinations in 5 days.    Plan:  Continue Depakote 500 mg twice daily for impulse control  Depakote level as of 3/30 was 68  Continue prazosin 1 mg daily at bedtime for nightmares  Increase Seroquel 150 mg to 200 mg nightly  Change Zoloft to 50 mg from nighttime to in the morning
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
## 2025-04-01 NOTE — NURSING NOTE
Pt intermittently visible on unit. Calm, cooperative, organized, able to express needs appropriately. Compliant with meds and programming, no endorsement of SI/HI/AVH. Expresses depression, but states this is due to inability to sleep; will relay. PRN medication for mod anxiety; see note. Positive appetite. No distress observed or reported.

## 2025-04-02 PROCEDURE — 99232 SBSQ HOSP IP/OBS MODERATE 35: CPT | Performed by: PSYCHIATRY & NEUROLOGY

## 2025-04-02 RX ADMIN — PRAZOSIN HYDROCHLORIDE 1 MG: 1 CAPSULE ORAL at 21:02

## 2025-04-02 RX ADMIN — TRAZODONE HYDROCHLORIDE 50 MG: 50 TABLET ORAL at 21:03

## 2025-04-02 RX ADMIN — Medication 2000 UNITS: at 08:26

## 2025-04-02 RX ADMIN — SERTRALINE HYDROCHLORIDE 50 MG: 50 TABLET ORAL at 08:26

## 2025-04-02 RX ADMIN — DIVALPROEX SODIUM 500 MG: 500 TABLET, DELAYED RELEASE ORAL at 21:02

## 2025-04-02 RX ADMIN — HYDROXYZINE HYDROCHLORIDE 50 MG: 50 TABLET, FILM COATED ORAL at 13:45

## 2025-04-02 RX ADMIN — Medication 1 TABLET: at 08:26

## 2025-04-02 RX ADMIN — FOLIC ACID 1 MG: 1 TABLET ORAL at 08:27

## 2025-04-02 RX ADMIN — DIVALPROEX SODIUM 500 MG: 500 TABLET, DELAYED RELEASE ORAL at 08:26

## 2025-04-02 RX ADMIN — THIAMINE HCL TAB 100 MG 100 MG: 100 TAB at 08:26

## 2025-04-02 RX ADMIN — CYANOCOBALAMIN TAB 1000 MCG 1000 MCG: 1000 TAB at 08:26

## 2025-04-02 RX ADMIN — QUETIAPINE FUMARATE 250 MG: 100 TABLET ORAL at 21:01

## 2025-04-02 RX ADMIN — NICOTINE 1 PATCH: 21 PATCH, EXTENDED RELEASE TRANSDERMAL at 08:25

## 2025-04-02 NOTE — SOCIAL WORK
Discussed with patient: AUDIT score of 26 UDS/Identified Substance(s) used: Alcohol  Risks discussed included: Financial, relationships, mental/physical health, employment  Recommendations discussed: Outpatient dual diagnosis tx  Patient's response: Pt declined referral for outpatient dual diagnosis

## 2025-04-02 NOTE — PROGRESS NOTES
Progress Note - Behavioral Health   Name: Genevieve Meza 49 y.o. female I MRN: [id number]  Unit/Bed#: -01 I Date of Admission: 3/26/2025   Date of Service: 4/2/2025 I Hospital Day: 7    Assessment & Plan  Major depressive disorder, recurrent, severe with psychotic features (HCC)  As above 04/02/2025 the patient is reporting continued poor sleep and continued intermittent homicidal ideation without a plan. Her anxiety has improved. Otherwise she is denying symptoms.      Plan:  Continue Depakote 500 mg twice daily for impulse control  Depakote level ordered for 04/03  Continue prazosin 1 mg daily at bedtime for nightmares  Increase Seroquel 200 mg to 250 mg nightly  Change Zoloft to 50 mg from nighttime to in the morning    Alcohol abuse, continuous  See plan under principal problem  Medical clearance for psychiatric admission  Management per medical team  Fibromyalgia  Management per medical team  Tobacco abuse  See plan under principal problem  Elevated triglycerides with high cholesterol  Management per medical team  Personality disorder (HCC)  See plan under principal problem  Post-traumatic stress disorder, chronic  See plan under principal problem  SAADIA (generalized anxiety disorder)  See plan under principal problem  Panic disorder with agoraphobia  See plan under principal problem  Impulse control disorder  See plan under principal problem  Vitamin D deficiency  Daily Vitamin D3 2,000 units  Management per medical team    Current medications:  Current Facility-Administered Medications   Medication Dose Route Frequency Provider Last Rate    albuterol  2 puff Inhalation Q4H PRN Aye Hill MD      aluminum-magnesium hydroxide-simethicone  30 mL Oral Q4H PRN GLENN Hong      benztropine  1 mg Intramuscular Q4H PRN Max 6/day GLENN Hong      benztropine  1 mg Oral Q4H PRN Max 6/day GLENN Hong      bisacodyl  10 mg Rectal Daily PRN GLENN Hong      Cholecalciferol  2,000  Units Oral Daily Aye Hill MD      cyanocobalamin  1,000 mcg Oral Daily Cassidy Dominguez PA-C      divalproex sodium  500 mg Oral BID Favian Guardado DO      folic acid  1 mg Oral Daily Aye Hill MD      hydrOXYzine HCL  25 mg Oral Q6H PRN Max 4/day GLENN Hong      hydrOXYzine HCL  50 mg Oral Q4H PRN Max 4/day GLENN Hong      Or    LORazepam  1 mg Intramuscular Q4H PRN GLENN Hong      ibuprofen  400 mg Oral Q4H PRN GLENN Hong      ibuprofen  600 mg Oral Q6H PRN GLENN Hong      ibuprofen  800 mg Oral Q8H PRN GLENN Hong      LORazepam  1 mg Oral Q4H PRN Max 6/day GLENN Hong      Or    LORazepam  2 mg Intramuscular Q6H PRN Max 3/day GLENN Hong      multivitamin-minerals  1 tablet Oral Daily Aye Hill MD      nicotine  1 patch Transdermal Daily GLENN Hong      OLANZapine  10 mg Oral Q3H PRN Max 3/day GLENN Hong      Or    OLANZapine  10 mg Intramuscular Q3H PRN Max 3/day GLENN Hong      OLANZapine  5 mg Oral Q3H PRN Max 6/day GLENN Hong      Or    OLANZapine  5 mg Intramuscular Q3H PRN Max 6/day GLENN Hong      OLANZapine  2.5 mg Oral Q3H PRN Max 8/day GLENN Hong      polyethylene glycol  17 g Oral Daily PRN GLENN Hong      prazosin  1 mg Oral HS Aye Hill MD      propranolol  10 mg Oral Q8H PRN GLENN Hong      QUEtiapine  200 mg Oral HS Favian Guardado DO      senna-docusate sodium  1 tablet Oral BID PRN Cassidy Dominguez PA-C      sertraline  50 mg Oral Daily Favian Guardado DO      thiamine  100 mg Oral Daily Aye Hill MD      traZODone  50 mg Oral HS PRN GLENN Hong          Risks/Benefits of Treatment:     Risks, benefits, and possible side effects of medications explained to patient and patient verbalizes understanding and agreement for treatment.    Progress Toward Goals: improving    Treatment Planning:     All current active medications have been  "reviewed.  Continue to monitor response to treatment and assess for potential side effects of medications.  Encourage group therapy, milieu therapy and occupational therapy  Collaboration with medical service for medical comorbidities as indicated.  Behavioral Health checks for safety monitoring.  Long Stay Certification : Not Applicable  Estimated Discharge Day: 4/4/2025   Legal Status : Voluntary 201 commitment.    Subjective     Behavior over the last 24 hours: improving.    Per nursing, Genevieve is denying symptoms but continues to have difficulty with sleeping. She also received Atarax for anxiety.  Minipress was held due to not meeting blood pressure parameters.     She reported her mood today as \"feeling empty.\"  She added that she is still experiencing poor sleep.  She endorses that her appetite continues to improve and she is eating more than she has in the past couple of months.  Patient also reports that her anxiety is improved today compared to yesterday.  She is denying medication side effects.  She is denying suicidal ideation.  She denied current homicidal ideation but did endorse that she briefly had homicidal ideation yesterday afternoon towards peers.  She denied having a plan.  She denied auditory and visual hallucinations.    Sleep: insomnia  Appetite: normal  Medication side effects: No  ROS: review of systems as noted above in HPI/Subjective report, all other systems are negative    Objective :  Temp:  [97.8 °F (36.6 °C)-98.3 °F (36.8 °C)] 98 °F (36.7 °C)  HR:  [77-85] 77  BP: ()/(56-71) 92/56  Resp:  [16-17] 16  SpO2:  [97 %-100 %] 97 %  O2 Device: None (Room air)    Temp:  [97.8 °F (36.6 °C)-98.3 °F (36.8 °C)] 98 °F (36.7 °C)  HR:  [77-85] 77  BP: ()/(56-71) 92/56  Resp:  [16-17] 16  SpO2:  [97 %-100 %] 97 %  O2 Device: None (Room air)    Mental Status Evaluation:    Appearance:  marginal hygiene, dressed in hospital attire, looks stated age   Behavior:  pleasant, cooperative " "  Speech:  normal rate, normal volume, normal pitch   Mood:  \"Feel empty\"   Affect:  blunted   Thought Process:  organized, logical   Thought Content:  no overt delusions   Perceptual Disturbances: no auditory hallucinations, no visual hallucinations, does not appear responding to internal stimuli   Risk Potential: Suicidal Ideation -  None at present  Homicidal Ideation -   none at time of interview but patient experienced homicidal ideation without a plan yesterday  Potential for Aggression - Yes, due to history of violence   Sensorium:  oriented to person, place, and time/date   Memory:  recent and remote memory grossly intact   Consciousness:  alert and awake   Attention/Concentration: attention span and concentration are age appropriate   Insight:  limited   Judgment: limited   Gait/Station: normal gait/station, normal balance   Motor Activity: no abnormal movements       Lab Results: I have reviewed the following results:  Results from the past 24 hours: No results found for this or any previous visit (from the past 24 hours).    Administrative Statements     Counseling / Coordination of Care:   Patient's progress discussed with staff in treatment team meeting.  Medication changes reviewed with staff in treatment team meeting..    Favian Guardado DO 04/02/25  PGY-II  "

## 2025-04-02 NOTE — NURSING NOTE
"Observed sitting in the milieu most of the evening until bedtime. Reports their mood was \"bad earlier\", though patient was calm and pleasant during conversation. Observed working on a puzzle in the dayroom. Ate snack with peers. Denies all psych symptoms.    Medication compliant. Scheduled minipress held due to order parameters not being met.     Staff availability reinforced.   "

## 2025-04-02 NOTE — NURSING NOTE
Pt reassessed for anxiety after PRN Atarax 50 mg PO. Pt reports feeling much better and is participating in Art Therapy. No additional needs or requests at this time.

## 2025-04-02 NOTE — NURSING NOTE
"Upon reassessment one hour later at 2214 from the PRN trazodone 50 mg PO patient received at 2114 patient observed sitting in bed eating chips and states \"I'm not tired\".     Trazodone 50 mg not effective at this time.   "

## 2025-04-02 NOTE — PLAN OF CARE
Problem: Ineffective Coping  Goal: Cooperates with admission process  Description: Interventions: - Complete admission process  Outcome: Progressing  Goal: Participates in unit activities  Description: Interventions:- Provide therapeutic environment - Provide required programming - Redirect inappropriate behaviors   Outcome: Progressing  Goal: Patient/Family verbalizes awareness of resources  Outcome: Progressing     Problem: Depression  Goal: Treatment Goal: Demonstrate behavioral control of depressive symptoms, verbalize feelings of improved mood/affect, and adopt new coping skills prior to discharge  Outcome: Progressing  Goal: Verbalize thoughts and feelings  Description: Interventions:- Assess and re-assess patient's level of risk - Engage patient in 1:1 interactions, daily, for a minimum of 15 minutes - Encourage patient to express feelings, fears, frustrations, hopes   Outcome: Progressing  Goal: Attend and participate in unit activities, including therapeutic, recreational, and educational groups  Description: Interventions:- Provide therapeutic and educational activities daily, encourage attendance and participation, and document same in the medical record   Outcome: Progressing     Problem: Anxiety  Goal: Anxiety is at manageable level  Description: Interventions:- Assess and monitor patient's anxiety level. - Monitor for signs and symptoms (heart palpitations, chest pain, shortness of breath, headaches, nausea, feeling jumpy, restlessness, irritable, apprehensive). - Collaborate with interdisciplinary team and initiate plan and interventions as ordered.- Abie patient to unit/surroundings- Explain treatment plan- Encourage participation in care- Encourage verbalization of concerns/fears- Identify coping mechanisms- Assist in developing anxiety-reducing skills- Administer/offer alternative therapies- Limit or eliminate stimulants  Outcome: Progressing     Problem: Risk for Violence/Aggression Toward  Others  Goal: Treatment Goal: Refrain from acts of violence/aggression during length of stay, and demonstrate improved impulse control at the time of discharge  Outcome: Progressing  Goal: Refrain from harming others  Outcome: Progressing  Goal: Control angry outbursts  Description: Interventions:- Monitor patient closely, per order- Ensure early verbal de-escalation- Monitor prn medication needs- Set reasonable/therapeutic limits, outline behavioral expectations, and consequences - Provide a non-threatening milieu, utilizing the least restrictive interventions   Outcome: Progressing     Problem: Potential for Falls  Goal: Patient will remain free of falls  Description: -Remind patient to notify staff when needed.  -Continue to assess pt's LOC and safety awareness  Outcome: Progressing     Problem: DISCHARGE PLANNING - CARE MANAGEMENT  Goal: Discharge to post-acute care or home with appropriate resources  Description: INTERVENTIONS:- Conduct assessment to determine patient/family and health care team treatment goals, and need for post-acute services based on payer coverage, community resources, and patient preferences, and barriers to discharge- Address psychosocial, clinical, and financial barriers to discharge as identified in assessment in conjunction with the patient/family and health care team- Arrange appropriate level of post-acute services according to patient’s   needs and preference and payer coverage in collaboration with the physician and health care team- Communicate with and update the patient/family, physician, and health care team regarding progress on the discharge plan- Arrange appropriate transportation to post-acute venues  Outcome: Not Progressing

## 2025-04-02 NOTE — DISCHARGE INSTR - APPOINTMENTS
Behavioral Health Nurse Navigator, Maryellen or Stella will be calling you after your discharge, on the phone number that you provided.  They will be available as an additional support, if needed.   If you wish to speak with Maryellen, you may contact her at [phone].

## 2025-04-02 NOTE — PROGRESS NOTES
04/02/25 0847   Team Meeting   Meeting Type Daily Rounds   Team Members Present   Team Members Present Physician;Nurse;   Physician Team Member Sergio   Nursing Team Member Doyle   Care Management Team Member Jalen   Patient/Family Present   Patient Present No   Patient's Family Present No     201. Pt PRN Atarax for anxiety and was effective. Pt denies all symptoms. Pt complaint with meds. Pt isolative during the day but visible in the evening. Pt is calm and pleasant. Pt will discharge next week.

## 2025-04-02 NOTE — NURSING NOTE
Pt reported anxiety to RN. HAM score of 19 indicating moderate anxiety. Atarax 50 mg PO administered.

## 2025-04-02 NOTE — NURSING NOTE
"Pt approached RN and reported that after taking a nap, woke up around 1130 and felt very nauseous and \"projectile vomited\" into toilet. No other symptoms at this time. Pt education re possibility of N/V d/t starting SSRIs provided, pt verbalized understanding.  Pt remains with positive appetite and was \"able to keep lunch down no problem.\" PA notified verbally while on unit. No actions to be made at this time, monitor any continuing symptoms and report as needed.   "

## 2025-04-02 NOTE — NURSING NOTE
Pt intermittently visible on the unit. Interacts well with peers and staff. Calm, clear, pleasant and cooperative. Compliant with meds and programming. Positive appetite. No S/E reported p starting Zoloft 50mg yesterday. Reported spotty sleep again; to be addressed today. Flat affect. Denies SI/HI/AVH depression and anxiety, although appears depressed. No distress reported or observed.

## 2025-04-02 NOTE — DISCHARGE INSTR - OTHER ORDERS
CRISIS INFORMATION  If you are experiencing a mental health emergency, you may call the [facility] Crisis Intervention Office 24 hours a day, 7 days per week at [phone].    In [facility], call [phone].    [facility] is a confidential 24/7 telephone support service manned by trained mental health consumers.  [facility] provides support, a listening ear and can provide information about available services.[facility] specializes in the concerns of mental health consumers, their families and friends.  However, we are also here for anyone who has a mental health concern, is confused about or just doesn't know anything about mental health or where to get information.  To reach [facility], call [phone].    HOW TO GET SUBSTANCE ABUSE HELP:  If you or someone you know has a drug or alcohol problem, there is help:  [facility] Abuse [facility]: [phone]  [facility]: [phone]  An assessment is the first step.   In addition to those listed there are other programs available in the area but assessment is best to determine an appropriate level of care.  If you DO NOT have Medical Assistance (MA) or Private Insurance, an assessment can be scheduled at one of these providers:  [facility]  [address] [place] [address]  [phone]   [facility]  [address] [place] [address]  [phone]   [address] [place] [address]  [phone]   [facility]  [address] [place] [address]  [phone]   [facility]  [address] [place] [address]  [phone]   [facility]  [address] [place] [address]  [phone]     If you HAVE Medical Assistance, an assessment can be scheduled at one of these providers:  [facility]  [address] [place] [address]  [phone]   [facility]  [address] [place] [address]  [phone]   [facility]  [address], [place] [address]  [phone]  [address] [place] [address], [address], [place] [address] [phone]   [address] [address]  [phone]   [address] [place] [address]  [phone]   [facility]  [address] [place] [address]  [phone]   [facility]  [address] [place] [address]  [phone]   [facility]  [address] [place] [address]  [phone]   [facility]  [address] [place] [address]  [phone]     If you HAVE Private Insurance, an assessment can be scheduled at one of these providers.  Please contact these Providers to determine if they are in your network plan:  [facility]  [address], [place] [address]  [phone]   [address] [address]  [phone]   [address] [place] [address]  [phone]   [facility]  [address] [place] [address]  [phone]   [facility]  [address] [place] [address]  [phone]     From the [facility] website [place]guides/opioid-epidemic/#GetNaloxone    How do I get naloxone?  Family members and friends can access naloxone by:    Obtaining a prescription from their family doctor  Using the standing order issued by [facility] Laine Castro. A standing order is a prescription written for the general public, rather than specifically for an individual.  To use the standing order, print it and take it with you to the pharmacy or have the digital version on your phone. Download the standing order from the Department of [facility] (PDF).    If you are unable to print  it or use the digital version, the standing order is kept on file at many pharmacies. If a pharmacy does not have it on file, they may have the ability to look it up.    Naloxone prescriptions can be filled at most pharmacies. Although the medication might not be available for same-day pickup, it often can be ordered and available within a day or two.     [facility] Services

## 2025-04-02 NOTE — NURSING NOTE
Complained of trouble falling asleep. Requested trazodone. Received PRN trazodone 50 mg PO for insomnia at 2114.

## 2025-04-02 NOTE — ASSESSMENT & PLAN NOTE
As above 04/02/2025 the patient is reporting continued poor sleep and continued intermittent homicidal ideation without a plan. Her anxiety has improved. Otherwise she is denying symptoms.      Plan:  Continue Depakote 500 mg twice daily for impulse control  Depakote level ordered for 04/03  Continue prazosin 1 mg daily at bedtime for nightmares  Increase Seroquel 200 mg to 250 mg nightly  Change Zoloft to 50 mg from nighttime to in the morning

## 2025-04-03 LAB — VALPROATE SERPL-MCNC: 67 UG/ML (ref 50–125)

## 2025-04-03 PROCEDURE — 99232 SBSQ HOSP IP/OBS MODERATE 35: CPT | Performed by: PSYCHIATRY & NEUROLOGY

## 2025-04-03 PROCEDURE — 80164 ASSAY DIPROPYLACETIC ACD TOT: CPT

## 2025-04-03 RX ORDER — DIVALPROEX SODIUM 500 MG/1
1500 TABLET, FILM COATED, EXTENDED RELEASE ORAL DAILY
Status: DISCONTINUED | OUTPATIENT
Start: 2025-04-04 | End: 2025-04-04

## 2025-04-03 RX ORDER — QUETIAPINE FUMARATE 300 MG/1
300 TABLET, FILM COATED ORAL
Status: DISCONTINUED | OUTPATIENT
Start: 2025-04-03 | End: 2025-04-08

## 2025-04-03 RX ADMIN — QUETIAPINE FUMARATE 300 MG: 300 TABLET ORAL at 21:11

## 2025-04-03 RX ADMIN — NICOTINE 1 PATCH: 21 PATCH, EXTENDED RELEASE TRANSDERMAL at 08:33

## 2025-04-03 RX ADMIN — Medication 1 TABLET: at 08:33

## 2025-04-03 RX ADMIN — TRAZODONE HYDROCHLORIDE 50 MG: 50 TABLET ORAL at 21:13

## 2025-04-03 RX ADMIN — FOLIC ACID 1 MG: 1 TABLET ORAL at 08:33

## 2025-04-03 RX ADMIN — SERTRALINE HYDROCHLORIDE 50 MG: 50 TABLET ORAL at 08:33

## 2025-04-03 RX ADMIN — CYANOCOBALAMIN TAB 1000 MCG 1000 MCG: 1000 TAB at 08:33

## 2025-04-03 RX ADMIN — THIAMINE HCL TAB 100 MG 100 MG: 100 TAB at 08:33

## 2025-04-03 RX ADMIN — Medication 2000 UNITS: at 08:33

## 2025-04-03 RX ADMIN — PRAZOSIN HYDROCHLORIDE 1 MG: 1 CAPSULE ORAL at 21:11

## 2025-04-03 RX ADMIN — DIVALPROEX SODIUM 500 MG: 500 TABLET, DELAYED RELEASE ORAL at 08:33

## 2025-04-03 RX ADMIN — DIVALPROEX SODIUM 750 MG: 250 TABLET, DELAYED RELEASE ORAL at 21:11

## 2025-04-03 NOTE — PLAN OF CARE
Problem: Ineffective Coping  Goal: Cooperates with admission process  Description: Interventions: - Complete admission process  Outcome: Progressing  Goal: Participates in unit activities  Description: Interventions:- Provide therapeutic environment - Provide required programming - Redirect inappropriate behaviors   Outcome: Progressing  Goal: Patient/Family verbalizes awareness of resources  Outcome: Progressing     Problem: Depression  Goal: Treatment Goal: Demonstrate behavioral control of depressive symptoms, verbalize feelings of improved mood/affect, and adopt new coping skills prior to discharge  Outcome: Progressing  Goal: Verbalize thoughts and feelings  Description: Interventions:- Assess and re-assess patient's level of risk - Engage patient in 1:1 interactions, daily, for a minimum of 15 minutes - Encourage patient to express feelings, fears, frustrations, hopes   Outcome: Progressing  Goal: Attend and participate in unit activities, including therapeutic, recreational, and educational groups  Description: Interventions:- Provide therapeutic and educational activities daily, encourage attendance and participation, and document same in the medical record   Outcome: Progressing     Problem: Anxiety  Goal: Anxiety is at manageable level  Description: Interventions:- Assess and monitor patient's anxiety level. - Monitor for signs and symptoms (heart palpitations, chest pain, shortness of breath, headaches, nausea, feeling jumpy, restlessness, irritable, apprehensive). - Collaborate with interdisciplinary team and initiate plan and interventions as ordered.- [place] patient to unit/surroundings- Explain treatment plan- Encourage participation in care- Encourage verbalization of concerns/fears- Identify coping mechanisms- Assist in developing anxiety-reducing skills- Administer/offer alternative therapies- Limit or eliminate stimulants  Outcome: Progressing     Problem: Potential for Falls  Goal: Patient will  remain free of falls  Description: -Remind patient to notify staff when needed.  -Continue to assess pt's LOC and safety awareness  Outcome: Progressing     Problem: DISCHARGE PLANNING - CARE MANAGEMENT  Goal: Discharge to post-acute care or home with appropriate resources  Description: INTERVENTIONS:- Conduct assessment to determine patient/family and health care team treatment goals, and need for post-acute services based on payer coverage, community resources, and patient preferences, and barriers to discharge- Address psychosocial, clinical, and financial barriers to discharge as identified in assessment in conjunction with the patient/family and health care team- Arrange appropriate level of post-acute services according to patient’s   needs and preference and payer coverage in collaboration with the physician and health care team- Communicate with and update the patient/family, physician, and health care team regarding progress on the discharge plan- Arrange appropriate transportation to post-acute venues  Outcome: Not Progressing

## 2025-04-03 NOTE — ASSESSMENT & PLAN NOTE
As above 04/03/2025 the patient is reporting improved sleep and continued intermittent homicidal ideation without a plan. Her anxiety has improved but she now reports some mild depression today.  She is denying other psych symptoms.    Plan:  Increase Depakote 500 DR mg BID to Depakote ER 1,500 mg nightly starting on 04/04. (Will give 750 mg DR on night of 04/03)   Depakote level on 04/03 was 67  Depakote level ordered for 04/06/2025  Continue prazosin 1 mg daily at bedtime for nightmares  Increase Seroquel 250 mg to 300 mg nightly  Change Zoloft to 50 mg from nighttime to in the morning

## 2025-04-03 NOTE — PROGRESS NOTES
Pastoral Care Progress Note          Chaplaincy Interventions Utilized:   Empowerment: Facilitated group experience and Provided chaplaincy education    The pt was present in spirituality group facilitated by the  today. She left part way through and mostly just listened as others talked.

## 2025-04-03 NOTE — NURSING NOTE
Pt is calm, cooperative, social with select peers and staff. Pt seen in milieu for needs, meals and medications. Pt denies SI, HI, AH, VH and pain at this time. Pt is not going to groups, states she is doing her ADLs, medication/meal compliant. Pt denies any unmet needs and remains on q15 min checks for safety.

## 2025-04-03 NOTE — NURSING NOTE
"Patient observed isolative to room reading a book, patient stated upon approach \"I had a fair good day, but more tearful, while talking to my friend I suddenly started crying from noway and I got meds for anxiety early, I feel better now\". Patient denies SI/HI/VH/AH but continues to report depression and wish the medication adjustment becomes effective soon.  Compliant with scheduled meds. No PRN needed and Q15 minutes checks on going for safety.   "

## 2025-04-03 NOTE — PROGRESS NOTES
04/03/25 0852   Team Meeting   Meeting Type Daily Rounds   Team Members Present   Team Members Present Physician;Nurse;   Physician Team Member Sergio   Nursing Team Member Doyle   Care Management Team Member Roman   Patient/Family Present   Patient Present No   Patient's Family Present No     Pt denying all symptoms but reporting mild depression at times. Pt received Atarax for anxiety.

## 2025-04-03 NOTE — PROGRESS NOTES
Progress Note - Behavioral Health   Name: Genevieve Meza 49 y.o. female I MRN: [id number]  Unit/Bed#: -01 I Date of Admission: 3/26/2025   Date of Service: 4/3/2025 I Hospital Day: 8    Assessment & Plan  Major depressive disorder, recurrent, severe with psychotic features (HCC)  As above 04/03/2025 the patient is reporting improved sleep and continued intermittent homicidal ideation without a plan. Her anxiety has improved but she now reports some mild depression today.  She is denying other psych symptoms.    Plan:  Increase Depakote 500 DR mg BID to Depakote ER 1,500 mg nightly starting on 04/04. (Will give 750 mg DR on night of 04/03)   Depakote level on 04/03 was 67  Depakote level ordered for 04/06/2025  Continue prazosin 1 mg daily at bedtime for nightmares  Increase Seroquel 250 mg to 300 mg nightly  Change Zoloft to 50 mg from nighttime to in the morning    Alcohol abuse, continuous  See plan under principal problem  Medical clearance for psychiatric admission  Management per medical team  Fibromyalgia  Management per medical team  Tobacco abuse  See plan under principal problem  Elevated triglycerides with high cholesterol  Management per medical team  Personality disorder (HCC)  See plan under principal problem  Post-traumatic stress disorder, chronic  See plan under principal problem  SAADIA (generalized anxiety disorder)  See plan under principal problem  Panic disorder with agoraphobia  See plan under principal problem  Impulse control disorder  See plan under principal problem  Vitamin D deficiency  Daily Vitamin D3 2,000 units  Management per medical team    Current medications:  Current Facility-Administered Medications   Medication Dose Route Frequency Provider Last Rate    albuterol  2 puff Inhalation Q4H PRN Aye Hill MD      aluminum-magnesium hydroxide-simethicone  30 mL Oral Q4H PRN GLENN Hong      benztropine  1 mg Intramuscular Q4H PRN Max 6/day GLENN Hong       benztropine  1 mg Oral Q4H PRN Max 6/day GLENN Hong      bisacodyl  10 mg Rectal Daily PRN GLENN Hong      Cholecalciferol  2,000 Units Oral Daily Aye Hill MD      cyanocobalamin  1,000 mcg Oral Daily Cassidy Dominguez PA-C      divalproex sodium  500 mg Oral BID Favian Guardado DO      folic acid  1 mg Oral Daily Aye Hill MD      hydrOXYzine HCL  25 mg Oral Q6H PRN Max 4/day GLENN Hong      hydrOXYzine HCL  50 mg Oral Q4H PRN Max 4/day GLENN Hong      Or    LORazepam  1 mg Intramuscular Q4H PRN GLENN Hong      ibuprofen  400 mg Oral Q4H PRN GLENN Hong      ibuprofen  600 mg Oral Q6H PRN GLENN Hong      ibuprofen  800 mg Oral Q8H PRN GLENN Hong      LORazepam  1 mg Oral Q4H PRN Max 6/day GLENN Hong      Or    LORazepam  2 mg Intramuscular Q6H PRN Max 3/day GLENN Hong      multivitamin-minerals  1 tablet Oral Daily Aye Hill MD      nicotine  1 patch Transdermal Daily GLENN Hong      OLANZapine  10 mg Oral Q3H PRN Max 3/day GLENN Hong      Or    OLANZapine  10 mg Intramuscular Q3H PRN Max 3/day GLENN Hong      OLANZapine  5 mg Oral Q3H PRN Max 6/day GLENN Hong      Or    OLANZapine  5 mg Intramuscular Q3H PRN Max 6/day GLENN Hong      OLANZapine  2.5 mg Oral Q3H PRN Max 8/day GLENN Hong      polyethylene glycol  17 g Oral Daily PRN GLENN Hong      prazosin  1 mg Oral HS Aye Hill MD      propranolol  10 mg Oral Q8H PRN GLENN Hong      QUEtiapine  250 mg Oral HS Favian Guardado DO      senna-docusate sodium  1 tablet Oral BID PRN Cassidy Dominguez PA-C      sertraline  50 mg Oral Daily Favian Guardado DO      thiamine  100 mg Oral Daily Aye Hill MD      traZODone  50 mg Oral HS PRN GLENN Hong          Risks/Benefits of Treatment:     Risks, benefits, and possible side effects of medications explained to patient and patient verbalizes  "understanding and agreement for treatment.    Progress Toward Goals: improving    Treatment Planning:     All current active medications have been reviewed.  Continue to monitor response to treatment and assess for potential side effects of medications.  Encourage group therapy, milieu therapy and occupational therapy  Collaboration with medical service for medical comorbidities as indicated.  Behavioral Health checks for safety monitoring.  Long Stay Certification : Not Applicable  Estimated Discharge Day: 4/10/2025   Legal Status : Voluntary 201 commitment.    Subjective     Behavior over the last 24 hours: improving.    Per nursing, Genevieve denying anxiety, depression, and other psychiatric symptoms. However, nursing noted that she appears to be depressed. She slept better last night. She vomited once yesterday.     She reported her mood today is \"alright.\"  She added that she has been a bit depressed since yesterday evening.  She does report her anxiety is improved.  She also reported that her sleep has marginally improved.  She endorses continued adequate appetite.  She denied medication side effects.  She denied suicidal ideation.  She endorsed homicidal ideation yesterday towards peers without a plan.  She denied auditory and visual hallucinations.    Sleep: normal  Appetite: normal  Medication side effects: No  ROS: review of systems as noted above in HPI/Subjective report, all other systems are negative    Objective :  Temp:  [97 °F (36.1 °C)-97.5 °F (36.4 °C)] 97.5 °F (36.4 °C)  HR:  [70-88] 77  BP: (107-115)/(58-68) 107/58  Resp:  [16] 16  SpO2:  [97 %-100 %] 97 %  O2 Device: None (Room air)    Temp:  [97 °F (36.1 °C)-97.5 °F (36.4 °C)] 97.5 °F (36.4 °C)  HR:  [70-88] 77  BP: (107-115)/(58-68) 107/58  Resp:  [16] 16  SpO2:  [97 %-100 %] 97 %  O2 Device: None (Room air)    Mental Status Evaluation:    Appearance:  disheveled, marginal hygiene, dressed in hospital attire, looks stated age   Behavior:  " "pleasant, cooperative   Speech:  normal rate, normal volume, normal pitch   Mood:  \"Alright.\"   Affect:  blunted   Thought Process:  organized, logical   Thought Content:  some paranoia   Perceptual Disturbances: no auditory hallucinations, no visual hallucinations, does not appear responding to internal stimuli   Risk Potential: Suicidal Ideation -  None at present  Homicidal Ideation -  None at present but recently had HI without a plan  Potential for Aggression - No   Sensorium:  oriented to person, place, and time/date   Memory:  recent and remote memory grossly intact   Consciousness:  alert and awake   Attention/Concentration: attention span and concentration are age appropriate   Insight:  limited   Judgment: limited   Gait/Station: normal gait/station, normal balance   Motor Activity: no abnormal movements       Lab Results: I have reviewed the following results:  Results from the past 24 hours:   Recent Results (from the past 24 hours)   Valproic acid level, total    Collection Time: 04/03/25  5:48 AM   Result Value Ref Range    Valproic Acid, Total 67 50 - 125 ug/mL       Administrative Statements     Counseling / Coordination of Care:   Patient's progress discussed with staff in treatment team meeting.  Medication changes reviewed with staff in treatment team meeting..    Favian Guardado DO 04/03/25  "

## 2025-04-04 PROCEDURE — 99232 SBSQ HOSP IP/OBS MODERATE 35: CPT | Performed by: PSYCHIATRY & NEUROLOGY

## 2025-04-04 RX ORDER — DIVALPROEX SODIUM 500 MG/1
1500 TABLET, FILM COATED, EXTENDED RELEASE ORAL
Status: DISCONTINUED | OUTPATIENT
Start: 2025-04-04 | End: 2025-04-14 | Stop reason: HOSPADM

## 2025-04-04 RX ADMIN — CYANOCOBALAMIN TAB 1000 MCG 1000 MCG: 1000 TAB at 08:24

## 2025-04-04 RX ADMIN — THIAMINE HCL TAB 100 MG 100 MG: 100 TAB at 08:23

## 2025-04-04 RX ADMIN — FOLIC ACID 1 MG: 1 TABLET ORAL at 08:23

## 2025-04-04 RX ADMIN — TRAZODONE HYDROCHLORIDE 50 MG: 50 TABLET ORAL at 21:44

## 2025-04-04 RX ADMIN — PRAZOSIN HYDROCHLORIDE 1 MG: 1 CAPSULE ORAL at 21:39

## 2025-04-04 RX ADMIN — Medication 2000 UNITS: at 08:24

## 2025-04-04 RX ADMIN — NICOTINE 1 PATCH: 21 PATCH, EXTENDED RELEASE TRANSDERMAL at 08:24

## 2025-04-04 RX ADMIN — ALUMINUM HYDROXIDE, MAGNESIUM HYDROXIDE, AND SIMETHICONE 30 ML: 200; 200; 20 SUSPENSION ORAL at 21:45

## 2025-04-04 RX ADMIN — SERTRALINE HYDROCHLORIDE 50 MG: 50 TABLET ORAL at 08:24

## 2025-04-04 RX ADMIN — DIVALPROEX SODIUM 1500 MG: 500 TABLET, EXTENDED RELEASE ORAL at 21:40

## 2025-04-04 RX ADMIN — Medication 1 TABLET: at 08:24

## 2025-04-04 RX ADMIN — QUETIAPINE FUMARATE 300 MG: 300 TABLET ORAL at 21:39

## 2025-04-04 NOTE — NURSING NOTE
2113: Patient requesting PRN PO trazodone 50 mg to assist with insomnia.    2213: patient resting in bed with eyes closed. Appears asleep.

## 2025-04-04 NOTE — PLAN OF CARE
Problem: Ineffective Coping  Goal: Cooperates with admission process  Description: Interventions: - Complete admission process  Outcome: Progressing  Goal: Participates in unit activities  Description: Interventions:- Provide therapeutic environment - Provide required programming - Redirect inappropriate behaviors   Outcome: Progressing  Goal: Patient/Family verbalizes awareness of resources  Outcome: Progressing     Problem: Depression  Goal: Treatment Goal: Demonstrate behavioral control of depressive symptoms, verbalize feelings of improved mood/affect, and adopt new coping skills prior to discharge  Outcome: Progressing  Goal: Verbalize thoughts and feelings  Description: Interventions:- Assess and re-assess patient's level of risk - Engage patient in 1:1 interactions, daily, for a minimum of 15 minutes - Encourage patient to express feelings, fears, frustrations, hopes   Outcome: Progressing  Goal: Attend and participate in unit activities, including therapeutic, recreational, and educational groups  Description: Interventions:- Provide therapeutic and educational activities daily, encourage attendance and participation, and document same in the medical record   Outcome: Progressing     Problem: Anxiety  Goal: Anxiety is at manageable level  Description: Interventions:- Assess and monitor patient's anxiety level. - Monitor for signs and symptoms (heart palpitations, chest pain, shortness of breath, headaches, nausea, feeling jumpy, restlessness, irritable, apprehensive). - Collaborate with interdisciplinary team and initiate plan and interventions as ordered.- [place] patient to unit/surroundings- Explain treatment plan- Encourage participation in care- Encourage verbalization of concerns/fears- Identify coping mechanisms- Assist in developing anxiety-reducing skills- Administer/offer alternative therapies- Limit or eliminate stimulants  Outcome: Progressing     Problem: Risk for Violence/Aggression Toward  Others  Goal: Treatment Goal: Refrain from acts of violence/aggression during length of stay, and demonstrate improved impulse control at the time of discharge  Outcome: Progressing  Goal: Refrain from harming others  Outcome: Progressing  Goal: Control angry outbursts  Description: Interventions:- Monitor patient closely, per order- Ensure early verbal de-escalation- Monitor prn medication needs- Set reasonable/therapeutic limits, outline behavioral expectations, and consequences - Provide a non-threatening milieu, utilizing the least restrictive interventions   Outcome: Progressing     Problem: Potential for Falls  Goal: Patient will remain free of falls  Description: -Remind patient to notify staff when needed.  -Continue to assess pt's LOC and safety awareness  Outcome: Progressing

## 2025-04-04 NOTE — NURSING NOTE
Patient isolative to self and room, visible at times on unit. Attending groups. Patient is calm and cooperative upon approach. Patient denies SI/HI/AH/VH. Patient is compliant with meds and meals.

## 2025-04-04 NOTE — PROGRESS NOTES
04/01/25 1057   Team Meeting   Meeting Type Tx Team Meeting   Team Members Present   Team Members Present Physician;Nurse;   Physician Team Member Sergio   Nursing Team Member [place]   Care Management Team Member Roman   Patient/Family Present   Patient Present Yes   Patient's Family Present No     Tx plan was reviewed and discussed with Pt. Pt was encouraged to attend groups. Medication was discussed with Pt. Pt signed tx plan.

## 2025-04-04 NOTE — ASSESSMENT & PLAN NOTE
As of 04/04/25: Genevieve still has unstable mood and continues to report on and off homicidal thoughts. She was angry at her roommate last night and is glad that her room was changed today.    Current medications  Continue Depakote ER 1,500 mg at bedtime to help with mood stabilization and anger control.   Recheck Depakote level, CBC/diff and CMP on 04/06/2025  Continue Prazosin 1 mg at bedtime to help with nightmares  Continue Seroquel 300 mg at bedtime to help with mood  Continue Zoloft 50 mg daily to help with depressive symptoms

## 2025-04-04 NOTE — NURSING NOTE
Patient pleasant on approach, visible and social on the unit.  Patient denies all psych symptoms at this time. Complaint with scheduled medications. Denies any unmet needs at this time.

## 2025-04-04 NOTE — PROGRESS NOTES
Progress Note - Behavioral Health   Name: Genevieve Meza 49 y.o. female I MRN: [id number]  Unit/Bed#: -01 I Date of Admission: 3/26/2025   Date of Service: 4/4/2025 I Hospital Day: 9    Assessment & Plan  Major depressive disorder, recurrent, severe with psychotic features (HCC)  As of 04/04/25: Genevieve still has unstable mood and continues to report on and off homicidal thoughts. She was angry at her roommate last night and is glad that her room was changed today.    Current medications  Continue Depakote ER 1,500 mg at bedtime to help with mood stabilization and anger control.   Recheck Depakote level, CBC/diff and CMP on 04/06/2025  Continue Prazosin 1 mg at bedtime to help with nightmares  Continue Seroquel 300 mg at bedtime to help with mood  Continue Zoloft 50 mg daily to help with depressive symptoms  SAADIA (generalized anxiety disorder)  Management as per principal problem  Post-traumatic stress disorder, chronic  Management as per principal problem  Panic disorder with agoraphobia  Management as per principal problem  Impulse control disorder  Management as per principal problem  Personality disorder (HCC)  Management as per principal problem  Alcohol abuse, continuous  Counseling on the unit  Medical clearance for psychiatric admission  Management per medical team  Fibromyalgia  Management per medical team  Tobacco abuse  Smoking cessation  Elevated triglycerides with high cholesterol  Management per medical team  Vitamin D deficiency  Daily Vitamin D3 2,000 units  Management per medical team    Current medications:    Current Facility-Administered Medications:     albuterol (PROVENTIL HFA,VENTOLIN HFA) inhaler 2 puff, 2 puff, Inhalation, Q4H PRN, Aey Hill MD    aluminum-magnesium hydroxide-simethicone (MAALOX) oral suspension 30 mL, 30 mL, Oral, Q4H PRN, GLENN Hong    benztropine (COGENTIN) injection 1 mg, 1 mg, Intramuscular, Q4H PRN Max 6/day, GLENN Hong    benztropine  (COGENTIN) tablet 1 mg, 1 mg, Oral, Q4H PRN Max 6/day, GLENN Hong    bisacodyl (DULCOLAX) rectal suppository 10 mg, 10 mg, Rectal, Daily PRN, GLENN Hong    Cholecalciferol (VITAMIN D3) tablet 2,000 Units, 2,000 Units, Oral, Daily, Aye Hill MD, 2,000 Units at 04/04/25 0824    cyanocobalamin (VITAMIN B-12) tablet 1,000 mcg, 1,000 mcg, Oral, Daily, Cassidy Dominguez PA-C, 1,000 mcg at 04/04/25 0824    divalproex sodium (DEPAKOTE ER) 24 hr tablet 1,500 mg, 1,500 mg, Oral, HS, Aye Hill MD    folic acid (FOLVITE) tablet 1 mg, 1 mg, Oral, Daily, Aye Hill MD, 1 mg at 04/04/25 0823    hydrOXYzine HCL (ATARAX) tablet 25 mg, 25 mg, Oral, Q6H PRN Max 4/day, GLENN Hong    hydrOXYzine HCL (ATARAX) tablet 50 mg, 50 mg, Oral, Q4H PRN Max 4/day, 50 mg at 04/02/25 1345 **OR** LORazepam (ATIVAN) injection 1 mg, 1 mg, Intramuscular, Q4H PRN, GLENN Hong    ibuprofen (MOTRIN) tablet 400 mg, 400 mg, Oral, Q4H PRN, GLENN Hong    ibuprofen (MOTRIN) tablet 600 mg, 600 mg, Oral, Q6H PRN, GLENN Hong    ibuprofen (MOTRIN) tablet 800 mg, 800 mg, Oral, Q8H PRN, GLENN Hong, 800 mg at 03/31/25 2114    LORazepam (ATIVAN) tablet 1 mg, 1 mg, Oral, Q4H PRN Max 6/day **OR** LORazepam (ATIVAN) injection 2 mg, 2 mg, Intramuscular, Q6H PRN Max 3/day, GLENN Hong    multivitamin-minerals (CENTRUM) tablet 1 tablet, 1 tablet, Oral, Daily, Aye Hill MD, 1 tablet at 04/04/25 0824    nicotine (NICODERM CQ) 21 mg/24 hr TD 24 hr patch 1 patch, 1 patch, Transdermal, Daily, GLENN Hong, 1 patch at 04/04/25 0824    OLANZapine (ZyPREXA) tablet 10 mg, 10 mg, Oral, Q3H PRN Max 3/day **OR** OLANZapine (ZyPREXA) IM injection 10 mg, 10 mg, Intramuscular, Q3H PRN Max 3/day, GLENN Hong    OLANZapine (ZyPREXA) tablet 5 mg, 5 mg, Oral, Q3H PRN Max 6/day **OR** OLANZapine (ZyPREXA) IM injection 5 mg, 5 mg, Intramuscular, Q3H PRN Max 6/day, GLENN Hong    OLANZapine  (ZyPREXA) tablet 2.5 mg, 2.5 mg, Oral, Q3H PRN Max 8/day, GLENN Hong    polyethylene glycol (MIRALAX) packet 17 g, 17 g, Oral, Daily PRN, GLENN Hong, 17 g at 03/30/25 1704    prazosin (MINIPRESS) capsule 1 mg, 1 mg, Oral, HS, Aye Hill MD, 1 mg at 04/03/25 2111    propranolol (INDERAL) tablet 10 mg, 10 mg, Oral, Q8H PRN, GLENN Hong    QUEtiapine (SEROquel) tablet 300 mg, 300 mg, Oral, HS, Favian Guardado DO, 300 mg at 04/03/25 2111    senna-docusate sodium (SENOKOT S) 8.6-50 mg per tablet 1 tablet, 1 tablet, Oral, BID PRN, Cassidy Dominguez PA-C, 1 tablet at 03/30/25 1056    sertraline (ZOLOFT) tablet 50 mg, 50 mg, Oral, Daily, Favian Guardado DO, 50 mg at 04/04/25 0824    thiamine tablet 100 mg, 100 mg, Oral, Daily, Aye Hill MD, 100 mg at 04/04/25 0823    traZODone (DESYREL) tablet 50 mg, 50 mg, Oral, HS PRN, GLENN Hong, 50 mg at 04/03/25 2113     Risks/Benefits of Treatment:     Risks, benefits, and possible side effects of medications explained to patient and patient verbalizes understanding and agreement for treatment.  Risks of medications in pregnancy explained if female patient. Patient verbalizes understanding and agrees to notify her doctor if she becomes pregnant.    Progress Toward Goals: no significant Improvement today, still at times irritable, still paranoid, still reports homicidal thoughts    Treatment Planning:     All current active medications have been reviewed.  Continue to monitor response to treatment and assess for potential side effects of medications.  Encourage group therapy, milieu therapy and occupational therapy  Collaboration with medical service for medical comorbidities as indicated.  Behavioral Health checks for safety monitoring.  Observe progress over the weekend.  Long Stay Certification : Not Applicable  Estimated Discharge Day: 4/10/2025   Legal Status : Voluntary 201 commitment.    Subjective     Behavior over the last 24 hours:  unchanged.    Genevieve still has irritability and some paranoia today. She also continues to report thoughts to harm others and states that she was very angry at her roommate yesterday. She is glad that her room was changed this morning.  She denies current suicidal thoughts. Limited participation in milieu. Has been taking medications.    Sleep: normal  Appetite: normal  Medication side effects: No  ROS: review of systems as noted above in HPI/Subjective report, denies any shortness of breath, chest pain, or abdominal pain, all other systems are negative    Objective :  Temp:  [97.5 °F (36.4 °C)-98.7 °F (37.1 °C)] 97.5 °F (36.4 °C)  HR:  [63-76] 63  BP: ()/(58-75) 98/61  Resp:  [16] 16  SpO2:  [97 %] 97 %  O2 Device: None (Room air)    Mental Status Evaluation:    Appearance:  casually dressed   Behavior:  cooperative, limited eye contact   Speech:  normal rate and volume   Mood:  dysphoric, irritable   Affect:  blunted   Thought Process:  organized, goal directed   Thought Content:  some paranoia   Perceptual Disturbances: no auditory hallucinations, no visual hallucinations   Risk Potential: Suicidal Ideation -  None at present  Homicidal Ideation -  Yes, towards roommate in the hospital  Potential for Aggression - Not at present   Sensorium:  oriented to person, place, and time/date   Memory:  recent and remote memory grossly intact   Consciousness:  alert and awake   Attention/Concentration: decreased attention span and decreased concentration   Insight:  impaired   Judgment: impaired   Gait/Station: normal gait/station, normal balance   Motor Activity: no abnormal movements       Lab Results: I have reviewed the following results:  Results from the past 24 hours: No results found for this or any previous visit (from the past 24 hours).    Administrative Statements     Counseling / Coordination of Care:   Patient's progress discussed with staff in treatment team meeting.  Medication changes reviewed with  staff in treatment team meeting..    Aye Hill MD 04/04/25

## 2025-04-04 NOTE — PROGRESS NOTES
04/04/25 1401   Activity/Group Checklist   Group Other (Comment)  (Group Art Therapy/Psychodynamic, Creatively Explore Relationship with Affirmations followed by Process Discussion)   Attendance Attended   Attendance Duration (min) Greater than 60  (90 min)   Interactions Interacted appropriately   Affect/Mood Appropriate   Goals Achieved Discussed coping strategies;Able to listen to others;Able to engage in interactions;Able to reflect/comment on own behavior;Able to self-disclose;Able to recieve feedback;Able to give feedback to another

## 2025-04-05 PROCEDURE — 99232 SBSQ HOSP IP/OBS MODERATE 35: CPT | Performed by: PSYCHIATRY & NEUROLOGY

## 2025-04-05 RX ADMIN — TRAZODONE HYDROCHLORIDE 50 MG: 50 TABLET ORAL at 21:06

## 2025-04-05 RX ADMIN — THIAMINE HCL TAB 100 MG 100 MG: 100 TAB at 08:23

## 2025-04-05 RX ADMIN — FOLIC ACID 1 MG: 1 TABLET ORAL at 08:23

## 2025-04-05 RX ADMIN — NICOTINE 1 PATCH: 21 PATCH, EXTENDED RELEASE TRANSDERMAL at 08:23

## 2025-04-05 RX ADMIN — CYANOCOBALAMIN TAB 1000 MCG 1000 MCG: 1000 TAB at 08:23

## 2025-04-05 RX ADMIN — Medication 2000 UNITS: at 08:23

## 2025-04-05 RX ADMIN — DIVALPROEX SODIUM 1500 MG: 500 TABLET, EXTENDED RELEASE ORAL at 21:06

## 2025-04-05 RX ADMIN — QUETIAPINE FUMARATE 300 MG: 300 TABLET ORAL at 21:06

## 2025-04-05 RX ADMIN — SERTRALINE HYDROCHLORIDE 50 MG: 50 TABLET ORAL at 08:23

## 2025-04-05 RX ADMIN — Medication 1 TABLET: at 08:23

## 2025-04-05 NOTE — PROGRESS NOTES
Progress Note - Behavioral Health   Name: Genevieve Meza 49 y.o. female I MRN: [id number]  Unit/Bed#: -01 I Date of Admission: 3/26/2025   Date of Service: 4/5/2025 I Hospital Day: 10     Assessment & Plan  Major depressive disorder, recurrent, severe with psychotic features (HCC)  As of 04/05/25: Genevieve still has unstable mood and continues to report on and off homicidal thoughts. She was angry at her roommate last night and is glad that her room was changed today.    Current medications  Continue Depakote ER 1,500 mg at bedtime to help with mood stabilization and anger control.   Recheck Depakote level, CBC/diff and CMP on 04/06/2025  Continue Prazosin 1 mg at bedtime to help with nightmares  Continue Seroquel 300 mg at bedtime to help with mood  Continue Zoloft 50 mg daily to help with depressive symptoms  SAADIA (generalized anxiety disorder)  Management as per principal problem  Post-traumatic stress disorder, chronic  Management as per principal problem  Panic disorder with agoraphobia  Management as per principal problem  Impulse control disorder  Management as per principal problem  Personality disorder (HCC)  Management as per principal problem  Alcohol abuse, continuous  Counseling on the unit  Medical clearance for psychiatric admission  Management per medical team  Fibromyalgia  Management per medical team  Tobacco abuse  Smoking cessation  Elevated triglycerides with high cholesterol  Management per medical team  Vitamin D deficiency  Daily Vitamin D3 2,000 units  Management per medical team    Current medications:  Current Facility-Administered Medications   Medication Dose Route Frequency Provider Last Rate    albuterol  2 puff Inhalation Q4H PRN Aye Hill MD      aluminum-magnesium hydroxide-simethicone  30 mL Oral Q4H PRN GLENN Hong      benztropine  1 mg Intramuscular Q4H PRN Max 6/day GLENN Hong      benztropine  1 mg Oral Q4H PRN Max 6/day GLENN Hong       bisacodyl  10 mg Rectal Daily PRN GLENN Hong      Cholecalciferol  2,000 Units Oral Daily Aye Hill MD      cyanocobalamin  1,000 mcg Oral Daily Cassidy Dominguez PA-C      divalproex sodium  1,500 mg Oral HS Aye Hill MD      folic acid  1 mg Oral Daily Aye Hill MD      hydrOXYzine HCL  25 mg Oral Q6H PRN Max 4/day GLENN Hong      hydrOXYzine HCL  50 mg Oral Q4H PRN Max 4/day GLENN Hong      Or    LORazepam  1 mg Intramuscular Q4H PRN GLENN Hong      ibuprofen  400 mg Oral Q4H PRN GLENN Hong      ibuprofen  600 mg Oral Q6H PRN GLENN Hong      ibuprofen  800 mg Oral Q8H PRN GLENN Hong      LORazepam  1 mg Oral Q4H PRN Max 6/day GLENN Hong      Or    LORazepam  2 mg Intramuscular Q6H PRN Max 3/day GLENN Hong      multivitamin-minerals  1 tablet Oral Daily Aye Hill MD      nicotine  1 patch Transdermal Daily GLENN Hong      OLANZapine  10 mg Oral Q3H PRN Max 3/day GLENN Hong      Or    OLANZapine  10 mg Intramuscular Q3H PRN Max 3/day GLENN Hong      OLANZapine  5 mg Oral Q3H PRN Max 6/day GLENN Hong      Or    OLANZapine  5 mg Intramuscular Q3H PRN Max 6/day GLENN Hong      OLANZapine  2.5 mg Oral Q3H PRN Max 8/day GLENN Hong      polyethylene glycol  17 g Oral Daily PRN GLENN Hong      prazosin  1 mg Oral HS Aye Hill MD      propranolol  10 mg Oral Q8H PRN GLENN Hong      QUEtiapine  300 mg Oral HS Favian Guardado DO      senna-docusate sodium  1 tablet Oral BID PRN Cassidy Dominguez PA-C      sertraline  50 mg Oral Daily Favian Guardado DO      thiamine  100 mg Oral Daily Aye Hill MD      traZODone  50 mg Oral HS PRN GLENN Hong          Risks/Benefits of Treatment:     Risks, benefits, and possible side effects of medications explained to patient and patient verbalizes understanding and agreement for treatment.    Progress Toward Goals:  improving    Treatment Planning:     All current active medications have been reviewed.  Continue to monitor response to treatment and assess for potential side effects of medications.  Encourage group therapy, milieu therapy and occupational therapy  Collaboration with medical service for medical comorbidities as indicated.  Behavioral Health checks for safety monitoring.  Long Stay Certification : Not Applicable  Estimated Discharge Day: 4/10/2025 10 days (4/15/2025)  Legal Status: Status of Admission  Status of Admission: 201  Release of Information Signed: Yes (Pt signed SORAYA for [facility], as well as Isidro Brown ([place])).    Subjective     Behavior over the last 24 hours: improved.    Patient remains compliant with medications and denies side effects. According to staff report patient is visible on unit, but isolative to self. Patient is calm and cooperative upon approach.   Today she  denies SI/HI/AH/VH. Mood is depressed and feels she needs a dose increase on antidepressant medication. Can increase sertraline to 100 mg po daily in the near future since Sertraline was started less than a week ago.  Reported feeling tired and stated Depakote is a new medication for her and it causes to feel tire and dizzy due to lowering BP.  Agrees to continue current treatment.    Sleep: normal  Appetite: normal  Medication side effects: No  ROS: review of systems as noted above in HPI/Subjective report, all other systems are negative    Objective :  Temp:  [97.6 °F (36.4 °C)-98.1 °F (36.7 °C)] 97.6 °F (36.4 °C)  HR:  [72-84] 82  BP: (103-119)/(63-80) 116/69  Resp:  [16] 16  SpO2:  [98 %-99 %] 99 %  O2 Device: None (Room air)    Temp:  [97.6 °F (36.4 °C)-98.1 °F (36.7 °C)] 97.6 °F (36.4 °C)  HR:  [72-84] 82  BP: (103-119)/(63-80) 116/69  Resp:  [16] 16  SpO2:  [98 %-99 %] 99 %  O2 Device: None (Room air)    Mental Status Evaluation:    Appearance:  age appropriate, casually dressed   Behavior:  cooperative    Speech:  normal rate, normal volume, normal pitch, spontaneous   Mood:  depressed   Affect:  constricted   Thought Process:  goal directed   Thought Content:  no overt delusions   Perceptual Disturbances: none   Risk Potential: Suicidal Ideation -  None  Homicidal Ideation -  None  Potential for Aggression - No   Sensorium:  oriented to person, place, and time/date   Memory:  recent and remote memory grossly intact   Consciousness:  alert and awake   Attention/Concentration: attention span and concentration appear shorter than expected for age   Insight:  limited   Judgment: limited   Gait/Station: normal gait/station, normal balance   Motor Activity: no abnormal movements       Lab Results: I have reviewed the following results:  Results from the past 24 hours: No results found for this or any previous visit (from the past 24 hours).    Administrative Statements     Counseling / Coordination of Care:   Patient's progress discussed with staff in treatment team meeting.  Medication changes reviewed with staff in treatment team meeting..    Mary Ann May MD 04/05/25

## 2025-04-05 NOTE — NURSING NOTE
Patient is visible on unit, but isolative to self. Patient is calm and cooperative upon approach. Patient denies SI/HI/AH/VH. Patient is compliant with meds and meals.

## 2025-04-05 NOTE — NURSING NOTE
"Patient mainly isolative to room, out for needs. Patient reports continued depression and unable mood. Patient denies SI/AVH, but \"just negative thoughts.\" Patient compliant with scheduled medications. Patient c/o difficulty sleeping and abdominal gas. PRN maalox was administered at 2145 and PRN trazodone 50mg was administered at 2144.    On reassessment, patient resting in bed with no further complaints. Q15 observation maintained.  "

## 2025-04-05 NOTE — ASSESSMENT & PLAN NOTE
As of 04/05/25: Genevieve still has unstable mood and continues to report on and off homicidal thoughts. She was angry at her roommate last night and is glad that her room was changed today.    Current medications  Continue Depakote ER 1,500 mg at bedtime to help with mood stabilization and anger control.   Recheck Depakote level, CBC/diff and CMP on 04/06/2025  Continue Prazosin 1 mg at bedtime to help with nightmares  Continue Seroquel 300 mg at bedtime to help with mood  Continue Zoloft 50 mg daily to help with depressive symptoms

## 2025-04-05 NOTE — PLAN OF CARE
Problem: Ineffective Coping  Goal: Cooperates with admission process  Description: Interventions: - Complete admission process  Outcome: Progressing  Goal: Participates in unit activities  Description: Interventions:- Provide therapeutic environment - Provide required programming - Redirect inappropriate behaviors   Outcome: Progressing  Goal: Patient/Family verbalizes awareness of resources  Outcome: Progressing     Problem: Depression  Goal: Treatment Goal: Demonstrate behavioral control of depressive symptoms, verbalize feelings of improved mood/affect, and adopt new coping skills prior to discharge  Outcome: Progressing  Goal: Verbalize thoughts and feelings  Description: Interventions:- Assess and re-assess patient's level of risk - Engage patient in 1:1 interactions, daily, for a minimum of 15 minutes - Encourage patient to express feelings, fears, frustrations, hopes   Outcome: Progressing  Goal: Attend and participate in unit activities, including therapeutic, recreational, and educational groups  Description: Interventions:- Provide therapeutic and educational activities daily, encourage attendance and participation, and document same in the medical record   Outcome: Progressing     Problem: Anxiety  Goal: Anxiety is at manageable level  Description: Interventions:- Assess and monitor patient's anxiety level. - Monitor for signs and symptoms (heart palpitations, chest pain, shortness of breath, headaches, nausea, feeling jumpy, restlessness, irritable, apprehensive). - Collaborate with interdisciplinary team and initiate plan and interventions as ordered.- [place] patient to unit/surroundings- Explain treatment plan- Encourage participation in care- Encourage verbalization of concerns/fears- Identify coping mechanisms- Assist in developing anxiety-reducing skills- Administer/offer alternative therapies- Limit or eliminate stimulants  Outcome: Progressing     Problem: Risk for Violence/Aggression Toward  Others  Goal: Treatment Goal: Refrain from acts of violence/aggression during length of stay, and demonstrate improved impulse control at the time of discharge  Outcome: Progressing  Goal: Refrain from harming others  Outcome: Progressing  Goal: Control angry outbursts  Description: Interventions:- Monitor patient closely, per order- Ensure early verbal de-escalation- Monitor prn medication needs- Set reasonable/therapeutic limits, outline behavioral expectations, and consequences - Provide a non-threatening milieu, utilizing the least restrictive interventions   Outcome: Progressing     Problem: Potential for Falls  Goal: Patient will remain free of falls  Description: -Remind patient to notify staff when needed.  -Continue to assess pt's LOC and safety awareness  Outcome: Progressing     Problem: DISCHARGE PLANNING - CARE MANAGEMENT  Goal: Discharge to post-acute care or home with appropriate resources  Description: INTERVENTIONS:- Conduct assessment to determine patient/family and health care team treatment goals, and need for post-acute services based on payer coverage, community resources, and patient preferences, and barriers to discharge- Address psychosocial, clinical, and financial barriers to discharge as identified in assessment in conjunction with the patient/family and health care team- Arrange appropriate level of post-acute services according to patient’s   needs and preference and payer coverage in collaboration with the physician and health care team- Communicate with and update the patient/family, physician, and health care team regarding progress on the discharge plan- Arrange appropriate transportation to post-acute venues  Outcome: Progressing

## 2025-04-06 LAB
ALBUMIN SERPL BCG-MCNC: 3.9 G/DL (ref 3.5–5)
ALP SERPL-CCNC: 44 U/L (ref 34–104)
ALT SERPL W P-5'-P-CCNC: 7 U/L (ref 7–52)
ANION GAP SERPL CALCULATED.3IONS-SCNC: 6 MMOL/L (ref 4–13)
AST SERPL W P-5'-P-CCNC: 9 U/L (ref 13–39)
BASOPHILS # BLD MANUAL: 0.13 THOUSAND/UL (ref 0–0.1)
BASOPHILS NFR MAR MANUAL: 2 % (ref 0–1)
BILIRUB SERPL-MCNC: 0.21 MG/DL (ref 0.2–1)
BUN SERPL-MCNC: 15 MG/DL (ref 5–25)
CALCIUM SERPL-MCNC: 8.9 MG/DL (ref 8.4–10.2)
CHLORIDE SERPL-SCNC: 99 MMOL/L (ref 96–108)
CO2 SERPL-SCNC: 32 MMOL/L (ref 21–32)
CREAT SERPL-MCNC: 0.93 MG/DL (ref 0.6–1.3)
EOSINOPHIL # BLD MANUAL: 0.38 THOUSAND/UL (ref 0–0.4)
EOSINOPHIL NFR BLD MANUAL: 6 % (ref 0–6)
ERYTHROCYTE [DISTWIDTH] IN BLOOD BY AUTOMATED COUNT: 15 % (ref 11.6–15.1)
GFR SERPL CREATININE-BSD FRML MDRD: 73 ML/MIN/1.73SQ M
GLUCOSE P FAST SERPL-MCNC: 89 MG/DL (ref 65–99)
GLUCOSE SERPL-MCNC: 89 MG/DL (ref 65–140)
HCT VFR BLD AUTO: 37.8 % (ref 34.8–46.1)
HGB BLD-MCNC: 11.5 G/DL (ref 11.5–15.4)
LYMPHOCYTES # BLD AUTO: 3.4 THOUSAND/UL (ref 0.6–4.47)
LYMPHOCYTES # BLD AUTO: 54 % (ref 14–44)
MCH RBC QN AUTO: 25 PG (ref 26.8–34.3)
MCHC RBC AUTO-ENTMCNC: 30.4 G/DL (ref 31.4–37.4)
MCV RBC AUTO: 82 FL (ref 82–98)
MONOCYTES # BLD AUTO: 0.32 THOUSAND/UL (ref 0–1.22)
MONOCYTES NFR BLD: 5 % (ref 4–12)
NEUTROPHILS # BLD MANUAL: 2.08 THOUSAND/UL (ref 1.85–7.62)
NEUTS SEG NFR BLD AUTO: 33 % (ref 43–75)
PLATELET # BLD AUTO: 273 THOUSANDS/UL (ref 149–390)
PLATELET BLD QL SMEAR: ADEQUATE
PLATELET CLUMP BLD QL SMEAR: PRESENT
PMV BLD AUTO: 10.4 FL (ref 8.9–12.7)
POTASSIUM SERPL-SCNC: 4.6 MMOL/L (ref 3.5–5.3)
PROT SERPL-MCNC: 6.7 G/DL (ref 6.4–8.4)
RBC # BLD AUTO: 4.6 MILLION/UL (ref 3.81–5.12)
RBC MORPH BLD: NORMAL
SODIUM SERPL-SCNC: 137 MMOL/L (ref 135–147)
VALPROATE SERPL-MCNC: 78 UG/ML (ref 50–125)
WBC # BLD AUTO: 6.3 THOUSAND/UL (ref 4.31–10.16)

## 2025-04-06 PROCEDURE — 85007 BL SMEAR W/DIFF WBC COUNT: CPT | Performed by: PSYCHIATRY & NEUROLOGY

## 2025-04-06 PROCEDURE — 80164 ASSAY DIPROPYLACETIC ACD TOT: CPT | Performed by: PSYCHIATRY & NEUROLOGY

## 2025-04-06 PROCEDURE — 80053 COMPREHEN METABOLIC PANEL: CPT

## 2025-04-06 PROCEDURE — 85027 COMPLETE CBC AUTOMATED: CPT | Performed by: PSYCHIATRY & NEUROLOGY

## 2025-04-06 PROCEDURE — 99232 SBSQ HOSP IP/OBS MODERATE 35: CPT | Performed by: PSYCHIATRY & NEUROLOGY

## 2025-04-06 RX ADMIN — DIVALPROEX SODIUM 1500 MG: 500 TABLET, EXTENDED RELEASE ORAL at 21:28

## 2025-04-06 RX ADMIN — IBUPROFEN 800 MG: 400 TABLET ORAL at 21:27

## 2025-04-06 RX ADMIN — CYANOCOBALAMIN TAB 1000 MCG 1000 MCG: 1000 TAB at 08:43

## 2025-04-06 RX ADMIN — NICOTINE 1 PATCH: 21 PATCH, EXTENDED RELEASE TRANSDERMAL at 08:42

## 2025-04-06 RX ADMIN — TRAZODONE HYDROCHLORIDE 50 MG: 50 TABLET ORAL at 21:28

## 2025-04-06 RX ADMIN — PRAZOSIN HYDROCHLORIDE 1 MG: 1 CAPSULE ORAL at 21:28

## 2025-04-06 RX ADMIN — FOLIC ACID 1 MG: 1 TABLET ORAL at 08:43

## 2025-04-06 RX ADMIN — Medication 2000 UNITS: at 08:43

## 2025-04-06 RX ADMIN — SERTRALINE HYDROCHLORIDE 50 MG: 50 TABLET ORAL at 08:43

## 2025-04-06 RX ADMIN — QUETIAPINE FUMARATE 300 MG: 300 TABLET ORAL at 21:28

## 2025-04-06 RX ADMIN — THIAMINE HCL TAB 100 MG 100 MG: 100 TAB at 08:43

## 2025-04-06 RX ADMIN — Medication 1 TABLET: at 08:43

## 2025-04-06 RX ADMIN — ALUMINUM HYDROXIDE, MAGNESIUM HYDROXIDE, AND SIMETHICONE 30 ML: 200; 200; 20 SUSPENSION ORAL at 08:47

## 2025-04-06 NOTE — PLAN OF CARE
Problem: Ineffective Coping  Goal: Cooperates with admission process  Description: Interventions: - Complete admission process  Outcome: Progressing  Goal: Participates in unit activities  Description: Interventions:- Provide therapeutic environment - Provide required programming - Redirect inappropriate behaviors   Outcome: Progressing  Goal: Patient/Family verbalizes awareness of resources  Outcome: Progressing     Problem: Depression  Goal: Treatment Goal: Demonstrate behavioral control of depressive symptoms, verbalize feelings of improved mood/affect, and adopt new coping skills prior to discharge  Outcome: Progressing  Goal: Verbalize thoughts and feelings  Description: Interventions:- Assess and re-assess patient's level of risk - Engage patient in 1:1 interactions, daily, for a minimum of 15 minutes - Encourage patient to express feelings, fears, frustrations, hopes   Outcome: Progressing  Goal: Attend and participate in unit activities, including therapeutic, recreational, and educational groups  Description: Interventions:- Provide therapeutic and educational activities daily, encourage attendance and participation, and document same in the medical record   Outcome: Progressing     Problem: Anxiety  Goal: Anxiety is at manageable level  Description: Interventions:- Assess and monitor patient's anxiety level. - Monitor for signs and symptoms (heart palpitations, chest pain, shortness of breath, headaches, nausea, feeling jumpy, restlessness, irritable, apprehensive). - Collaborate with interdisciplinary team and initiate plan and interventions as ordered.- [place] patient to unit/surroundings- Explain treatment plan- Encourage participation in care- Encourage verbalization of concerns/fears- Identify coping mechanisms- Assist in developing anxiety-reducing skills- Administer/offer alternative therapies- Limit or eliminate stimulants  Outcome: Progressing     Problem: Risk for Violence/Aggression Toward  Others  Goal: Treatment Goal: Refrain from acts of violence/aggression during length of stay, and demonstrate improved impulse control at the time of discharge  Outcome: Progressing  Goal: Refrain from harming others  Outcome: Progressing  Goal: Control angry outbursts  Description: Interventions:- Monitor patient closely, per order- Ensure early verbal de-escalation- Monitor prn medication needs- Set reasonable/therapeutic limits, outline behavioral expectations, and consequences - Provide a non-threatening milieu, utilizing the least restrictive interventions   Outcome: Progressing     Problem: Potential for Falls  Goal: Patient will remain free of falls  Description: -Remind patient to notify staff when needed.  -Continue to assess pt's LOC and safety awareness  Outcome: Progressing     Problem: DISCHARGE PLANNING - CARE MANAGEMENT  Goal: Discharge to post-acute care or home with appropriate resources  Description: INTERVENTIONS:- Conduct assessment to determine patient/family and health care team treatment goals, and need for post-acute services based on payer coverage, community resources, and patient preferences, and barriers to discharge- Address psychosocial, clinical, and financial barriers to discharge as identified in assessment in conjunction with the patient/family and health care team- Arrange appropriate level of post-acute services according to patient’s   needs and preference and payer coverage in collaboration with the physician and health care team- Communicate with and update the patient/family, physician, and health care team regarding progress on the discharge plan- Arrange appropriate transportation to post-acute venues  Outcome: Progressing

## 2025-04-06 NOTE — ASSESSMENT & PLAN NOTE
As of 04/06/25: Genevieve still has unstable mood and continues to report on and off homicidal thoughts. She was angry at her roommate last night and is glad that her room was changed today.    Current medications  Continue Depakote ER 1,500 mg at bedtime to help with mood stabilization and anger control.   Recheck Depakote level, CBC/diff and CMP on 04/06/2025  Continue Prazosin 1 mg at bedtime to help with nightmares  Continue Seroquel 300 mg at bedtime to help with mood  Continue Zoloft 50 mg daily to help with depressive symptoms

## 2025-04-06 NOTE — PROGRESS NOTES
Progress Note - Behavioral Health   Name: Genevieve Meza 49 y.o. female I MRN: [id number]  Unit/Bed#: -01 I Date of Admission: 3/26/2025   Date of Service: 4/6/2025 I Hospital Day: 11     Assessment & Plan  Major depressive disorder, recurrent, severe with psychotic features (HCC)  As of 04/06/25: Genevieve still has unstable mood and continues to report on and off homicidal thoughts. She was angry at her roommate last night and is glad that her room was changed today.    Current medications  Continue Depakote ER 1,500 mg at bedtime to help with mood stabilization and anger control.   Recheck Depakote level, CBC/diff and CMP on 04/06/2025  Continue Prazosin 1 mg at bedtime to help with nightmares  Continue Seroquel 300 mg at bedtime to help with mood  Continue Zoloft 50 mg daily to help with depressive symptoms  SAADIA (generalized anxiety disorder)  Management as per principal problem  Post-traumatic stress disorder, chronic  Management as per principal problem  Panic disorder with agoraphobia  Management as per principal problem  Impulse control disorder  Management as per principal problem  Personality disorder (HCC)  Management as per principal problem  Alcohol abuse, continuous  Counseling on the unit  Medical clearance for psychiatric admission  Management per medical team  Fibromyalgia  Management per medical team  Tobacco abuse  Smoking cessation  Elevated triglycerides with high cholesterol  Management per medical team  Vitamin D deficiency  Daily Vitamin D3 2,000 units  Management per medical team    Current medications:  Current Facility-Administered Medications   Medication Dose Route Frequency Provider Last Rate    albuterol  2 puff Inhalation Q4H PRN Aye Hill MD      aluminum-magnesium hydroxide-simethicone  30 mL Oral Q4H PRN GLENN Hong      benztropine  1 mg Intramuscular Q4H PRN Max 6/day LGENN Hong      benztropine  1 mg Oral Q4H PRN Max 6/day GLENN Hong       bisacodyl  10 mg Rectal Daily PRN GLENN Hong      Cholecalciferol  2,000 Units Oral Daily Aye Hill MD      cyanocobalamin  1,000 mcg Oral Daily Cassidy Dominguez PA-C      divalproex sodium  1,500 mg Oral HS Aye Hill MD      folic acid  1 mg Oral Daily Aye Hill MD      hydrOXYzine HCL  25 mg Oral Q6H PRN Max 4/day GLENN Hong      hydrOXYzine HCL  50 mg Oral Q4H PRN Max 4/day GLENN Hong      Or    LORazepam  1 mg Intramuscular Q4H PRN GLENN Hong      ibuprofen  400 mg Oral Q4H PRN GLENN Hong      ibuprofen  600 mg Oral Q6H PRN GLENN Hong      ibuprofen  800 mg Oral Q8H PRN GLENN Hong      LORazepam  1 mg Oral Q4H PRN Max 6/day GLENN Hong      Or    LORazepam  2 mg Intramuscular Q6H PRN Max 3/day GLENN Hong      multivitamin-minerals  1 tablet Oral Daily Aye Hill MD      nicotine  1 patch Transdermal Daily GLENN Hong      OLANZapine  10 mg Oral Q3H PRN Max 3/day GLENN Hong      Or    OLANZapine  10 mg Intramuscular Q3H PRN Max 3/day GLENN Hong      OLANZapine  5 mg Oral Q3H PRN Max 6/day GLENN Hong      Or    OLANZapine  5 mg Intramuscular Q3H PRN Max 6/day GLENN Hong      OLANZapine  2.5 mg Oral Q3H PRN Max 8/day GLENN Hong      polyethylene glycol  17 g Oral Daily PRN GLENN Hong      prazosin  1 mg Oral HS Aye Hill MD      propranolol  10 mg Oral Q8H PRN GLENN Hong      QUEtiapine  300 mg Oral HS Favian Guardado DO      senna-docusate sodium  1 tablet Oral BID PRN Cassidy Dominguez PA-C      sertraline  50 mg Oral Daily Favian Guardado DO      thiamine  100 mg Oral Daily Aye Hill MD      traZODone  50 mg Oral HS PRN GLENN Hong          Risks/Benefits of Treatment:     Risks, benefits, and possible side effects of medications explained to patient and patient verbalizes understanding and agreement for treatment.    Progress Toward Goals:  improving    Treatment Planning:     All current active medications have been reviewed.  Continue to monitor response to treatment and assess for potential side effects of medications.  Encourage group therapy, milieu therapy and occupational therapy  Collaboration with medical service for medical comorbidities as indicated.  Behavioral Health checks for safety monitoring.  Long Stay Certification : Not Applicable  Estimated Discharge Day: 4/10/2025 10 days (4/16/2025)  Legal Status: Status of Admission  Status of Admission: 201  Release of Information Signed: Yes (Pt signed SORAYA for [facility], as well as Isidro Brown ([place])).    Subjective     Behavior over the last 24 hours: improved.    Patient remains compliant with medications and denies side effects. According to staff report patient is calm and cooperative upon approach. Patient is visible on unit, socializing with peers.      Today she  denies SI/HI/AH/VH. Mood is depressed and is hoping to get Sertraline dose increased tomorrow to 100 mg po qam. Took prn Trazodone for insomnia last night which helped.  Agrees to continue current treatment.    Sleep: normal  Appetite: normal  Medication side effects: No  ROS: review of systems as noted above in HPI/Subjective report, all other systems are negative    Objective :  Temp:  [97.7 °F (36.5 °C)-97.9 °F (36.6 °C)] 97.7 °F (36.5 °C)  HR:  [72-87] 72  BP: (102-117)/(67-68) 117/68  Resp:  [16] 16  SpO2:  [99 %-100 %] 99 %  O2 Device: None (Room air)    Temp:  [97.7 °F (36.5 °C)-97.9 °F (36.6 °C)] 97.7 °F (36.5 °C)  HR:  [72-87] 72  BP: (102-117)/(67-68) 117/68  Resp:  [16] 16  SpO2:  [99 %-100 %] 99 %  O2 Device: None (Room air)    Mental Status Evaluation:    Appearance:  age appropriate, casually dressed   Behavior:  cooperative   Speech:  normal rate, normal volume, normal pitch, spontaneous   Mood:  depressed   Affect:  constricted   Thought Process:  goal directed   Thought Content:  no overt delusions    Perceptual Disturbances: none   Risk Potential: Suicidal Ideation -  None  Homicidal Ideation -  None  Potential for Aggression - No   Sensorium:  oriented to person, place, and time/date   Memory:  recent and remote memory grossly intact   Consciousness:  alert and awake   Attention/Concentration: attention span and concentration appear shorter than expected for age   Insight:  limited   Judgment: limited   Gait/Station: normal gait/station, normal balance   Motor Activity: no abnormal movements       Lab Results: I have reviewed the following results:  Results from the past 24 hours: No results found for this or any previous visit (from the past 24 hours).    Administrative Statements     Counseling / Coordination of Care:   Patient's progress discussed with staff in treatment team meeting.  Medication changes reviewed with staff in treatment team meeting..    Mary Ann May MD 04/06/25

## 2025-04-06 NOTE — NURSING NOTE
Patient remained visible on the unit throughout the evening. Pleasant and cooperative with routine. Denied any unmet needs. HS medication compliant. Trazodone 50 mg po prn given at 2106.

## 2025-04-06 NOTE — NURSING NOTE
At 2206, patient appeared resting in bed with her eyes closed. Trazodone 50 mg po prn appears effective.

## 2025-04-06 NOTE — NURSING NOTE
Patient is calm and cooperative upon approach. Patient is visible on unit, socializing with peers. Patient denies SI/HI/AH/VH. Patient is compliant with meds and meals.     0847- Patient requested Maalox PO for indigestion. Will reassess in an hr

## 2025-04-07 PROCEDURE — 99232 SBSQ HOSP IP/OBS MODERATE 35: CPT | Performed by: PSYCHIATRY & NEUROLOGY

## 2025-04-07 RX ADMIN — Medication 1 TABLET: at 08:49

## 2025-04-07 RX ADMIN — Medication 2000 UNITS: at 08:49

## 2025-04-07 RX ADMIN — THIAMINE HCL TAB 100 MG 100 MG: 100 TAB at 08:49

## 2025-04-07 RX ADMIN — QUETIAPINE FUMARATE 300 MG: 300 TABLET ORAL at 21:19

## 2025-04-07 RX ADMIN — PRAZOSIN HYDROCHLORIDE 1 MG: 1 CAPSULE ORAL at 21:19

## 2025-04-07 RX ADMIN — CYANOCOBALAMIN TAB 1000 MCG 1000 MCG: 1000 TAB at 08:49

## 2025-04-07 RX ADMIN — NICOTINE 1 PATCH: 21 PATCH, EXTENDED RELEASE TRANSDERMAL at 08:49

## 2025-04-07 RX ADMIN — DIVALPROEX SODIUM 1500 MG: 500 TABLET, EXTENDED RELEASE ORAL at 21:19

## 2025-04-07 RX ADMIN — TRAZODONE HYDROCHLORIDE 50 MG: 50 TABLET ORAL at 21:19

## 2025-04-07 RX ADMIN — SENNOSIDES AND DOCUSATE SODIUM 1 TABLET: 50; 8.6 TABLET ORAL at 21:19

## 2025-04-07 RX ADMIN — FOLIC ACID 1 MG: 1 TABLET ORAL at 08:49

## 2025-04-07 RX ADMIN — SERTRALINE HYDROCHLORIDE 50 MG: 50 TABLET ORAL at 08:49

## 2025-04-07 NOTE — ASSESSMENT & PLAN NOTE
As of 04/07/25: Genevieve still labile mood and continues to report on and off homicidal thoughts.  She feels like her homicidal ideation is improving after changing rooms.    Current medications  Continue Depakote ER 1,500 mg at bedtime to help with mood stabilization and anger control.   Labs reviewed from 4/6/25: VPA level 78, CBC/diff and CMP within normal limits  Continue Prazosin 1 mg at bedtime to help with nightmares  Continue Seroquel 300 mg at bedtime to help with mood  Continue Zoloft 50 mg daily to help with depressive symptoms

## 2025-04-07 NOTE — PROGRESS NOTES
04/07/25 0847   Team Meeting   Meeting Type Daily Rounds   Team Members Present   Team Members Present Physician;Nurse;   Physician Team Member Sergio   Nursing Team Member Morris   Care Management Team Member Roman   Patient/Family Present   Patient Present No   Patient's Family Present No     Pt can be irritable at times. Interactive with select peers. Med and meal compliant. Cooperative. Attending groups. Depakote levels in normal range

## 2025-04-07 NOTE — NURSING NOTE
Pt is calm, cooperative, social with select peers and staff. Pt denies SI, HI, AH, VH and pain at this time. Pt is withdrawing/isolating in her room but does come out for meals and needs. Pt is seen in the dayroom this morning for morning medications and breakfast. Pt not going to groups, states she is doing her ADLs and is medication/ meal complaint. Pt denies any unmet needs and remains on q15 min checks for safety.

## 2025-04-07 NOTE — PLAN OF CARE
Problem: Ineffective Coping  Goal: Cooperates with admission process  Description: Interventions: - Complete admission process  Outcome: Progressing  Goal: Participates in unit activities  Description: Interventions:- Provide therapeutic environment - Provide required programming - Redirect inappropriate behaviors   Outcome: Progressing  Goal: Patient/Family verbalizes awareness of resources  Outcome: Progressing     Problem: Depression  Goal: Treatment Goal: Demonstrate behavioral control of depressive symptoms, verbalize feelings of improved mood/affect, and adopt new coping skills prior to discharge  Outcome: Progressing  Goal: Verbalize thoughts and feelings  Description: Interventions:- Assess and re-assess patient's level of risk - Engage patient in 1:1 interactions, daily, for a minimum of 15 minutes - Encourage patient to express feelings, fears, frustrations, hopes   Outcome: Progressing  Goal: Attend and participate in unit activities, including therapeutic, recreational, and educational groups  Description: Interventions:- Provide therapeutic and educational activities daily, encourage attendance and participation, and document same in the medical record   Outcome: Progressing     Problem: Anxiety  Goal: Anxiety is at manageable level  Description: Interventions:- Assess and monitor patient's anxiety level. - Monitor for signs and symptoms (heart palpitations, chest pain, shortness of breath, headaches, nausea, feeling jumpy, restlessness, irritable, apprehensive). - Collaborate with interdisciplinary team and initiate plan and interventions as ordered.- [place] patient to unit/surroundings- Explain treatment plan- Encourage participation in care- Encourage verbalization of concerns/fears- Identify coping mechanisms- Assist in developing anxiety-reducing skills- Administer/offer alternative therapies- Limit or eliminate stimulants  Outcome: Progressing     Problem: Risk for Violence/Aggression Toward  Others  Goal: Treatment Goal: Refrain from acts of violence/aggression during length of stay, and demonstrate improved impulse control at the time of discharge  Outcome: Progressing  Goal: Refrain from harming others  Outcome: Progressing  Goal: Control angry outbursts  Description: Interventions:- Monitor patient closely, per order- Ensure early verbal de-escalation- Monitor prn medication needs- Set reasonable/therapeutic limits, outline behavioral expectations, and consequences - Provide a non-threatening milieu, utilizing the least restrictive interventions   Outcome: Progressing     Problem: Potential for Falls  Goal: Patient will remain free of falls  Description: -Remind patient to notify staff when needed.  -Continue to assess pt's LOC and safety awareness  Outcome: Progressing     Problem: DISCHARGE PLANNING - CARE MANAGEMENT  Goal: Discharge to post-acute care or home with appropriate resources  Description: INTERVENTIONS:- Conduct assessment to determine patient/family and health care team treatment goals, and need for post-acute services based on payer coverage, community resources, and patient preferences, and barriers to discharge- Address psychosocial, clinical, and financial barriers to discharge as identified in assessment in conjunction with the patient/family and health care team- Arrange appropriate level of post-acute services according to patient’s   needs and preference and payer coverage in collaboration with the physician and health care team- Communicate with and update the patient/family, physician, and health care team regarding progress on the discharge plan- Arrange appropriate transportation to post-acute venues  Outcome: Progressing

## 2025-04-07 NOTE — NURSING NOTE
Patient is in the community and social with peers. Patient affect is depressed and can be pessimistic at times. Patient denies SI/HI/AVH, but continued mood instability. Patient compliant with scheduled medications/ At this time, patient c/o 10/10 throat pain when eating or swallowing. PRN motrin 800mg was administered at 2127. Patient also requested PRN trazodone 50mg at this time.     On reassessment, patient observed resting in bed with no further complaints of pain or difficulty sleeping. Medications effective.

## 2025-04-07 NOTE — PROGRESS NOTES
Progress Note - Behavioral Health   Name: Genevieve Meza 49 y.o. female I MRN: [id number]  Unit/Bed#: -01 I Date of Admission: 3/26/2025   Date of Service: 4/7/2025 I Hospital Day: 12    Assessment & Plan  Major depressive disorder, recurrent, severe with psychotic features (HCC)  As of 04/07/25: Genevieve still labile mood and continues to report on and off homicidal thoughts.  She feels like her homicidal ideation is improving after changing rooms.    Current medications  Continue Depakote ER 1,500 mg at bedtime to help with mood stabilization and anger control.   Labs reviewed from 4/6/25: VPA level 78, CBC/diff and CMP within normal limits  Continue Prazosin 1 mg at bedtime to help with nightmares  Continue Seroquel 300 mg at bedtime to help with mood  Continue Zoloft 50 mg daily to help with depressive symptoms  SAADIA (generalized anxiety disorder)  Management as per principal problem  Post-traumatic stress disorder, chronic  Management as per principal problem  Panic disorder with agoraphobia  Management as per principal problem  Impulse control disorder  Management as per principal problem  Personality disorder (HCC)  Management as per principal problem  Alcohol abuse, continuous  Counseling on the unit  Medical clearance for psychiatric admission  Management per medical team  Fibromyalgia  Management per medical team  Tobacco abuse  Smoking cessation  Elevated triglycerides with high cholesterol  Management per medical team  Vitamin D deficiency  Daily Vitamin D3 2,000 units  Management per medical team    Current medications:  Current Facility-Administered Medications   Medication Dose Route Frequency Provider Last Rate    albuterol  2 puff Inhalation Q4H PRN Aye Hill MD      aluminum-magnesium hydroxide-simethicone  30 mL Oral Q4H PRN GLENN Hong      benztropine  1 mg Intramuscular Q4H PRN Max 6/day GLENN Hong      benztropine  1 mg Oral Q4H PRN Max 6/day GLENN Hong       bisacodyl  10 mg Rectal Daily PRN GLENN Hong      Cholecalciferol  2,000 Units Oral Daily Aye Hill MD      cyanocobalamin  1,000 mcg Oral Daily Cassidy Dominguez PA-C      divalproex sodium  1,500 mg Oral HS Aye Hill MD      folic acid  1 mg Oral Daily Aye Hill MD      hydrOXYzine HCL  25 mg Oral Q6H PRN Max 4/day GLENN Hong      hydrOXYzine HCL  50 mg Oral Q4H PRN Max 4/day GLENN Hong      Or    LORazepam  1 mg Intramuscular Q4H PRN GLENN Hong      ibuprofen  400 mg Oral Q4H PRN GLENN Hong      ibuprofen  600 mg Oral Q6H PRN GLENN Hong      ibuprofen  800 mg Oral Q8H PRN GLENN Hong      LORazepam  1 mg Oral Q4H PRN Max 6/day GLENN Hong      Or    LORazepam  2 mg Intramuscular Q6H PRN Max 3/day GLENN Hong      multivitamin-minerals  1 tablet Oral Daily Aye Hill MD      nicotine  1 patch Transdermal Daily GLENN Hong      OLANZapine  10 mg Oral Q3H PRN Max 3/day GLENN Hong      Or    OLANZapine  10 mg Intramuscular Q3H PRN Max 3/day GLENN Hong      OLANZapine  5 mg Oral Q3H PRN Max 6/day GLENN Hong      Or    OLANZapine  5 mg Intramuscular Q3H PRN Max 6/day GLENN Hong      OLANZapine  2.5 mg Oral Q3H PRN Max 8/day GLENN Hong      polyethylene glycol  17 g Oral Daily PRN GLENN Hong      prazosin  1 mg Oral HS Aye Hill MD      propranolol  10 mg Oral Q8H PRN GLENN Hong      QUEtiapine  300 mg Oral HS Favian Guardado DO      senna-docusate sodium  1 tablet Oral BID PRN Cassidy Dominguez PA-C      sertraline  50 mg Oral Daily Favian Guardado DO      thiamine  100 mg Oral Daily Aye Hill MD      traZODone  50 mg Oral HS PRN GLENN Hong          Risks/Benefits of Treatment:     Risks, benefits, and possible side effects of medications explained to patient and patient verbalizes understanding and agreement for treatment.    Progress Toward Goals:  "progressing slowly    Treatment Planning:     All current active medications have been reviewed.  Continue to monitor response to treatment and assess for potential side effects of medications.  Encourage group therapy, milieu therapy and occupational therapy  Collaboration with medical service for medical comorbidities as indicated.  Behavioral Health checks for safety monitoring.  Long Stay Certification : Not Applicable  Estimated Discharge Day: 4/10/2025   Legal Status : Voluntary 201 commitment.    Subjective     Behavior over the last 24 hours: slowly improving.    Per nursing, patient has been irritable reporting difficulty with swallowing, noted to have mood instability and fleeting HI.    Genevieve appears to be slightly irritable, lying in bed, scant in conversation.  She reports that her current mood is \"fine.\"  She states that she feels more tired than usual.  She states that she feels like her appetite has increased since being here.  She notes normal sleep.  She denies suicidal or homicidal ideation, intent, or plan as of today.  She reports after changing rooms she has been feeling less homicidal.  She denies auditory or visual hallucinations. Limited group attendance. Compliant with medications.    Sleep: normal  Appetite: increased  Medication side effects: No  ROS: review of systems as noted above in HPI/Subjective report, all other systems are negative    Objective :  Temp:  [97.9 °F (36.6 °C)-98.1 °F (36.7 °C)] 97.9 °F (36.6 °C)  HR:  [73-80] 73  BP: (104-114)/(56-61) 104/60  Resp:  [16-17] 17  SpO2:  [98 %] 98 %  O2 Device: None (Room air)    Temp:  [97.9 °F (36.6 °C)-98.1 °F (36.7 °C)] 97.9 °F (36.6 °C)  HR:  [73-80] 73  BP: (104-114)/(56-61) 104/60  Resp:  [16-17] 17  SpO2:  [98 %] 98 %  O2 Device: None (Room air)    Mental Status Evaluation:    Appearance:  disheveled, marginal hygiene, looks stated age, overweight   Behavior:  calm, guarded, limited eye contact   Speech:  normal rate and " "volume, scant   Mood:  \"Fine\"   Affect:  blunted, irritable edge   Thought Process:  organized, goal directed   Thought Content:  some paranoia   Perceptual Disturbances: no auditory hallucinations, no visual hallucinations   Risk Potential: Suicidal Ideation -  None at present  Homicidal Ideation -  angry, hostile feelings with no homicidal plan  Potential for Aggression - Not at present   Sensorium:  oriented to person, place, and time/date   Memory:  recent and remote memory grossly intact   Consciousness:  alert and awake   Attention/Concentration: decreased attention span and decreased concentration   Insight:  impaired   Judgment: impaired   Gait/Station: normal gait/station, normal balance   Motor Activity: no abnormal movements       Lab Results: I have reviewed the following results:  CBC:   Lab Results   Component Value Date    WBC 6.30 04/06/2025    RBC 4.60 04/06/2025    HGB 11.5 04/06/2025    HCT 37.8 04/06/2025    MCV 82 04/06/2025     04/06/2025    MCH 25.0 (L) 04/06/2025    MCHC 30.4 (L) 04/06/2025    RDW 15.0 04/06/2025    MPV 10.4 04/06/2025    NEUTROABS 2.16 03/30/2025    TOTANEUTABS 2.08 04/06/2025     CMP:   Lab Results   Component Value Date    SODIUM 137 04/06/2025    K 4.6 04/06/2025    CL 99 04/06/2025    CO2 32 04/06/2025    AGAP 6 04/06/2025    BUN 15 04/06/2025    CREATININE 0.93 04/06/2025    GLUC 89 04/06/2025    GLUF 89 04/06/2025    CALCIUM 8.9 04/06/2025    AST 9 (L) 04/06/2025    ALT 7 04/06/2025    ALKPHOS 44 04/06/2025    TP 6.7 04/06/2025    ALB 3.9 04/06/2025    TBILI 0.21 04/06/2025    EGFR 73 04/06/2025     Depakote:   Lab Results   Component Value Date    VALPROICTOT 78 04/06/2025       Administrative Statements     Counseling / Coordination of Care:   Patient's progress discussed with staff in treatment team meeting.  Medication changes reviewed with staff in treatment team meeting..    Hailey Archibald DO 04/07/25  "

## 2025-04-08 LAB
ALBUMIN SERPL BCG-MCNC: 4 G/DL (ref 3.5–5)
ALP SERPL-CCNC: 45 U/L (ref 34–104)
ALT SERPL W P-5'-P-CCNC: 6 U/L (ref 7–52)
ANION GAP SERPL CALCULATED.3IONS-SCNC: 6 MMOL/L (ref 4–13)
AST SERPL W P-5'-P-CCNC: 9 U/L (ref 13–39)
BASOPHILS # BLD AUTO: 0.06 THOUSANDS/ÂΜL (ref 0–0.1)
BASOPHILS NFR BLD AUTO: 1 % (ref 0–1)
BILIRUB SERPL-MCNC: 0.18 MG/DL (ref 0.2–1)
BUN SERPL-MCNC: 13 MG/DL (ref 5–25)
CALCIUM SERPL-MCNC: 9 MG/DL (ref 8.4–10.2)
CHLORIDE SERPL-SCNC: 100 MMOL/L (ref 96–108)
CO2 SERPL-SCNC: 30 MMOL/L (ref 21–32)
CREAT SERPL-MCNC: 0.83 MG/DL (ref 0.6–1.3)
EOSINOPHIL # BLD AUTO: 0.2 THOUSAND/ÂΜL (ref 0–0.61)
EOSINOPHIL NFR BLD AUTO: 3 % (ref 0–6)
ERYTHROCYTE [DISTWIDTH] IN BLOOD BY AUTOMATED COUNT: 15.1 % (ref 11.6–15.1)
GFR SERPL CREATININE-BSD FRML MDRD: 84 ML/MIN/1.73SQ M
GLUCOSE SERPL-MCNC: 94 MG/DL (ref 65–140)
HCT VFR BLD AUTO: 35.3 % (ref 34.8–46.1)
HGB BLD-MCNC: 10.8 G/DL (ref 11.5–15.4)
IMM GRANULOCYTES # BLD AUTO: 0.03 THOUSAND/UL (ref 0–0.2)
IMM GRANULOCYTES NFR BLD AUTO: 1 % (ref 0–2)
LYMPHOCYTES # BLD AUTO: 2.39 THOUSANDS/ÂΜL (ref 0.6–4.47)
LYMPHOCYTES NFR BLD AUTO: 37 % (ref 14–44)
MCH RBC QN AUTO: 25.3 PG (ref 26.8–34.3)
MCHC RBC AUTO-ENTMCNC: 30.6 G/DL (ref 31.4–37.4)
MCV RBC AUTO: 83 FL (ref 82–98)
MONOCYTES # BLD AUTO: 0.74 THOUSAND/ÂΜL (ref 0.17–1.22)
MONOCYTES NFR BLD AUTO: 11 % (ref 4–12)
NEUTROPHILS # BLD AUTO: 3.11 THOUSANDS/ÂΜL (ref 1.85–7.62)
NEUTS SEG NFR BLD AUTO: 47 % (ref 43–75)
NRBC BLD AUTO-RTO: 0 /100 WBCS
PLATELET # BLD AUTO: 274 THOUSANDS/UL (ref 149–390)
PMV BLD AUTO: 10.6 FL (ref 8.9–12.7)
POTASSIUM SERPL-SCNC: 4.2 MMOL/L (ref 3.5–5.3)
PROT SERPL-MCNC: 6.6 G/DL (ref 6.4–8.4)
RBC # BLD AUTO: 4.27 MILLION/UL (ref 3.81–5.12)
SODIUM SERPL-SCNC: 136 MMOL/L (ref 135–147)
VALPROATE SERPL-MCNC: 80 UG/ML (ref 50–125)
WBC # BLD AUTO: 6.53 THOUSAND/UL (ref 4.31–10.16)

## 2025-04-08 PROCEDURE — 99232 SBSQ HOSP IP/OBS MODERATE 35: CPT | Performed by: PSYCHIATRY & NEUROLOGY

## 2025-04-08 PROCEDURE — 80164 ASSAY DIPROPYLACETIC ACD TOT: CPT | Performed by: PSYCHIATRY & NEUROLOGY

## 2025-04-08 PROCEDURE — 85027 COMPLETE CBC AUTOMATED: CPT | Performed by: PSYCHIATRY & NEUROLOGY

## 2025-04-08 PROCEDURE — 80053 COMPREHEN METABOLIC PANEL: CPT | Performed by: PSYCHIATRY & NEUROLOGY

## 2025-04-08 RX ORDER — RISPERIDONE 1 MG/1
1 TABLET ORAL
Status: DISCONTINUED | OUTPATIENT
Start: 2025-04-08 | End: 2025-04-09

## 2025-04-08 RX ORDER — QUETIAPINE FUMARATE 100 MG/1
200 TABLET, FILM COATED ORAL
Status: DISCONTINUED | OUTPATIENT
Start: 2025-04-08 | End: 2025-04-09

## 2025-04-08 RX ADMIN — CYANOCOBALAMIN TAB 1000 MCG 1000 MCG: 1000 TAB at 09:40

## 2025-04-08 RX ADMIN — NICOTINE 1 PATCH: 21 PATCH, EXTENDED RELEASE TRANSDERMAL at 09:41

## 2025-04-08 RX ADMIN — Medication 2000 UNITS: at 09:40

## 2025-04-08 RX ADMIN — Medication 1 TABLET: at 09:40

## 2025-04-08 RX ADMIN — DIVALPROEX SODIUM 1500 MG: 500 TABLET, EXTENDED RELEASE ORAL at 21:00

## 2025-04-08 RX ADMIN — SERTRALINE HYDROCHLORIDE 50 MG: 50 TABLET ORAL at 09:40

## 2025-04-08 RX ADMIN — FOLIC ACID 1 MG: 1 TABLET ORAL at 09:40

## 2025-04-08 RX ADMIN — THIAMINE HCL TAB 100 MG 100 MG: 100 TAB at 09:40

## 2025-04-08 RX ADMIN — QUETIAPINE FUMARATE 200 MG: 100 TABLET ORAL at 21:00

## 2025-04-08 RX ADMIN — RISPERIDONE 1 MG: 1 TABLET, FILM COATED ORAL at 21:00

## 2025-04-08 RX ADMIN — TRAZODONE HYDROCHLORIDE 50 MG: 50 TABLET ORAL at 21:00

## 2025-04-08 NOTE — ASSESSMENT & PLAN NOTE
As of 04/08/25: Genevieve continues to be irritable, but improving in terms of lability. Patient is currently somatically preoccupied. She is no longer homicidal.    Current medications  Continue Depakote ER 1,500 mg at bedtime to help with mood stabilization and anger control.   Labs reviewed from 4/6/25: VPA level 78, CBC/diff and CMP within normal limits, repeat labs tonight.  Discontinue Prazosin 1 mg at bedtime due to dizziness  Taper Seroquel to 200 mg at bedtime due to dizziness  Start Risperdal 1 mg daily at bedtime to help with mood  Increase Zoloft to 75 mg daily to help with depressive symptoms

## 2025-04-08 NOTE — NURSING NOTE
Patient is isolative to bedroom. Denies SI, HI, SIB, auditory and visual hallucinations. Patient is med and meal compliant. Strongly encouraged to attend therapy groups. Denies any unmet needs at this time. Maintaining Q15 safety checks.

## 2025-04-08 NOTE — NURSING NOTE
Patient visible for needs, social when visible. Patient endorses some depression but denies all other psych symptoms. Patient compliant with scheduled medications, patient requested PRN PO Trazodone 50 mg to assist with sleep and PRN PO senokot 8.6-5.50 mg due to constipation administered @ 6209. 4600: Patient awake after reassessment of PRN PO trazodone 50 mg, Patient also not reporting a BM after PRN PO senokot 8.6-5.50.

## 2025-04-08 NOTE — PLAN OF CARE
Problem: Ineffective Coping  Goal: Cooperates with admission process  Description: Interventions: - Complete admission process  Outcome: Progressing  Goal: Participates in unit activities  Description: Interventions:- Provide therapeutic environment - Provide required programming - Redirect inappropriate behaviors   Outcome: Progressing  Goal: Patient/Family verbalizes awareness of resources  Outcome: Progressing     Problem: Depression  Goal: Treatment Goal: Demonstrate behavioral control of depressive symptoms, verbalize feelings of improved mood/affect, and adopt new coping skills prior to discharge  Outcome: Progressing  Goal: Verbalize thoughts and feelings  Description: Interventions:- Assess and re-assess patient's level of risk - Engage patient in 1:1 interactions, daily, for a minimum of 15 minutes - Encourage patient to express feelings, fears, frustrations, hopes   Outcome: Progressing  Goal: Attend and participate in unit activities, including therapeutic, recreational, and educational groups  Description: Interventions:- Provide therapeutic and educational activities daily, encourage attendance and participation, and document same in the medical record   Outcome: Progressing     Problem: Anxiety  Goal: Anxiety is at manageable level  Description: Interventions:- Assess and monitor patient's anxiety level. - Monitor for signs and symptoms (heart palpitations, chest pain, shortness of breath, headaches, nausea, feeling jumpy, restlessness, irritable, apprehensive). - Collaborate with interdisciplinary team and initiate plan and interventions as ordered.- [place] patient to unit/surroundings- Explain treatment plan- Encourage participation in care- Encourage verbalization of concerns/fears- Identify coping mechanisms- Assist in developing anxiety-reducing skills- Administer/offer alternative therapies- Limit or eliminate stimulants  Outcome: Progressing     Problem: Risk for Violence/Aggression Toward  Others  Goal: Treatment Goal: Refrain from acts of violence/aggression during length of stay, and demonstrate improved impulse control at the time of discharge  Outcome: Progressing  Goal: Refrain from harming others  Outcome: Progressing  Goal: Control angry outbursts  Description: Interventions:- Monitor patient closely, per order- Ensure early verbal de-escalation- Monitor prn medication needs- Set reasonable/therapeutic limits, outline behavioral expectations, and consequences - Provide a non-threatening milieu, utilizing the least restrictive interventions   Outcome: Progressing     Problem: Potential for Falls  Goal: Patient will remain free of falls  Description: -Remind patient to notify staff when needed.  -Continue to assess pt's LOC and safety awareness  Outcome: Progressing     Problem: DISCHARGE PLANNING - CARE MANAGEMENT  Goal: Discharge to post-acute care or home with appropriate resources  Description: INTERVENTIONS:- Conduct assessment to determine patient/family and health care team treatment goals, and need for post-acute services based on payer coverage, community resources, and patient preferences, and barriers to discharge- Address psychosocial, clinical, and financial barriers to discharge as identified in assessment in conjunction with the patient/family and health care team- Arrange appropriate level of post-acute services according to patient’s   needs and preference and payer coverage in collaboration with the physician and health care team- Communicate with and update the patient/family, physician, and health care team regarding progress on the discharge plan- Arrange appropriate transportation to post-acute venues  Outcome: Not Progressing

## 2025-04-08 NOTE — PROGRESS NOTES
04/08/25 0848   Team Meeting   Meeting Type Daily Rounds   Team Members Present   Team Members Present Physician;Nurse;   Physician Team Member Sergio   Nursing Team Member Annette   Care Management Team Member Roman   Patient/Family Present   Patient Present No   Patient's Family Present No     Pt still endorsing mild anxiety. Not attending groups consistently. Med and meal compliant.

## 2025-04-09 PROCEDURE — 99232 SBSQ HOSP IP/OBS MODERATE 35: CPT | Performed by: PSYCHIATRY & NEUROLOGY

## 2025-04-09 RX ORDER — QUETIAPINE FUMARATE 100 MG/1
100 TABLET, FILM COATED ORAL
Status: DISCONTINUED | OUTPATIENT
Start: 2025-04-09 | End: 2025-04-10

## 2025-04-09 RX ADMIN — CYANOCOBALAMIN TAB 1000 MCG 1000 MCG: 1000 TAB at 09:34

## 2025-04-09 RX ADMIN — Medication 2000 UNITS: at 09:34

## 2025-04-09 RX ADMIN — NICOTINE 1 PATCH: 21 PATCH, EXTENDED RELEASE TRANSDERMAL at 09:35

## 2025-04-09 RX ADMIN — Medication 1 TABLET: at 09:34

## 2025-04-09 RX ADMIN — SERTRALINE HYDROCHLORIDE 75 MG: 50 TABLET ORAL at 09:34

## 2025-04-09 RX ADMIN — THIAMINE HCL TAB 100 MG 100 MG: 100 TAB at 09:34

## 2025-04-09 RX ADMIN — FOLIC ACID 1 MG: 1 TABLET ORAL at 09:34

## 2025-04-09 RX ADMIN — DIVALPROEX SODIUM 1500 MG: 500 TABLET, EXTENDED RELEASE ORAL at 21:26

## 2025-04-09 RX ADMIN — QUETIAPINE FUMARATE 100 MG: 100 TABLET ORAL at 21:26

## 2025-04-09 RX ADMIN — RISPERIDONE 1.5 MG: 1 TABLET, FILM COATED ORAL at 21:26

## 2025-04-09 RX ADMIN — TRAZODONE HYDROCHLORIDE 50 MG: 50 TABLET ORAL at 21:28

## 2025-04-09 NOTE — NURSING NOTE
Patient visible for needs and social. Patient currently denying all psych symptoms, compliant with scheduled medications. Patient denies any unmet needs at this time.

## 2025-04-09 NOTE — NURSING NOTE
2100: Patient requesting PRN PO trazodone 50 mg to assist with sleep.    2200: Patient resting in bed with their eyes closed, appears asleep.

## 2025-04-09 NOTE — PLAN OF CARE
Problem: Ineffective Coping  Goal: Cooperates with admission process  Description: Interventions: - Complete admission process  Outcome: Progressing  Goal: Participates in unit activities  Description: Interventions:- Provide therapeutic environment - Provide required programming - Redirect inappropriate behaviors   Outcome: Progressing  Goal: Patient/Family verbalizes awareness of resources  Outcome: Progressing     Problem: Depression  Goal: Treatment Goal: Demonstrate behavioral control of depressive symptoms, verbalize feelings of improved mood/affect, and adopt new coping skills prior to discharge  Outcome: Progressing  Goal: Verbalize thoughts and feelings  Description: Interventions:- Assess and re-assess patient's level of risk - Engage patient in 1:1 interactions, daily, for a minimum of 15 minutes - Encourage patient to express feelings, fears, frustrations, hopes   Outcome: Progressing  Goal: Attend and participate in unit activities, including therapeutic, recreational, and educational groups  Description: Interventions:- Provide therapeutic and educational activities daily, encourage attendance and participation, and document same in the medical record   Outcome: Progressing     Problem: Anxiety  Goal: Anxiety is at manageable level  Description: Interventions:- Assess and monitor patient's anxiety level. - Monitor for signs and symptoms (heart palpitations, chest pain, shortness of breath, headaches, nausea, feeling jumpy, restlessness, irritable, apprehensive). - Collaborate with interdisciplinary team and initiate plan and interventions as ordered.- [place] patient to unit/surroundings- Explain treatment plan- Encourage participation in care- Encourage verbalization of concerns/fears- Identify coping mechanisms- Assist in developing anxiety-reducing skills- Administer/offer alternative therapies- Limit or eliminate stimulants  Outcome: Progressing     Problem: Risk for Violence/Aggression Toward  Others  Goal: Treatment Goal: Refrain from acts of violence/aggression during length of stay, and demonstrate improved impulse control at the time of discharge  Outcome: Progressing  Goal: Refrain from harming others  Outcome: Progressing  Goal: Control angry outbursts  Description: Interventions:- Monitor patient closely, per order- Ensure early verbal de-escalation- Monitor prn medication needs- Set reasonable/therapeutic limits, outline behavioral expectations, and consequences - Provide a non-threatening milieu, utilizing the least restrictive interventions   Outcome: Progressing     Problem: Potential for Falls  Goal: Patient will remain free of falls  Description: -Remind patient to notify staff when needed.  -Continue to assess pt's LOC and safety awareness  Outcome: Progressing     Problem: DISCHARGE PLANNING - CARE MANAGEMENT  Goal: Discharge to post-acute care or home with appropriate resources  Description: INTERVENTIONS:- Conduct assessment to determine patient/family and health care team treatment goals, and need for post-acute services based on payer coverage, community resources, and patient preferences, and barriers to discharge- Address psychosocial, clinical, and financial barriers to discharge as identified in assessment in conjunction with the patient/family and health care team- Arrange appropriate level of post-acute services according to patient’s   needs and preference and payer coverage in collaboration with the physician and health care team- Communicate with and update the patient/family, physician, and health care team regarding progress on the discharge plan- Arrange appropriate transportation to post-acute venues  Outcome: Progressing

## 2025-04-09 NOTE — PROGRESS NOTES
Progress Note - Behavioral Health   Name: Genevieve Meza 49 y.o. female I MRN: [id number]  Unit/Bed#: -01 I Date of Admission: 3/26/2025   Date of Service: 4/9/2025 I Hospital Day: 14    Assessment & Plan  Major depressive disorder, recurrent, severe with psychotic features (HCC)  As of 04/09/25: Genevieve appears to be less irritable, less somatically preoccupied.  She is no longer having homicidal ideation.  Continuing to make medication changes.    Current medications  Continue Depakote ER 1,500 mg at bedtime to help with mood stabilization and anger control.   Labs reviewed from 4/8/25: VPA level 80, CBC/diff showing low Hgb of 10.8, but appears to be at baseline, CMP within normal limits.  Taper Seroquel to 100 mg at bedtime due to dizziness  Increase Risperdal to 1.5 mg daily at bedtime to help with mood  Continue Zoloft 75 mg daily to help with depressive symptoms- plan to increase to 100 mg over the weekend  SAADIA (generalized anxiety disorder)  Management as per principal problem  Post-traumatic stress disorder, chronic  Management as per principal problem  Panic disorder with agoraphobia  Management as per principal problem  Impulse control disorder  Management as per principal problem  Personality disorder (HCC)  Management as per principal problem  Alcohol abuse, continuous  Counseling on the unit  Medical clearance for psychiatric admission  Management per medical team  Fibromyalgia  Management per medical team  Tobacco abuse  Smoking cessation  Elevated triglycerides with high cholesterol  Management per medical team  Vitamin D deficiency  Daily Vitamin D3 2,000 units  Management per medical team    Current medications:  Current Facility-Administered Medications   Medication Dose Route Frequency Provider Last Rate    albuterol  2 puff Inhalation Q4H PRN Aye Hill MD      aluminum-magnesium hydroxide-simethicone  30 mL Oral Q4H PRN GLENN Hong      benztropine  1 mg Intramuscular Q4H  PRN Max 6/day GLENN Hong      benztropine  1 mg Oral Q4H PRN Max 6/day GLENN Hong      bisacodyl  10 mg Rectal Daily PRN GLENN Hong      Cholecalciferol  2,000 Units Oral Daily Aye Hill MD      cyanocobalamin  1,000 mcg Oral Daily Cassidy Dominguez PA-C      divalproex sodium  1,500 mg Oral HS Aye Hill MD      folic acid  1 mg Oral Daily Aye Hill MD      hydrOXYzine HCL  25 mg Oral Q6H PRN Max 4/day Juli Lee, GLENN      hydrOXYzine HCL  50 mg Oral Q4H PRN Max 4/day GLENN Hong      Or    LORazepam  1 mg Intramuscular Q4H PRN Juli Lee, GLENN      ibuprofen  400 mg Oral Q4H PRN Juli Lee, GLENN      ibuprofen  600 mg Oral Q6H PRN Juli Lee, GLENN      ibuprofen  800 mg Oral Q8H PRN GLENN Hong      LORazepam  1 mg Oral Q4H PRN Max 6/day GLENN Hong      Or    LORazepam  2 mg Intramuscular Q6H PRN Max 3/day GLENN Hong      multivitamin-minerals  1 tablet Oral Daily Aye Hill MD      nicotine  1 patch Transdermal Daily GLENN Hong      OLANZapine  10 mg Oral Q3H PRN Max 3/day GLENN Hong      Or    OLANZapine  10 mg Intramuscular Q3H PRN Max 3/day GLENN Hong      OLANZapine  5 mg Oral Q3H PRN Max 6/day GLENN Hong      Or    OLANZapine  5 mg Intramuscular Q3H PRN Max 6/day GLENN Hong      OLANZapine  2.5 mg Oral Q3H PRN Max 8/day GLENN Hong      polyethylene glycol  17 g Oral Daily PRN GLENN Hong      propranolol  10 mg Oral Q8H PRN GLENN Hong      QUEtiapine  100 mg Oral HS Hailey Archibald,       risperiDONE  1.5 mg Oral HS Hailey Archibald,       senna-docusate sodium  1 tablet Oral BID PRN Cassidy Dominguez PA-C      sertraline  75 mg Oral Daily Hailey Archibald,       thiamine  100 mg Oral Daily Aye Hill MD      traZODone  50 mg Oral HS PRN GLENN Hong          Risks/Benefits of Treatment:     Risks, benefits, and possible side effects of medications explained to patient and  "patient verbalizes understanding and agreement for treatment.    Progress Toward Goals: progressing slowly    Treatment Planning:     All current active medications have been reviewed.  Continue to monitor response to treatment and assess for potential side effects of medications.  Encourage group therapy, milieu therapy and occupational therapy  Collaboration with medical service for medical comorbidities as indicated.  Behavioral Health checks for safety monitoring.  Long Stay Certification : Not Applicable  Estimated Discharge Day: 4/10/2025   Legal Status : Voluntary 201 commitment.    Subjective     Behavior over the last 24 hours: slowly improving.    Per nursing, patient was isolative in her bedroom. She denied symptoms. She was visible and social. She did not attend groups.  Patient was medication and meal compliant.  Patient reported insomnia and received PRN trazodone 50 mg on 4/8 at 2100, which was effective.      Genevieve appears to be laying in bed, depressed, slightly brighter, calm and cooperative.  She reports that her current mood is \"better.\"  She states that she slept well after receiving trazodone.  She reports adequate appetite.  She denies suicidal or homicidal ideation, intent, or plan.  She denies auditory or visual hallucinations.  She reports that she is no longer feeling dizzy or unsteady.  We discussed continuing to make medication changes slowly to ensure less side effects.  Patient expressed understanding.  She still reports depression regarding her family situation.  No other concerns or complaints.    Sleep: insomnia, improved with trazodone  Appetite: fair  Medication side effects: No  ROS: review of systems as noted above in HPI/Subjective report, all other systems are negative    Objective :  Temp:  [98.5 °F (36.9 °C)] 98.5 °F (36.9 °C)  HR:  [75-82] 75  BP: (102-108)/(58-63) 102/58  Resp:  [16] 16  SpO2:  [99 %-100 %] 99 %  O2 Device: None (Room air)    Temp:  [98.5 °F (36.9 °C)] " "98.5 °F (36.9 °C)  HR:  [75-82] 75  BP: (102-108)/(58-63) 102/58  Resp:  [16] 16  SpO2:  [99 %-100 %] 99 %  O2 Device: None (Room air)    Mental Status Evaluation:    Appearance:  casually dressed, marginal hygiene, looks stated age, overweight   Behavior:  calm, guarded, fair eye contact   Speech:  normal rate and volume   Mood:  \"Better\"   Affect:  depressed   Thought Process:  organized, goal directed   Thought Content:  no overt delusions   Perceptual Disturbances: no auditory hallucinations, no visual hallucinations   Risk Potential: Suicidal Ideation -  None at present  Homicidal Ideation -  None at present  Potential for Aggression - Not at present   Sensorium:  oriented to person, place, and time/date   Memory:  recent and remote memory grossly intact   Consciousness:  alert and awake   Attention/Concentration: decreased attention span and decreased concentration   Insight:  limited   Judgment: limited   Gait/Station: normal gait/station, normal balance   Motor Activity: no abnormal movements       Lab Results: I have reviewed the following results:  CBC:   Lab Results   Component Value Date    WBC 6.53 04/08/2025    RBC 4.27 04/08/2025    HGB 10.8 (L) 04/08/2025    HCT 35.3 04/08/2025    MCV 83 04/08/2025     04/08/2025    MCH 25.3 (L) 04/08/2025    MCHC 30.6 (L) 04/08/2025    RDW 15.1 04/08/2025    MPV 10.6 04/08/2025    NEUTROABS 3.11 04/08/2025    TOTANEUTABS 2.08 04/06/2025     CMP:   Lab Results   Component Value Date    SODIUM 136 04/08/2025    K 4.2 04/08/2025     04/08/2025    CO2 30 04/08/2025    AGAP 6 04/08/2025    BUN 13 04/08/2025    CREATININE 0.83 04/08/2025    GLUC 94 04/08/2025    GLUF 89 04/06/2025    CALCIUM 9.0 04/08/2025    AST 9 (L) 04/08/2025    ALT 6 (L) 04/08/2025    ALKPHOS 45 04/08/2025    TP 6.6 04/08/2025    ALB 4.0 04/08/2025    TBILI 0.18 (L) 04/08/2025    EGFR 84 04/08/2025     Depakote:   Lab Results   Component Value Date    VALPROICTOT 80 04/08/2025 "       Administrative Statements     Counseling / Coordination of Care:   Patient's progress discussed with staff in treatment team meeting.  Medication changes reviewed with staff in treatment team meeting.  Medication changes discussed with patient.  Medication education provided to patient..    Hailey Archibald DO 04/09/25

## 2025-04-09 NOTE — ASSESSMENT & PLAN NOTE
As of 04/09/25: Genevieve appears to be less irritable, less somatically preoccupied.  She is no longer having homicidal ideation.  Continuing to make medication changes.    Current medications  Continue Depakote ER 1,500 mg at bedtime to help with mood stabilization and anger control.   Labs reviewed from 4/8/25: VPA level 80, CBC/diff showing low Hgb of 10.8, but appears to be at baseline, CMP within normal limits.  Taper Seroquel to 100 mg at bedtime due to dizziness  Increase Risperdal to 1.5 mg daily at bedtime to help with mood  Continue Zoloft 75 mg daily to help with depressive symptoms- plan to increase to 100 mg over the weekend

## 2025-04-09 NOTE — NURSING NOTE
Patient is isolative to bedroom. Denies SI, HI, SIB, auditory and visual hallucinations. Med and meal compliant. Patient endorses having more energy this morning after med changes made. Encouraged increased group participation. Denies any unmet needs at this time. Maintaining Q15 safety checks.

## 2025-04-09 NOTE — PROGRESS NOTES
04/09/25 0840   Team Meeting   Meeting Type Daily Rounds   Team Members Present   Team Members Present Physician;Nurse;   Physician Team Member Sergio   Nursing Team Member Annette   Care Management Team Member Roman   Patient/Family Present   Patient Present No   Patient's Family Present No     Pt withdrawn and isolative. Possible d/c next week due to med changes. Med and meal compliant

## 2025-04-09 NOTE — PROGRESS NOTES
Pt completed and signed Safety & Relapse Prevention Plan. Chart copy was labeled with Pt sticker and placed in RN scan bin. Pt opted to keep Patient copy of form in their possession.    04/09/25 1435   Activity/Group Checklist   Group Admission/Discharge   Attendance Attended   Attendance Duration (min) 0-15   Interactions Interacted appropriately   Affect/Mood Appropriate   Goals Achieved Identified feelings;Identified triggers;Identified relapse prevention strategies;Identified medication adherence strategies;Discussed safety plan;Discussed coping strategies;Discussed discharge plans;Verbalized increased hopefulness

## 2025-04-10 PROCEDURE — 99232 SBSQ HOSP IP/OBS MODERATE 35: CPT | Performed by: PSYCHIATRY & NEUROLOGY

## 2025-04-10 RX ORDER — RISPERIDONE 2 MG/1
2 TABLET ORAL
Status: DISCONTINUED | OUTPATIENT
Start: 2025-04-10 | End: 2025-04-14 | Stop reason: HOSPADM

## 2025-04-10 RX ORDER — SIMETHICONE 80 MG
80 TABLET,CHEWABLE ORAL EVERY 6 HOURS PRN
Status: DISCONTINUED | OUTPATIENT
Start: 2025-04-10 | End: 2025-04-14 | Stop reason: HOSPADM

## 2025-04-10 RX ADMIN — THIAMINE HCL TAB 100 MG 100 MG: 100 TAB at 09:35

## 2025-04-10 RX ADMIN — RISPERIDONE 2 MG: 2 TABLET, FILM COATED ORAL at 21:28

## 2025-04-10 RX ADMIN — Medication 1 TABLET: at 09:35

## 2025-04-10 RX ADMIN — TRAZODONE HYDROCHLORIDE 50 MG: 50 TABLET ORAL at 21:28

## 2025-04-10 RX ADMIN — FOLIC ACID 1 MG: 1 TABLET ORAL at 09:35

## 2025-04-10 RX ADMIN — SERTRALINE HYDROCHLORIDE 75 MG: 50 TABLET ORAL at 09:35

## 2025-04-10 RX ADMIN — NICOTINE 1 PATCH: 21 PATCH, EXTENDED RELEASE TRANSDERMAL at 09:38

## 2025-04-10 RX ADMIN — Medication 2000 UNITS: at 09:35

## 2025-04-10 RX ADMIN — CYANOCOBALAMIN TAB 1000 MCG 1000 MCG: 1000 TAB at 09:35

## 2025-04-10 RX ADMIN — DIVALPROEX SODIUM 1500 MG: 500 TABLET, EXTENDED RELEASE ORAL at 21:28

## 2025-04-10 RX ADMIN — SIMETHICONE 80 MG: 80 TABLET, CHEWABLE ORAL at 15:36

## 2025-04-10 NOTE — ASSESSMENT & PLAN NOTE
As of 04/10/25: Genevieve appears to be slowly improving.  She is no longer having homicidal ideation.    Current medications  Continue Depakote ER 1,500 mg at bedtime to help with mood stabilization and anger control.   Labs reviewed from 4/8/25: VPA level 80, CBC/diff showing low Hgb of 10.8, but appears to be at baseline, CMP within normal limits.  Discontinue Seroquel 100 mg at bedtime due to dizziness  Increase Risperdal to 2 mg daily at bedtime to help with mood  Continue Zoloft 75 mg daily to help with depressive symptoms- plan to increase to 100 mg over the weekend

## 2025-04-10 NOTE — PLAN OF CARE
Problem: Ineffective Coping  Goal: Cooperates with admission process  Description: Interventions: - Complete admission process  Outcome: Progressing  Goal: Participates in unit activities  Description: Interventions:- Provide therapeutic environment - Provide required programming - Redirect inappropriate behaviors   Outcome: Progressing  Goal: Patient/Family verbalizes awareness of resources  Outcome: Progressing     Problem: Depression  Goal: Treatment Goal: Demonstrate behavioral control of depressive symptoms, verbalize feelings of improved mood/affect, and adopt new coping skills prior to discharge  Outcome: Progressing  Goal: Verbalize thoughts and feelings  Description: Interventions:- Assess and re-assess patient's level of risk - Engage patient in 1:1 interactions, daily, for a minimum of 15 minutes - Encourage patient to express feelings, fears, frustrations, hopes   Outcome: Progressing  Goal: Attend and participate in unit activities, including therapeutic, recreational, and educational groups  Description: Interventions:- Provide therapeutic and educational activities daily, encourage attendance and participation, and document same in the medical record   Outcome: Progressing     Problem: Anxiety  Goal: Anxiety is at manageable level  Description: Interventions:- Assess and monitor patient's anxiety level. - Monitor for signs and symptoms (heart palpitations, chest pain, shortness of breath, headaches, nausea, feeling jumpy, restlessness, irritable, apprehensive). - Collaborate with interdisciplinary team and initiate plan and interventions as ordered.- [place] patient to unit/surroundings- Explain treatment plan- Encourage participation in care- Encourage verbalization of concerns/fears- Identify coping mechanisms- Assist in developing anxiety-reducing skills- Administer/offer alternative therapies- Limit or eliminate stimulants  Outcome: Progressing     Problem: Risk for Violence/Aggression Toward  Others  Goal: Treatment Goal: Refrain from acts of violence/aggression during length of stay, and demonstrate improved impulse control at the time of discharge  Outcome: Progressing  Goal: Refrain from harming others  Outcome: Progressing  Goal: Control angry outbursts  Description: Interventions:- Monitor patient closely, per order- Ensure early verbal de-escalation- Monitor prn medication needs- Set reasonable/therapeutic limits, outline behavioral expectations, and consequences - Provide a non-threatening milieu, utilizing the least restrictive interventions   Outcome: Progressing     Problem: Potential for Falls  Goal: Patient will remain free of falls  Description: -Remind patient to notify staff when needed.  -Continue to assess pt's LOC and safety awareness  Outcome: Progressing

## 2025-04-10 NOTE — NURSING NOTE
"Pt is calm, cooperative, isolating to self in her room. Pt is out in milieu for needs and meals. Pt denies SI, HI,AH, VH and pain at this time. Pt states she is \"feeling better mentally just very cold in this room.\" Reached out to engineering about the room temp. Pt has not attended groups as of yet today but is encouraged to do so, pt states she is doing her ADLs and is med/meal complaint. Pt denies any unmet needs and remains on q15 min checks for safety.  "

## 2025-04-10 NOTE — PLAN OF CARE
Problem: Ineffective Coping  Goal: Participates in unit activities  Description: Interventions:- Provide therapeutic environment - Provide required programming - Redirect inappropriate behaviors   Outcome: Progressing  Note: Sporadic, but progressing     Problem: Depression  Goal: Attend and participate in unit activities, including therapeutic, recreational, and educational groups  Description: Interventions:- Provide therapeutic and educational activities daily, encourage attendance and participation, and document same in the medical record   Outcome: Progressing  Note: Sporadic, but progressing

## 2025-04-10 NOTE — NURSING NOTE
"Pt visible intermittently, social with select peers, calm, cooperative. Pt states, \"I'm glad they're adjusting my meds now, I was a zombie all day from that Seroquel, and I never want to have to come back in here again. \" Pt denies IS/HI/AH/VH, requested and given PRN trazodone 50mg for sleep at 21:28; effective. Pt appears asleep.  "

## 2025-04-10 NOTE — PROGRESS NOTES
Progress Note - Behavioral Health   Name: Genevieve Meza 49 y.o. female I MRN: [id number]  Unit/Bed#: -01 I Date of Admission: 3/26/2025   Date of Service: 4/10/2025 I Hospital Day: 15    Assessment & Plan  Major depressive disorder, recurrent, severe with psychotic features (HCC)  As of 04/10/25: Genevieve appears to be slowly improving.  She is no longer having homicidal ideation.    Current medications  Continue Depakote ER 1,500 mg at bedtime to help with mood stabilization and anger control.   Labs reviewed from 4/8/25: VPA level 80, CBC/diff showing low Hgb of 10.8, but appears to be at baseline, CMP within normal limits.  Discontinue Seroquel 100 mg at bedtime due to dizziness  Increase Risperdal to 2 mg daily at bedtime to help with mood  Continue Zoloft 75 mg daily to help with depressive symptoms- plan to increase to 100 mg over the weekend  SAADIA (generalized anxiety disorder)  Management as per principal problem  Post-traumatic stress disorder, chronic  Management as per principal problem  Panic disorder with agoraphobia  Management as per principal problem  Impulse control disorder  Management as per principal problem  Personality disorder (HCC)  Management as per principal problem  Alcohol abuse, continuous  Counseling on the unit  Medical clearance for psychiatric admission  Management per medical team  Fibromyalgia  Management per medical team  Tobacco abuse  Smoking cessation  Elevated triglycerides with high cholesterol  Management per medical team  Vitamin D deficiency  Daily Vitamin D3 2,000 units  Management per medical team    Current medications:  Current Facility-Administered Medications   Medication Dose Route Frequency Provider Last Rate    albuterol  2 puff Inhalation Q4H PRN Aye Hill MD      aluminum-magnesium hydroxide-simethicone  30 mL Oral Q4H PRN GLENN Hong      benztropine  1 mg Intramuscular Q4H PRN Max 6/day GLENN Hong      benztropine  1 mg Oral Q4H  PRN Max 6/day GLENN Hong      bisacodyl  10 mg Rectal Daily PRN GLENN Hong      Cholecalciferol  2,000 Units Oral Daily Aye Hill MD      cyanocobalamin  1,000 mcg Oral Daily Cassidy Dominguez PA-C      divalproex sodium  1,500 mg Oral HS Aye Hill MD      folic acid  1 mg Oral Daily Aye Hill MD      hydrOXYzine HCL  25 mg Oral Q6H PRN Max 4/day GLENN Hong      hydrOXYzine HCL  50 mg Oral Q4H PRN Max 4/day GLENN Hong      Or    LORazepam  1 mg Intramuscular Q4H PRN GLENN Hong      ibuprofen  400 mg Oral Q4H PRN GLENN Hong      ibuprofen  600 mg Oral Q6H PRN GLENN Hong      ibuprofen  800 mg Oral Q8H PRN GLENN Hong      LORazepam  1 mg Oral Q4H PRN Max 6/day GLENN Hong      Or    LORazepam  2 mg Intramuscular Q6H PRN Max 3/day GLENN Hong      multivitamin-minerals  1 tablet Oral Daily Aye Hill MD      nicotine  1 patch Transdermal Daily GLENN Hong      OLANZapine  10 mg Oral Q3H PRN Max 3/day GLENN Hong      Or    OLANZapine  10 mg Intramuscular Q3H PRN Max 3/day GLENN Hong      OLANZapine  5 mg Oral Q3H PRN Max 6/day GLENN Hong      Or    OLANZapine  5 mg Intramuscular Q3H PRN Max 6/day GLENN Hong      OLANZapine  2.5 mg Oral Q3H PRN Max 8/day GLENN Hong      polyethylene glycol  17 g Oral Daily PRN GLENN Hong      propranolol  10 mg Oral Q8H PRN GLENN Hong      risperiDONE  2 mg Oral HS Hailey Archibald DO      senna-docusate sodium  1 tablet Oral BID PRN Cassidy Dominguez PA-C      sertraline  75 mg Oral Daily Hailey Archibald DO      thiamine  100 mg Oral Daily Aye Hill MD      traZODone  50 mg Oral HS PRN GLENN Hong          Risks/Benefits of Treatment:     Risks, benefits, and possible side effects of medications explained to patient and patient verbalizes understanding and agreement for treatment.    Progress Toward Goals: progressing  "slowly    Treatment Planning:     All current active medications have been reviewed.  Continue to monitor response to treatment and assess for potential side effects of medications.  Encourage group therapy, milieu therapy and occupational therapy  Collaboration with medical service for medical comorbidities as indicated.  Behavioral Health checks for safety monitoring.  Long Stay Certification : Not Applicable  Estimated Discharge Day: 4/14/2025   Legal Status : Voluntary 201 commitment.    Subjective     Behavior over the last 24 hours: slowly improving.    Per nursing, patient was mostly isolative to her room, reporting to have more energy. She was intermittently visible in the evening and social with select peers. She reported insomnia and received trazodone 50 mg on 4/9 at 2128 which was effective.     Genevieve appears to be calm, cooperative, still somewhat somatically preoccupied.  She reports that her current mood is \"all right.\"  She states that she slept well last night after she took the trazodone.  She reports that her appetite has been increased.  She reports that she has been having difficulty with having bowel movements.  She requests simethicone as it normally helps her.  She denies suicidal or homicidal ideation, intent, or plan.  She denies auditory or visual hallucinations.  We discussed medication changes and patient was agreeable.    Sleep: insomnia, improved with trazodone  Appetite: increased  Medication side effects: No  ROS: review of systems as noted above in HPI/Subjective report, all other systems are negative    Objective :  Temp:  [98 °F (36.7 °C)-98.9 °F (37.2 °C)] 98 °F (36.7 °C)  HR:  [78-86] 78  BP: ()/(58-77) 116/77  Resp:  [16] 16  SpO2:  [98 %-99 %] 99 %  O2 Device: None (Room air)    Temp:  [98 °F (36.7 °C)-98.9 °F (37.2 °C)] 98 °F (36.7 °C)  HR:  [78-86] 78  BP: ()/(58-77) 116/77  Resp:  [16] 16  SpO2:  [98 %-99 %] 99 %  O2 Device: None (Room air)    Mental Status " "Evaluation:    Appearance:  age appropriate, casually dressed, adequate grooming, looks stated age   Behavior:  cooperative, calm, fair eye contact   Speech:  normal rate and volume   Mood:  \"Alright\"   Affect:  constricted   Thought Process:  organized, goal directed   Thought Content:  somatic preoccupation   Perceptual Disturbances: no auditory hallucinations, no visual hallucinations   Risk Potential: Suicidal Ideation -  None at present  Homicidal Ideation -  None at present  Potential for Aggression - Not at present   Sensorium:  oriented to person, place, and time/date   Memory:  recent and remote memory grossly intact   Consciousness:  alert and awake   Attention/Concentration: attention span and concentration are age appropriate   Insight:  limited   Judgment: limited   Gait/Station: normal gait/station, normal balance   Motor Activity: no abnormal movements       Lab Results: I have reviewed the following results:      Administrative Statements     Counseling / Coordination of Care:   Patient's progress discussed with staff in treatment team meeting.  Medication changes reviewed with staff in treatment team meeting.  Medication changes discussed with patient.  Medication education provided to patient..    Hailey Archibald DO 04/10/25  "

## 2025-04-10 NOTE — PROGRESS NOTES
04/10/25 0841   Team Meeting   Meeting Type Daily Rounds   Team Members Present   Team Members Present Physician;Nurse;   Physician Team Member Sergio   Nursing Team Member Annette   Care Management Team Member Roman   Patient/Family Present   Patient Present No   Patient's Family Present No     Pt to discharge at some point next week. Denying psych symptoms. Adjusting to med changes.

## 2025-04-11 PROCEDURE — 99232 SBSQ HOSP IP/OBS MODERATE 35: CPT | Performed by: PSYCHIATRY & NEUROLOGY

## 2025-04-11 RX ORDER — TRAZODONE HYDROCHLORIDE 50 MG/1
50 TABLET ORAL
Status: DISCONTINUED | OUTPATIENT
Start: 2025-04-11 | End: 2025-04-14 | Stop reason: HOSPADM

## 2025-04-11 RX ORDER — SERTRALINE HYDROCHLORIDE 100 MG/1
100 TABLET, FILM COATED ORAL DAILY
Status: DISCONTINUED | OUTPATIENT
Start: 2025-04-12 | End: 2025-04-14 | Stop reason: HOSPADM

## 2025-04-11 RX ADMIN — MAGNESIUM HYDROXIDE 30 ML: 400 SUSPENSION ORAL at 10:00

## 2025-04-11 RX ADMIN — THIAMINE HCL TAB 100 MG 100 MG: 100 TAB at 08:18

## 2025-04-11 RX ADMIN — DIVALPROEX SODIUM 1500 MG: 500 TABLET, EXTENDED RELEASE ORAL at 21:19

## 2025-04-11 RX ADMIN — Medication 2000 UNITS: at 08:18

## 2025-04-11 RX ADMIN — Medication 1 TABLET: at 08:17

## 2025-04-11 RX ADMIN — FOLIC ACID 1 MG: 1 TABLET ORAL at 08:18

## 2025-04-11 RX ADMIN — RISPERIDONE 2 MG: 2 TABLET, FILM COATED ORAL at 21:19

## 2025-04-11 RX ADMIN — NICOTINE 1 PATCH: 21 PATCH, EXTENDED RELEASE TRANSDERMAL at 08:18

## 2025-04-11 RX ADMIN — SERTRALINE HYDROCHLORIDE 75 MG: 50 TABLET ORAL at 08:18

## 2025-04-11 RX ADMIN — CYANOCOBALAMIN TAB 1000 MCG 1000 MCG: 1000 TAB at 08:18

## 2025-04-11 RX ADMIN — TRAZODONE HYDROCHLORIDE 50 MG: 50 TABLET ORAL at 21:19

## 2025-04-11 NOTE — PROGRESS NOTES
Progress Note - Behavioral Health   Name: Genevieve Meza 49 y.o. female I MRN: [id number]  Unit/Bed#: -01 I Date of Admission: 3/26/2025   Date of Service: 4/11/2025 I Hospital Day: 16    Assessment & Plan  Major depressive disorder, recurrent, severe with psychotic features (HCC)  As of 04/11/25: Genevieve appears calm and cooperative.  Anxiety, depression, and lability are slowly improving.    Current medications:  Continue Depakote ER 1500 mg at bedtime to help with mood stabilization and anger control.   Labs reviewed from 4/8/25: VPA level 80, CBC/diff showing low Hgb of 10.8, but appears to be at baseline, CMP within normal limits.  Continue Risperdal 2 mg daily at bedtime to help with mood  Continue Zoloft 100 mg daily to help with depressive symptoms- increased 4/12  Continue trazodone 50 mg daily at bedtime for insomnia  SAADIA (generalized anxiety disorder)  Management as per principal problem  Post-traumatic stress disorder, chronic  Management as per principal problem  Panic disorder with agoraphobia  Management as per principal problem  Impulse control disorder  Management as per principal problem  Personality disorder (HCC)  Management as per principal problem  Alcohol abuse, continuous  Counseling on the unit  Medical clearance for psychiatric admission  Management per medical team  Fibromyalgia  Management per medical team  Tobacco abuse  Smoking cessation  Elevated triglycerides with high cholesterol  Management per medical team  Vitamin D deficiency  Daily Vitamin D3 2,000 units  Management per medical team    Current medications:  Current Facility-Administered Medications   Medication Dose Route Frequency Provider Last Rate    albuterol  2 puff Inhalation Q4H PRN Aye Hill MD      aluminum-magnesium hydroxide-simethicone  30 mL Oral Q4H PRN GLENN Hong      benztropine  1 mg Intramuscular Q4H PRN Max 6/day GLENN Hong      benztropine  1 mg Oral Q4H PRN Max 6/day Juli  GLENN Lee      bisacodyl  10 mg Rectal Daily PRN GLENN Hong      Cholecalciferol  2,000 Units Oral Daily Aye Hill MD      cyanocobalamin  1,000 mcg Oral Daily Cassidy Dominguez PA-C      divalproex sodium  1,500 mg Oral HS Aye Hill MD      folic acid  1 mg Oral Daily Aye Hill MD      hydrOXYzine HCL  25 mg Oral Q6H PRN Max 4/day GLENN Hong      hydrOXYzine HCL  50 mg Oral Q4H PRN Max 4/day GLENN Hong      Or    LORazepam  1 mg Intramuscular Q4H PRN Juli Lee, GLENN      ibuprofen  400 mg Oral Q4H PRN GLENN Hong      ibuprofen  600 mg Oral Q6H PRN Juli Lee, GLENN      ibuprofen  800 mg Oral Q8H PRN GLENN Hong      LORazepam  1 mg Oral Q4H PRN Max 6/day GLENN Hong      Or    LORazepam  2 mg Intramuscular Q6H PRN Max 3/day GLENN Hong      magnesium hydroxide  30 mL Oral Daily PRN Hailey Archibald, DO      melatonin  3 mg Oral HS PRN Hailey Archibald, DO      multivitamin-minerals  1 tablet Oral Daily Aye Hill MD      nicotine  1 patch Transdermal Daily GLENN Hong      OLANZapine  10 mg Oral Q3H PRN Max 3/day GLENN Hong      Or    OLANZapine  10 mg Intramuscular Q3H PRN Max 3/day GLENN Hong      OLANZapine  5 mg Oral Q3H PRN Max 6/day GLENN Hong      Or    OLANZapine  5 mg Intramuscular Q3H PRN Max 6/day GLENN Hong      OLANZapine  2.5 mg Oral Q3H PRN Max 8/day GLENN Hong      polyethylene glycol  17 g Oral Daily PRN GLENN Hong      propranolol  10 mg Oral Q8H PRN GLENN Hong      risperiDONE  2 mg Oral HS Hailey Archibald, DO      senna-docusate sodium  1 tablet Oral BID PRN Cassidy Dominguez PA-C      [START ON 4/12/2025] sertraline  100 mg Oral Daily Hailey Archibald, DO      simethicone  80 mg Oral Q6H PRN Hailey Archibald, DO      thiamine  100 mg Oral Daily Aye Hill MD      traZODone  50 mg Oral HS Hailey Archibald DO          Risks/Benefits of Treatment:     Risks, benefits, and possible side  effects of medications explained to patient and patient verbalizes understanding and agreement for treatment.    Progress Toward Goals: improving slowly    Treatment Planning:     All current active medications have been reviewed.  Continue to monitor response to treatment and assess for potential side effects of medications.  Encourage group therapy, milieu therapy and occupational therapy  Collaboration with medical service for medical comorbidities as indicated.  Behavioral Health checks for safety monitoring.  Long Stay Certification : Not Applicable  Estimated Discharge Day: 4/14/2025   Legal Status : Voluntary 201 commitment.    Subjective     Behavior over the last 24 hours: slowly improving.    Patient was visited on unit for continuing care; chart reviewed and discussed with the nursing staff.  On approach, the patient was calm, pleasant and cooperative. Denied any changes in mood, appetite, and energy level. No problem initiating and maintaining sleep.  Denied A/VH currently.  Denied SI/HI, intent or plan upon direct inquiry at this time. States she has much less side effects on Risperdal. Reviewed events prior to hospitalization. Patient states she has never had auditory hallucinations. The voice that tells her to do bad things is her own thoughts. She states she was angry at her daughters prior to hospitalization because they act as if they are her mother while not paying rent.    Patient continues to be intermittently visible in the milieu and interacts with select staff and peers. No reports of aggression or self-injurious behavior on unit. No PRN medications used in the past 24 hours.    Patient accepted all offered medications and no adverse effects of medications noted or reported.    Sleep: normal  Appetite: normal  Medication side effects: No  ROS: review of systems as noted above in HPI/Subjective report, all other systems are negative    Objective :  Temp:  [98.2 °F (36.8 °C)-98.8 °F (37.1 °C)]  98.8 °F (37.1 °C)  HR:  [80-85] 85  BP: (107-109)/(69-70) 109/70  Resp:  [16] 16  SpO2:  [99 %-100 %] 100 %  O2 Device: None (Room air)    Mental Status Evaluation:    Appearance:  disheveled, sitting in bed   Behavior:  cooperative, calm   Speech:  normal rate and volume   Mood:  improved, less depressed   Affect:  constricted   Thought Process:  goal directed, linear   Associations: intact associations   Thought Content:  no overt delusions   Perceptual Disturbances: no auditory hallucinations, no visual hallucinations, does not appear responding to internal stimuli   Risk Potential: Suicidal ideation - None at present  Homicidal ideation - None at present  Potential for aggression - No   Sensorium:  Grossly oriented   Memory:  recent and remote memory grossly intact   Consciousness:  alert and awake   Attention/Concentration: attention span and concentration are age appropriate   Insight:  impaired, improving   Judgment: impaired, improving   Gait/Station: normal gait/station, normal balance   Motor Activity: no abnormal movements         Lab Results: I have reviewed the following results:  Results from the past 24 hours: No results found for this or any previous visit (from the past 24 hours).    Administrative Statements     Counseling / Coordination of Care:   Patient's progress reviewed with nursing staff.  Medication changes reviewed with nursing staff.  Medications, treatment progress and treatment plan reviewed with patient..      This note was not shared with the patient due to reasonable likelihood of causing patient harm      Katalina Amin MD 04/11/25

## 2025-04-11 NOTE — NURSING NOTE
Pt reporting one time dose of milk of magnesia was effective. Pt had a BM and reports improvement in stomach discomfort.

## 2025-04-11 NOTE — PROGRESS NOTES
04/11/25 0839   Team Meeting   Meeting Type Daily Rounds   Team Members Present   Team Members Present Physician;Nurse;   Physician Team Member Sergio   Nursing Team Member Doyle   Care Management Team Member Roman   Patient/Family Present   Patient Present No   Patient's Family Present No     Pt requesting scheduled Trazodone. Denying psych symptoms.

## 2025-04-11 NOTE — ASSESSMENT & PLAN NOTE
As of 04/11/25: Genevieve appears calm and cooperative.  Anxiety, depression, and lability are slowly improving.    Current medications:  Continue Depakote ER 1,500 mg at bedtime to help with mood stabilization and anger control.   Labs reviewed from 4/8/25: VPA level 80, CBC/diff showing low Hgb of 10.8, but appears to be at baseline, CMP within normal limits.  Continue Risperdal 2 mg daily at bedtime to help with mood  Continue Zoloft 75 mg daily to help with depressive symptoms- plan to increase to 100 mg tomorrow.  Start trazodone 50 mg daily at bedtime for insomnia

## 2025-04-11 NOTE — NURSING NOTE
Pt denies SI, HI, AH and VH. Pt reporting not having a BM for 4 days, pt given one times dose of milk of magnesia. Pt is visible in the milieu, social with peers and attending groups. Pt is calm and cooperative. Medication and meal compliant.

## 2025-04-11 NOTE — NURSING NOTE
Pt visible intermittently, social with select peers. Pt has intact sense of humor, and fair insight. Pt denies SI/HI/AH/VH, requested and given PRN trazodone 50mg for sleep at 21:28; effective. Pt appears asleep at this time.

## 2025-04-11 NOTE — PROGRESS NOTES
Progress Note - Behavioral Health   Name: Genevieve Meza 49 y.o. female I MRN: [id number]  Unit/Bed#: -01 I Date of Admission: 3/26/2025   Date of Service: 4/11/2025 I Hospital Day: 16    Assessment & Plan  Major depressive disorder, recurrent, severe with psychotic features (HCC)  As of 04/11/25: Genevieve appears calm and cooperative.  Anxiety, depression, and lability are slowly improving.    Current medications:  Continue Depakote ER 1,500 mg at bedtime to help with mood stabilization and anger control.   Labs reviewed from 4/8/25: VPA level 80, CBC/diff showing low Hgb of 10.8, but appears to be at baseline, CMP within normal limits.  Continue Risperdal 2 mg daily at bedtime to help with mood  Continue Zoloft 75 mg daily to help with depressive symptoms- plan to increase to 100 mg tomorrow.  Start trazodone 50 mg daily at bedtime for insomnia  SAADIA (generalized anxiety disorder)  Management as per principal problem  Post-traumatic stress disorder, chronic  Management as per principal problem  Panic disorder with agoraphobia  Management as per principal problem  Impulse control disorder  Management as per principal problem  Personality disorder (HCC)  Management as per principal problem  Alcohol abuse, continuous  Counseling on the unit  Medical clearance for psychiatric admission  Management per medical team  Fibromyalgia  Management per medical team  Tobacco abuse  Smoking cessation  Elevated triglycerides with high cholesterol  Management per medical team  Vitamin D deficiency  Daily Vitamin D3 2,000 units  Management per medical team    Current medications:  Current Facility-Administered Medications   Medication Dose Route Frequency Provider Last Rate    albuterol  2 puff Inhalation Q4H PRN Aye Hill MD      aluminum-magnesium hydroxide-simethicone  30 mL Oral Q4H PRN GLENN Hong      benztropine  1 mg Intramuscular Q4H PRN Max 6/day GLENN Hong      benztropine  1 mg Oral Q4H PRN  Max 6/day GLENN Hong      bisacodyl  10 mg Rectal Daily PRN GLENN Hong      Cholecalciferol  2,000 Units Oral Daily Aye Hill MD      cyanocobalamin  1,000 mcg Oral Daily Cassidy Dominguez PA-C      divalproex sodium  1,500 mg Oral HS Aye Hill MD      folic acid  1 mg Oral Daily Aye Hill MD      hydrOXYzine HCL  25 mg Oral Q6H PRN Max 4/day Juli Lee, GLENN      hydrOXYzine HCL  50 mg Oral Q4H PRN Max 4/day GLENN Hong      Or    LORazepam  1 mg Intramuscular Q4H PRN GLENN Hong      ibuprofen  400 mg Oral Q4H PRN Juli Lee, GLENN      ibuprofen  600 mg Oral Q6H PRN Juli Lee, GLENN      ibuprofen  800 mg Oral Q8H PRN GLENN Hong      LORazepam  1 mg Oral Q4H PRN Max 6/day GLENN Hong      Or    LORazepam  2 mg Intramuscular Q6H PRN Max 3/day GLENN Hong      multivitamin-minerals  1 tablet Oral Daily Aye Hill MD      nicotine  1 patch Transdermal Daily GLENN Hong      OLANZapine  10 mg Oral Q3H PRN Max 3/day GLENN Hong      Or    OLANZapine  10 mg Intramuscular Q3H PRN Max 3/day GLENN Hong      OLANZapine  5 mg Oral Q3H PRN Max 6/day GLENN Hong      Or    OLANZapine  5 mg Intramuscular Q3H PRN Max 6/day GLENN Hong      OLANZapine  2.5 mg Oral Q3H PRN Max 8/day GLENN Hong      polyethylene glycol  17 g Oral Daily PRN GLENN Hong      propranolol  10 mg Oral Q8H PRN GLENN Hong      risperiDONE  2 mg Oral HS Hailey Archibald,       senna-docusate sodium  1 tablet Oral BID PRN Cassidy Dominguez PA-C      sertraline  75 mg Oral Daily Hailey Archibald,       simethicone  80 mg Oral Q6H PRN Hailey Archibald,       thiamine  100 mg Oral Daily Aye Hill MD      traZODone  50 mg Oral HS PRN GLENN Hong          Risks/Benefits of Treatment:     Risks, benefits, and possible side effects of medications explained to patient and patient verbalizes understanding and agreement for  "treatment.    Progress Toward Goals: improving slowly    Treatment Planning:     All current active medications have been reviewed.  Continue to monitor response to treatment and assess for potential side effects of medications.  Encourage group therapy, milieu therapy and occupational therapy  Collaboration with medical service for medical comorbidities as indicated.  Behavioral Health checks for safety monitoring.  Long Stay Certification : Not Applicable  Estimated Discharge Day: 4/14/2025   Legal Status : Voluntary 201 commitment.    Subjective     Behavior over the last 24 hours: slowly improving.    Per nursing, patient has been calm, cooperative, isolative to self in her room in the morning, but more visible and social with peers in the evening. She attended group.  Patient is meal and medication compliant.  She reported insomnia and received trazodone 50 mg on 4/10 at 2128.      Genevieve appears calm, cooperative, continues to be somatically preoccupied.  She reports that her current mood is \"okay.\"  She reports that she has not had a bowel movement in 4 days, but states that milk of magnesia usually helps with her constipation.  We discussed a one-time dose and then ordering as needed.  She states that she slept well after she took trazodone.  She reports increased appetite.  She denies suicidal or homicidal ideation, intent, or plan.  She denies auditory or visual hallucinations.  She reports that she enjoys spending time with her roommate and reading.  We discussed medication changes including increasing Zoloft tomorrow and scheduling trazodone at bedtime and patient was agreeable.    Sleep: insomnia, improved with trazodone  Appetite: increased  Medication side effects: No  ROS: review of systems as noted above in HPI/Subjective report, all other systems are negative    Objective :  Temp:  [98.2 °F (36.8 °C)-98.5 °F (36.9 °C)] 98.2 °F (36.8 °C)  HR:  [80-86] 80  BP: (102-107)/(67-69) 107/69  Resp:  [16] " "16  SpO2:  [96 %-99 %] 99 %  O2 Device: None (Room air)    Temp:  [98.2 °F (36.8 °C)-98.5 °F (36.9 °C)] 98.2 °F (36.8 °C)  HR:  [80-86] 80  BP: (102-107)/(67-69) 107/69  Resp:  [16] 16  SpO2:  [96 %-99 %] 99 %  O2 Device: None (Room air)    Mental Status Evaluation:    Appearance:  age appropriate, casually dressed, adequate grooming, looks stated age   Behavior:  cooperative, calm, fair eye contact, laying in bed   Speech:  normal rate and volume, clear, coherent   Mood:  \"Ok\"   Affect:  constricted   Thought Process:  organized, goal directed   Thought Content:  somatic preoccupation   Perceptual Disturbances: no auditory hallucinations, no visual hallucinations   Risk Potential: Suicidal Ideation -  None at present  Homicidal Ideation -  None at present  Potential for Aggression - Not at present   Sensorium:  oriented to person, place, and time/date   Memory:  recent and remote memory grossly intact   Consciousness:  alert and awake   Attention/Concentration: attention span and concentration are age appropriate   Insight:  limited   Judgment: limited   Gait/Station: normal gait/station, normal balance   Motor Activity: no abnormal movements       Lab Results: I have reviewed the following results:      Administrative Statements     Counseling / Coordination of Care:   Patient's progress discussed with staff in treatment team meeting.  Medication changes reviewed with staff in treatment team meeting.  Medication changes discussed with patient.  Medication education provided to patient..    Hailey Archibald DO 04/11/25  "

## 2025-04-11 NOTE — ASSESSMENT & PLAN NOTE
As of 04/11/25: Genevieve appears calm and cooperative.  Anxiety, depression, and lability are slowly improving.    Current medications:  Continue Depakote ER 1500 mg at bedtime to help with mood stabilization and anger control.   Labs reviewed from 4/8/25: VPA level 80, CBC/diff showing low Hgb of 10.8, but appears to be at baseline, CMP within normal limits.  Continue Risperdal 2 mg daily at bedtime to help with mood  Continue Zoloft 100 mg daily to help with depressive symptoms- increased 4/12  Continue trazodone 50 mg daily at bedtime for insomnia

## 2025-04-11 NOTE — PLAN OF CARE
Problem: Ineffective Coping  Goal: Cooperates with admission process  Description: Interventions: - Complete admission process  Outcome: Progressing  Goal: Participates in unit activities  Description: Interventions:- Provide therapeutic environment - Provide required programming - Redirect inappropriate behaviors   Outcome: Progressing  Goal: Patient/Family verbalizes awareness of resources  Outcome: Progressing     Problem: Depression  Goal: Treatment Goal: Demonstrate behavioral control of depressive symptoms, verbalize feelings of improved mood/affect, and adopt new coping skills prior to discharge  Outcome: Progressing  Goal: Verbalize thoughts and feelings  Description: Interventions:- Assess and re-assess patient's level of risk - Engage patient in 1:1 interactions, daily, for a minimum of 15 minutes - Encourage patient to express feelings, fears, frustrations, hopes   Outcome: Progressing  Goal: Attend and participate in unit activities, including therapeutic, recreational, and educational groups  Description: Interventions:- Provide therapeutic and educational activities daily, encourage attendance and participation, and document same in the medical record   Outcome: Progressing     Problem: Anxiety  Goal: Anxiety is at manageable level  Description: Interventions:- Assess and monitor patient's anxiety level. - Monitor for signs and symptoms (heart palpitations, chest pain, shortness of breath, headaches, nausea, feeling jumpy, restlessness, irritable, apprehensive). - Collaborate with interdisciplinary team and initiate plan and interventions as ordered.- [place] patient to unit/surroundings- Explain treatment plan- Encourage participation in care- Encourage verbalization of concerns/fears- Identify coping mechanisms- Assist in developing anxiety-reducing skills- Administer/offer alternative therapies- Limit or eliminate stimulants  Outcome: Progressing     Problem: Risk for Violence/Aggression Toward  Others  Goal: Treatment Goal: Refrain from acts of violence/aggression during length of stay, and demonstrate improved impulse control at the time of discharge  Outcome: Progressing  Goal: Refrain from harming others  Outcome: Progressing  Goal: Control angry outbursts  Description: Interventions:- Monitor patient closely, per order- Ensure early verbal de-escalation- Monitor prn medication needs- Set reasonable/therapeutic limits, outline behavioral expectations, and consequences - Provide a non-threatening milieu, utilizing the least restrictive interventions   Outcome: Progressing     Problem: Potential for Falls  Goal: Patient will remain free of falls  Description: -Remind patient to notify staff when needed.  -Continue to assess pt's LOC and safety awareness  Outcome: Progressing

## 2025-04-12 PROCEDURE — 99232 SBSQ HOSP IP/OBS MODERATE 35: CPT | Performed by: PSYCHIATRY & NEUROLOGY

## 2025-04-12 RX ADMIN — DIVALPROEX SODIUM 1500 MG: 500 TABLET, EXTENDED RELEASE ORAL at 21:00

## 2025-04-12 RX ADMIN — MAGNESIUM HYDROXIDE 30 ML: 400 SUSPENSION ORAL at 18:00

## 2025-04-12 RX ADMIN — RISPERIDONE 2 MG: 2 TABLET, FILM COATED ORAL at 21:00

## 2025-04-12 RX ADMIN — SERTRALINE HYDROCHLORIDE 100 MG: 100 TABLET ORAL at 08:46

## 2025-04-12 RX ADMIN — NICOTINE 1 PATCH: 21 PATCH, EXTENDED RELEASE TRANSDERMAL at 08:47

## 2025-04-12 RX ADMIN — ALBUTEROL SULFATE 2 PUFF: 90 AEROSOL, METERED RESPIRATORY (INHALATION) at 16:34

## 2025-04-12 RX ADMIN — TRAZODONE HYDROCHLORIDE 50 MG: 50 TABLET ORAL at 21:00

## 2025-04-12 RX ADMIN — CYANOCOBALAMIN TAB 1000 MCG 1000 MCG: 1000 TAB at 08:46

## 2025-04-12 RX ADMIN — THIAMINE HCL TAB 100 MG 100 MG: 100 TAB at 08:46

## 2025-04-12 RX ADMIN — Medication 2000 UNITS: at 08:46

## 2025-04-12 RX ADMIN — Medication 1 TABLET: at 08:46

## 2025-04-12 RX ADMIN — FOLIC ACID 1 MG: 1 TABLET ORAL at 08:46

## 2025-04-12 NOTE — PLAN OF CARE
Problem: Ineffective Coping  Goal: Cooperates with admission process  Description: Interventions: - Complete admission process  Outcome: Progressing  Goal: Participates in unit activities  Description: Interventions:- Provide therapeutic environment - Provide required programming - Redirect inappropriate behaviors   Outcome: Progressing  Goal: Patient/Family verbalizes awareness of resources  Outcome: Progressing     Problem: Depression  Goal: Treatment Goal: Demonstrate behavioral control of depressive symptoms, verbalize feelings of improved mood/affect, and adopt new coping skills prior to discharge  Outcome: Progressing  Goal: Verbalize thoughts and feelings  Description: Interventions:- Assess and re-assess patient's level of risk - Engage patient in 1:1 interactions, daily, for a minimum of 15 minutes - Encourage patient to express feelings, fears, frustrations, hopes   Outcome: Progressing  Goal: Attend and participate in unit activities, including therapeutic, recreational, and educational groups  Description: Interventions:- Provide therapeutic and educational activities daily, encourage attendance and participation, and document same in the medical record   Outcome: Progressing     Problem: Anxiety  Goal: Anxiety is at manageable level  Description: Interventions:- Assess and monitor patient's anxiety level. - Monitor for signs and symptoms (heart palpitations, chest pain, shortness of breath, headaches, nausea, feeling jumpy, restlessness, irritable, apprehensive). - Collaborate with interdisciplinary team and initiate plan and interventions as ordered.- [place] patient to unit/surroundings- Explain treatment plan- Encourage participation in care- Encourage verbalization of concerns/fears- Identify coping mechanisms- Assist in developing anxiety-reducing skills- Administer/offer alternative therapies- Limit or eliminate stimulants  Outcome: Progressing     Problem: Risk for Violence/Aggression Toward  Others  Goal: Treatment Goal: Refrain from acts of violence/aggression during length of stay, and demonstrate improved impulse control at the time of discharge  Outcome: Progressing  Goal: Refrain from harming others  Outcome: Progressing  Goal: Control angry outbursts  Description: Interventions:- Monitor patient closely, per order- Ensure early verbal de-escalation- Monitor prn medication needs- Set reasonable/therapeutic limits, outline behavioral expectations, and consequences - Provide a non-threatening milieu, utilizing the least restrictive interventions   Outcome: Progressing     Problem: Potential for Falls  Goal: Patient will remain free of falls  Description: -Remind patient to notify staff when needed.  -Continue to assess pt's LOC and safety awareness  Outcome: Progressing     Problem: DISCHARGE PLANNING - CARE MANAGEMENT  Goal: Discharge to post-acute care or home with appropriate resources  Description: INTERVENTIONS:- Conduct assessment to determine patient/family and health care team treatment goals, and need for post-acute services based on payer coverage, community resources, and patient preferences, and barriers to discharge- Address psychosocial, clinical, and financial barriers to discharge as identified in assessment in conjunction with the patient/family and health care team- Arrange appropriate level of post-acute services according to patient’s   needs and preference and payer coverage in collaboration with the physician and health care team- Communicate with and update the patient/family, physician, and health care team regarding progress on the discharge plan- Arrange appropriate transportation to post-acute venues  Outcome: Progressing

## 2025-04-12 NOTE — NURSING NOTE
Pt calm and cooperative. Visible in the milieu, social with staff and peers. Denies SI, HI, AH, VH, and pain. Compliant with medication and snack. Denies unmet needs at this time.

## 2025-04-12 NOTE — NURSING NOTE
"Patient is calm with a constricted affect. Denies depression, anxiety, SI/HI/AVH. Prefers to read in room. Says she feels better on risperdal \"no longer feeling like a zombie. I just want my meds to be right because I never want to come back here again.\" No unmet needs currently.   "

## 2025-04-13 LAB — B-HCG SERPL-ACNC: <0.6 MIU/ML (ref 0–5)

## 2025-04-13 PROCEDURE — 84702 CHORIONIC GONADOTROPIN TEST: CPT | Performed by: NURSE PRACTITIONER

## 2025-04-13 PROCEDURE — 99232 SBSQ HOSP IP/OBS MODERATE 35: CPT | Performed by: PSYCHIATRY & NEUROLOGY

## 2025-04-13 RX ORDER — POLYETHYLENE GLYCOL 3350 17 G/17G
17 POWDER, FOR SOLUTION ORAL DAILY PRN
Status: DISCONTINUED | OUTPATIENT
Start: 2025-04-13 | End: 2025-04-14 | Stop reason: HOSPADM

## 2025-04-13 RX ORDER — DOCUSATE SODIUM 100 MG/1
100 CAPSULE, LIQUID FILLED ORAL 2 TIMES DAILY
Status: DISCONTINUED | OUTPATIENT
Start: 2025-04-13 | End: 2025-04-14 | Stop reason: HOSPADM

## 2025-04-13 RX ORDER — SENNOSIDES 8.6 MG
1 TABLET ORAL
Status: DISCONTINUED | OUTPATIENT
Start: 2025-04-13 | End: 2025-04-14 | Stop reason: HOSPADM

## 2025-04-13 RX ADMIN — TRAZODONE HYDROCHLORIDE 50 MG: 50 TABLET ORAL at 21:15

## 2025-04-13 RX ADMIN — SENNOSIDES 8.6 MG: 8.6 TABLET, FILM COATED ORAL at 21:15

## 2025-04-13 RX ADMIN — CYANOCOBALAMIN TAB 1000 MCG 1000 MCG: 1000 TAB at 08:45

## 2025-04-13 RX ADMIN — THIAMINE HCL TAB 100 MG 100 MG: 100 TAB at 08:45

## 2025-04-13 RX ADMIN — RISPERIDONE 2 MG: 2 TABLET, FILM COATED ORAL at 21:15

## 2025-04-13 RX ADMIN — Medication 2000 UNITS: at 08:45

## 2025-04-13 RX ADMIN — NICOTINE 1 PATCH: 21 PATCH, EXTENDED RELEASE TRANSDERMAL at 08:45

## 2025-04-13 RX ADMIN — POLYETHYLENE GLYCOL 3350 17 G: 17 POWDER, FOR SOLUTION ORAL at 13:59

## 2025-04-13 RX ADMIN — FOLIC ACID 1 MG: 1 TABLET ORAL at 08:47

## 2025-04-13 RX ADMIN — MAGNESIUM HYDROXIDE 30 ML: 400 SUSPENSION ORAL at 08:50

## 2025-04-13 RX ADMIN — DIVALPROEX SODIUM 1500 MG: 500 TABLET, EXTENDED RELEASE ORAL at 21:15

## 2025-04-13 RX ADMIN — Medication 1 TABLET: at 08:45

## 2025-04-13 RX ADMIN — DOCUSATE SODIUM 100 MG: 100 CAPSULE, LIQUID FILLED ORAL at 17:25

## 2025-04-13 RX ADMIN — SERTRALINE HYDROCHLORIDE 100 MG: 100 TABLET ORAL at 08:45

## 2025-04-13 NOTE — NURSING NOTE
Patient visible and social throughout the evening. Pleasant and cooperative with routine. Denied any unmet needs. HS medication compliant.

## 2025-04-13 NOTE — NURSING NOTE
Patient is calm reporting some improvement in constipation with medium BM and less strain and bloating. Miralax now effective. Socializes in the milieu, showered, continues to look forward to discharge tomorrow.  Denies depression, anxiety, SI/HI/AVH. No unmet needs currently.

## 2025-04-13 NOTE — NURSING NOTE
Patient is calm denying depression, anxiety, SI/HI/AVH. Says she is looking forward to discharge. Reports concern with possibly being pregnant due to abdominal distention and constipation. Medical notified. Milk of magnesia given for constipation this morning, not effective thus far. Medical provider contacted to add bowel regimen. Patient is visible on the milieu watching tv with peers. Pregnancy labs and bowel regimen added by provider. No unmet needs currently.

## 2025-04-13 NOTE — PROGRESS NOTES
Progress Note - Behavioral Health   Name: Genevieve Meza 49 y.o. female I MRN: [id number]  Unit/Bed#: -01 I Date of Admission: 3/26/2025   Date of Service: 4/13/2025 I Hospital Day: 18    Assessment & Plan  Major depressive disorder, recurrent, severe with psychotic features (HCC)  As of 04/13/25: Genevieve appears calm and cooperative.  Anxiety, depression, and lability are slowly improving.    Current medications:  Continue Depakote ER 1500 mg at bedtime to help with mood stabilization and anger control.   Labs reviewed from 4/8/25: VPA level 80, CBC/diff showing low Hgb of 10.8, but appears to be at baseline, CMP within normal limits.  Continue Risperdal 2 mg daily at bedtime to help with mood  Continue Zoloft 100 mg daily to help with depressive symptoms- increased 4/12  Continue trazodone 50 mg daily at bedtime for insomnia  SAADIA (generalized anxiety disorder)  Management as per principal problem  Post-traumatic stress disorder, chronic  Management as per principal problem  Panic disorder with agoraphobia  Management as per principal problem  Impulse control disorder  Management as per principal problem  Personality disorder (HCC)  Management as per principal problem  Alcohol abuse, continuous  Counseling on the unit  Medical clearance for psychiatric admission  Management per medical team  Fibromyalgia  Management per medical team  Tobacco abuse  Smoking cessation  Elevated triglycerides with high cholesterol  Management per medical team  Vitamin D deficiency  Daily Vitamin D3 2,000 units  Management per medical team    Current medications:  Current Facility-Administered Medications   Medication Dose Route Frequency Provider Last Rate    albuterol  2 puff Inhalation Q4H PRN Aye Hill MD      aluminum-magnesium hydroxide-simethicone  30 mL Oral Q4H PRN GLENN Hong      benztropine  1 mg Intramuscular Q4H PRN Max 6/day GLENN Hong      benztropine  1 mg Oral Q4H PRN Max 6/day Juli  GLENN Lee      Cholecalciferol  2,000 Units Oral Daily Aye Hill MD      cyanocobalamin  1,000 mcg Oral Daily Cassidy Dominguez PA-C      divalproex sodium  1,500 mg Oral HS Aye iHll MD      docusate sodium  100 mg Oral BID GLENN Pedraza      folic acid  1 mg Oral Daily Aye Hill MD      hydrOXYzine HCL  25 mg Oral Q6H PRN Max 4/day GLENN Hong      hydrOXYzine HCL  50 mg Oral Q4H PRN Max 4/day GLENN Hong      Or    LORazepam  1 mg Intramuscular Q4H PRN GLENN Hong      ibuprofen  400 mg Oral Q4H PRN GLENN Hong      ibuprofen  600 mg Oral Q6H PRN Juli Lee, GLENN      ibuprofen  800 mg Oral Q8H PRN GLENN Hong      LORazepam  1 mg Oral Q4H PRN Max 6/day GLENN Hong      Or    LORazepam  2 mg Intramuscular Q6H PRN Max 3/day GLENN Hong      magnesium hydroxide  30 mL Oral Daily PRN Katalina Amin MD      melatonin  3 mg Oral HS PRN Hailey Archibald,       multivitamin-minerals  1 tablet Oral Daily Aye Hill MD      nicotine  1 patch Transdermal Daily GLENN Hong      OLANZapine  10 mg Oral Q3H PRN Max 3/day GLENN Hong      Or    OLANZapine  10 mg Intramuscular Q3H PRN Max 3/day GLENN Hong      OLANZapine  5 mg Oral Q3H PRN Max 6/day GLENN Hong      Or    OLANZapine  5 mg Intramuscular Q3H PRN Max 6/day GLENN Hnog      OLANZapine  2.5 mg Oral Q3H PRN Max 8/day GLENN Hong      polyethylene glycol  17 g Oral Daily PRN GLENN Pedraza      propranolol  10 mg Oral Q8H PRN GLENN Hong      risperiDONE  2 mg Oral HS Hailey Archibald,       senna  1 tablet Oral HS GLENN Pedraza      sertraline  100 mg Oral Daily Hailey Archibald DO      simethicone  80 mg Oral Q6H PRN Hailey Archibald,       thiamine  100 mg Oral Daily Aye Hill MD      traZODone  50 mg Oral HS Hailey Archibald DO          Risks/Benefits of Treatment:     Risks, benefits, and possible side effects of  medications explained to patient and patient verbalizes understanding and agreement for treatment.    Progress Toward Goals: progressing slowly    Treatment Planning:     All current active medications have been reviewed.  Continue to monitor response to treatment and assess for potential side effects of medications.  Encourage group therapy, milieu therapy and occupational therapy  Collaboration with medical service for medical comorbidities as indicated.  Behavioral Health checks for safety monitoring.  Long Stay Certification : Not Applicable  Estimated Discharge Day: 4/14/2025   Legal Status : Voluntary 201 commitment.    Subjective     Behavior over the last 24 hours: slowly improving.      Patient was visited on unit for continuing care; chart reviewed and discussed with the nursing staff.  On approach, the patient was calm and cooperative. Denied any changes in mood, appetite, and energy level. Was irritated yesterday with another patient and had thoughts to harm her. No problem initiating and maintaining sleep.  Denied A/VH currently.  Denied SI/HI, intent or plan upon direct inquiry at this time.    Patient continues to be visible in the milieu and interacts with staff and peers. No reports of aggression or self-injurious behavior on unit. No PRN medications used in the past 24 hours.    Patient accepted all offered medications and no adverse effects of medications noted or reported.      Sleep: normal  Appetite: normal  Medication side effects: No  ROS: review of systems as noted above in HPI/Subjective report, reports chest discomfort, chills, cough, and shortness of breath, all other systems are negative    Objective :  Temp:  [97.8 °F (36.6 °C)-98.4 °F (36.9 °C)] 97.8 °F (36.6 °C)  HR:  [73-84] 73  BP: (105-161)/(68-86) 105/68  Resp:  [16] 16  SpO2:  [98 %-100 %] 98 %  O2 Device: None (Room air)    Temp:  [97.8 °F (36.6 °C)-98.4 °F (36.9 °C)] 97.8 °F (36.6 °C)  HR:  [73-84] 73  BP: (105-161)/(68-86)  105/68  Resp:  [16] 16  SpO2:  [98 %-100 %] 98 %  O2 Device: None (Room air)    Mental Status Evaluation:    Appearance:  disheveled   Behavior:  cooperative, calm   Speech:  normal rate, normal volume, normal pitch   Mood:  improved, anxious   Affect:  constricted   Thought Process:  organized, goal directed   Thought Content:  no overt delusions   Perceptual Disturbances: no auditory hallucinations, no visual hallucinations   Risk Potential: Suicidal Ideation -  None at present  Homicidal Ideation -  None at present  Potential for Aggression - Not at present   Sensorium:  oriented to person, place, and time/date   Memory:  recent and remote memory grossly intact   Consciousness:  alert and awake   Attention/Concentration: attention span and concentration are age appropriate   Insight:  impaired   Judgment: impaired   Gait/Station: normal gait/station, normal balance   Motor Activity: no abnormal movements       Lab Results: I have reviewed the following results:  Results from the past 24 hours: No results found for this or any previous visit (from the past 24 hours).    Administrative Statements     Counseling / Coordination of Care:   Patient's progress reviewed with nursing staff.  Medication changes reviewed with nursing staff.  Medications, treatment progress and treatment plan reviewed with patient..    Katalina Amin MD 04/13/25

## 2025-04-13 NOTE — PLAN OF CARE
Problem: Ineffective Coping  Goal: Cooperates with admission process  Description: Interventions: - Complete admission process  Outcome: Progressing  Goal: Participates in unit activities  Description: Interventions:- Provide therapeutic environment - Provide required programming - Redirect inappropriate behaviors   Outcome: Progressing  Goal: Patient/Family verbalizes awareness of resources  Outcome: Progressing     Problem: Anxiety  Goal: Anxiety is at manageable level  Description: Interventions:- Assess and monitor patient's anxiety level. - Monitor for signs and symptoms (heart palpitations, chest pain, shortness of breath, headaches, nausea, feeling jumpy, restlessness, irritable, apprehensive). - Collaborate with interdisciplinary team and initiate plan and interventions as ordered.- [place] patient to unit/surroundings- Explain treatment plan- Encourage participation in care- Encourage verbalization of concerns/fears- Identify coping mechanisms- Assist in developing anxiety-reducing skills- Administer/offer alternative therapies- Limit or eliminate stimulants  Outcome: Progressing

## 2025-04-13 NOTE — ASSESSMENT & PLAN NOTE
As of 04/13/25: Genevieve appears calm and cooperative.  Anxiety, depression, and lability are slowly improving.    Current medications:  Continue Depakote ER 1500 mg at bedtime to help with mood stabilization and anger control.   Labs reviewed from 4/8/25: VPA level 80, CBC/diff showing low Hgb of 10.8, but appears to be at baseline, CMP within normal limits.  Continue Risperdal 2 mg daily at bedtime to help with mood  Continue Zoloft 100 mg daily to help with depressive symptoms- increased 4/12  Continue trazodone 50 mg daily at bedtime for insomnia

## 2025-04-14 ENCOUNTER — TELEPHONE (OUTPATIENT)
Age: 50
End: 2025-04-14

## 2025-04-14 VITALS
DIASTOLIC BLOOD PRESSURE: 69 MMHG | TEMPERATURE: 97.6 F | WEIGHT: 191.4 LBS | RESPIRATION RATE: 17 BRPM | HEIGHT: 67 IN | OXYGEN SATURATION: 100 % | HEART RATE: 105 BPM | BODY MASS INDEX: 30.04 KG/M2 | SYSTOLIC BLOOD PRESSURE: 100 MMHG

## 2025-04-14 PROBLEM — Z00.8 MEDICAL CLEARANCE FOR PSYCHIATRIC ADMISSION: Status: RESOLVED | Noted: 2022-10-27 | Resolved: 2025-04-14

## 2025-04-14 PROCEDURE — 99239 HOSP IP/OBS DSCHRG MGMT >30: CPT | Performed by: PSYCHIATRY & NEUROLOGY

## 2025-04-14 RX ORDER — FOLIC ACID 1 MG/1
1 TABLET ORAL DAILY
Qty: 30 TABLET | Refills: 0 | Status: SHIPPED | OUTPATIENT
Start: 2025-04-15 | End: 2025-05-15

## 2025-04-14 RX ORDER — RISPERIDONE 2 MG/1
2 TABLET ORAL
Qty: 30 TABLET | Refills: 1 | Status: SHIPPED | OUTPATIENT
Start: 2025-04-14 | End: 2025-06-13

## 2025-04-14 RX ORDER — DIVALPROEX SODIUM 500 MG/1
1500 TABLET, FILM COATED, EXTENDED RELEASE ORAL
Qty: 90 TABLET | Refills: 1 | Status: SHIPPED | OUTPATIENT
Start: 2025-04-14 | End: 2025-06-13

## 2025-04-14 RX ORDER — ALBUTEROL SULFATE 90 UG/1
2 INHALANT RESPIRATORY (INHALATION) EVERY 4 HOURS PRN
Qty: 18 G | Refills: 0 | Status: SHIPPED | OUTPATIENT
Start: 2025-04-14

## 2025-04-14 RX ORDER — SERTRALINE HYDROCHLORIDE 100 MG/1
100 TABLET, FILM COATED ORAL DAILY
Qty: 30 TABLET | Refills: 1 | Status: SHIPPED | OUTPATIENT
Start: 2025-04-15 | End: 2025-06-14

## 2025-04-14 RX ORDER — DOCUSATE SODIUM 100 MG/1
100 CAPSULE, LIQUID FILLED ORAL 2 TIMES DAILY
Qty: 60 CAPSULE | Refills: 0 | Status: SHIPPED | OUTPATIENT
Start: 2025-04-14 | End: 2025-05-14

## 2025-04-14 RX ORDER — LANOLIN ALCOHOL/MO/W.PET/CERES
100 CREAM (GRAM) TOPICAL DAILY
Qty: 30 TABLET | Refills: 0 | Status: SHIPPED | OUTPATIENT
Start: 2025-04-15 | End: 2025-05-15

## 2025-04-14 RX ORDER — TRAZODONE HYDROCHLORIDE 50 MG/1
50 TABLET ORAL
Qty: 30 TABLET | Refills: 1 | Status: SHIPPED | OUTPATIENT
Start: 2025-04-14 | End: 2025-06-13

## 2025-04-14 RX ORDER — HYDROXYZINE HYDROCHLORIDE 25 MG/1
25 TABLET, FILM COATED ORAL 2 TIMES DAILY PRN
Qty: 60 TABLET | Refills: 1 | Status: SHIPPED | OUTPATIENT
Start: 2025-04-14 | End: 2025-06-13

## 2025-04-14 RX ADMIN — Medication 2000 UNITS: at 08:06

## 2025-04-14 RX ADMIN — THIAMINE HCL TAB 100 MG 100 MG: 100 TAB at 08:06

## 2025-04-14 RX ADMIN — NICOTINE 1 PATCH: 21 PATCH, EXTENDED RELEASE TRANSDERMAL at 08:06

## 2025-04-14 RX ADMIN — FOLIC ACID 1 MG: 1 TABLET ORAL at 08:06

## 2025-04-14 RX ADMIN — HYDROXYZINE HYDROCHLORIDE 50 MG: 50 TABLET, FILM COATED ORAL at 11:28

## 2025-04-14 RX ADMIN — Medication 1 TABLET: at 08:06

## 2025-04-14 RX ADMIN — SERTRALINE HYDROCHLORIDE 100 MG: 100 TABLET ORAL at 08:06

## 2025-04-14 RX ADMIN — ALBUTEROL SULFATE 2 PUFF: 90 AEROSOL, METERED RESPIRATORY (INHALATION) at 07:50

## 2025-04-14 RX ADMIN — DOCUSATE SODIUM 100 MG: 100 CAPSULE, LIQUID FILLED ORAL at 08:06

## 2025-04-14 RX ADMIN — CYANOCOBALAMIN TAB 1000 MCG 1000 MCG: 1000 TAB at 08:06

## 2025-04-14 NOTE — NURSING NOTE
Pt denies SI, HI, AH and VH. Pt has no current complaints or concerns. Pt is calm, cooperative and pleasant. Pt is visible in the milieu and social with peers. Pt reporting having BM's last night and feeling stomach pain relief. Medication and meal compliant.

## 2025-04-14 NOTE — NURSING NOTE
Pt reporting feeling anxious about upcoming d/c. HAM scale score 20, pt given PRN PO atarax 50mg.

## 2025-04-14 NOTE — NURSING NOTE
PRN PO atarax 50mg was effective for anxiety. Pt sitting comfortably in the dayroom with no further complaints.

## 2025-04-14 NOTE — NURSING NOTE
AVS printed and reviewed with pt, pt verbalized understanding and had no questions regarding AVS. Pt provided office number and psychiatrist name to contact about outpatient appointment. Pt medications e-scribed to preferred pharmacy. Pt has no complaints or concerns. Pt walking off unit with belongings by her side. Pt left with significant other to return home.

## 2025-04-14 NOTE — ASSESSMENT & PLAN NOTE
Smoking cessation  Offered NRT (patch) at discharge. Patient reports that she will continue with the nicotine gum.

## 2025-04-14 NOTE — TELEPHONE ENCOUNTER
Shabnam from [facility] called in regard to scheduling patient for HDC. Writer provided [facility]'s intake email for sending discharge paperwork and someone will reach out for scheduling.

## 2025-04-14 NOTE — BH TRANSITION RECORD
Contact Information: If you have any questions, concerns, pended studies, tests and/or procedures, or emergencies regarding your inpatient behavioral health visit. Please contact [facility] [phone] and ask to speak to a , nurse or physician. A contact is available 24 hours/ 7 days a week at this number.     Summary of Procedures Performed During your Stay:  Below is a list of major procedures performed during your hospital stay and a summary of results:  - Cardiac Procedures/Studies:  .    Results for orders placed or performed during the hospital encounter of 03/26/25 (from the past 1000 hours)   ECG 12 lead    Collection Time: 03/27/25 11:06 AM   Result Value    Ventricular Rate 77    Atrial Rate 77    OH Interval 172    QRSD Interval 74    QT Interval 392    QTC Interval 444    P Axis 57    QRS Axis 31    T Wave Axis -9    Narrative    Normal sinus rhythm  Nonspecific T wave abnormality  Abnormal ECG  When compared with ECG of 27-Mar-2025 11:06, (unconfirmed)  No significant change was found  Confirmed by Griffin Byrd ([id number]) on 3/27/2025 2:04:59 PM     *Note: Due to a large number of results and/or encounters for the requested time period, some results have not been displayed. A complete set of results can be found in Results Review.         Pending Studies (From admission, onward)       Start     Ordered    04/14/25 0000  Comprehensive metabolic panel (CMP)        Comments: This is a patient instruction: Patient fasting for 8 hours or longer recommended.      04/14/25 1008    04/14/25 0000  CBC and differential        Comments: This is a patient instruction: This test is non-fasting. Please drink two glasses of water morning of bloodwork.       04/14/25 1008    04/14/25 0000  Valproic acid level, total         04/14/25 1008                  Please follow up on the above pending studies with your PCP and/or referring provider. Please obtain lab work in 1 week.      Hailey Archibald, DO

## 2025-04-14 NOTE — SOCIAL WORK
Pt to D/C today. Pt denies SI/HI/AVH. Pt oriented x3. Pt to d/c to home and significant other will  upon discharge. Pt to follow up with [facility] on 6/16/2025 at 12:00pm. Scripts sent to preferred pharmacy.     Discharge Address: [address] [place] [id number]  Phone:  [phone]

## 2025-04-14 NOTE — SOCIAL WORK
CM spoke to pt's tee Felix over the phone to discuss discharge plan and go over any questions or concerns. CM explained that patient is set for discharge today, and will be provided with outpatient follow-up appointments for therapy/psych. Pt's meds will be sent to her preferred pharmacy. Isidro was in agreement with discharge plan and will be available to pick pt up this afternoon at 1:30pm.

## 2025-04-14 NOTE — PLAN OF CARE
Problem: Ineffective Coping  Goal: Cooperates with admission process  Description: Interventions: - Complete admission process  Outcome: Progressing  Goal: Participates in unit activities  Description: Interventions:- Provide therapeutic environment - Provide required programming - Redirect inappropriate behaviors   Outcome: Progressing  Goal: Patient/Family verbalizes awareness of resources  Outcome: Progressing     Problem: Depression  Goal: Treatment Goal: Demonstrate behavioral control of depressive symptoms, verbalize feelings of improved mood/affect, and adopt new coping skills prior to discharge  Outcome: Progressing  Goal: Verbalize thoughts and feelings  Description: Interventions:- Assess and re-assess patient's level of risk - Engage patient in 1:1 interactions, daily, for a minimum of 15 minutes - Encourage patient to express feelings, fears, frustrations, hopes   Outcome: Progressing  Goal: Attend and participate in unit activities, including therapeutic, recreational, and educational groups  Description: Interventions:- Provide therapeutic and educational activities daily, encourage attendance and participation, and document same in the medical record   Outcome: Progressing     Problem: Anxiety  Goal: Anxiety is at manageable level  Description: Interventions:- Assess and monitor patient's anxiety level. - Monitor for signs and symptoms (heart palpitations, chest pain, shortness of breath, headaches, nausea, feeling jumpy, restlessness, irritable, apprehensive). - Collaborate with interdisciplinary team and initiate plan and interventions as ordered.- [place] patient to unit/surroundings- Explain treatment plan- Encourage participation in care- Encourage verbalization of concerns/fears- Identify coping mechanisms- Assist in developing anxiety-reducing skills- Administer/offer alternative therapies- Limit or eliminate stimulants  Outcome: Progressing

## 2025-04-14 NOTE — PROGRESS NOTES
04/14/25 0838   Team Meeting   Meeting Type Daily Rounds   Team Members Present   Team Members Present Physician;Nurse;   Physician Team Member Sergio   Nursing Team Member Clarissa   Care Management Team Member Roman   Patient/Family Present   Patient Present No   Patient's Family Present No     Pt calm, denying psych symptoms. Receiving milk of mag due to constipation. Possible d/c for today.

## 2025-04-14 NOTE — ASSESSMENT & PLAN NOTE
As of 04/14/25: Genevieve appears calm and cooperative.  Anxiety, depression, and lability are slowly improving.    Current medications:  Continue Depakote ER 1500 mg at bedtime to help with mood stabilization and anger control.   Labs reviewed from 4/8/25: VPA level 80, CBC/diff showing low Hgb of 10.8, but appears to be at baseline, CMP within normal limits.  Continue Risperdal 2 mg daily at bedtime to help with mood  Continue Zoloft 100 mg daily to help with depressive symptoms- increased 4/12  Continue trazodone 50 mg daily at bedtime for insomnia

## 2025-04-14 NOTE — PLAN OF CARE
Problem: Ineffective Coping  Goal: Cooperates with admission process  Description: Interventions: - Complete admission process  Outcome: Completed  Goal: Participates in unit activities  Description: Interventions:- Provide therapeutic environment - Provide required programming - Redirect inappropriate behaviors   Outcome: Completed  Goal: Patient/Family verbalizes awareness of resources  Outcome: Completed     Problem: Depression  Goal: Treatment Goal: Demonstrate behavioral control of depressive symptoms, verbalize feelings of improved mood/affect, and adopt new coping skills prior to discharge  Outcome: Completed  Goal: Verbalize thoughts and feelings  Description: Interventions:- Assess and re-assess patient's level of risk - Engage patient in 1:1 interactions, daily, for a minimum of 15 minutes - Encourage patient to express feelings, fears, frustrations, hopes   Outcome: Completed  Goal: Attend and participate in unit activities, including therapeutic, recreational, and educational groups  Description: Interventions:- Provide therapeutic and educational activities daily, encourage attendance and participation, and document same in the medical record   Outcome: Completed     Problem: Anxiety  Goal: Anxiety is at manageable level  Description: Interventions:- Assess and monitor patient's anxiety level. - Monitor for signs and symptoms (heart palpitations, chest pain, shortness of breath, headaches, nausea, feeling jumpy, restlessness, irritable, apprehensive). - Collaborate with interdisciplinary team and initiate plan and interventions as ordered.- [place] patient to unit/surroundings- Explain treatment plan- Encourage participation in care- Encourage verbalization of concerns/fears- Identify coping mechanisms- Assist in developing anxiety-reducing skills- Administer/offer alternative therapies- Limit or eliminate stimulants  Outcome: Completed     Problem: Risk for Violence/Aggression Toward Others  Goal:  Treatment Goal: Refrain from acts of violence/aggression during length of stay, and demonstrate improved impulse control at the time of discharge  Outcome: Completed  Goal: Refrain from harming others  Outcome: Completed  Goal: Control angry outbursts  Description: Interventions:- Monitor patient closely, per order- Ensure early verbal de-escalation- Monitor prn medication needs- Set reasonable/therapeutic limits, outline behavioral expectations, and consequences - Provide a non-threatening milieu, utilizing the least restrictive interventions   Outcome: Completed     Problem: Potential for Falls  Goal: Patient will remain free of falls  Description: -Remind patient to notify staff when needed.  -Continue to assess pt's LOC and safety awareness  Outcome: Completed     Problem: DISCHARGE PLANNING - CARE MANAGEMENT  Goal: Discharge to post-acute care or home with appropriate resources  Description: INTERVENTIONS:- Conduct assessment to determine patient/family and health care team treatment goals, and need for post-acute services based on payer coverage, community resources, and patient preferences, and barriers to discharge- Address psychosocial, clinical, and financial barriers to discharge as identified in assessment in conjunction with the patient/family and health care team- Arrange appropriate level of post-acute services according to patient’s   needs and preference and payer coverage in collaboration with the physician and health care team- Communicate with and update the patient/family, physician, and health care team regarding progress on the discharge plan- Arrange appropriate transportation to post-acute venues  Outcome: Completed

## 2025-04-14 NOTE — DISCHARGE SUMMARY
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     Potassium 04/06/2025 4.6     Chloride 04/06/2025 99     CO2 04/06/2025 32     ANION GAP 04/06/2025 6     BUN 04/06/2025 15     Creatinine 04/06/2025 0.93     Glucose 04/06/2025 89     Glucose, Fasting 04/06/2025 89     Calcium 04/06/2025 8.9     AST 04/06/2025 9 (L)     ALT 04/06/2025 7     Alkaline Phosphatase 04/06/2025 44     Total Protein 04/06/2025 6.7     Albumin 04/06/2025 3.9     Total Bilirubin 04/06/2025 0.21     eGFR 04/06/2025 73     WBC 04/06/2025 6.30     RBC 04/06/2025 4.60     Hemoglobin 04/06/2025 11.5     Hematocrit 04/06/2025 37.8     MCV 04/06/2025 82     MCH 04/06/2025 25.0 (L)     MCHC 04/06/2025 30.4 (L)     RDW 04/06/2025 15.0     MPV 04/06/2025 10.4     Platelets 04/06/2025 273     Valproic Acid, Total 04/06/2025 78     Segmented % 04/06/2025 33 (L)     Lymphocytes % 04/06/2025 54 (H)     Monocytes % 04/06/2025 5     Eosinophils % 04/06/2025 6     Basophils % 04/06/2025 2 (H)     Absolute Neutrophils 04/06/2025 2.08     Absolute Lymphocytes 04/06/2025 3.40     Absolute Monocytes 04/06/2025 0.32     Absolute Eosinophils 04/06/2025 0.38     Absolute Basophils 04/06/2025 0.13 (H)     RBC Morphology 04/06/2025 Normal     Platelet Estimate 04/06/2025 Adequate     Clumped Platelets 04/06/2025 Present     Valproic Acid, Total 04/08/2025 80      Sodium 04/08/2025 136     Potassium 04/08/2025 4.2     Chloride 04/08/2025 100     CO2 04/08/2025 30     ANION GAP 04/08/2025 6     BUN 04/08/2025 13     Creatinine 04/08/2025 0.83     Glucose 04/08/2025 94     Calcium 04/08/2025 9.0     AST 04/08/2025 9 (L)     ALT 04/08/2025 6 (L)     Alkaline Phosphatase 04/08/2025 45     Total Protein 04/08/2025 6.6     Albumin 04/08/2025 4.0     Total Bilirubin 04/08/2025 0.18 (L)     eGFR 04/08/2025 84     WBC 04/08/2025 6.53     RBC 04/08/2025 4.27     Hemoglobin 04/08/2025 10.8 (L)     Hematocrit 04/08/2025 35.3     MCV 04/08/2025 83     MCH 04/08/2025 25.3 (L)     MCHC 04/08/2025 30.6 (L)     RDW 04/08/2025 15.1     MPV 04/08/2025 10.6     Platelets 04/08/2025 274     nRBC 04/08/2025 0     Segmented % 04/08/2025 47     Immature Grans % 04/08/2025 1     Lymphocytes % 04/08/2025 37     Monocytes % 04/08/2025 11     Eosinophils Relative 04/08/2025 3     Basophils Relative 04/08/2025 1     Absolute Neutrophils 04/08/2025 3.11     Absolute Immature Grans 04/08/2025 0.03     Absolute Lymphocytes 04/08/2025 2.39     Absolute Monocytes 04/08/2025 0.74     Eosinophils Absolute 04/08/2025 0.20     Basophils Absolute 04/08/2025 0.06     HCG, Quant 04/13/2025 <0.6      Depakote:   Lab Results   Component Value Date    VALPROICTOT 80 04/08/2025       Discharge Medications:  Home Medications After Discharge    Scheduled   Cholecalciferol (VITAMIN D3) 1,000 units tablet, Take 2 tablets (2,000 Units total) by mouth daily, Start: 04/15/25   cyanocobalamin (VITAMIN B-12) 1000 MCG tablet, Take 1 tablet (1,000 mcg total) by mouth daily, Start: 04/15/25   divalproex sodium (DEPAKOTE ER) 500 mg 24 hr tablet, Take 3 tablets (1,500 mg total) by mouth daily at bedtime   docusate sodium (COLACE) 100 mg capsule, Take 1 capsule (100 mg total) by mouth 2 (two) times a day   folic acid (FOLVITE) 1 mg tablet, Take 1 tablet (1 mg total) by mouth daily, Start: 04/15/25   risperiDONE (RisperDAL) 2 mg  tablet, Take 1 tablet (2 mg total) by mouth daily at bedtime   sertraline (ZOLOFT) 100 mg tablet, Take 1 tablet (100 mg total) by mouth in the morning, Start: 04/15/25   thiamine 100 MG tablet, Take 1 tablet (100 mg total) by mouth daily, Start: 04/15/25   traZODone (DESYREL) 50 mg tablet, Take 1 tablet (50 mg total) by mouth daily at bedtime    PRN   albuterol (PROVENTIL HFA,VENTOLIN HFA) 90 mcg/act inhaler, Inhale 2 puffs every 4 (four) hours as needed for wheezing or shortness of breath   hydrOXYzine HCL (ATARAX) 25 mg tablet, Take 1 tablet (25 mg total) by mouth 2 (two) times a day as needed for itching (anxiety)     Discharge instructions/Information to patient and family:   See After Visit Summary for information provided to patient and family.      Provisions for Follow-Up Care:  See after visit summary for information related to follow-up care and any pertinent home health orders.      Discharge Statement:    I have spent a total time of 38 minutes in caring for this patient on the day of the visit/encounter.   >30 minutes of time was spent on: Instructions for management, Importance of tx compliance, Risk factor reductions, Counseling / Coordination of care, Documenting in the medical record, Reviewing / ordering tests, medicine, procedures  , and Communicating with other healthcare professionals .  I reviewed with Genevieve importance of compliance with medications and outpatient treatment after discharge.  I discussed the medication regimen and possible side effects of the medications with Genevieve prior to discharge. At the time of discharge she was tolerating psychiatric medications.  I discussed outpatient follow up with Genevieve.  I reviewed with Genevieve crisis plan and safety plan upon discharge.  I discussed with Genevieve recommendation to follow up with outpatient drug and alcohol counseling and AA meetings.  Outpatient Smoking Cessation referral was offered to Genevieve. She declined the  referral.  Smoking Cessation medication was offered to Genevieve. She declined Smoking Cessation medication.    Discharge on Two Antipsychotic Medications: Tiffanie Archibald DO 04/14/25

## 2025-04-16 ENCOUNTER — TELEPHONE (OUTPATIENT)
Dept: PSYCHIATRY | Facility: CLINIC | Age: 50
End: 2025-04-16

## 2025-04-16 NOTE — TELEPHONE ENCOUNTER
Called and left a  for Genevieve, notifying her of her appt tomorrow with Amparo JACOB CM, and that we must cancel/reschedule her 4/21/25 appt with desmond Morales, due to Yumiko being out of the office. MAT office number provided.  Appt cancelled.    For your review.

## 2025-04-17 ENCOUNTER — OFFICE VISIT (OUTPATIENT)
Dept: PSYCHIATRY | Facility: CLINIC | Age: 50
End: 2025-04-17
Payer: COMMERCIAL

## 2025-04-17 DIAGNOSIS — F10.10 ALCOHOL ABUSE, CONTINUOUS: Primary | Chronic | ICD-10-CM

## 2025-04-17 PROCEDURE — H0006 ALCOHOL AND/OR DRUG SERVICES: HCPCS

## 2025-04-21 NOTE — PSYCH
Genevieve Meza  04/17/2025  Start Time: 1308  Stop Time: 1349  Total Visit Time: 41 minutes    Visit type: In person    Recovery needs addressed during session: Substance Use Disorder Recovery    Notes (DAP Format)  DATA: Patient presented for in-person, scheduled visit with this CM.   This is to be patient's initial visit with the [facility], please utilize this documentation as formal intake.     Present was patient's minor daughter    Patient was recently released from [facility].   Patient did mention to struggle with MH and alcohol use, has not consumed alcohol since being home.   Does have appt scheduled with Dr. Morgan for continued maintenance.   Patient will be continuing to see Dr. Davis following DC as patient did see provider while IP.    Patient struggles with understanding alcohol as an outlet for anger and frustration. Would like to meet with certified .     ASSESSMENT: Patient was alert and oriented.   Patient did not present as a concern.  Patient stated to be taking all medication as prescribed.   Patient denies any SI/HI, no concern to this CM at this time    PLAN: Patient has been scheduled with SIMONA QUINTERO,  Patient did not state to need continued CM services, no treatment plan created at this time.   Patient will continue to meet with Psychiatry for ongoing medication management.     Consent for services signed.   SORAYA for significant other, EC signed.    Referrals made during this visit SIMONA QUINTERO    Recovery connections: [facility]    Next appointment: no future appts scheduled with this CM

## 2025-04-23 ENCOUNTER — TELEPHONE (OUTPATIENT)
Dept: PSYCHIATRY | Facility: CLINIC | Age: 50
End: 2025-04-23

## 2025-04-24 ENCOUNTER — OFFICE VISIT (OUTPATIENT)
Dept: OTHER | Age: 50
End: 2025-04-24
Payer: COMMERCIAL

## 2025-04-24 ENCOUNTER — OFFICE VISIT (OUTPATIENT)
Dept: PSYCHIATRY | Facility: CLINIC | Age: 50
End: 2025-04-24
Payer: COMMERCIAL

## 2025-04-24 VITALS
DIASTOLIC BLOOD PRESSURE: 78 MMHG | HEART RATE: 86 BPM | BODY MASS INDEX: 30.92 KG/M2 | HEIGHT: 67 IN | SYSTOLIC BLOOD PRESSURE: 118 MMHG | WEIGHT: 197 LBS

## 2025-04-24 DIAGNOSIS — F10.10 ALCOHOL ABUSE, CONTINUOUS: Primary | ICD-10-CM

## 2025-04-24 DIAGNOSIS — F10.20 ALCOHOL USE DISORDER, SEVERE, DEPENDENCE (HCC): Primary | ICD-10-CM

## 2025-04-24 PROCEDURE — H0006 ALCOHOL AND/OR DRUG SERVICES: HCPCS

## 2025-04-24 PROCEDURE — 99204 OFFICE O/P NEW MOD 45 MIN: CPT | Performed by: EMERGENCY MEDICINE

## 2025-04-24 RX ORDER — NALTREXONE HYDROCHLORIDE 50 MG/1
50 TABLET, FILM COATED ORAL DAILY
Qty: 30 TABLET | Refills: 3 | Status: SHIPPED | OUTPATIENT
Start: 2025-04-24

## 2025-04-24 NOTE — ASSESSMENT & PLAN NOTE
We discussed all SB options   After answering all questions, she decided to pursue naltrexone   I verified she takes no opioids to via PDMP and interview, to include Kratom  Recent transaminases normal  Sent in PO and will f/u next month, at which point will decide on staying with PO or transitioning to IM

## 2025-04-24 NOTE — PROGRESS NOTES
[facility]     History and Physical  Genevieve Meza 49 y.o. female MRN: [id number]   @ Encounter: [id number]       1. Alcohol use disorder, severe, dependence (HCC)  Assessment & Plan:  We discussed all SB options   After answering all questions, she decided to pursue naltrexone   I verified she takes no opioids to via PDMP and interview, to include Kratom  Recent transaminases normal  Sent in PO and will f/u next month, at which point will decide on staying with PO or transitioning to IM  Orders:  -     naltrexone (REVIA) 50 mg tablet; Take 1 tablet (50 mg total) by mouth daily      In addition to the above recommendations, the patient will also be referred to the [facility]'s Certified Addiction Counselors for additional evaluation and psychotherapy. We will also engage our Certified  for patient support.       HPI: Genevieve Meza is a 49 y.o. year old female who presents with AUD. She had her first drink at age 18. From then until her early 30's, her use was about once per month, rarely to excess and never to blackout. However, in her early 30's, she noticed her use increased with job related stress to the point where she was drinking once per week. After the death of her mother, her alcohol use increased to daily within a couple years. For the past 7 years or so, she has struggled with near daily alcohol use, up to a half a fifth of liquor per day or beer. She has tried stopping on her own several times, at which point she would develop withdrawal symptoms of anxiety, nausea, trouble sleeping, sweats, but no h/o seizures. However, within a few days, she would often suffer recurrence of use. There is no other significant substance use history. She was admitted to [facility] about a month ago for major depression. Follow up with [facility] was recommended upon discharge from [facility].Her last drink was 3/24/25. She does report having thoughts of wanting to drink beer and is concerned that eventually  that may lead to recurrence of use.         Review of PDMP:     PDMP Review         Value Time User    PDMP Reviewed  Yes 2025 12:32 PM Kalyan Morgan, DO               Social History     Tobacco Use   Smoking Status Every Day    Current packs/day: 0.50    Average packs/day: 0.5 packs/day for 23.3 years (11.7 ttl pk-yrs)    Types: Cigarettes    Start date: 2002   Smokeless Tobacco Never        Review of Systems   Constitutional:  Negative for chills and fever.   HENT:  Negative for ear pain and sore throat.    Eyes:  Negative for pain and visual disturbance.   Respiratory:  Negative for cough and shortness of breath.    Cardiovascular:  Negative for chest pain and palpitations.   Gastrointestinal:  Negative for abdominal pain and vomiting.   Genitourinary:  Negative for dysuria and hematuria.   Musculoskeletal:  Positive for myalgias (chronic). Negative for arthralgias and back pain.   Skin:  Negative for color change and rash.   Neurological:  Negative for seizures and syncope.   All other systems reviewed and are negative.   ROS     Historical Information      Past Medical History:   Diagnosis Date    Abdominal pain     Abnormal mammogram of both breasts 10/30/2020    Abnormal uterine bleeding     Alcohol use     Anemia     Asthma     Bilateral breast cysts     last assessed.....17    resolved.....11/3/17     Cervical cancer, FIGO stage I (HCC)     Colon polyp     Constipation     Depression     Diarrhea     Fibromyalgia     Gastroesophageal reflux disease 2022    Hypertension     Menorrhagia 2017    Positive ROME (antinuclear antibody) 2021    Ordered by Derm, per note subsets neg. May be false pos or may develop CTD in the future. Monitor for development of symptoms      Seizures (HCC)     Thalassemia trait     URI (upper respiratory infection)     under additonal type - Chronic    Vitamin B12 deficiency         Past Surgical History:   Procedure Laterality Date      SECTION      INGUINAL HERNIA REPAIR      last assessed.....12/17/15      WY COLONOSCOPY FLX DX W/COLLJ SPEC WHEN PFRMD N/A 9/15/2017    Procedure: COLONOSCOPY;  Surgeon: Jorge Millan MD;  Location:  GI LAB;  Service: Gastroenterology    SKIN BIOPSY      last assessed.....12/17/15      UMBILICAL HERNIA REPAIR      last assessed.....12/17/15     URETHRAL DIVERTICULECTOMY      excision of urethral diverticulum......last assessed....12/17/15        Family History   Problem Relation Age of Onset    Hyperlipidemia Mother     Hypertension Mother     Breast cancer Mother         neoplasm/dx first age 36, mastectomy age 42, did have lymph biopsy positive with recurrance age around age 62    No Known Problems Father     No Known Problems Sister     No Known Problems Sister     No Known Problems Sister     No Known Problems Daughter     No Known Problems Daughter     No Known Problems Daughter     No Known Problems Daughter     Hyperlipidemia Maternal Grandmother     Hypertension Maternal Grandmother     Leukemia Maternal Grandmother     No Known Problems Maternal Grandfather     No Known Problems Paternal Grandmother     No Known Problems Paternal Grandfather         Social History      Marital Status: Single    Occupation: Customer Care     Patient Pre-hospital Living Situation: housed     Communicable diseases:    Tuberculosis (TB):   Have you traveled extensively (more than 4 weeks) outside the [place] in the last five years to high tuberculosis incidence areas ([place], [place], [place], [place]: No  Have you resided in any of these facilities in the past year: jails, prisons, shelters, nursing homes, and other long-term care facilities such as rehabilitation centers? If residents of any of these facilities were tested within the past three (3) los: No  Have you had any close contact with someone diagnosed with tuberculosis?: No  Have you been homeless within the past year?: No  Have you ever injected drugs?:  No  Do you or anyone in your household, currently have the following symptoms such as a sustained cough for two or more weeks, coughing up blood, fever/chills, loss of appetite, unexplained weight loss, fatigue, night sweats? : No  Do you currently have or anticipate having any condition that would decrease your immune system?: No    Viral Hepatitis:  Have you been tested for Hepatitis B or C?: Yes  Diagnosed with Hepatitis B or C?: No (Tested neg 1/2025)    HIV:  Have you been diagnosed with HIV?: No (tested neg 1/2025)    Allergies   Allergen Reactions    Percocet [Oxycodone-Acetaminophen] Anaphylaxis    Doxycycline Hives, Myalgia and Rash     Category: Allergy;           Prior to Admission medications    Medication Sig Start Date End Date Taking? Authorizing Provider   albuterol (PROVENTIL HFA,VENTOLIN HFA) 90 mcg/act inhaler Inhale 2 puffs every 4 (four) hours as needed for wheezing or shortness of breath 4/14/25  Yes Hailey Archibald, DO   Cholecalciferol (VITAMIN D3) 1,000 units tablet Take 2 tablets (2,000 Units total) by mouth daily 4/15/25 5/15/25 Yes Hailey Archibald DO   cyanocobalamin (VITAMIN B-12) 1000 MCG tablet Take 1 tablet (1,000 mcg total) by mouth daily 4/15/25 5/15/25 Yes Hailey Archibald DO   divalproex sodium (DEPAKOTE ER) 500 mg 24 hr tablet Take 3 tablets (1,500 mg total) by mouth daily at bedtime 4/14/25 6/13/25 Yes Hailey Archibald DO   docusate sodium (COLACE) 100 mg capsule Take 1 capsule (100 mg total) by mouth 2 (two) times a day 4/14/25 5/14/25 Yes Hailey Archibald DO   folic acid (FOLVITE) 1 mg tablet Take 1 tablet (1 mg total) by mouth daily 4/15/25 5/15/25 Yes Hailey Archibald DO   hydrOXYzine HCL (ATARAX) 25 mg tablet Take 1 tablet (25 mg total) by mouth 2 (two) times a day as needed for itching (anxiety) 4/14/25 6/13/25 Yes Hailey Archibald, DO   naltrexone (REVIA) 50 mg tablet Take 1 tablet (50 mg total) by mouth daily 4/24/25  Yes Kalyan Morgan, DO   risperiDONE (RisperDAL) 2 mg tablet Take 1 tablet (2 mg  total) by mouth daily at bedtime 4/14/25 6/13/25 Yes Hailey Archibald DO   sertraline (ZOLOFT) 100 mg tablet Take 1 tablet (100 mg total) by mouth in the morning 4/15/25 6/14/25 Yes Hailey Archibald DO   thiamine 100 MG tablet Take 1 tablet (100 mg total) by mouth daily 4/15/25 5/15/25 Yes Hailey Archibald DO   traZODone (DESYREL) 50 mg tablet Take 1 tablet (50 mg total) by mouth daily at bedtime 4/14/25 6/13/25 Yes Hailey Archibald DO       naltrexone     Objective      Vitals    Vitals:    04/24/25 1319   BP: 118/78   Pulse: 86       Physical Exam  Constitutional:       General: She is not in acute distress.     Appearance: Normal appearance.   HENT:      Mouth/Throat:      Mouth: Mucous membranes are moist.   Eyes:      Extraocular Movements: Extraocular movements intact.   Cardiovascular:      Rate and Rhythm: Normal rate.   Pulmonary:      Effort: Pulmonary effort is normal.   Musculoskeletal:         General: Normal range of motion.   Skin:     Coloration: Skin is not jaundiced.   Neurological:      Mental Status: She is alert and oriented to person, place, and time.      Coordination: Coordination normal.      Gait: Gait normal.   Psychiatric:         Mood and Affect: Mood normal.         Behavior: Behavior normal.            COWS Score:          Data:     Lab Results:  Results from last 6 Months   Lab Units 04/08/25  1933   WBC Thousand/uL 6.53   HEMOGLOBIN g/dL 10.8*   HEMATOCRIT % 35.3   PLATELETS Thousands/uL 274        Results from last 6 Months   Lab Units 04/08/25  1933   POTASSIUM mmol/L 4.2   CHLORIDE mmol/L 100   CO2 mmol/L 30   BUN mg/dL 13   CREATININE mg/dL 0.83   CALCIUM mg/dL 9.0   ALBUMIN g/dL 4.0   ALK PHOS U/L 45   ALT U/L 6*   AST U/L 9*        Hepatitis panel:  Results from last 6 Months   Lab Units 01/09/25  1104   HEP B S AG  Non-reactive   HEP C AB  Non-reactive       HIV results:   Results from last 6 Months   Lab Units 01/09/25  1104   HIV-1 P24 ANTIGEN-BIOPLEX  Non-Reactive       TB:        Imaging  Studies: Results Review Statement: No pertinent imaging studies reviewed.     EKG, Pathology, and Other Studies: Results Review Statement: No pertinent imaging studies reviewed.     Counseling / Coordination of Care     Total time spent today 45 minutes. Greater than 50% of total time was spent with the patient and / or family counseling and / or coordination of care.         ** Please Note: This note may have been constructed using a voice recognition system. **     Kalyan Morgan DO

## 2025-04-24 NOTE — PSYCH
Genevieve Meza  04/24/25  Start Time: 1400  Stop Time: 1436  Total Visit Time: 36 minutes    Visit type: In person    Recovery needs addressed during session: Other Transportation    Notes (DAP Format)  DATA: Patient presented for in-person, scheduled visit with this CM.     Patient and this CM completed [employer] application for patient's transportation needs.    ASSESSMENT:   Patient states to be feeling join pain that could be a result of flare up. Will discuss with Dr. Davis.   Patient otherwise doing well. No concerns.   Patient low risk SI/HI    PLAN: Patient to continue following up with assigned treatment team.   Patient no longer in need as CM services, unless requested.   Patient to be considered discharged from CM services    Referrals made during this visit: [facility]    Recovery connections: [facility], [facility]    Next appointment: no future appts needed at this time

## 2025-04-29 ENCOUNTER — OFFICE VISIT (OUTPATIENT)
Dept: INTERNAL MEDICINE CLINIC | Facility: CLINIC | Age: 50
End: 2025-04-29

## 2025-04-29 VITALS
WEIGHT: 195 LBS | TEMPERATURE: 97.6 F | HEART RATE: 70 BPM | HEIGHT: 67 IN | BODY MASS INDEX: 30.61 KG/M2 | SYSTOLIC BLOOD PRESSURE: 108 MMHG | DIASTOLIC BLOOD PRESSURE: 74 MMHG

## 2025-04-29 DIAGNOSIS — F41.1 GAD (GENERALIZED ANXIETY DISORDER): Chronic | ICD-10-CM

## 2025-04-29 DIAGNOSIS — N64.4 BREAST PAIN IN FEMALE: ICD-10-CM

## 2025-04-29 DIAGNOSIS — Z71.6 ENCOUNTER FOR SMOKING CESSATION COUNSELING: ICD-10-CM

## 2025-04-29 DIAGNOSIS — F33.3 MAJOR DEPRESSIVE DISORDER, RECURRENT, SEVERE WITH PSYCHOTIC FEATURES (HCC): Primary | Chronic | ICD-10-CM

## 2025-04-29 DIAGNOSIS — F10.20 ALCOHOL USE DISORDER, SEVERE, DEPENDENCE (HCC): ICD-10-CM

## 2025-04-29 PROBLEM — R63.2 APPETITE INCREASE: Status: ACTIVE | Noted: 2025-04-29

## 2025-04-29 NOTE — ASSESSMENT & PLAN NOTE
Patient lauded in her efforts to quit smoking she can't use the patches they don't stick and cause skin irritation , gum doesn't work recommend lozenges

## 2025-04-29 NOTE — ASSESSMENT & PLAN NOTE
Patient did well during her hospital stay she acclimated to the environment and was feeling a lot better , mood sleeping , new medications depakote titrated from 500 - 1000 mg then at discharge to 1500 mg , risperdal hs , trazodone as well , taking zoloft 100 mg in the morning. She has only had to take atarax x 1 for anxiety   She has developed extreme increase appetite and bilateral breast pain tightness swelling , she can hardly stand to have a bra on   She doesn't see psychiatry until 6/26/25   I will have her decrease dose of risperdal to 1 mg hs , continue same depakote She will evangelist me and touch base in 2 days , will plan to stop risperdal  As this is very likely the cause of her breast pain and  increased appetite   She will reach out to her case  manager regarding these changes

## 2025-04-29 NOTE — ASSESSMENT & PLAN NOTE
Patient is doing well on zoloft ,  anxiety so much better controlled she has only had to take 1 atarax

## 2025-04-29 NOTE — ASSESSMENT & PLAN NOTE
Patient has been sober since hospital stay , she saw med toxicology and was started on naltrexone

## 2025-04-29 NOTE — PROGRESS NOTES
Name: Genevieve Meza      : 1975      MRN: [id number]  Encounter Provider: Stephanie Trujillo MD  Encounter Date: 2025   Encounter department: [facility]    Assessment & Plan  Major depressive disorder, recurrent, severe with psychotic features (HCC)  Patient did well during her hospital stay she acclimated to the environment and was feeling a lot better , mood sleeping , new medications depakote titrated from 500 - 1000 mg then at discharge to 1500 mg , risperdal hs , trazodone as well , taking zoloft 100 mg in the morning. She has only had to take atarax x 1 for anxiety   She has developed extreme increase appetite and bilateral breast pain tightness swelling , she can hardly stand to have a bra on   She doesn't see psychiatry until 27   I will have her decrease dose of risperdal to 1 mg hs , continue same depakote She will evangelist me and touch base in 2 days , will plan to stop risperdal  As this is very likely the cause of her breast pain and  increased appetite   She will reach out to her case  manager regarding these changes          Alcohol use disorder, severe, dependence (HCC)  Patient has been sober since hospital stay , she saw med toxicology and was started on naltrexone        SAADIA (generalized anxiety disorder)  Patient is doing well on zoloft ,  anxiety so much better controlled she has only had to take 1 atarax        Breast pain in female  Likely risperdone related see taper and d/c schedule        Encounter for smoking cessation counseling  Patient lauded in her efforts to quit smoking she can't use the patches they don't stick and cause skin irritation , gum doesn't work recommend lozenges        BMI Counseling: Body mass index is 30.54 kg/m². The BMI is above normal. Nutrition recommendations include decreasing portion sizes, encouraging healthy choices of fruits and vegetables, decreasing fast food intake, consuming healthier snacks, limiting  drinks that contain sugar and reducing intake of saturated and trans fat. Exercise recommendations include exercising 3-5 times per week. Rationale for BMI follow-up plan is due to patient being overweight or obese.         History of Present Illness     HPI Patient here to discuss medications she had recent [facility] admission 3/26- 4/14 MDD with psychotic features , she missed a psych appt on 4/23   She had been self medicating with ETOH cigarettes she was so stressed and mad at people   New meds risperdone and depakote , depakote titrated up in hospital 500- 1000 and then again at discharge to 1500 mg   Since home she has only had to take atarax once , one of the medications is causing her breasts to be sore also hungry all the time she has been sober from ETOH she has met with  x 2 , Juliane London . 4 grown adult  children in her home ,and partner   Past medications She had followed with [facility] 2023 early 2024 she was on prazocin , welbutrin , paxil atarax she felt poorly , she then saw a different provider   She is wanting to quit smoking can't use patches they don't stick and cause a lot of itching   Review of Systems   Constitutional:  Positive for appetite change. Negative for chills and fever.   HENT:  Negative for ear pain and sore throat.    Eyes:  Negative for pain and visual disturbance.   Respiratory:  Negative for cough and shortness of breath.         Breast pain since taking risperdone    Cardiovascular:  Negative for chest pain and palpitations.   Gastrointestinal:  Negative for abdominal pain and vomiting.   Genitourinary:  Negative for dysuria and hematuria.   Musculoskeletal:  Negative for arthralgias and back pain.   Skin:  Negative for color change and rash.   Neurological:  Negative for seizures and syncope.   Psychiatric/Behavioral:  Positive for sleep disturbance. Negative for self-injury and suicidal ideas.         Mike depression , SAADIA   All other systems reviewed and are  negative.    Past Medical History:   Diagnosis Date    Abdominal pain     Abnormal mammogram of both breasts 10/30/2020    Abnormal uterine bleeding     Alcohol use     Anemia     Asthma     Bilateral breast cysts     last assessed.....17    resolved.....11/3/17     Cervical cancer, FIGO stage I (HCC)     Colon polyp     Constipation     Depression     Diarrhea     Fibromyalgia     Gastroesophageal reflux disease 2022    Hypertension     Menorrhagia 2017    Positive ROME (antinuclear antibody) 2021    Ordered by Derm, per note subsets neg. May be false pos or may develop CTD in the future. Monitor for development of symptoms      Seizures (HCC)     Thalassemia trait     URI (upper respiratory infection)     under additonal type - Chronic    Vitamin B12 deficiency      Past Surgical History:   Procedure Laterality Date     SECTION      INGUINAL HERNIA REPAIR      last assessed.....12/17/15      NM COLONOSCOPY FLX DX W/COLLJ SPEC WHEN PFRMD N/A 9/15/2017    Procedure: COLONOSCOPY;  Surgeon: Jorge Millan MD;  Location: [facility];  Service: Gastroenterology    SKIN BIOPSY      last assessed.....12/17/15      UMBILICAL HERNIA REPAIR      last assessed.....12/17/15     URETHRAL DIVERTICULECTOMY      excision of urethral diverticulum......last assessed....12/17/15     Family History   Problem Relation Age of Onset    Hyperlipidemia Mother     Hypertension Mother     Breast cancer Mother         neoplasm/dx first age 36, mastectomy age 40, did have lymph biopsy positive with recurrance age around age 60    No Known Problems Father     No Known Problems Sister     No Known Problems Sister     No Known Problems Sister     No Known Problems Daughter     No Known Problems Daughter     No Known Problems Daughter     No Known Problems Daughter     Hyperlipidemia Maternal Grandmother     Hypertension Maternal Grandmother     Leukemia Maternal Grandmother     No Known Problems Maternal Grandfather      No Known Problems Paternal Grandmother     No Known Problems Paternal Grandfather      Social History     Tobacco Use    Smoking status: Former     Current packs/day: 0.50     Average packs/day: 0.5 packs/day for 23.3 years (11.7 ttl pk-yrs)     Types: Cigarettes     Start date: 1/1/2002    Smokeless tobacco: Never   Vaping Use    Vaping status: Every Day   Substance and Sexual Activity    Alcohol use: Not Currently     Comment: every other week    Drug use: Never    Sexual activity: Yes     Partners: Male     Birth control/protection: None     Current Outpatient Medications on File Prior to Visit   Medication Sig    albuterol (PROVENTIL HFA,VENTOLIN HFA) 90 mcg/act inhaler Inhale 2 puffs every 4 (four) hours as needed for wheezing or shortness of breath    Cholecalciferol (VITAMIN D3) 1,000 units tablet Take 2 tablets (2,000 Units total) by mouth daily    cyanocobalamin (VITAMIN B-12) 1000 MCG tablet Take 1 tablet (1,000 mcg total) by mouth daily    divalproex sodium (DEPAKOTE ER) 500 mg 24 hr tablet Take 3 tablets (1,500 mg total) by mouth daily at bedtime    docusate sodium (COLACE) 100 mg capsule Take 1 capsule (100 mg total) by mouth 2 (two) times a day    folic acid (FOLVITE) 1 mg tablet Take 1 tablet (1 mg total) by mouth daily    hydrOXYzine HCL (ATARAX) 25 mg tablet Take 1 tablet (25 mg total) by mouth 2 (two) times a day as needed for itching (anxiety)    naltrexone (REVIA) 50 mg tablet Take 1 tablet (50 mg total) by mouth daily    risperiDONE (RisperDAL) 2 mg tablet Take 1 tablet (2 mg total) by mouth daily at bedtime    sertraline (ZOLOFT) 100 mg tablet Take 1 tablet (100 mg total) by mouth in the morning    thiamine 100 MG tablet Take 1 tablet (100 mg total) by mouth daily    traZODone (DESYREL) 50 mg tablet Take 1 tablet (50 mg total) by mouth daily at bedtime     Allergies   Allergen Reactions    Percocet [Oxycodone-Acetaminophen] Anaphylaxis    Doxycycline Hives, Myalgia and Rash     Category:  "Allergy;        Immunization History   Administered Date(s) Administered    Tdap 12/09/2015, 09/12/2021     Objective   /74 (BP Location: Left arm, Patient Position: Sitting, Cuff Size: Large)   Pulse 70   Temp 97.6 °F (36.4 °C) (Temporal)   Ht 5' 7\" (1.702 m)   Wt 88.5 kg (195 lb)   BMI 30.54 kg/m²     Physical Exam  Vitals and nursing note reviewed.   Constitutional:       General: She is not in acute distress.     Appearance: She is well-developed.      Comments: Skin with good color turgor , well hydrated ,no distress noted  OW   HENT:      Head: Normocephalic and atraumatic.      Right Ear: No decreased hearing noted. No middle ear effusion. There is no impacted cerumen.      Left Ear: No decreased hearing noted.  No middle ear effusion. There is no impacted cerumen.      Mouth/Throat:      Pharynx: Oropharynx is clear.   Eyes:      Conjunctiva/sclera: Conjunctivae normal.   Cardiovascular:      Rate and Rhythm: Normal rate and regular rhythm.      Heart sounds: No murmur heard.  Pulmonary:      Effort: Pulmonary effort is normal. No respiratory distress.      Breath sounds: Normal breath sounds.   Abdominal:      Palpations: Abdomen is soft.      Tenderness: There is no abdominal tenderness.   Musculoskeletal:         General: No swelling.      Cervical back: Neck supple.   Skin:     General: Skin is warm and dry.      Capillary Refill: Capillary refill takes less than 2 seconds.   Neurological:      Mental Status: She is alert.   Psychiatric:         Mood and Affect: Mood normal.         "

## 2025-05-22 ENCOUNTER — TELEPHONE (OUTPATIENT)
Dept: INTERNAL MEDICINE CLINIC | Facility: CLINIC | Age: 50
End: 2025-05-22

## 2025-06-03 ENCOUNTER — OFFICE VISIT (OUTPATIENT)
Dept: OBGYN CLINIC | Facility: CLINIC | Age: 50
End: 2025-06-03

## 2025-06-03 VITALS
SYSTOLIC BLOOD PRESSURE: 131 MMHG | BODY MASS INDEX: 30.61 KG/M2 | HEIGHT: 67 IN | WEIGHT: 195 LBS | DIASTOLIC BLOOD PRESSURE: 86 MMHG | HEART RATE: 65 BPM

## 2025-06-03 DIAGNOSIS — N76.0 BV (BACTERIAL VAGINOSIS): Primary | ICD-10-CM

## 2025-06-03 DIAGNOSIS — B96.89 BV (BACTERIAL VAGINOSIS): Primary | ICD-10-CM

## 2025-06-03 DIAGNOSIS — R30.0 DYSURIA: ICD-10-CM

## 2025-06-03 DIAGNOSIS — N91.2 AMENORRHEA: ICD-10-CM

## 2025-06-03 LAB
BV WHIFF TEST VAG QL: POSITIVE
CLUE CELLS SPEC QL WET PREP: POSITIVE
PH SMN: 5.5 [PH]
SL AMB  POCT GLUCOSE, UA: NORMAL
SL AMB LEUKOCYTE ESTERASE,UA: NORMAL
SL AMB POCT BILIRUBIN,UA: NORMAL
SL AMB POCT BLOOD,UA: NORMAL
SL AMB POCT CLARITY,UA: CLEAR
SL AMB POCT COLOR,UA: YELLOW
SL AMB POCT KETONES,UA: NORMAL
SL AMB POCT NITRITE,UA: NORMAL
SL AMB POCT PH,UA: 6
SL AMB POCT SPECIFIC GRAVITY,UA: 1.03
SL AMB POCT URINE HCG: NEGATIVE
SL AMB POCT URINE PROTEIN: NORMAL
SL AMB POCT UROBILINOGEN: 0.2
SL AMB POCT WET MOUNT: ABNORMAL
T VAGINALIS VAG QL WET PREP: NEGATIVE
YEAST VAG QL WET PREP: NEGATIVE

## 2025-06-03 PROCEDURE — 81003 URINALYSIS AUTO W/O SCOPE: CPT | Performed by: OBSTETRICS & GYNECOLOGY

## 2025-06-03 PROCEDURE — 99213 OFFICE O/P EST LOW 20 MIN: CPT | Performed by: OBSTETRICS & GYNECOLOGY

## 2025-06-03 PROCEDURE — 87210 SMEAR WET MOUNT SALINE/INK: CPT | Performed by: OBSTETRICS & GYNECOLOGY

## 2025-06-03 PROCEDURE — 3075F SYST BP GE 130 - 139MM HG: CPT | Performed by: OBSTETRICS & GYNECOLOGY

## 2025-06-03 PROCEDURE — 3079F DIAST BP 80-89 MM HG: CPT | Performed by: OBSTETRICS & GYNECOLOGY

## 2025-06-03 PROCEDURE — 81025 URINE PREGNANCY TEST: CPT | Performed by: OBSTETRICS & GYNECOLOGY

## 2025-06-03 RX ORDER — METRONIDAZOLE 7.5 MG/G
1 GEL VAGINAL 2 TIMES DAILY
Qty: 14 G | Refills: 0 | Status: SHIPPED | OUTPATIENT
Start: 2025-06-03 | End: 2025-06-10

## 2025-06-03 RX ORDER — METRONIDAZOLE 500 MG/1
500 TABLET ORAL EVERY 12 HOURS SCHEDULED
Qty: 14 TABLET | Refills: 0 | Status: SHIPPED | OUTPATIENT
Start: 2025-06-03 | End: 2025-06-03 | Stop reason: ALTCHOICE

## 2025-06-03 NOTE — PROGRESS NOTES
Name: Genevieve Meza      : 1975      MRN: [id number]  Encounter Provider: Frank Hines MD  Encounter Date: 6/3/2025   Encounter department: [facility]  :  Assessment & Plan  BV (bacterial vaginosis)  3-week history of thin watery discharge  Meeting Amsel criteria (4/4)  Cannot tolerate oral Flagyl (N/V)  Script sent for 7-day course of vaginal Metrogel  RTO in 1w if symptoms do not improve or worsen  Orders:    metroNIDAZOLE (METROGEL) 0.75 % vaginal gel; Insert 1 Application into the vagina 2 (two) times a day for 7 days    POCT urine HCG    POCT urine dip auto non-scope    Dysuria  Reported associated dysuria with vaginal symptoms  Screening UA negative for signs of infection  Orders:    POCT urine dip auto non-scope    Amenorrhea  Longstanding h/o irregular menses  LMP 3 months ago  UPT negative  Orders:    POCT urine HCG        History of Present Illness   HPI  Genevieve Meza is a 49 y.o. female who presents for 3 week history of vaginal discharge. Genevieve reports thin water discharge and associated foul odor. She is not sexually active and has not been in month. Genevieve reports a history of irregular menses, LMP 3 months ago. UPT negative. After examination we discussed positive findings for BV. We discussed her intolerance to oral Flagyl with recommendation for vaginal application of Metrogel to which she is agreeable. All questions answered.      Review of Systems   Constitutional:  Negative for chills and fever.   HENT:  Negative for ear pain and sore throat.    Eyes:  Negative for pain and visual disturbance.   Respiratory:  Negative for cough and shortness of breath.    Cardiovascular:  Negative for chest pain and palpitations.   Gastrointestinal:  Negative for abdominal pain and vomiting.   Genitourinary:  Positive for dysuria and vaginal discharge. Negative for hematuria and vaginal bleeding.   Musculoskeletal:  Negative for arthralgias and back pain.  "  Skin:  Negative for color change and rash.   Neurological:  Negative for seizures and syncope.   All other systems reviewed and are negative.         Objective   /86 (BP Location: Right arm, Patient Position: Sitting, Cuff Size: Large)   Pulse 65   Ht 5' 7\" (1.702 m)   Wt 88.5 kg (195 lb)   LMP  (LMP Unknown) Comment: about 3 months ago  BMI 30.54 kg/m²      Physical Exam  Vitals and nursing note reviewed. Exam conducted with a chaperone present.   Constitutional:       General: She is not in acute distress.  HENT:      Head: Normocephalic.      Right Ear: External ear normal.      Left Ear: External ear normal.     Eyes:      General: No scleral icterus.        Right eye: No discharge.         Left eye: No discharge.      Conjunctiva/sclera: Conjunctivae normal.       Cardiovascular:      Rate and Rhythm: Normal rate.      Pulses: Normal pulses.   Pulmonary:      Effort: Pulmonary effort is normal. No respiratory distress.   Abdominal:      General: Abdomen is flat. There is no distension.      Palpations: Abdomen is soft.      Tenderness: There is no abdominal tenderness. There is no guarding.   Genitourinary:     General: Normal vulva.      Vagina: Vaginal discharge present.     Musculoskeletal:         General: No swelling or tenderness. Normal range of motion.      Cervical back: Normal range of motion.      Right lower leg: No edema.      Left lower leg: No edema.     Skin:     General: Skin is warm and dry.      Capillary Refill: Capillary refill takes less than 2 seconds.     Neurological:      Mental Status: She is alert and oriented to person, place, and time. Mental status is at baseline.     Psychiatric:         Mood and Affect: Mood normal.         Behavior: Behavior normal.         Administrative Statements   I have spent a total time of 15 minutes in caring for this patient on the day of the visit/encounter including Diagnostic results, Prognosis, Risks and benefits of tx options, " Instructions for management, Patient and family education, Importance of tx compliance, Risk factor reductions, Impressions, Counseling / Coordination of care, Documenting in the medical record, Reviewing/placing orders in the medical record (including tests, medications, and/or procedures), Obtaining or reviewing history  , and Communicating with other healthcare professionals .

## 2025-06-16 ENCOUNTER — TELEPHONE (OUTPATIENT)
Age: 50
End: 2025-06-16

## 2025-06-16 NOTE — TELEPHONE ENCOUNTER
Received warm transfer from [facility]. Rescheduled patients [facility] appt for tomorrow 6/17 at 11:30am with Sandi Jarrtet.
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
## 2025-06-17 ENCOUNTER — OFFICE VISIT (OUTPATIENT)
Dept: PSYCHIATRY | Facility: CLINIC | Age: 50
End: 2025-06-17
Payer: COMMERCIAL

## 2025-06-17 VITALS — HEIGHT: 67 IN | BODY MASS INDEX: 31.6 KG/M2 | WEIGHT: 201.3 LBS

## 2025-06-17 DIAGNOSIS — F41.1 GAD (GENERALIZED ANXIETY DISORDER): Chronic | ICD-10-CM

## 2025-06-17 DIAGNOSIS — F43.12 POST-TRAUMATIC STRESS DISORDER, CHRONIC: Chronic | ICD-10-CM

## 2025-06-17 DIAGNOSIS — F33.3 MAJOR DEPRESSIVE DISORDER, RECURRENT, SEVERE WITH PSYCHOTIC FEATURES (HCC): Primary | Chronic | ICD-10-CM

## 2025-06-17 DIAGNOSIS — F63.9 IMPULSE CONTROL DISORDER: Chronic | ICD-10-CM

## 2025-06-17 DIAGNOSIS — F10.90 ALCOHOL USE DISORDER: ICD-10-CM

## 2025-06-17 PROCEDURE — 90792 PSYCH DIAG EVAL W/MED SRVCS: CPT | Performed by: PHYSICIAN ASSISTANT

## 2025-06-17 RX ORDER — QUETIAPINE FUMARATE 100 MG/1
TABLET, FILM COATED ORAL
Qty: 45 TABLET | Refills: 1 | Status: SHIPPED | OUTPATIENT
Start: 2025-06-17

## 2025-06-17 RX ORDER — DIVALPROEX SODIUM 500 MG/1
1500 TABLET, FILM COATED, EXTENDED RELEASE ORAL
Qty: 90 TABLET | Refills: 1 | Status: SHIPPED | OUTPATIENT
Start: 2025-06-17 | End: 2025-06-17 | Stop reason: SDDI

## 2025-06-17 RX ORDER — TRAZODONE HYDROCHLORIDE 50 MG/1
50 TABLET ORAL
Qty: 30 TABLET | Refills: 1 | Status: SHIPPED | OUTPATIENT
Start: 2025-06-17 | End: 2025-08-16

## 2025-06-17 RX ORDER — FOLIC ACID 1 MG/1
1 TABLET ORAL DAILY
Qty: 30 TABLET | Refills: 1 | Status: SHIPPED | OUTPATIENT
Start: 2025-06-17 | End: 2025-06-24 | Stop reason: SDUPTHER

## 2025-06-17 RX ORDER — SERTRALINE HYDROCHLORIDE 100 MG/1
100 TABLET, FILM COATED ORAL DAILY
Qty: 30 TABLET | Refills: 1 | Status: SHIPPED | OUTPATIENT
Start: 2025-06-17 | End: 2025-08-16

## 2025-06-17 RX ORDER — LANOLIN ALCOHOL/MO/W.PET/CERES
100 CREAM (GRAM) TOPICAL DAILY
Qty: 30 TABLET | Refills: 1 | Status: SHIPPED | OUTPATIENT
Start: 2025-06-17 | End: 2025-06-24 | Stop reason: SDUPTHER

## 2025-06-17 RX ORDER — DIVALPROEX SODIUM 500 MG/1
1500 TABLET, FILM COATED, EXTENDED RELEASE ORAL
Qty: 90 TABLET | Refills: 1 | Status: SHIPPED | OUTPATIENT
Start: 2025-06-17 | End: 2025-06-17 | Stop reason: SDUPTHER

## 2025-06-17 RX ORDER — QUETIAPINE FUMARATE 100 MG/1
TABLET, FILM COATED ORAL
Qty: 45 TABLET | Refills: 0 | Status: SHIPPED | OUTPATIENT
Start: 2025-06-17 | End: 2025-06-17 | Stop reason: SDUPTHER

## 2025-06-17 RX ORDER — DIVALPROEX SODIUM 500 MG/1
1500 TABLET, FILM COATED, EXTENDED RELEASE ORAL
Qty: 90 TABLET | Refills: 1 | Status: SHIPPED | OUTPATIENT
Start: 2025-06-17

## 2025-06-17 RX ORDER — HYDROXYZINE HYDROCHLORIDE 50 MG/1
50 TABLET, FILM COATED ORAL 2 TIMES DAILY PRN
Qty: 60 TABLET | Refills: 1 | Status: SHIPPED | OUTPATIENT
Start: 2025-06-17 | End: 2025-08-16

## 2025-06-17 NOTE — BH TREATMENT PLAN
"TREATMENT PLAN (Medication Management Only)        [facility]    Name/Date of Birth/MRN:  Genevieve Meza 49 y.o. 1975 MRN: [id number]  Date of Treatment Plan: June 17, 2025  Diagnosis/Diagnoses:   1. Major depressive disorder, recurrent, severe with psychotic features (HCC)    2. SAADIA (generalized anxiety disorder)    3. Post-traumatic stress disorder, chronic    4. Impulse control disorder    5. Alcohol use disorder      Strengths/Personal Resources for Self-Care: \"I'm a nurturer and a protector; I'm a leader\"  Area/Areas of need (in own words): \"I been sleeping for 4 weeks straight, only waking up to eat, use the bathroom.  No energy \".  1. Long Term Goal:   Maintain mood stability and control of anxiety  Target Date: 6 months - 12/17/2025  Person/Persons responsible for completion of goal: Fermin  2.  Short Term Objective (s) - How will we reach this goal?:   A.  Provider new recommended medication/dosage changes and/or continue medication(s):  Pt accepts today's plan.   D/C  Risperidone-- Pt already stopped it  Start Quetiapine 100mg (1/2) tab po qhs x 4 days, then (1) tab po qhs x 4 days, then (1 1/2) tabs   Increase Hydroxyzine to 50mg (1) tab po bid prn anxiety # 60 R1  Continue:  Depakote ER 500mg (3) tabs po qhs # 90 R1  Sertraline 100mg (1) tab po qd # 30 R1  Trazodone 50mg (1) tab po qhs # 30 R1   Thiamine 100mg (1) qd # 30 R1  Folic Acid 1mg (1) qd # 30 R1  Naltrexone 50mg qd -- per [facility] program Dr Morgan  Get Serum Beta HCG  Get VPA level, CBC with diff-- was ordered while still inpatient and due 4/21/2025  F/U PCP, [facility] program  Return 7/31/2025 at 2:00PM.  Can call any time sooner prn.  Pt made aware of the 24-7 on call service line.     Target Date: 6 months - 12/17/2025  Person/Persons Responsible for Completion of Goal: Fermin   Progress Towards Goals: stable, continuing treatment  Treatment Modality: Medication " mgt  Review due 180 days from date of this plan: 6 months - 12/17/2025  Expected length of service: ongoing treatment unless revised  My Physician/PA/NP and I have developed this plan together and I agree to work on the goals and objectives. I understand the treatment goals that were developed for my treatment.  Electronic Signatures: on file (unless signed below)    Sandi Jarrett PA-C 06/17/25

## 2025-06-17 NOTE — ASSESSMENT & PLAN NOTE
Orders:    Pregnancy Test (HCG Qualitative); Future    hydrOXYzine HCL (ATARAX) 50 mg tablet; Take 1 tablet (50 mg total) by mouth 2 (two) times a day as needed for itching (anxiety)    sertraline (ZOLOFT) 100 mg tablet; Take 1 tablet (100 mg total) by mouth in the morning    traZODone (DESYREL) 50 mg tablet; Take 1 tablet (50 mg total) by mouth daily at bedtime    QUEtiapine (SEROquel) 100 mg tablet; Take (1/2) tab po qhs x 4 days, then (1) tab po qhs x 4 days, then (1 1/2) tabs po qhs    divalproex sodium (Depakote ER) 500 mg 24 hr tablet; Take 3 tablets (1,500 mg total) by mouth daily at bedtime

## 2025-06-17 NOTE — ASSESSMENT & PLAN NOTE
Orders:    Pregnancy Test (HCG Qualitative); Future    hydrOXYzine HCL (ATARAX) 50 mg tablet; Take 1 tablet (50 mg total) by mouth 2 (two) times a day as needed for itching (anxiety)

## 2025-06-24 ENCOUNTER — APPOINTMENT (OUTPATIENT)
Dept: LAB | Facility: CLINIC | Age: 50
End: 2025-06-24
Payer: COMMERCIAL

## 2025-06-24 ENCOUNTER — TELEPHONE (OUTPATIENT)
Dept: INTERNAL MEDICINE CLINIC | Facility: CLINIC | Age: 50
End: 2025-06-24

## 2025-06-24 ENCOUNTER — OFFICE VISIT (OUTPATIENT)
Dept: INTERNAL MEDICINE CLINIC | Facility: CLINIC | Age: 50
End: 2025-06-24

## 2025-06-24 VITALS
WEIGHT: 201.8 LBS | BODY MASS INDEX: 31.61 KG/M2 | TEMPERATURE: 97.4 F | OXYGEN SATURATION: 99 % | SYSTOLIC BLOOD PRESSURE: 116 MMHG | DIASTOLIC BLOOD PRESSURE: 81 MMHG | HEART RATE: 68 BPM

## 2025-06-24 DIAGNOSIS — Z00.00 WELL ADULT EXAM: ICD-10-CM

## 2025-06-24 DIAGNOSIS — Z91.89 NEED FOR DENTAL CARE: ICD-10-CM

## 2025-06-24 DIAGNOSIS — I10 PRIMARY HYPERTENSION: ICD-10-CM

## 2025-06-24 DIAGNOSIS — E55.9 VITAMIN D DEFICIENCY: ICD-10-CM

## 2025-06-24 DIAGNOSIS — R63.5 WEIGHT GAIN: ICD-10-CM

## 2025-06-24 DIAGNOSIS — Z00.00 WELL ADULT EXAM: Primary | ICD-10-CM

## 2025-06-24 DIAGNOSIS — E53.8 LOW SERUM VITAMIN B12: ICD-10-CM

## 2025-06-24 DIAGNOSIS — R63.2 APPETITE INCREASE: ICD-10-CM

## 2025-06-24 DIAGNOSIS — F33.3 MAJOR DEPRESSIVE DISORDER, RECURRENT, SEVERE WITH PSYCHOTIC FEATURES (HCC): Chronic | ICD-10-CM

## 2025-06-24 DIAGNOSIS — Z12.31 ENCOUNTER FOR SCREENING MAMMOGRAM FOR BREAST CANCER: ICD-10-CM

## 2025-06-24 DIAGNOSIS — J45.20 MILD INTERMITTENT ASTHMA WITHOUT COMPLICATION: ICD-10-CM

## 2025-06-24 DIAGNOSIS — F10.20 ALCOHOL USE DISORDER, SEVERE, DEPENDENCE (HCC): ICD-10-CM

## 2025-06-24 DIAGNOSIS — F10.90 ALCOHOL USE DISORDER: ICD-10-CM

## 2025-06-24 PROBLEM — Z12.11 SCREEN FOR COLON CANCER: Status: ACTIVE | Noted: 2025-06-24

## 2025-06-24 LAB
25(OH)D3 SERPL-MCNC: 21.6 NG/ML (ref 30–100)
ALBUMIN SERPL BCG-MCNC: 4.2 G/DL (ref 3.5–5)
ALP SERPL-CCNC: 45 U/L (ref 34–104)
ALT SERPL W P-5'-P-CCNC: 7 U/L (ref 7–52)
ANION GAP SERPL CALCULATED.3IONS-SCNC: 8 MMOL/L (ref 4–13)
AST SERPL W P-5'-P-CCNC: 13 U/L (ref 13–39)
BASOPHILS # BLD AUTO: 0.06 THOUSANDS/ÂΜL (ref 0–0.1)
BASOPHILS NFR BLD AUTO: 1 % (ref 0–1)
BILIRUB SERPL-MCNC: 0.31 MG/DL (ref 0.2–1)
BUN SERPL-MCNC: 16 MG/DL (ref 5–25)
CALCIUM SERPL-MCNC: 8.5 MG/DL (ref 8.4–10.2)
CHLORIDE SERPL-SCNC: 101 MMOL/L (ref 96–108)
CHOLEST SERPL-MCNC: 191 MG/DL (ref ?–200)
CO2 SERPL-SCNC: 25 MMOL/L (ref 21–32)
CREAT SERPL-MCNC: 0.74 MG/DL (ref 0.6–1.3)
EOSINOPHIL # BLD AUTO: 0.24 THOUSAND/ÂΜL (ref 0–0.61)
EOSINOPHIL NFR BLD AUTO: 3 % (ref 0–6)
ERYTHROCYTE [DISTWIDTH] IN BLOOD BY AUTOMATED COUNT: 14.6 % (ref 11.6–15.1)
EST. AVERAGE GLUCOSE BLD GHB EST-MCNC: 117 MG/DL
GFR SERPL CREATININE-BSD FRML MDRD: 96 ML/MIN/1.73SQ M
GLUCOSE P FAST SERPL-MCNC: 92 MG/DL (ref 65–99)
HBA1C MFR BLD: 5.7 %
HCG SERPL QL: NEGATIVE
HCT VFR BLD AUTO: 35.3 % (ref 34.8–46.1)
HDLC SERPL-MCNC: 65 MG/DL
HGB BLD-MCNC: 11.3 G/DL (ref 11.5–15.4)
IMM GRANULOCYTES # BLD AUTO: 0.03 THOUSAND/UL (ref 0–0.2)
IMM GRANULOCYTES NFR BLD AUTO: 0 % (ref 0–2)
LDLC SERPL CALC-MCNC: 100 MG/DL (ref 0–100)
LYMPHOCYTES # BLD AUTO: 3.21 THOUSANDS/ÂΜL (ref 0.6–4.47)
LYMPHOCYTES NFR BLD AUTO: 47 % (ref 14–44)
MCH RBC QN AUTO: 25.7 PG (ref 26.8–34.3)
MCHC RBC AUTO-ENTMCNC: 32 G/DL (ref 31.4–37.4)
MCV RBC AUTO: 80 FL (ref 82–98)
MONOCYTES # BLD AUTO: 0.44 THOUSAND/ÂΜL (ref 0.17–1.22)
MONOCYTES NFR BLD AUTO: 6 % (ref 4–12)
NEUTROPHILS # BLD AUTO: 2.98 THOUSANDS/ÂΜL (ref 1.85–7.62)
NEUTS SEG NFR BLD AUTO: 43 % (ref 43–75)
NONHDLC SERPL-MCNC: 126 MG/DL
NRBC BLD AUTO-RTO: 0 /100 WBCS
PLATELET # BLD AUTO: 272 THOUSANDS/UL (ref 149–390)
PMV BLD AUTO: 10.5 FL (ref 8.9–12.7)
POTASSIUM SERPL-SCNC: 3.8 MMOL/L (ref 3.5–5.3)
PROT SERPL-MCNC: 6.9 G/DL (ref 6.4–8.4)
RBC # BLD AUTO: 4.4 MILLION/UL (ref 3.81–5.12)
SODIUM SERPL-SCNC: 134 MMOL/L (ref 135–147)
TRIGL SERPL-MCNC: 130 MG/DL (ref ?–150)
VALPROATE SERPL-MCNC: <10 ÂΜG/ML (ref 50–125)
WBC # BLD AUTO: 6.96 THOUSAND/UL (ref 4.31–10.16)

## 2025-06-24 PROCEDURE — 36415 COLL VENOUS BLD VENIPUNCTURE: CPT

## 2025-06-24 PROCEDURE — 80061 LIPID PANEL: CPT

## 2025-06-24 PROCEDURE — 80053 COMPREHEN METABOLIC PANEL: CPT

## 2025-06-24 PROCEDURE — 84703 CHORIONIC GONADOTROPIN ASSAY: CPT

## 2025-06-24 PROCEDURE — 82306 VITAMIN D 25 HYDROXY: CPT

## 2025-06-24 PROCEDURE — 85025 COMPLETE CBC W/AUTO DIFF WBC: CPT

## 2025-06-24 PROCEDURE — 83036 HEMOGLOBIN GLYCOSYLATED A1C: CPT

## 2025-06-24 PROCEDURE — 80164 ASSAY DIPROPYLACETIC ACD TOT: CPT

## 2025-06-24 RX ORDER — FOLIC ACID 1 MG/1
1 TABLET ORAL DAILY
Qty: 30 TABLET | Refills: 5 | Status: SHIPPED | OUTPATIENT
Start: 2025-06-24 | End: 2025-07-24

## 2025-06-24 RX ORDER — LANOLIN ALCOHOL/MO/W.PET/CERES
100 CREAM (GRAM) TOPICAL DAILY
Qty: 30 TABLET | Refills: 5 | Status: SHIPPED | OUTPATIENT
Start: 2025-06-24 | End: 2025-07-24

## 2025-06-24 NOTE — ASSESSMENT & PLAN NOTE
Patient taking po B 12 last level 343   Orders:    cyanocobalamin (VITAMIN B-12) 1000 MCG tablet; Take 1 tablet (1,000 mcg total) by mouth daily

## 2025-06-24 NOTE — ASSESSMENT & PLAN NOTE
Patient has had unsatiable appetite since taking depakote risperdol late March 2025 , weight gain nearly 40 lbs , risperdal has been d/c'd by psych , we discussed healthy diet portion control , regular exercise , she will speak with psych on 6/30   Orders:    Hemoglobin A1C; Future

## 2025-06-24 NOTE — ASSESSMENT & PLAN NOTE
Patient is taking D3 2000 units daily update labs   Orders:    Vitamin D 25 hydroxy; Future    Cholecalciferol (VITAMIN D3) 1,000 units tablet; Take 2 tablets (2,000 Units total) by mouth daily

## 2025-06-24 NOTE — PROGRESS NOTES
Adult Annual Physical  Name: Genevieve Meza      : 1975      MRN: [id number]  Encounter Provider: Stephanie Trujillo MD  Encounter Date: 2025   Encounter department: [facility]    :  Assessment & Plan  Primary hypertension         Mild intermittent asthma without complication         Well adult exam             Preventive Screenings:    - Cervical cancer screening: has history of cervical cancer     Immunizations:  - Immunizations due: Prevnar 20         History of Present Illness     Adult Annual Physicalpt here for well exam     Vision  Dental  Hearing   Diet   Exercise   Smoking ETOH   Review of Systems      Objective   /81 (BP Location: Right arm, Patient Position: Sitting, Cuff Size: Adult)   Pulse 68   Temp (!) 97.4 °F (36.3 °C) (Temporal)   Wt 91.5 kg (201 lb 12.8 oz)   LMP  (LMP Unknown) Comment: LMP about 3 months ago  SpO2 99%   BMI 31.61 kg/m²     Physical Exam

## 2025-06-24 NOTE — ASSESSMENT & PLAN NOTE
Patient will schedule eye exam , she is agreeable to dental referral , declines prevnar , agreeable to fasting labs , screening mammogram   Orders:    Lipid panel; Future

## 2025-06-24 NOTE — ASSESSMENT & PLAN NOTE
Bp well controlled off medication , discussed healthy diet , portion control , limit prepared foods , continue Health Hinge exercise olinda

## 2025-06-24 NOTE — PROGRESS NOTES
Adult Annual Physical  Name: Genevieve Meza      : 1975      MRN: [id number]  Encounter Provider: Stephanie Trujillo MD  Encounter Date: 2025   Encounter department: [facility] [place]    :  Assessment & Plan  Primary hypertension  Bp well controlled off medication , discussed healthy diet , portion control , limit prepared foods , continue [employer] exercise olinda        Mild intermittent asthma without complication  Patient only has sx when weather changes , when she is ill        Well adult exam  Patient will schedule eye exam , she is agreeable to dental referral , declines prevnar , agreeable to fasting labs , screening mammogram   Orders:    Lipid panel; Future    Vitamin D deficiency  Patient is taking D3 2000 units daily update labs   Orders:    Vitamin D 25 hydroxy; Future    Cholecalciferol (VITAMIN D3) 1,000 units tablet; Take 2 tablets (2,000 Units total) by mouth daily    Low serum vitamin B12  Patient taking po B 12 last level 343   Orders:    cyanocobalamin (VITAMIN B-12) 1000 MCG tablet; Take 1 tablet (1,000 mcg total) by mouth daily    Need for dental care    Orders:    Ambulatory Referral to Dentistry; Future    Encounter for screening mammogram for breast cancer    Orders:    Mammo screening bilateral w 3d and cad; Future    Weight gain  Patient has had unsatiable appetite since taking depakote risperdol late 2025 , weight gain nearly 40 lbs , risperdal has been d/c'd by psych , we discussed healthy diet portion control , regular exercise , she will speak with psych on    Orders:    Hemoglobin A1C; Future    Alcohol use disorder  Patient is taking naltrexone , she had a few beers the other week none since   Orders:    thiamine 100 MG tablet; Take 1 tablet (100 mg total) by mouth daily    cyanocobalamin (VITAMIN B-12) 1000 MCG tablet; Take 1 tablet (1,000 mcg total) by mouth daily    folic acid (FOLVITE) 1 mg tablet; Take 1 tablet (1 mg total) by  mouth daily    Appetite increase         Alcohol use disorder, severe, dependence (HCC)             Preventive Screenings:    - Cervical cancer screening: has history of cervical cancer     Immunizations:  - Immunizations due: Prevnar 20         History of Present Illness     Adult Annual Physical here for wellness exam   New med depakote since march , risperdal I had decreased dose , saw psych Sandi Jarrett 6/17 d/c risperdal ( breast sx ) back on seroquel 100 mg 1/2 x 4 days then 1 pill then up to 11/2 ( 150 )this is going well  has follow  up 7/31 weight [facility] 167 now 201     Vision ~ 2 years ago needs new glasses   Dental last exam 2019   Hearing normal   Diet proteins eating 4 servings , fruits veggies 4 each , water 4 - 5 bottles q day , rare coffee , sprite some days , - juice   Exercise none formal she has [employer] , working on this stretching light weights   Smoking 1 pack lasts 2 weeks , rare ETOH   Fam hx HTN Mom , MGM , Sister , - DM - MI   Fam hx cancer Mom breast ca , MGM leukemia   Review of Systems   Constitutional:  Negative for chills and fever.   HENT:  Negative for ear pain and sore throat.    Eyes:  Negative for pain and visual disturbance.   Respiratory:  Negative for cough and shortness of breath.    Cardiovascular:  Negative for chest pain and palpitations.   Gastrointestinal:  Negative for abdominal pain and vomiting.   Genitourinary:  Negative for dysuria and hematuria.   Musculoskeletal:  Negative for arthralgias and back pain.   Skin:  Negative for color change and rash.   Neurological:  Negative for seizures and syncope.   All other systems reviewed and are negative.        Objective   /81 (BP Location: Right arm, Patient Position: Sitting, Cuff Size: Adult)   Pulse 68   Temp (!) 97.4 °F (36.3 °C) (Temporal)   Wt 91.5 kg (201 lb 12.8 oz)   LMP  (LMP Unknown) Comment: LMP about 3 months ago  SpO2 99%   BMI 31.61 kg/m²     Physical Exam  Vitals and nursing  note reviewed.   Constitutional:       General: She is not in acute distress.     Appearance: She is well-developed.      Comments: Skin with good color turgor , well hydrated ,no distress noted     HENT:      Head: Normocephalic and atraumatic.      Right Ear: No decreased hearing noted. No middle ear effusion. There is no impacted cerumen.      Left Ear: No decreased hearing noted.  No middle ear effusion. There is no impacted cerumen.     Eyes:      Conjunctiva/sclera: Conjunctivae normal.     Neck:      Thyroid: No thyromegaly.     Cardiovascular:      Rate and Rhythm: Normal rate and regular rhythm.      Heart sounds: Normal heart sounds. No murmur heard.  Pulmonary:      Effort: Pulmonary effort is normal. No respiratory distress.      Breath sounds: Normal breath sounds.   Abdominal:      Palpations: Abdomen is soft.      Tenderness: There is no abdominal tenderness. There is no right CVA tenderness or left CVA tenderness.     Musculoskeletal:         General: No swelling.      Cervical back: Neck supple.   Lymphadenopathy:      Cervical:      Right cervical: No superficial cervical adenopathy.     Left cervical: No superficial cervical adenopathy.     Skin:     General: Skin is warm and dry.      Capillary Refill: Capillary refill takes less than 2 seconds.     Neurological:      Mental Status: She is alert.      Comments: Non focal exam   Psychiatric:         Attention and Perception: Attention normal.         Mood and Affect: Mood normal.         Speech: Speech normal.         Behavior: Behavior normal.

## 2025-06-24 NOTE — TELEPHONE ENCOUNTER
Called lab services and spoke to sheryl. Patient in office today and already had labs completed prior to appointment. Provider would like to order lipid, A1c an vit d, called and asked sheryl if these could be added. Per young that should not be an issue. Labs added.

## 2025-06-25 ENCOUNTER — TELEPHONE (OUTPATIENT)
Dept: INTERNAL MEDICINE CLINIC | Facility: CLINIC | Age: 50
End: 2025-06-25

## 2025-06-25 NOTE — TELEPHONE ENCOUNTER
Prior auth for the Thiamine HCL has been approved by the insurance.       Prior auth has been initiated with the insurance for Thiamine tablets

## 2025-06-25 NOTE — TELEPHONE ENCOUNTER
Prior authorization initiated on Cover my Meds for patient's Vitamin D3 tablets.     Pending review    Key: [id number]

## 2025-06-26 DIAGNOSIS — F10.20 ALCOHOL USE DISORDER, SEVERE, DEPENDENCE (HCC): ICD-10-CM

## 2025-06-26 NOTE — TELEPHONE ENCOUNTER
Prior authorization for Vitamin D3 25 mcg tablets denied.     Insurance provided list of preferred formulary options on media.     Attempted to reach [facility] pharmacy to discuss script options. Waiting for callback from pharmacy.

## 2025-06-26 NOTE — TELEPHONE ENCOUNTER
Spoke with patient's [facility] pharmacy at . Introduced self and role to pharmacist. Patient's Vitamin D3 tablets approved and ready for pickup. Copay is $1.00.     No other needs noted.

## 2025-06-27 RX ORDER — NALTREXONE HYDROCHLORIDE 50 MG/1
50 TABLET, FILM COATED ORAL DAILY
Qty: 30 TABLET | Refills: 3 | OUTPATIENT
Start: 2025-06-27

## 2025-07-09 DIAGNOSIS — F33.3 MAJOR DEPRESSIVE DISORDER, RECURRENT, SEVERE WITH PSYCHOTIC FEATURES (HCC): Chronic | ICD-10-CM

## 2025-07-09 RX ORDER — SERTRALINE HYDROCHLORIDE 100 MG/1
100 TABLET, FILM COATED ORAL DAILY
Qty: 90 TABLET | Refills: 0 | Status: SHIPPED | OUTPATIENT
Start: 2025-07-09

## 2025-07-09 RX ORDER — TRAZODONE HYDROCHLORIDE 50 MG/1
50 TABLET ORAL
Qty: 90 TABLET | Refills: 0 | Status: SHIPPED | OUTPATIENT
Start: 2025-07-09

## 2025-07-09 RX ORDER — HYDROXYZINE HYDROCHLORIDE 50 MG/1
50 TABLET, FILM COATED ORAL 2 TIMES DAILY PRN
Qty: 180 TABLET | Refills: 0 | Status: SHIPPED | OUTPATIENT
Start: 2025-07-09

## 2025-07-16 DIAGNOSIS — F10.90 ALCOHOL USE DISORDER: ICD-10-CM

## 2025-07-16 DIAGNOSIS — E53.8 LOW SERUM VITAMIN B12: ICD-10-CM

## 2025-07-16 DIAGNOSIS — E55.9 VITAMIN D DEFICIENCY: ICD-10-CM

## 2025-07-16 DIAGNOSIS — F33.3 MAJOR DEPRESSIVE DISORDER, RECURRENT, SEVERE WITH PSYCHOTIC FEATURES (HCC): Chronic | ICD-10-CM

## 2025-07-16 RX ORDER — CHOLECALCIFEROL (VITAMIN D3) 25 MCG
2 TABLET ORAL DAILY
Qty: 180 TABLET | Refills: 2 | Status: SHIPPED | OUTPATIENT
Start: 2025-07-16

## 2025-07-16 RX ORDER — CYANOCOBALAMIN (VITAMIN B-12) 1000 MCG
1 TABLET ORAL DAILY
Qty: 90 TABLET | Refills: 2 | Status: SHIPPED | OUTPATIENT
Start: 2025-07-16

## 2025-07-16 RX ORDER — LANOLIN ALCOHOL/MO/W.PET/CERES
100 CREAM (GRAM) TOPICAL DAILY
Qty: 90 TABLET | Refills: 2 | Status: SHIPPED | OUTPATIENT
Start: 2025-07-16

## 2025-07-17 RX ORDER — QUETIAPINE FUMARATE 100 MG/1
TABLET, FILM COATED ORAL
Qty: 135 TABLET | Refills: 0 | Status: SHIPPED | OUTPATIENT
Start: 2025-07-17 | End: 2025-07-31 | Stop reason: SDUPTHER

## 2025-07-24 PROBLEM — Z12.11 SCREEN FOR COLON CANCER: Status: RESOLVED | Noted: 2025-06-24 | Resolved: 2025-07-24

## 2025-07-24 PROBLEM — Z00.00 WELL ADULT EXAM: Status: RESOLVED | Noted: 2022-10-27 | Resolved: 2025-07-24

## 2025-07-31 ENCOUNTER — OFFICE VISIT (OUTPATIENT)
Dept: PSYCHIATRY | Facility: CLINIC | Age: 50
End: 2025-07-31
Payer: COMMERCIAL

## 2025-07-31 VITALS — WEIGHT: 206.2 LBS | HEIGHT: 67 IN | BODY MASS INDEX: 32.36 KG/M2

## 2025-07-31 DIAGNOSIS — F60.9 PERSONALITY DISORDER (HCC): Chronic | ICD-10-CM

## 2025-07-31 DIAGNOSIS — F40.01 PANIC DISORDER WITH AGORAPHOBIA: Chronic | ICD-10-CM

## 2025-07-31 DIAGNOSIS — F33.3 MAJOR DEPRESSIVE DISORDER, RECURRENT, SEVERE WITH PSYCHOTIC FEATURES (HCC): Primary | Chronic | ICD-10-CM

## 2025-07-31 DIAGNOSIS — F41.1 GAD (GENERALIZED ANXIETY DISORDER): Chronic | ICD-10-CM

## 2025-07-31 DIAGNOSIS — F10.20 ALCOHOL USE DISORDER, SEVERE, DEPENDENCE (HCC): ICD-10-CM

## 2025-07-31 DIAGNOSIS — F43.12 POST-TRAUMATIC STRESS DISORDER, CHRONIC: Chronic | ICD-10-CM

## 2025-07-31 PROCEDURE — 99214 OFFICE O/P EST MOD 30 MIN: CPT | Performed by: PHYSICIAN ASSISTANT

## 2025-07-31 RX ORDER — SERTRALINE HYDROCHLORIDE 100 MG/1
100 TABLET, FILM COATED ORAL DAILY
Qty: 90 TABLET | Refills: 0 | Status: SHIPPED | OUTPATIENT
Start: 2025-07-31

## 2025-07-31 RX ORDER — QUETIAPINE FUMARATE 200 MG/1
200 TABLET, FILM COATED ORAL
Qty: 90 TABLET | Refills: 0 | Status: SHIPPED | OUTPATIENT
Start: 2025-07-31

## 2025-07-31 RX ORDER — TRAZODONE HYDROCHLORIDE 50 MG/1
50 TABLET ORAL
Qty: 90 TABLET | Refills: 0 | Status: SHIPPED | OUTPATIENT
Start: 2025-07-31

## 2025-07-31 RX ORDER — FOLIC ACID 1 MG/1
1 TABLET ORAL DAILY
Qty: 90 TABLET | Refills: 2 | Status: SHIPPED | OUTPATIENT
Start: 2025-07-31

## 2025-07-31 RX ORDER — NALTREXONE HYDROCHLORIDE 50 MG/1
50 TABLET, FILM COATED ORAL DAILY
Qty: 30 TABLET | Refills: 5 | Status: SHIPPED | OUTPATIENT
Start: 2025-07-31

## 2025-07-31 RX ORDER — HYDROXYZINE HYDROCHLORIDE 50 MG/1
50 TABLET, FILM COATED ORAL 2 TIMES DAILY PRN
Qty: 180 TABLET | Refills: 0 | Status: SHIPPED | OUTPATIENT
Start: 2025-07-31